# Patient Record
Sex: FEMALE | Race: WHITE | NOT HISPANIC OR LATINO | Employment: OTHER | URBAN - METROPOLITAN AREA
[De-identification: names, ages, dates, MRNs, and addresses within clinical notes are randomized per-mention and may not be internally consistent; named-entity substitution may affect disease eponyms.]

---

## 2017-01-09 DIAGNOSIS — J45.909 UNCOMPLICATED ASTHMA, UNSPECIFIED ASTHMA SEVERITY: ICD-10-CM

## 2017-01-09 NOTE — TELEPHONE ENCOUNTER
Have we ever prescribed this med? Yes.  If yes, what date? 8/8/16    Last OV: 5/2/16    Next OV: no pending    DX: Asthma    Medications: Qvar 80mcg

## 2017-01-10 NOTE — TELEPHONE ENCOUNTER
Spoke to patient advised RX sent to the pharmacy. Advised to follow up as advised annual with PFT pt was transferred to scheduling to be scheduled

## 2017-01-16 ENCOUNTER — TELEPHONE (OUTPATIENT)
Dept: PULMONOLOGY | Facility: HOSPICE | Age: 77
End: 2017-01-16

## 2017-01-16 NOTE — TELEPHONE ENCOUNTER
MEDICATION PRIOR AUTHORIZATION NEEDED:    1. Name of Medication: Qvar 80mcg-Tier Exception    2. Requested By (Name of Pharmacy): [facility]     3. Is insurance on file current? yes    4. What is the name & phone number of the 3rd party payor? [facility] [phone]

## 2017-01-18 NOTE — TELEPHONE ENCOUNTER
DOCUMENTATION OF PRIOR AUTH STATUS    1. Medication name and dose: Qvar 80mcg    2. Name and Phone # of Prescription coverage company: [employer] [phone]    3. Date Prior Auth was submitted: 1/13/2017    4. What information was given to obtain insurance decision: Clinical notes    5. Prior Auth letter Approved or Denied: Approved through 12/31/2017    6. Pharmacy notified: Yes    7. Patient notified: Yes

## 2017-01-23 ENCOUNTER — TELEPHONE (OUTPATIENT)
Dept: PULMONOLOGY | Facility: HOSPICE | Age: 77
End: 2017-01-23

## 2017-01-30 DIAGNOSIS — J45.909 UNCOMPLICATED ASTHMA, UNSPECIFIED ASTHMA SEVERITY: ICD-10-CM

## 2017-01-30 RX ORDER — FLUTICASONE PROPIONATE 110 UG/1
2 AEROSOL, METERED RESPIRATORY (INHALATION) 2 TIMES DAILY
Qty: 3 INHALER | Refills: 3 | Status: SHIPPED | OUTPATIENT
Start: 2017-01-30 | End: 2018-02-19 | Stop reason: SDUPTHER

## 2017-01-31 NOTE — TELEPHONE ENCOUNTER
1. Caller Name: pt                      Call Back Number: [phone] (home)       2. Message: Pt called in regards to the switch out from Qvar. Per last phone encounter WILLIAM Jang has suggested switching to Flovent 110mcg. Pt would like this sent as a 3 Mth supply to [facility].     3. Patient approves office to leave a detailed voicemail/MyChart message: yes

## 2017-02-24 ENCOUNTER — TELEPHONE (OUTPATIENT)
Dept: PULMONOLOGY | Facility: HOSPICE | Age: 77
End: 2017-02-24

## 2017-02-24 DIAGNOSIS — R05.9 COUGH: ICD-10-CM

## 2017-02-24 RX ORDER — AZITHROMYCIN 250 MG/1
TABLET, FILM COATED ORAL
Qty: 6 TAB | Refills: 0 | Status: SHIPPED | OUTPATIENT
Start: 2017-02-24 | End: 2017-12-15

## 2017-02-24 RX ORDER — METHYLPREDNISOLONE 4 MG/1
TABLET ORAL
Qty: 21 TAB | Refills: 0 | Status: SHIPPED | OUTPATIENT
Start: 2017-02-24 | End: 2017-12-15

## 2017-03-20 ENCOUNTER — TELEPHONE (OUTPATIENT)
Dept: PULMONOLOGY | Facility: HOSPICE | Age: 77
End: 2017-03-20

## 2017-03-20 DIAGNOSIS — R06.00 DYSPNEA, UNSPECIFIED TYPE: ICD-10-CM

## 2017-05-04 ENCOUNTER — NON-PROVIDER VISIT (OUTPATIENT)
Dept: PULMONOLOGY | Facility: HOSPICE | Age: 77
End: 2017-05-04
Payer: MEDICARE

## 2017-05-04 ENCOUNTER — OFFICE VISIT (OUTPATIENT)
Dept: PULMONOLOGY | Facility: HOSPICE | Age: 77
End: 2017-05-04
Payer: MEDICARE

## 2017-05-04 VITALS
SYSTOLIC BLOOD PRESSURE: 118 MMHG | TEMPERATURE: 97 F | WEIGHT: 178 LBS | HEART RATE: 85 BPM | BODY MASS INDEX: 28.61 KG/M2 | DIASTOLIC BLOOD PRESSURE: 76 MMHG | HEIGHT: 66 IN | RESPIRATION RATE: 16 BRPM

## 2017-05-04 DIAGNOSIS — J45.991 COUGH VARIANT ASTHMA: ICD-10-CM

## 2017-05-04 DIAGNOSIS — R06.00 DYSPNEA, UNSPECIFIED TYPE: ICD-10-CM

## 2017-05-04 PROCEDURE — 4040F PNEUMOC VAC/ADMIN/RCVD: CPT | Mod: 8P | Performed by: INTERNAL MEDICINE

## 2017-05-04 PROCEDURE — G8419 CALC BMI OUT NRM PARAM NOF/U: HCPCS | Performed by: INTERNAL MEDICINE

## 2017-05-04 PROCEDURE — 1101F PT FALLS ASSESS-DOCD LE1/YR: CPT | Mod: 8P | Performed by: INTERNAL MEDICINE

## 2017-05-04 PROCEDURE — G8432 DEP SCR NOT DOC, RNG: HCPCS | Performed by: INTERNAL MEDICINE

## 2017-05-04 PROCEDURE — 94060 EVALUATION OF WHEEZING: CPT | Performed by: INTERNAL MEDICINE

## 2017-05-04 PROCEDURE — 99214 OFFICE O/P EST MOD 30 MIN: CPT | Mod: 25 | Performed by: INTERNAL MEDICINE

## 2017-05-04 PROCEDURE — 1036F TOBACCO NON-USER: CPT | Performed by: INTERNAL MEDICINE

## 2017-05-04 PROCEDURE — 94729 DIFFUSING CAPACITY: CPT | Performed by: INTERNAL MEDICINE

## 2017-05-04 PROCEDURE — 3017F COLORECTAL CA SCREEN DOC REV: CPT | Mod: 8P | Performed by: INTERNAL MEDICINE

## 2017-05-04 PROCEDURE — 3014F SCREEN MAMMO DOC REV: CPT | Performed by: INTERNAL MEDICINE

## 2017-05-04 PROCEDURE — 94726 PLETHYSMOGRAPHY LUNG VOLUMES: CPT | Performed by: INTERNAL MEDICINE

## 2017-05-04 RX ORDER — TURMERIC ROOT EXTRACT 500 MG
500 TABLET ORAL 2 TIMES DAILY
COMMUNITY

## 2017-05-04 RX ORDER — MAGNESIUM CARB/ALUMINUM HYDROX 105-160MG
1 TABLET,CHEWABLE ORAL DAILY
COMMUNITY
End: 2023-05-09

## 2017-05-04 RX ORDER — THYROID 60 MG/1
60 TABLET ORAL DAILY
COMMUNITY
End: 2017-12-15

## 2017-05-04 ASSESSMENT — PULMONARY FUNCTION TESTS
FVC_PREDICTED: 3.08
FVC: 2.57
FEV1/FVC_PERCENT_PREDICTED: 104
FEV1/FVC: 78.21
FEV1/FVC_PERCENT_CHANGE: 100
FEV1_PERCENT_CHANGE: 2
FEV1_PERCENT_CHANGE: 2
FEV1_PREDICTED: 2.32
FVC: 2.51
FEV1: 1.95
FEV1/FVC: 78
FEV1_PERCENT_PREDICTED: 84
FVC_PERCENT_PREDICTED: 81
FEV1/FVC_PERCENT_PREDICTED: 104
FVC_PERCENT_PREDICTED: 83
FEV1_PERCENT_PREDICTED: 86
FEV1/FVC_PERCENT_PREDICTED: 75
FEV1: 2.01

## 2017-05-04 NOTE — PROCEDURES
Good patient effort & cooperation.  The results of this test meet the ATS standards for acceptability and repeatability.  The Spirometry results meet the ATS/ERS standards acceptability & repeatability.  The DLCO was uncorrected for Hgb.  A bronchodilator of Ventolin HFA- 2puffs via spacer was administered.    The FVC is 2.57 L or 83%, FEV1 is 2.01 L or 86%, FEV1/FVC 78%. TLC 93%. DLCO 146%. No bronchodilator response.    Interpretation:  Normal pulmonary function.  Normal gas transfer.  Good patient effort and cooperation.

## 2017-05-04 NOTE — PROGRESS NOTES
Chief Complaint   Patient presents with   • Results     PFT results.       HPI: This patient is a 76 y.o. Female who returns for annual follow-up of cough variant asthma. She was on Qvar 80 µg however formulary changed her to Flovent 110 µg. She denies cough, wheezing, dyspnea or chest tightness. Her pulmonary function testing show normal lung function with improved FEV1 to 2.01 L or 86%. No significant bronchodilator response was noted. She feels well overall, typically has one bout of bronchitis annually requiring antibiotics.   She feels well and has no specific complaints.      Past Medical History   Diagnosis Date   • Hyperlipidemia    • Hypothyroidism    • Lung nodules July 2009 stable 2010   • HTN (hypertension)        Social History     Social History   • Marital Status:      Spouse Name: N/A   • Number of Children: N/A   • Years of Education: N/A     Occupational History   • Not on file.     Social History Main Topics   • Smoking status: Former Smoker -- 1.00 packs/day for 15 years     Types: Cigarettes     Quit date: 01/01/1977   • Smokeless tobacco: Never Used      Comment: STARTED 1960  QUIT  1977   • Alcohol Use: 0.0 oz/week     0 Standard drinks or equivalent per week      Comment: MIXED DRINKS OCCASIONALLY   • Drug Use: No   • Sexual Activity: Not on file     Other Topics Concern   • Not on file     Social History Narrative       History reviewed. No pertinent family history.    Current Outpatient Prescriptions on File Prior to Visit   Medication Sig Dispense Refill   • fluticasone (FLOVENT HFA) 110 MCG/ACT Aerosol Inhale 2 Puffs by mouth 2 times a day. Use spacer. Rinse mouth after each use. 3 Inhaler 3   • Albuterol Sulfate (PROAIR RESPICLICK) 108 (90 BASE) MCG/ACT AEROSOL POWDER, BREATH ACTIVATED Inhale 2 Puffs by mouth as needed (for shortness of breath, wheezing.). every 4-6 hours as needed. 1 Each 0   • simvastatin (ZOCOR) 40 MG Tab Take 40 mg by mouth every day.     • lorazepam  (ATIVAN) 0.5 MG TABS Take 1 Tab by mouth 2 Times a Day. 100 Each 1   • thyroid (ARMOUR THYROID) 90 MG TABS Take 1 Tab by mouth every day. 100 Each 3   • vitamin D (CHOLECALCIFEROL) 1000 UNIT TABS Take 1,000 Units by mouth every day.     • Magnesium 250 MG TABS Take 400 Tabs by mouth every day.     • Lutein 20 MG TABS Take 25 mg by mouth every day.     • Bioflavonoid Products (LISSY-C) TABS Take 1,000 Tabs by mouth every day.     • Multiple Vitamin (MULTIVITAMIN PO) Take 1 tablet by mouth every day.     • docosahexanoic acid (FISH OIL) 1000 MG CAPS Take 1,000 mg by mouth every day.     • azithromycin (ZITHROMAX) 250 MG Tab Take 2 tablets on day 1, then take 1 tablet a day for 4 days. 6 Tab 0   • MethylPREDNISolone (MEDROL DOSEPAK) 4 MG Tablet Therapy Pack Take as directed. 21 Tab 0   • beclomethasone (QVAR) 80 MCG/ACT inhaler Inhale 2 Puffs by mouth 2 times a day. 1 Inhaler 5   • MethylPREDNISolone (MEDROL DOSEPAK) 4 MG Tablet Therapy Pack Take as directed. 21 Tab 0   • azithromycin (ZITHROMAX) 250 MG Tab Take as directed 6 Tab 0   • albuterol 108 (90 BASE) MCG/ACT Aero Soln inhalation aerosol Inhale 2 Puffs by mouth every four hours as needed for Shortness of Breath. 1 Inhaler 1   • simvastatin (ZOCOR) 40 MG Tab Take 40 mg by mouth every evening.     • NON SPECIFIED CALCIUM TABLETS ONE BY MOUTH DAILY.     • clotrimazole (LOTRIMIN) 1 % CREA Apply  to affected area(s) 2 times a day. Apply to affected area 2 times a day prn  Safe way [phone] 1 Tube 3   • fluticasone-salmeterol (ADVAIR DISKUS) 250-50 MCG/DOSE AEPB Inhale 1 Puff by mouth every 12 hours.     • Ranitidine HCl (RANITIDINE 75 PO) Take 75 mg by mouth 2 Times a Day.       No current facility-administered medications on file prior to visit.       Allergies: Atenolol    ROS:   Constitutional: Denies fevers, chills, night sweats, fatigue or weight loss  Eyes: Denies vision loss, pain, drainage, double vision  Ears, Nose, Throat: Denies earache, difficulty  "hearing, tinnitus, nasal congestion, hoarseness  Cardiovascular: Denies chest pain, tightness, palpitations, orthopnea or edema  Respiratory: As in history of present illness  Sleep: Denies daytime sleepiness, snoring, apneas, insomnia, morning headaches  GI: Denies heartburn, dysphagia, nausea, abdominal pain, diarrhea or constipation  : Denies frequent urination, hematuria, discharge or painful urination  Musculoskeletal: Denies back pain, painful joints, sore muscles  Neurological: Denies weakness or headaches  Skin: No rashes    Blood pressure 118/76, pulse 85, temperature 36.1 °C (97 °F), temperature source Temporal, resp. rate 16, height 1.676 m (5' 5.98\"), weight 80.74 kg (178 lb).  Multi-Ox Readings  Multi Ox #1     O2 sat % at rest     O2 sat % on exertion     O2 sat average on exertion     Multi Ox #2     O2 sat % at rest     O2 sat % on exertion     O2 sat average on exertion       Oxygen Use     Oxygen Frequency     Duration of need     Is the patient mobile within the home?     CPAP Use?     BIPAP Use?     Servo Titration         Physical Exam:  Appearance: Well-nourished, well-developed, in no acute distress  HEENT: Normocephalic, atraumatic, white sclera, PERRLA, oropharynx clear  Neck: No adenopathy or masses  Respiratory: no intercostal retractions or accessory muscle use  Lungs auscultation: Clear to auscultation bilaterally  Cardiovascular: Regular rate rhythm. No murmurs, rubs or gallops.  No LE edema  Abdomen: soft, nondistended  Gait: Normal  Digits: No clubbing, cyanosis  Motor: No focal deficits  Orientation: Oriented to time, person and place    Diagnosis:  1. Cough variant asthma  AMB SPIROMETRY       Plan:  The patient's cough variant asthma is under good clinical control with inhaled corticosteroids. Continue Flovent 110 µg 2 puffs twice a day, rinsing mouth after use. Use ProAir HFA when necessary.  Pneumococcal vaccine, and annual influenza vaccine, advised.  Return in about 1 year " (around 5/4/2018) for with spirometry.

## 2017-05-04 NOTE — MR AVS SNAPSHOT
Karli Berrios   2017 1:00 PM   Non-Provider Visit   MRN: [id number]    Department:  [facility]   Dept Phone:  [phone]    Description:  Female : 1940   Provider:  Eli Jaramillo M.D.           Reason for Visit     Shortness of Breath           Allergies as of 2017     Allergen Noted Reactions    Atenolol 10/15/2014       Side effect. Leg Cramp      You were diagnosed with     Dyspnea, unspecified type   [[id number]]         Vital Signs     Smoking Status                   Former Smoker           Basic Information     Date Of Birth Sex Race Ethnicity Preferred Language    1940 Female White Non- English      Your appointments     May 04, 2017  2:20 PM   Established Patient Pul with Eli Jaramillo M.D.   [facility] (--)    [address]  [facility] [address]  [facility] [id number] [phone]              Problem List              ICD-10-CM Priority Class Noted - Resolved    Hypothyroidism E03.9   Unknown - Present    Hyperlipidemia E78.5   Unknown - Present    Neck pain M54.2   2010 - Present    Cough R05   2010 - Present    Lung nodules R91.8   2010 - Present    Asthma, mild intermittent J45.20   2016 - Present      Health Maintenance        Date Due Completion Dates    IMM DTaP/Tdap/Td Vaccine (1 - Tdap) 1961 ---    PAP SMEAR 1963 ---    COLONOSCOPY 1992 ---    IMM ZOSTER VACCINE 2002 ---    IMM PNEUMOCOCCAL 65+ (ADULT) LOW/MEDIUM RISK SERIES (1 of 2 - PCV13) 2007 ---    BONE DENSITY 3/10/2010 3/10/2005    MAMMOGRAM 2017, 10/5/2015, 2014, 2013, 2012, 2011, 2010, 2010, 2009, 2009, 6/3/2008, 6/3/2008, 5/3/2006, 2005            Current Immunizations     Influenza Vaccine Quad Inj (Pf) 2016  4:45 PM      Below and/or attached are the medications your provider expects you to take. Review all of your home medications and newly ordered medications with your  provider and/or pharmacist. Follow medication instructions as directed by your provider and/or pharmacist. Please keep your medication list with you and share with your provider. Update the information when medications are discontinued, doses are changed, or new medications (including over-the-counter products) are added; and carry medication information at all times in the event of emergency situations     Allergies:  ATENOLOL - (reactions not documented)               Medications  Valid as of: May 04, 2017 -  2:15 PM    Generic Name Brand Name Tablet Size Instructions for use    Albuterol Sulfate (Aero Soln) albuterol 108 (90 BASE) MCG/ACT Inhale 2 Puffs by mouth every four hours as needed for Shortness of Breath.        Albuterol Sulfate (AEROSOL POWDER, BREATH ACTIVATED) Albuterol Sulfate 108 (90 BASE) MCG/ACT Inhale 2 Puffs by mouth as needed (for shortness of breath, wheezing.). every 4-6 hours as needed.        Azithromycin (Tab) ZITHROMAX 250 MG Take as directed        Azithromycin (Tab) ZITHROMAX 250 MG Take 2 tablets on day 1, then take 1 tablet a day for 4 days.        Beclomethasone Dipropionate (Aero Soln) QVAR 80 MCG/ACT Inhale 2 Puffs by mouth 2 times a day.        Bioflavonoid Products (Tab) LISSY-C  Take 1,000 Tabs by mouth every day.        Calcium Carbonate-Vit D-Min   Take 1 tablet by mouth every day.        Cholecalciferol (Tab) cholecalciferol 1000 UNIT Take 1,000 Units by mouth every day.        Clotrimazole (Cream) LOTRIMIN 1 % Apply  to affected area(s) 2 times a day. Apply to affected area 2 times a day prn  Safe way [phone]        Cyanocobalamin   Take 1,000 Tabs by mouth every day.        Fluticasone Propionate HFA (Aerosol) FLOVENT  MCG/ACT Inhale 2 Puffs by mouth 2 times a day. Use spacer. Rinse mouth after each use.        Fluticasone-Salmeterol (AEROSOL POWDER, BREATH ACTIVATED) ADVAIR 250-50 MCG/DOSE Inhale 1 Puff by mouth every 12 hours.        Glucosamine-Chondroitin (Tab)  Glucosamine-Chondroitin 750-600 MG Take 1 tablet by mouth every day.        LORazepam (Tab) ATIVAN 0.5 MG Take 1 Tab by mouth 2 Times a Day.        Lutein (Tab) Lutein 20 MG Take 25 mg by mouth every day.        Magnesium (Tab) Magnesium 250 MG Take 400 Tabs by mouth every day.        MethylPREDNISolone (Tablet Therapy Pack) MEDROL DOSEPAK 4 MG Take as directed.        MethylPREDNISolone (Tablet Therapy Pack) MEDROL DOSEPAK 4 MG Take as directed.        Multiple Vitamins-Minerals   Take 1 tablet by mouth every day.        NON SPECIFIED   CALCIUM TABLETS ONE BY MOUTH DAILY.        Omega-3 Fatty Acids (Cap) OMEGA 3 FA 1000 MG Take 1,000 mg by mouth every day.        RaNITidine HCl   Take 75 mg by mouth 2 Times a Day.        Simvastatin (Tab) ZOCOR 40 MG Take 40 mg by mouth every day.        Simvastatin (Tab) ZOCOR 40 MG Take 40 mg by mouth every evening.        Thyroid (Tab) ARMOUR THYROID 90 MG Take 1 Tab by mouth every day.        Thyroid (Tab) ARMOUR THYROID 60 MG Take 60 mg by mouth every day.        Turmeric (Tab) Turmeric 500 MG Take 1 tablet by mouth 2 Times a Day.        .                 Medicines prescribed today were sent to:     [facility] [address] [place] [address], [place] - [address]    [address] [facility] [place] [address]    Phone: [phone] Fax: [phone]    Open 24 Hours?: No    [facility] [place], [place] - [address]    [address] [place] [address]    Phone: [phone] Fax: [phone]    Open 24 Hours?: No      Medication refill instructions:       If your prescription bottle indicates you have medication refills left, it is not necessary to call your provider’s office. Please contact your pharmacy and they will refill your medication.    If your prescription bottle indicates you do not have any refills left, you may request refills at any time through one of the following ways: The online [employer] system (except Urgent Care), by calling your provider’s office,  or by asking your pharmacy to contact your provider’s office with a refill request. Medication refills are processed only during regular business hours and may not be available until the next business day. Your provider may request additional information or to have a follow-up visit with you prior to refilling your medication.   *Please Note: Medication refills are assigned a new Rx number when refilled electronically. Your pharmacy may indicate that no refills were authorized even though a new prescription for the same medication is available at the pharmacy. Please request the medicine by name with the pharmacy before contacting your provider for a refill.           [email or web address] Access Code: [email or web address]  Expires: 6/2/2017  3:31 PM    PicRate.Me  A secure, online tool to manage your health information     [employer]’s PicRate.Me® is a secure, online tool that connects you to your personalized health information from the privacy of your home -- day or night - making it very easy for you to manage your healthcare. Once the activation process is completed, you can even access your medical information using the PicRate.Me sean, which is available for free in the Apple Sean store or Google Play store.     PicRate.Me provides the following levels of access (as shown below):   My Chart Features   [facility] Doctor [facility]  Specialists [facility]  Urgent  Care Non-[facility]  Primary Care  Doctor   Email your healthcare team securely and privately 24/7 X X X    Manage appointments: schedule your next appointment; view details of past/upcoming appointments X      Request prescription refills. X      View recent personal medical records, including lab and immunizations X X X X   View health record, including health history, allergies, medications X X X X   Read reports about your outpatient visits, procedures, consult and ER notes X X X X   See your discharge summary, which is a recap of your hospital and/or ER visit that includes  your diagnosis, lab results, and care plan. X X       How to register for [employer]:  1. Go to  [email or web address].  2. Click on the Sign Up Now box, which takes you to the New Member Sign Up page. You will need to provide the following information:  a. Enter your [employer] Access Code exactly as it appears at the top of this page. (You will not need to use this code after you’ve completed the sign-up process. If you do not sign up before the expiration date, you must request a new code.)   b. Enter your date of birth.   c. Enter your home email address.   d. Click Submit, and follow the next screen’s instructions.  3. Create a [employer] ID. This will be your [employer] login ID and cannot be changed, so think of one that is secure and easy to remember.  4. Create a [employer] password. You can change your password at any time.  5. Enter your Password Reset Question and Answer. This can be used at a later time if you forget your password.   6. Enter your e-mail address. This allows you to receive e-mail notifications when new information is available in [employer]net.  7. Click Sign Up. You can now view your health information.    For assistance activating your [employer] account, call [phone]

## 2017-05-04 NOTE — MR AVS SNAPSHOT
"Karli Berrios   2017 2:20 PM   Office Visit   MRN: [id number]    Department:  [facility]   Dept Phone:  [phone]    Description:  Female : 1940   Provider:  Eli Jaramillo M.D.           Reason for Visit     Results PFT results.      Allergies as of 2017     Allergen Noted Reactions    Atenolol 10/15/2014       Side effect. Leg Cramp      You were diagnosed with     Cough variant asthma   [493.82.ICD-9-CM]         Vital Signs     Blood Pressure Pulse Temperature Respirations Height Weight    118/76 mmHg 85 36.1 °C (97 °F) (Temporal) 16 1.676 m (5' 5.98\") 80.74 kg (178 lb)    Body Mass Index Smoking Status                28.74 kg/m2 Former Smoker          Basic Information     Date Of Birth Sex Race Ethnicity Preferred Language    1940 Female White Non- English      Problem List              ICD-10-CM Priority Class Noted - Resolved    Hypothyroidism E03.9   Unknown - Present    Hyperlipidemia E78.5   Unknown - Present    Neck pain M54.2   2010 - Present    Cough R05   2010 - Present    Lung nodules R91.8   2010 - Present    Asthma, mild intermittent J45.20   2016 - Present      Health Maintenance        Date Due Completion Dates    IMM DTaP/Tdap/Td Vaccine (1 - Tdap) 1961 ---    PAP SMEAR 1963 ---    COLONOSCOPY 1992 ---    IMM ZOSTER VACCINE 2002 ---    IMM PNEUMOCOCCAL 65+ (ADULT) LOW/MEDIUM RISK SERIES (1 of 2 - PCV13) 2007 ---    BONE DENSITY 3/10/2010 3/10/2005    MAMMOGRAM 2017, 10/5/2015, 2014, 2013, 2012, 2011, 2010, 2010, 2009, 2009, 6/3/2008, 6/3/2008, 5/3/2006, 2005            Current Immunizations     Influenza Vaccine Quad Inj (Pf) 2016  4:45 PM      Below and/or attached are the medications your provider expects you to take. Review all of your home medications and newly ordered medications with your provider and/or pharmacist. Follow medication " instructions as directed by your provider and/or pharmacist. Please keep your medication list with you and share with your provider. Update the information when medications are discontinued, doses are changed, or new medications (including over-the-counter products) are added; and carry medication information at all times in the event of emergency situations     Allergies:  ATENOLOL - (reactions not documented)               Medications  Valid as of: May 04, 2017 -  2:33 PM    Generic Name Brand Name Tablet Size Instructions for use    Albuterol Sulfate (Aero Soln) albuterol 108 (90 BASE) MCG/ACT Inhale 2 Puffs by mouth every four hours as needed for Shortness of Breath.        Albuterol Sulfate (AEROSOL POWDER, BREATH ACTIVATED) Albuterol Sulfate 108 (90 BASE) MCG/ACT Inhale 2 Puffs by mouth as needed (for shortness of breath, wheezing.). every 4-6 hours as needed.        Azithromycin (Tab) ZITHROMAX 250 MG Take as directed        Azithromycin (Tab) ZITHROMAX 250 MG Take 2 tablets on day 1, then take 1 tablet a day for 4 days.        Beclomethasone Dipropionate (Aero Soln) QVAR 80 MCG/ACT Inhale 2 Puffs by mouth 2 times a day.        Bioflavonoid Products (Tab) LISSY-C  Take 1,000 Tabs by mouth every day.        Calcium Carbonate-Vit D-Min   Take 1 tablet by mouth every day.        Cholecalciferol (Tab) cholecalciferol 1000 UNIT Take 1,000 Units by mouth every day.        Clotrimazole (Cream) LOTRIMIN 1 % Apply  to affected area(s) 2 times a day. Apply to affected area 2 times a day prn  [address] [phone]        Cyanocobalamin   Take 1,000 Tabs by mouth every day.        Fluticasone Propionate HFA (Aerosol) FLOVENT  MCG/ACT Inhale 2 Puffs by mouth 2 times a day. Use spacer. Rinse mouth after each use.        Fluticasone-Salmeterol (AEROSOL POWDER, BREATH ACTIVATED) ADVAIR 250-50 MCG/DOSE Inhale 1 Puff by mouth every 12 hours.        Glucosamine-Chondroitin (Tab) Glucosamine-Chondroitin 750-600 MG Take 1  tablet by mouth every day.        LORazepam (Tab) ATIVAN 0.5 MG Take 1 Tab by mouth 2 Times a Day.        Lutein (Tab) Lutein 20 MG Take 25 mg by mouth every day.        Magnesium (Tab) Magnesium 250 MG Take 400 Tabs by mouth every day.        MethylPREDNISolone (Tablet Therapy Pack) MEDROL DOSEPAK 4 MG Take as directed.        MethylPREDNISolone (Tablet Therapy Pack) MEDROL DOSEPAK 4 MG Take as directed.        Multiple Vitamins-Minerals   Take 1 tablet by mouth every day.        NON SPECIFIED   CALCIUM TABLETS ONE BY MOUTH DAILY.        Omega-3 Fatty Acids (Cap) OMEGA 3 FA 1000 MG Take 1,000 mg by mouth every day.        RaNITidine HCl   Take 75 mg by mouth 2 Times a Day.        Simvastatin (Tab) ZOCOR 40 MG Take 40 mg by mouth every day.        Simvastatin (Tab) ZOCOR 40 MG Take 40 mg by mouth every evening.        Thyroid (Tab) ARMOUR THYROID 90 MG Take 1 Tab by mouth every day.        Thyroid (Tab) ARMOUR THYROID 60 MG Take 60 mg by mouth every day.        Turmeric (Tab) Turmeric 500 MG Take 1 tablet by mouth 2 Times a Day.        .                 Medicines prescribed today were sent to:     [facility] [address] [place] [address], [place] - [address] [facility]    [address] [facility] [address]    Phone: [phone] Fax: [phone]    Open 24 Hours?: No    [facility] - [place], [place] - [address]    [address] [facility] [place] [address]    Phone: [phone] Fax: [phone]    Open 24 Hours?: No      Medication refill instructions:       If your prescription bottle indicates you have medication refills left, it is not necessary to call your provider’s office. Please contact your pharmacy and they will refill your medication.    If your prescription bottle indicates you do not have any refills left, you may request refills at any time through one of the following ways: The online [employer] system (except Urgent Care), by calling your provider’s office, or by asking your pharmacy to contact your  provider’s office with a refill request. Medication refills are processed only during regular business hours and may not be available until the next business day. Your provider may request additional information or to have a follow-up visit with you prior to refilling your medication.   *Please Note: Medication refills are assigned a new Rx number when refilled electronically. Your pharmacy may indicate that no refills were authorized even though a new prescription for the same medication is available at the pharmacy. Please request the medicine by name with the pharmacy before contacting your provider for a refill.        Your To Do List     Future Labs/Procedures Complete By Expires    AMB SPIROMETRY  5/4/2018 5/4/2018         [employer] Access Code: [email or web address]  Expires: 6/2/2017  3:31 PM    [employer]  A secure, online tool to manage your health information     [employer]® is a secure, online tool that connects you to your personalized health information from the privacy of your home -- day or night - making it very easy for you to manage your healthcare. Once the activation process is completed, you can even access your medical information using the [employer] sean, which is available for free in the Apple Sean store or Google Play store.     [employer] provides the following levels of access (as shown below):   My Chart Features   [facility] Doctor [facility]  Specialists [facility]  Urgent  Care Non-[facility]  Primary Care  Doctor   Email your healthcare team securely and privately 24/7 X X X    Manage appointments: schedule your next appointment; view details of past/upcoming appointments X      Request prescription refills. X      View recent personal medical records, including lab and immunizations X X X X   View health record, including health history, allergies, medications X X X X   Read reports about your outpatient visits, procedures, consult and ER notes X X X X   See your discharge summary, which  is a recap of your hospital and/or ER visit that includes your diagnosis, lab results, and care plan. X X       How to register for [employer]:  1. Go to  [email or web address].  2. Click on the Sign Up Now box, which takes you to the New Member Sign Up page. You will need to provide the following information:  a. Enter your [employer] Access Code exactly as it appears at the top of this page. (You will not need to use this code after you’ve completed the sign-up process. If you do not sign up before the expiration date, you must request a new code.)   b. Enter your date of birth.   c. Enter your home email address.   d. Click Submit, and follow the next screen’s instructions.  3. Create a [employer] ID. This will be your [employer] login ID and cannot be changed, so think of one that is secure and easy to remember.  4. Create a [employer] password. You can change your password at any time.  5. Enter your Password Reset Question and Answer. This can be used at a later time if you forget your password.   6. Enter your e-mail address. This allows you to receive e-mail notifications when new information is available in [employer].  7. Click Sign Up. You can now view your health information.    For assistance activating your [employer] account, call [phone]

## 2017-11-10 ENCOUNTER — HOSPITAL ENCOUNTER (OUTPATIENT)
Dept: RADIOLOGY | Facility: MEDICAL CENTER | Age: 77
End: 2017-11-10
Attending: OBSTETRICS & GYNECOLOGY
Payer: MEDICARE

## 2017-11-10 DIAGNOSIS — Z12.39 BREAST SCREENING: ICD-10-CM

## 2017-11-10 PROCEDURE — G0202 SCR MAMMO BI INCL CAD: HCPCS

## 2018-02-19 DIAGNOSIS — J45.909 UNCOMPLICATED ASTHMA: ICD-10-CM

## 2018-02-20 RX ORDER — DEXAMETHASONE 4 MG/1
TABLET ORAL
Qty: 1 INHALER | Refills: 3 | Status: SHIPPED | OUTPATIENT
Start: 2018-02-20 | End: 2018-08-06 | Stop reason: SDUPTHER

## 2018-02-20 NOTE — TELEPHONE ENCOUNTER
Have we ever prescribed this med? Yes.  If yes, what date? 1/30/17    Last OV: 5/4/17- Annual    Next OV: No pending apt scheduled    DX: Uncomplicated asthma, unspecified asthma severity (J45.909)    Medications: fluticasone (FLOVENT HFA) 110 MCG/ACT Aerosol

## 2018-08-01 ENCOUNTER — TELEPHONE (OUTPATIENT)
Dept: PULMONOLOGY | Facility: HOSPICE | Age: 78
End: 2018-08-01

## 2018-08-01 DIAGNOSIS — R06.02 SOB (SHORTNESS OF BREATH): ICD-10-CM

## 2018-08-06 DIAGNOSIS — J45.20 MILD INTERMITTENT ASTHMA WITHOUT COMPLICATION: ICD-10-CM

## 2018-08-06 DIAGNOSIS — J45.909 UNCOMPLICATED ASTHMA, UNSPECIFIED ASTHMA SEVERITY, UNSPECIFIED WHETHER PERSISTENT: ICD-10-CM

## 2018-08-06 DIAGNOSIS — J40 BRONCHITIS: ICD-10-CM

## 2018-08-06 RX ORDER — FLUTICASONE PROPIONATE 110 UG/1
AEROSOL, METERED RESPIRATORY (INHALATION)
Qty: 3 INHALER | Refills: 3 | Status: SHIPPED | OUTPATIENT
Start: 2018-08-06 | End: 2019-04-18 | Stop reason: SDUPTHER

## 2018-08-06 NOTE — TELEPHONE ENCOUNTER
Caller Name: Karli Berrios                 Call Back Number: [phone] (home)         Patient approves a detailed voicemail message: yes    Have we ever prescribed this med? Yes.  If yes, what date? 12/8/16    Last OV: 5/4/17- Dr. Jaramillo    Next OV: 8/6/18    DX: Mild intermittent asthma w/o comp    Medications: Flovent HFA, Proair respiclick

## 2018-08-21 ENCOUNTER — OFFICE VISIT (OUTPATIENT)
Dept: PULMONOLOGY | Facility: HOSPICE | Age: 78
End: 2018-08-21
Payer: MEDICARE

## 2018-08-21 ENCOUNTER — NON-PROVIDER VISIT (OUTPATIENT)
Dept: PULMONOLOGY | Facility: HOSPICE | Age: 78
End: 2018-08-21
Payer: MEDICARE

## 2018-08-21 VITALS
SYSTOLIC BLOOD PRESSURE: 124 MMHG | RESPIRATION RATE: 16 BRPM | OXYGEN SATURATION: 95 % | TEMPERATURE: 97.9 F | HEART RATE: 71 BPM | DIASTOLIC BLOOD PRESSURE: 62 MMHG | BODY MASS INDEX: 28.28 KG/M2 | HEIGHT: 66 IN | WEIGHT: 176 LBS

## 2018-08-21 DIAGNOSIS — J45.20 MILD INTERMITTENT ASTHMA WITHOUT COMPLICATION: ICD-10-CM

## 2018-08-21 DIAGNOSIS — R06.02 SOB (SHORTNESS OF BREATH): ICD-10-CM

## 2018-08-21 DIAGNOSIS — Z23 NEED FOR VACCINATION: ICD-10-CM

## 2018-08-21 PROCEDURE — 94060 EVALUATION OF WHEEZING: CPT | Performed by: INTERNAL MEDICINE

## 2018-08-21 PROCEDURE — 99213 OFFICE O/P EST LOW 20 MIN: CPT | Performed by: NURSE PRACTITIONER

## 2018-08-21 RX ORDER — THYROID 60 MG
60 TABLET ORAL
COMMUNITY
Start: 2018-06-20 | End: 2022-02-04 | Stop reason: SDUPTHER

## 2018-08-21 ASSESSMENT — PULMONARY FUNCTION TESTS
FVC: 2.29
FEV1_PREDICTED: 2.27
FEV1/FVC: 78.6
FVC: 2.29
FEV1/FVC_PERCENT_CHANGE: 0
FEV1: 1.8
FEV1/FVC_PERCENT_PREDICTED: 103
FEV1_PERCENT_CHANGE: 0
FEV1_PERCENT_CHANGE: 1
FEV1/FVC: 77
FEV1: 1.77
FVC_PERCENT_PREDICTED: 75
FEV1/FVC_PREDICTED: 74.92
FEV1_PREDICTED: 77
FVC_PREDICTED: 3.03

## 2018-08-21 ASSESSMENT — ENCOUNTER SYMPTOMS
CONSTITUTIONAL NEGATIVE: 1
COUGH: 1
NEUROLOGICAL NEGATIVE: 1
HEMOPTYSIS: 0
EYE DISCHARGE: 0
PSYCHIATRIC NEGATIVE: 1
EYE PAIN: 0
SHORTNESS OF BREATH: 0
SPUTUM PRODUCTION: 0
CARDIOVASCULAR NEGATIVE: 1
MUSCULOSKELETAL NEGATIVE: 1
BRUISES/BLEEDS EASILY: 0
GASTROINTESTINAL NEGATIVE: 1

## 2018-08-21 NOTE — PROGRESS NOTES
Chief Complaint   Patient presents with   • Follow-Up     PFT results         HPI: This patient is a 77 y.o. female, who presents for annual follow-up of cough variant asthma.  She is a former smoker, brief and distant smoking history.  15 pack year history quit 1977.    Spirometry today is stable with an FEV1 of 1.77 L 77% predicted, FEV1 FVC ratio of 77, no significant bronchodilator response.  She uses Flovent 110 mcg 2 puffs twice a day.  Patient reports her breathing has been stable.  She notes some mild chest congestion over the past couple of days.  She denies purulent cough, hemoptysis, wheeze, fever, chills, night sweats.  She follows with her primary care physician routinely.  She reports that she has had both of her pneumonia vaccines at [facility] by her house.  She will get those records and call our office so our system can be updated.    Past Medical History:   Diagnosis Date   • HTN (hypertension)    • Hyperlipidemia    • Hypothyroidism    • Lung nodules July 2009    stable 2010       Social History   Substance Use Topics   • Smoking status: Former Smoker     Packs/day: 1.00     Years: 15.00     Types: Cigarettes     Quit date: 1/1/1977   • Smokeless tobacco: Never Used      Comment: STARTED 1960  QUIT  1977   • Alcohol use 0.0 oz/week      Comment: MIXED DRINKS OCCASIONALLY       History reviewed. No pertinent family history.    Immunization History   Administered Date(s) Administered   • Hepatitis A Vaccine, Adult 07/31/2001, 02/07/2002   • Hepatitis B Vaccine Recombivax (Adol/Adult) 07/31/2001, 09/12/2001, 02/07/2002   • Influenza Vaccine Adult HD 10/03/2011, 09/27/2013, 09/17/2014, 09/23/2015, 09/21/2017   • Influenza Vaccine Quad Inj (Pf) 09/19/2016       Current medications as of today   Current Outpatient Prescriptions   Medication Sig Dispense Refill   • fluticasone (FLOVENT HFA) 110 MCG/ACT Aerosol INHALE TWO PUFFS BY MOUTH TWICE DAILY USE  SPACER.  RINSE  MOUTH  AFTER  EACH  USE 3 Inhaler 3  "  • Calcium Carbonate-Vit D-Min (CALCIUM 1200 PO) Take 1 tablet by mouth every day.     • Cyanocobalamin (VITAMIN B12 PO) Take 1,000 Tabs by mouth every day.     • Glucosamine-Chondroitin 750-600 MG Tab Take 1 tablet by mouth every day.     • Turmeric 500 MG Tab Take 1 tablet by mouth 2 Times a Day.     • albuterol 108 (90 BASE) MCG/ACT Aero Soln inhalation aerosol Inhale 2 Puffs by mouth every four hours as needed for Shortness of Breath. 1 Inhaler 1   • simvastatin (ZOCOR) 40 MG Tab Take 40 mg by mouth every day.     • lorazepam (ATIVAN) 0.5 MG TABS Take 1 Tab by mouth 2 Times a Day. 100 Each 1   • thyroid (ARMOUR THYROID) 90 MG TABS Take 1 Tab by mouth every day. 100 Each 3   • vitamin D (CHOLECALCIFEROL) 1000 UNIT TABS Take 1,000 Units by mouth every day.     • Magnesium 250 MG TABS Take 400 Tabs by mouth every day.     • Lutein 20 MG TABS Take 25 mg by mouth every day.     • Bioflavonoid Products (LISSY-C) TABS Take 1,000 Tabs by mouth every day.     • Multiple Vitamin (MULTIVITAMIN PO) Take 1 tablet by mouth every day.     • docosahexanoic acid (FISH OIL) 1000 MG CAPS Take 1,000 mg by mouth every day.     • ARMOUR THYROID 60 MG Tab      • Albuterol Sulfate (PROAIR RESPICLICK) 108 (90 Base) MCG/ACT AEROSOL POWDER, BREATH ACTIVATED Inhale 2 Puffs by mouth as needed (for shortness of breath, wheezing.). every 4-6 hours as needed. 1 Each 5   • azithromycin (ZITHROMAX) 250 MG Tab Take two pills today then one a day for the next four days 6 Tab 0   • MethylPREDNISolone (MEDROL DOSEPAK) 4 MG Tablet Therapy Pack Take as directed. 21 Tab 0     No current facility-administered medications for this visit.        Allergies: Atenolol    Blood pressure 124/62, pulse 71, temperature 36.6 °C (97.9 °F), resp. rate 16, height 1.676 m (5' 6\"), weight 79.8 kg (176 lb), SpO2 95 %.      Review of Systems   Constitutional: Negative.    HENT: Negative.    Eyes: Negative for pain and discharge.   Respiratory: Positive for cough. " Negative for hemoptysis, sputum production and shortness of breath.    Cardiovascular: Negative.    Gastrointestinal: Negative.    Musculoskeletal: Negative.    Skin: Negative.    Neurological: Negative.    Endo/Heme/Allergies: Negative for environmental allergies. Does not bruise/bleed easily.   Psychiatric/Behavioral: Negative.        Physical Exam   Constitutional: She is oriented to person, place, and time and well-developed, well-nourished, and in no distress.   HENT:   Head: Normocephalic and atraumatic.   Eyes: Pupils are equal, round, and reactive to light.   Neck: Normal range of motion. Neck supple. No tracheal deviation present.   Cardiovascular: Normal rate, regular rhythm and normal heart sounds.    Pulmonary/Chest: Effort normal and breath sounds normal.   Musculoskeletal: Normal range of motion.   Neurological: She is alert and oriented to person, place, and time. Gait normal.   Skin: Skin is warm and dry.   Psychiatric: Mood, memory, affect and judgment normal.   Vitals reviewed.      Diagnoses/Plan:      1. Mild intermittent asthma without complication  Continue current bronchodilators.  Patient has albuterol but rarely requires use.  She likes to minimize medication use if at all possible.  She has Medrol zpak on hand to take if necessary.    2. Need for vaccination  She will call with her pneumococcal vaccine history    She will follow-up here annually, sooner if needed    She is encouraged to follow-up with her primary care physician for routine health

## 2018-08-21 NOTE — PROCEDURES
Good patient effort & cooperation. The results of this test meet the ATS standards for acceptability and repeatability. Test was performed on the Curiyo/D spirometry system. Predicted values were N-Leonardo. A bronchodilator of Ventolin HFA - 2 puffs with a spacer was administered to the patient.    FEV1 is 1.77 L which is 77% of predicted.  FEV1 FVC ratio is normal.  No reversibility is noted.    No definite obstructive lung disease is noted.  A restrictive process cannot be ruled out without measurement of lung volume.

## 2018-09-21 DIAGNOSIS — J20.9 ACUTE BRONCHITIS WITH BRONCHOSPASM: ICD-10-CM

## 2018-09-21 RX ORDER — METHYLPREDNISOLONE 4 MG/1
TABLET ORAL
Qty: 21 TAB | Refills: 0 | Status: SHIPPED | OUTPATIENT
Start: 2018-09-21 | End: 2018-12-03

## 2018-09-21 NOTE — TELEPHONE ENCOUNTER
The patient called and she is asking for a new Rx of the Methylprednisone to have on hand.  She has a sore throat and does not want to go the weekend without having the med on hand in case she gets sick.  She took the last Rx when the smoke was so bad as she lives in [facility].  She would like the Rx sent to [facility] in [place].  Please advise, thank you.

## 2018-11-26 ENCOUNTER — HOSPITAL ENCOUNTER (OUTPATIENT)
Dept: RADIOLOGY | Facility: MEDICAL CENTER | Age: 78
End: 2018-11-26
Attending: NURSE PRACTITIONER
Payer: MEDICARE

## 2018-11-26 DIAGNOSIS — R22.30 AXILLARY MASS, UNSPECIFIED LATERALITY: ICD-10-CM

## 2018-11-26 DIAGNOSIS — R92.30 DENSE BREASTS: ICD-10-CM

## 2018-11-26 DIAGNOSIS — R92.2 DENSE BREASTS: ICD-10-CM

## 2018-11-26 DIAGNOSIS — Z12.31 BREAST CANCER SCREENING BY MAMMOGRAM: ICD-10-CM

## 2018-11-26 PROCEDURE — 76642 ULTRASOUND BREAST LIMITED: CPT | Mod: LT

## 2018-11-26 PROCEDURE — G0279 TOMOSYNTHESIS, MAMMO: HCPCS

## 2018-12-03 ENCOUNTER — TELEPHONE (OUTPATIENT)
Dept: PULMONOLOGY | Facility: HOSPICE | Age: 78
End: 2018-12-03

## 2018-12-03 DIAGNOSIS — J45.20 MILD INTERMITTENT ASTHMA WITHOUT COMPLICATION: ICD-10-CM

## 2018-12-03 RX ORDER — METHYLPREDNISOLONE 4 MG/1
TABLET ORAL
Qty: 21 TAB | Refills: 0 | Status: SHIPPED | OUTPATIENT
Start: 2018-12-03 | End: 2019-03-08 | Stop reason: SDUPTHER

## 2018-12-03 RX ORDER — PREDNISONE 10 MG/1
TABLET ORAL
Qty: 42 TAB | Refills: 0 | Status: CANCELLED | OUTPATIENT
Start: 2018-12-03

## 2018-12-03 NOTE — TELEPHONE ENCOUNTER
Caller Name:  Karli Berrios  Call Back Number: [phone]    Patient approves a detailed voicemail message: yes    Patient's symptoms    Consititutional: chest tightness from congestion    Fever: absent    Cough: absent     Is this a new symptom: Yes    Onset of symptom: several days ago    Frequency of symptom: rare    Has the patient tried anything to resolve symptoms: Yes    Has the patient taken their nebulizer/rescue inhaler: no, uses flovent but will not use rescue unless absolutely necessary     Additional details: none    Action taken per Active Symptom guide: patient requesting prednisone

## 2019-01-03 ENCOUNTER — TELEPHONE (OUTPATIENT)
Dept: PULMONOLOGY | Facility: HOSPICE | Age: 79
End: 2019-01-03

## 2019-01-03 DIAGNOSIS — R05.9 COUGH: ICD-10-CM

## 2019-01-03 RX ORDER — AZITHROMYCIN 250 MG/1
TABLET, FILM COATED ORAL
Qty: 6 TAB | Refills: 0 | Status: SHIPPED | OUTPATIENT
Start: 2019-01-03 | End: 2019-07-20

## 2019-01-03 NOTE — TELEPHONE ENCOUNTER
Caller Name:  Karli Berrios  Call Back Number: [phone] (home)      Patient approves a detailed voicemail message: yes    Patient's symptoms    Consititutional: non produc, chills    Fever: absent    Cough: non-productive    Is this a new symptom: Yes    Onset of symptom: a week ago    Frequency of symptom: infrequent    Has the patient tried anything to resolve symptoms: Yes    Has the patient taken their nebulizer/rescue inhaler: yes    Additional details: patient finished prednisone taper about 2 weeks ago due to a flare. She states she is pretty congested in her chest right now with no productive cough. She states she has some chills but no fever. She is wondering if she should try antibiotics.   Pharmacy on file confirmed       Action taken per Active Symptom guide: Urgent Care recommended    Patient agrees to recommended action per Active Symptom Escalation Protocol.

## 2019-03-08 DIAGNOSIS — J45.20 MILD INTERMITTENT ASTHMA WITHOUT COMPLICATION: ICD-10-CM

## 2019-03-08 RX ORDER — METHYLPREDNISOLONE 4 MG/1
TABLET ORAL
Qty: 21 TAB | Refills: 0 | Status: SHIPPED | OUTPATIENT
Start: 2019-03-08 | End: 2019-04-23

## 2019-03-08 NOTE — TELEPHONE ENCOUNTER
Have we ever prescribed this med? Yes.  If yes, what date? 12/03/2018    Last OV: 08/21/2018    Next OV: 08/19/2019    DX:  Acute bronchitis with bronchospasm (J20.9)    Medications:MethylPREDNISolone (MEDROL DOSEPAK) 4 MG Tablet Therapy Pack [[id number]         Pt said that she wants to have this on hand just to make sure she has it because she feels like if the Bronchitis is coming back.

## 2019-04-18 DIAGNOSIS — J45.20 MILD INTERMITTENT ASTHMA WITHOUT COMPLICATION: ICD-10-CM

## 2019-04-18 RX ORDER — FLUTICASONE PROPIONATE 110 UG/1
AEROSOL, METERED RESPIRATORY (INHALATION)
Qty: 3 INHALER | Refills: 3 | Status: SHIPPED | OUTPATIENT
Start: 2019-04-18 | End: 2020-05-01 | Stop reason: SDUPTHER

## 2019-04-18 NOTE — TELEPHONE ENCOUNTER
Received refill request from [facility] pharmacy fax [phone]     Have we ever prescribed this med? Yes.  If yes, what date? 8/6/18    Last OV: 8/21/18 SOFY Jo     Next OV: 4/23/19 SOFY Jo     DX: Mild intermittent asthma without complication    Medications: Flovent

## 2019-04-23 ENCOUNTER — OFFICE VISIT (OUTPATIENT)
Dept: PULMONOLOGY | Facility: HOSPICE | Age: 79
End: 2019-04-23
Payer: MEDICARE

## 2019-04-23 ENCOUNTER — HOSPITAL ENCOUNTER (OUTPATIENT)
Dept: RADIOLOGY | Facility: MEDICAL CENTER | Age: 79
End: 2019-04-23
Attending: NURSE PRACTITIONER
Payer: MEDICARE

## 2019-04-23 VITALS
SYSTOLIC BLOOD PRESSURE: 128 MMHG | HEART RATE: 98 BPM | DIASTOLIC BLOOD PRESSURE: 80 MMHG | HEIGHT: 66 IN | RESPIRATION RATE: 16 BRPM | BODY MASS INDEX: 26.65 KG/M2 | WEIGHT: 165.8 LBS | OXYGEN SATURATION: 96 %

## 2019-04-23 DIAGNOSIS — J45.20 MILD INTERMITTENT ASTHMA WITHOUT COMPLICATION: ICD-10-CM

## 2019-04-23 DIAGNOSIS — R06.02 SOB (SHORTNESS OF BREATH): ICD-10-CM

## 2019-04-23 DIAGNOSIS — R07.89 CHEST TIGHTNESS: ICD-10-CM

## 2019-04-23 PROCEDURE — 99214 OFFICE O/P EST MOD 30 MIN: CPT | Performed by: NURSE PRACTITIONER

## 2019-04-23 PROCEDURE — 71046 X-RAY EXAM CHEST 2 VIEWS: CPT

## 2019-04-23 RX ORDER — PREDNISONE 10 MG/1
TABLET ORAL
Qty: 18 TAB | Refills: 0 | Status: SHIPPED | OUTPATIENT
Start: 2019-04-23 | End: 2019-04-23

## 2019-04-23 RX ORDER — ALBUTEROL SULFATE 90 UG/1
2 AEROSOL, METERED RESPIRATORY (INHALATION) EVERY 4 HOURS PRN
Qty: 1 INHALER | Refills: 0 | Status: SHIPPED | OUTPATIENT
Start: 2019-04-23 | End: 2019-07-20

## 2019-04-23 RX ORDER — PREDNISONE 10 MG/1
TABLET ORAL
Qty: 18 TAB | Refills: 0 | Status: SHIPPED | OUTPATIENT
Start: 2019-04-23 | End: 2019-07-20

## 2019-04-23 ASSESSMENT — ENCOUNTER SYMPTOMS
FEVER: 0
WHEEZING: 0
WEIGHT LOSS: 0
EYE PAIN: 0
MUSCULOSKELETAL NEGATIVE: 1
BRUISES/BLEEDS EASILY: 0
CHILLS: 0
SHORTNESS OF BREATH: 1
COUGH: 0
DIAPHORESIS: 0
HEMOPTYSIS: 0
GASTROINTESTINAL NEGATIVE: 1
WEAKNESS: 0
CARDIOVASCULAR NEGATIVE: 1
EYE DISCHARGE: 0
PSYCHIATRIC NEGATIVE: 1
NEUROLOGICAL NEGATIVE: 1
SPUTUM PRODUCTION: 0

## 2019-04-23 NOTE — PROGRESS NOTES
"Chief Complaint   Patient presents with   • Asthma     Last Seen 18         HPI: This patient is a 78 y.o. female, who presents for follow-up of cough variant asthma    She is a former smoker, brief and distant smoking history. 15 pack year history quit .     Spirometry 2018 indicated FEV1 of 1.77 L 77% predicted, FEV1 FVC ratio of 77, no significant bronchodilator response.  She uses Flovent 110 mcg 2 puffs twice a day, rare use of albuterol.  She says for the past 3 weeks she has been doing more physical labor at home.  She is moving boxes from her home in [place] up to [facility].  She is noticed an increase shortness of breath and tightness in her chest.  She denies radiating symptoms or chest pain.  She denies wheezing, purulent cough, fever, chills, night sweats or allergy symptoms.  She feels almost like she has \"inflammation\".  She denies lower extremity edema.  She is concerned about the possibility of lung cancer.  Her friend was recently diagnosed with some type of GYN cancer and her father-in-law apparently  of lung cancer many years ago.  She does have a history of tiny bilateral pulmonary nodules stable on chest imaging from  through .  I reassured her that these are benign and not responsible for her current symptoms.  She has not had an x-ray in many years.  She was sick with bronchitis 3 times this winter.  The last time she was given Medrol and azithromycin with significant improvement in symptoms.  This feels similar.    Past Medical History:   Diagnosis Date   • HTN (hypertension)    • Hyperlipidemia    • Hypothyroidism    • Lung nodules 2009    stable        Social History   Substance Use Topics   • Smoking status: Former Smoker     Packs/day: 1.00     Years: 15.00     Types: Cigarettes     Quit date: 1977   • Smokeless tobacco: Never Used      Comment: STARTED   QUIT     • Alcohol use 0.0 oz/week      Comment: MIXED DRINKS OCCASIONALLY "       History reviewed. No pertinent family history.    Immunization History   Administered Date(s) Administered   • Hepatitis A Vaccine, Adult 07/31/2001, 02/07/2002   • Hepatitis B Vaccine Recombivax (Adol/Adult) 07/31/2001, 09/12/2001, 02/07/2002   • Influenza Vaccine Adult HD 10/03/2011, 09/27/2013, 09/17/2014, 09/23/2015, 09/21/2017, 09/19/2018   • Influenza Vaccine Quad Inj (Pf) 09/19/2016   • Pneumococcal Conjugate Vaccine (Prevnar/PCV-13) 10/01/2015   • Pneumococcal polysaccharide vaccine (PPSV-23) 01/01/2008       Current medications as of today   Current Outpatient Prescriptions   Medication Sig Dispense Refill   • fluticasone (FLOVENT HFA) 110 MCG/ACT Aerosol INHALE TWO PUFFS BY MOUTH TWICE DAILY USE  SPACER.  RINSE  MOUTH  AFTER  EACH  USE 3 Inhaler 3   • promethazine-dextromethorphan (PROMETHAZINE-DM) 6.25-15 MG/5ML syrup Take 5 mL by mouth at bedtime as needed for Cough. 1 Bottle 0   • ARMOUR THYROID 60 MG Tab      • Calcium Carbonate-Vit D-Min (CALCIUM 1200 PO) Take 1 tablet by mouth every day.     • Cyanocobalamin (VITAMIN B12 PO) Take 1,000 Tabs by mouth every day.     • Glucosamine-Chondroitin 750-600 MG Tab Take 1 tablet by mouth every day.     • Turmeric 500 MG Tab Take 1 tablet by mouth 2 Times a Day.     • simvastatin (ZOCOR) 40 MG Tab Take 40 mg by mouth every day.     • thyroid (ARMOUR THYROID) 90 MG TABS Take 1 Tab by mouth every day. 100 Each 3   • vitamin D (CHOLECALCIFEROL) 1000 UNIT TABS Take 1,000 Units by mouth every day.     • Magnesium 250 MG TABS Take 400 Tabs by mouth every day.     • Lutein 20 MG TABS Take 25 mg by mouth every day.     • Bioflavonoid Products (LISSY-C) TABS Take 1,000 Tabs by mouth every day.     • Multiple Vitamin (MULTIVITAMIN PO) Take 1 tablet by mouth every day.     • docosahexanoic acid (FISH OIL) 1000 MG CAPS Take 1,000 mg by mouth every day.     • MethylPREDNISolone (MEDROL DOSEPAK) 4 MG Tablet Therapy Pack Take as directed. 21 Tab 0   • azithromycin  "(ZITHROMAX Z-DARNELL) 250 MG Tab Take 2 tablets on day 1. Then take 1 tablet a day for 4 days. (Patient not taking: Reported on 4/23/2019) 6 Tab 0   • Albuterol Sulfate (PROAIR RESPICLICK) 108 (90 Base) MCG/ACT AEROSOL POWDER, BREATH ACTIVATED Inhale 2 Puffs by mouth as needed (for shortness of breath, wheezing.). every 4-6 hours as needed. 1 Each 5   • albuterol 108 (90 BASE) MCG/ACT Aero Soln inhalation aerosol Inhale 2 Puffs by mouth every four hours as needed for Shortness of Breath. 1 Inhaler 1   • lorazepam (ATIVAN) 0.5 MG TABS Take 1 Tab by mouth 2 Times a Day. 100 Each 1     No current facility-administered medications for this visit.        Allergies: Atenolol and Bactrim [sulfamethoxazole w-trimethoprim]    /80 (BP Location: Left arm, Patient Position: Sitting, BP Cuff Size: Adult)   Pulse 98   Resp 16   Ht 1.676 m (5' 6\")   Wt 75.2 kg (165 lb 12.8 oz)   SpO2 96%       Review of Systems   Constitutional: Positive for malaise/fatigue. Negative for chills, diaphoresis, fever and weight loss.   HENT: Negative.    Eyes: Negative for pain and discharge.   Respiratory: Positive for shortness of breath. Negative for cough, hemoptysis, sputum production and wheezing.    Cardiovascular: Negative.    Gastrointestinal: Negative.    Musculoskeletal: Negative.    Skin: Negative.    Neurological: Negative.  Negative for weakness.   Endo/Heme/Allergies: Negative for environmental allergies. Does not bruise/bleed easily.   Psychiatric/Behavioral: Negative.        Physical Exam   Constitutional: She is oriented to person, place, and time and well-developed, well-nourished, and in no distress.   HENT:   Head: Normocephalic and atraumatic.   Eyes: Pupils are equal, round, and reactive to light.   Neck: Normal range of motion. Neck supple. No tracheal deviation present.   Cardiovascular: Normal rate, regular rhythm and normal heart sounds.    Pulmonary/Chest: Effort normal and breath sounds normal. No respiratory " distress. She has no wheezes. She has no rales.   Diminished bilateral bases otherwise clear   Musculoskeletal: Normal range of motion.   Neurological: She is alert and oriented to person, place, and time.   Skin: Skin is warm and dry.   Psychiatric: Mood, memory, affect and judgment normal.   Vitals reviewed.      Diagnoses/Plan:    1. Mild intermittent asthma without complication  I suspect asthma exacerbation.  I have recommended a chest x-ray today.  We will increase her Flovent to Breo daily.  She will pick a sample up from the sample clinic.  We have provided her instructions on use.  She understands to discontinue the Flovent while taking Breo.  I would like her to start prednisone taper.  She should use her albuterol every 4-6 hours as needed.  - DX-CHEST-2 VIEWS  - albuterol (VENTOLIN HFA) 108 (90 Base) MCG/ACT Aero Soln inhalation aerosol; Inhale 2 Puffs by mouth every four hours as needed for Shortness of Breath (Wheezing).  Dispense: 1 Inhaler; Refill: 0  - Fluticasone Furoate-Vilanterol (BREO ELLIPTA) 100-25 MCG/INH AEROSOL POWDER, BREATH ACTIVATED; Inhale 1 Puff by mouth every day. Rinse mouth after use.  Dispense: 2 Each; Refill: 0    2. Chest tightness  - DX-CHEST-2 VIEWS    3. SOB (shortness of breath)  - DX-CHEST-2 VIEWS      ER precautions are given for any worsening symptoms    She will follow-up in 3 weeks for reevaluation      This dictation was created using voice recognition software. The accuracy of the dictation is limited to the abilities of the software. I expect there may be some errors of grammar and possibly content.

## 2019-04-23 NOTE — PATIENT INSTRUCTIONS
1. Go get chest xray today at [facility]  2. Stop Flovent. Start Breo, 1 puff daily, rinse mouth after use.  sample from [facility]  3. Start Prednisone, prescription has been sent to [facility]  4. ER precautions for any change in symptoms  5. F/U in 3 weeks

## 2019-04-25 ENCOUNTER — TELEPHONE (OUTPATIENT)
Dept: SLEEP MEDICINE | Facility: MEDICAL CENTER | Age: 79
End: 2019-04-25

## 2019-04-25 NOTE — TELEPHONE ENCOUNTER
Mary,  In regards to xray note, if Karli is feeling better with the current treatment plan she is okay to follow-up with me in July.  If she is not feeling better she should be reevaluated in the office.  Please have her keep her appointment with Ileana for the time being and see how she feels.

## 2019-04-29 ENCOUNTER — TELEPHONE (OUTPATIENT)
Dept: PULMONOLOGY | Facility: HOSPICE | Age: 79
End: 2019-04-29

## 2019-04-29 NOTE — TELEPHONE ENCOUNTER
Patient called to inform our office that all her medications will be transferred to [facility] pharmacy unless it is something she absolutely needs then it will be sent to a local pharmacy.

## 2019-05-15 ENCOUNTER — OFFICE VISIT (OUTPATIENT)
Dept: PULMONOLOGY | Facility: HOSPICE | Age: 79
End: 2019-05-15
Payer: MEDICARE

## 2019-05-15 VITALS
OXYGEN SATURATION: 95 % | DIASTOLIC BLOOD PRESSURE: 62 MMHG | RESPIRATION RATE: 16 BRPM | HEART RATE: 94 BPM | SYSTOLIC BLOOD PRESSURE: 126 MMHG | WEIGHT: 167 LBS | BODY MASS INDEX: 26.84 KG/M2 | HEIGHT: 66 IN

## 2019-05-15 DIAGNOSIS — J45.20 MILD INTERMITTENT ASTHMA WITHOUT COMPLICATION: ICD-10-CM

## 2019-05-15 PROCEDURE — 99213 OFFICE O/P EST LOW 20 MIN: CPT | Performed by: PHYSICIAN ASSISTANT

## 2019-05-15 ASSESSMENT — ENCOUNTER SYMPTOMS
DIAPHORESIS: 0
FEVER: 0
PALPITATIONS: 0
COUGH: 0
WEIGHT LOSS: 0
HEARTBURN: 1
SHORTNESS OF BREATH: 0
CHILLS: 0
WHEEZING: 1
SORE THROAT: 0
ORTHOPNEA: 0

## 2019-05-15 NOTE — PATIENT INSTRUCTIONS
1-continue Flovent  2-ok for Flonase if increased nasal symptoms  3-indications for Albuterol use   Increased cough   Shortness of breath    Wheezing   4-follow up in August

## 2019-05-16 NOTE — PROGRESS NOTES
CC: Improved cough    HPI:  Karli Berrios is a 78 y.o. year old female here today for follow-up on cough variant asthma.  Patient last seen in clinic on 4/23/2019 by SOFY Blackmon.  At that time she was seen for possible asthma exacerbation.  Reported history of 3 episodes of bronchitis in the last 4  months.  Patient with remote smoking history of reported 17 pack years who quit in 1977.  Continued abstinence advised.    Reviewed in clinic vitals including blood pressure of 126/62, SPO2 95%, heart rate of 94 and BMI of 26.95 kg/m². Reviewed home medication regimen, she did trial Breo but the pharmacy is now out.  She is taking Flovent with stated benefit.  We did discuss Flonase if she experiences increased nasal symptoms, we also discussed indications for albuterol use including increased cough, shortness of breath, wheezing.  Patient has follow-up appointment in August already scheduled.    Reviewed most recent imaging including chest x-ray obtained 4/23/2019 which demonstrated no acute cardiopulmonary disease.  Patient did have spirometry completed 8/21/2018 which demonstrated an FEV1 of 1.77 L or 77% predicted, and FEV1/FVC ratio that was normal.  Per pulmonologist interpretation no reversibility noted, no definite obstructive lung disease noted, restrictive disease cannot be ruled out without measurement of lung volumes.  Prior PFT obtained 5/4/2017 demonstrated per pulmonologist interpretation normal pulmonary function, normal gas transfer, no bronchodilator response.      Review of Systems   Constitutional: Negative for chills, diaphoresis, fever, malaise/fatigue and weight loss.   HENT: Negative for congestion, ear pain, hearing loss, sore throat and tinnitus.         No history of seasonal allergies or sinus infections   Eyes:        Wears eyeglasses, denies new or sudden vision changes   Respiratory: Positive for wheezing. Negative for cough and shortness of breath.         Reports she  "only wheezes when sick   Cardiovascular: Positive for chest pain. Negative for palpitations, orthopnea and leg swelling.        Occasional twinge, possibly related to reflux   Gastrointestinal: Positive for heartburn.        Improved on ranitidine and with decreased weight of 15 pounds, denies difficulty swallowing   Skin: Negative.        Past Medical History:   Diagnosis Date   • HTN (hypertension)    • Hyperlipidemia    • Hypothyroidism    • Lung nodules July 2009 stable 2010       Past Surgical History:   Procedure Laterality Date   • APPENDECTOMY     • CARPAL TUNNEL RELEASE     • CATARACT EXTRACTION WITH IOL     • HEMORRHOIDECTOMY     • OTHER      D & C   • OTHER      FISTULECTOMY   • OTHER      HYSTERECTOMY   • OTHER      LEFTY BREAST LIPOMA RESECTION       History reviewed. No pertinent family history.    Social History     Social History   • Marital status:      Spouse name: N/A   • Number of children: N/A   • Years of education: N/A     Occupational History   • Not on file.     Social History Main Topics   • Smoking status: Former Smoker     Packs/day: 1.00     Years: 15.00     Types: Cigarettes     Quit date: 1/1/1977   • Smokeless tobacco: Never Used      Comment: STARTED 1960  QUIT  1977   • Alcohol use 0.0 oz/week      Comment: MIXED DRINKS OCCASIONALLY   • Drug use: No      Comment: CBD with THC Cream for hands   • Sexual activity: Not on file     Other Topics Concern   • Not on file     Social History Narrative   • No narrative on file       Allergies as of 05/15/2019 - Reviewed 05/15/2019   Allergen Reaction Noted   • Atenolol  10/15/2014   • Bactrim [sulfamethoxazole w-trimethoprim]  09/27/2018        @Vital signs for this encounter:  Vitals:    05/15/19 1425 05/15/19 1434   Height: 1.676 m (5' 6\")    Weight: 75.8 kg (167 lb)    Weight % change since last entry.: 0 %    BP: 126/62    Pulse: 94    BMI (Calculated): 26.95    Resp: 16    O2 sat % room air:  95 %       Current medications as " of today   Current Outpatient Prescriptions   Medication Sig Dispense Refill   • fluticasone (FLOVENT HFA) 110 MCG/ACT Aerosol INHALE TWO PUFFS BY MOUTH TWICE DAILY USE  SPACER.  RINSE  MOUTH  AFTER  EACH  USE 3 Inhaler 3   • promethazine-dextromethorphan (PROMETHAZINE-DM) 6.25-15 MG/5ML syrup Take 5 mL by mouth at bedtime as needed for Cough. 1 Bottle 0   • ARMOUR THYROID 60 MG Tab      • Calcium Carbonate-Vit D-Min (CALCIUM 1200 PO) Take 1 tablet by mouth every day.     • Cyanocobalamin (VITAMIN B12 PO) Take 1,000 Tabs by mouth every day.     • Glucosamine-Chondroitin 750-600 MG Tab Take 1 tablet by mouth every day.     • Turmeric 500 MG Tab Take 1 tablet by mouth 2 Times a Day.     • simvastatin (ZOCOR) 40 MG Tab Take 40 mg by mouth every day.     • lorazepam (ATIVAN) 0.5 MG TABS Take 1 Tab by mouth 2 Times a Day. 100 Each 1   • thyroid (ARMOUR THYROID) 90 MG TABS Take 1 Tab by mouth every day. 100 Each 3   • vitamin D (CHOLECALCIFEROL) 1000 UNIT TABS Take 1,000 Units by mouth every day.     • Magnesium 250 MG TABS Take 400 Tabs by mouth every day.     • Lutein 20 MG TABS Take 25 mg by mouth every day.     • Bioflavonoid Products (LISSY-C) TABS Take 1,000 Tabs by mouth every day.     • Multiple Vitamin (MULTIVITAMIN PO) Take 1 tablet by mouth every day.     • docosahexanoic acid (FISH OIL) 1000 MG CAPS Take 1,000 mg by mouth every day.     • albuterol (VENTOLIN HFA) 108 (90 Base) MCG/ACT Aero Soln inhalation aerosol Inhale 2 Puffs by mouth every four hours as needed for Shortness of Breath (Wheezing). 1 Inhaler 0   • Fluticasone Furoate-Vilanterol (BREO ELLIPTA) 100-25 MCG/INH AEROSOL POWDER, BREATH ACTIVATED Inhale 1 Puff by mouth every day. Rinse mouth after use. (Patient not taking: Reported on 5/15/2019) 2 Each 0   • predniSONE (DELTASONE) 10 MG Tab Take 30mg x 3 days, then take 20mg x 3 days, then take 10mg x 3 days, with food, then discontinue. (Patient not taking: Reported on 5/15/2019) 18 Tab 0   •  azithromycin (ZITHROMAX Z-DARNELL) 250 MG Tab Take 2 tablets on day 1. Then take 1 tablet a day for 4 days. (Patient not taking: Reported on 4/23/2019) 6 Tab 0   • Albuterol Sulfate (PROAIR RESPICLICK) 108 (90 Base) MCG/ACT AEROSOL POWDER, BREATH ACTIVATED Inhale 2 Puffs by mouth as needed (for shortness of breath, wheezing.). every 4-6 hours as needed. 1 Each 5   • albuterol 108 (90 BASE) MCG/ACT Aero Soln inhalation aerosol Inhale 2 Puffs by mouth every four hours as needed for Shortness of Breath. 1 Inhaler 1     No current facility-administered medications for this visit.          Physical Exam:   Gen:           Alert and oriented, No apparent distress. Mood and affect     appropriate, normal interaction with provider.  Eyes:          sclere white, conjunctive moist.  Hearing:     Grossly intact.  Dentition:    Good dentition.  Oropharynx:   Tongue normal, posterior pharynx without erythema or exudate.  Neck:        Supple, trachea midline, no masses.  Respiratory Effort: No intercostal retractions or use of accessory muscles.   Lung Auscultation:      Clear to auscultation bilaterally; no rales, rhonchi or wheezing.  CV:            Regular rate and rhythm. No edema. No murmurs, rubs or gallops.  Digits, Nails, Ext: No clubbing, cyanosis, petechiae, or nodes.   Skin:        No rashes, lesions or ulcers noted on back or exposed skin    surfaces.                     Assessment:  1. Mild intermittent asthma without complication   continue Flovent, reviewed albuterol use indications, Flonase if increased nasal symptoms       Immunizations:    Flu: 9/19/2018  Pneumovax 23: 1/1/2008  Prevnar 13: 10/1/2015    Plan:  1-continue Flovent  2-ok for Flonase if increased nasal symptoms  3-indications for Albuterol use   Increased cough   Shortness of breath    Wheezing   4-follow up in August as scheduled, sooner if needed    This dictation was created using voice recognition software. The accuracy of the dictation is limited to  the abilities of the software. I expect there may be some errors of grammar and possibly content.

## 2019-09-10 ENCOUNTER — OFFICE VISIT (OUTPATIENT)
Dept: PULMONOLOGY | Facility: HOSPICE | Age: 79
End: 2019-09-10
Payer: MEDICARE

## 2019-09-10 VITALS
DIASTOLIC BLOOD PRESSURE: 70 MMHG | HEART RATE: 65 BPM | RESPIRATION RATE: 16 BRPM | SYSTOLIC BLOOD PRESSURE: 102 MMHG | WEIGHT: 157 LBS | HEIGHT: 66 IN | OXYGEN SATURATION: 95 % | BODY MASS INDEX: 25.23 KG/M2

## 2019-09-10 DIAGNOSIS — J45.20 MILD INTERMITTENT ASTHMA WITHOUT COMPLICATION: ICD-10-CM

## 2019-09-10 DIAGNOSIS — Z23 NEED FOR VACCINATION: ICD-10-CM

## 2019-09-10 DIAGNOSIS — Z87.891 FORMER SMOKER: ICD-10-CM

## 2019-09-10 DIAGNOSIS — R09.82 POST-NASAL DRIP: ICD-10-CM

## 2019-09-10 PROCEDURE — 99214 OFFICE O/P EST MOD 30 MIN: CPT | Mod: 25 | Performed by: NURSE PRACTITIONER

## 2019-09-10 PROCEDURE — 90662 IIV NO PRSV INCREASED AG IM: CPT | Performed by: NURSE PRACTITIONER

## 2019-09-10 PROCEDURE — G0008 ADMIN INFLUENZA VIRUS VAC: HCPCS | Performed by: NURSE PRACTITIONER

## 2019-09-10 ASSESSMENT — ENCOUNTER SYMPTOMS
SHORTNESS OF BREATH: 0
FALLS: 0
COUGH: 1
EYE DISCHARGE: 0
NEUROLOGICAL NEGATIVE: 1
BACK PAIN: 1
WHEEZING: 0
NECK PAIN: 0
MYALGIAS: 0
SPUTUM PRODUCTION: 0
HEMOPTYSIS: 0
BRUISES/BLEEDS EASILY: 0
GASTROINTESTINAL NEGATIVE: 1
CARDIOVASCULAR NEGATIVE: 1
PSYCHIATRIC NEGATIVE: 1
CONSTITUTIONAL NEGATIVE: 1
EYE PAIN: 0

## 2019-09-10 NOTE — PROGRESS NOTES
Chief Complaint   Patient presents with   • Asthma     Last Seen 05/15/19         HPI: This patient is a 78 y.o. female, who presents for follow up mild intermittent asthma.     She is a former smoker, brief and distant smoking history. 15 pack year history quit .     Spirometry 2018 indicated FEV1 of 1.77 L 77% predicted, FEV1 FVC ratio of 77, no significant bronchodilator response.  She uses Flovent 110 mcg 2 puffs twice a day, rare use of albuterol.  she does have a history of tiny bilateral pulmonary nodules stable on chest imaging from  through .  Chest x-ray in 2019 was unremarkable. She was treated with abx and steroids on 4 occasions this past winter and spring.  She is no URI or exacerbations of her asthma since May.  She is been feeling well.  She does notice intermittent cough and postnasal drip.  She is used Flonase in the past with subjective benefit.  She says her symptoms resolved with Flonase she is just not good about taking this.  she also has a history of GERD but changed her diet and reports improvement in symptoms.  She will take Prilosec on occasion but this is rare.  She denies purulent cough, wheeze, fever, chills, night sweats.    She has a history of osteoarthritis and has joint and back pain.  She is following up with pain management for steroid injections in her back.    Past Medical History:   Diagnosis Date   • HTN (hypertension)    • Hyperlipidemia    • Hypothyroidism    • Lung nodules 2009    stable        Social History     Tobacco Use   • Smoking status: Former Smoker     Packs/day: 1.00     Years: 17.00     Pack years: 17.00     Types: Cigarettes     Last attempt to quit: 1977     Years since quittin.7   • Smokeless tobacco: Never Used   • Tobacco comment: STARTED   QUIT     Substance Use Topics   • Alcohol use: Yes     Alcohol/week: 0.0 oz     Comment: MIXED DRINKS OCCASIONALLY   • Drug use: No     Types: Marijuana     Comment:  CBD with THC Cream for hands       History reviewed. No pertinent family history.    Immunization History   Administered Date(s) Administered   • Hepatitis A Vaccine, Adult 07/31/2001, 02/07/2002   • Hepatitis B Vaccine Recombivax (Adol/Adult) 07/31/2001, 09/12/2001, 02/07/2002   • Influenza Vaccine Adult HD 10/03/2011, 09/27/2013, 09/17/2014, 09/23/2015, 09/21/2017, 09/19/2018   • Influenza Vaccine Quad Inj (Pf) 09/19/2016   • Pneumococcal Conjugate Vaccine (Prevnar/PCV-13) 10/01/2015   • Pneumococcal polysaccharide vaccine (PPSV-23) 01/01/2008       Current medications as of today   Current Outpatient Medications   Medication Sig Dispense Refill   • fluticasone (FLOVENT HFA) 110 MCG/ACT Aerosol INHALE TWO PUFFS BY MOUTH TWICE DAILY USE  SPACER.  RINSE  MOUTH  AFTER  EACH  USE 3 Inhaler 3   • ARMOUR THYROID 60 MG Tab      • Albuterol Sulfate (PROAIR RESPICLICK) 108 (90 Base) MCG/ACT AEROSOL POWDER, BREATH ACTIVATED Inhale 2 Puffs by mouth as needed (for shortness of breath, wheezing.). every 4-6 hours as needed. 1 Each 5   • Calcium Carbonate-Vit D-Min (CALCIUM 1200 PO) Take 1 tablet by mouth every day.     • Cyanocobalamin (VITAMIN B12 PO) Take 1,000 Tabs by mouth every day.     • Glucosamine-Chondroitin 750-600 MG Tab Take 1 tablet by mouth every day.     • Turmeric 500 MG Tab Take 1 tablet by mouth 2 Times a Day.     • albuterol 108 (90 BASE) MCG/ACT Aero Soln inhalation aerosol Inhale 2 Puffs by mouth every four hours as needed for Shortness of Breath. 1 Inhaler 1   • simvastatin (ZOCOR) 40 MG Tab Take 40 mg by mouth every day.     • lorazepam (ATIVAN) 0.5 MG TABS Take 1 Tab by mouth 2 Times a Day. 100 Each 1   • thyroid (ARMOUR THYROID) 90 MG TABS Take 1 Tab by mouth every day. 100 Each 3   • vitamin D (CHOLECALCIFEROL) 1000 UNIT TABS Take 1,000 Units by mouth every day.     • Magnesium 250 MG TABS Take 400 Tabs by mouth every day.     • Lutein 20 MG TABS Take 25 mg by mouth every day.     • Bioflavonoid  "Products (LISSY-C) TABS Take 1,000 Tabs by mouth every day.     • Multiple Vitamin (MULTIVITAMIN PO) Take 1 tablet by mouth every day.     • docosahexanoic acid (FISH OIL) 1000 MG CAPS Take 1,000 mg by mouth every day.     • Fluticasone Furoate-Vilanterol (BREO ELLIPTA) 100-25 MCG/INH AEROSOL POWDER, BREATH ACTIVATED Inhale 1 Puff by mouth every day. Rinse mouth after use. (Patient not taking: Reported on 5/15/2019) 2 Each 0     No current facility-administered medications for this visit.        Allergies: Atenolol and Bactrim [sulfamethoxazole w-trimethoprim]    /70 (BP Location: Left arm, Patient Position: Sitting, BP Cuff Size: Adult)   Pulse 65   Resp 16   Ht 1.676 m (5' 6\")   Wt 71.2 kg (157 lb)   SpO2 95%       Review of Systems   Constitutional: Negative.    HENT: Negative.         + PND   Eyes: Negative for pain and discharge.   Respiratory: Positive for cough. Negative for hemoptysis, sputum production, shortness of breath and wheezing.    Cardiovascular: Negative.    Gastrointestinal: Negative.    Musculoskeletal: Positive for back pain and joint pain. Negative for falls, myalgias and neck pain.   Skin: Negative.    Neurological: Negative.    Endo/Heme/Allergies: Negative for environmental allergies. Does not bruise/bleed easily.   Psychiatric/Behavioral: Negative.        Physical Exam   Constitutional: She is oriented to person, place, and time and well-developed, well-nourished, and in no distress.   HENT:   Head: Normocephalic and atraumatic.   Mouth/Throat: Oropharynx is clear and moist.   Eyes: Pupils are equal, round, and reactive to light.   Neck: Normal range of motion. Neck supple. No tracheal deviation present.   Cardiovascular: Normal rate, regular rhythm and normal heart sounds.   Pulmonary/Chest: Effort normal and breath sounds normal.   Musculoskeletal: Normal range of motion.   Neurological: She is alert and oriented to person, place, and time. Gait normal.   Skin: Skin is warm " and dry.   Psychiatric: Mood, memory, affect and judgment normal.   Vitals reviewed.      Diagnoses/Plan:    1. Mild intermittent asthma without complication  Asthma is clinically stable.  She will continue Flovent twice daily.  Albuterol when necessary.  During winter months or for recurrent exacerbations she understands to stop therapy up to Breo.  She has a back-up inhaler on hand.  She will call our office if she experiences any worsening symptoms.    2. Former smoker  Patient quit smoking in 1977.  Recent chest x-ray was unremarkable.    3. Post-nasal drip  Cough related to postnasal drip.  Results completely with the use of Flonase.  I encouraged her to use Flonase regularly.    4. Need for vaccination  - Influenza Vaccine, High Dose (65+ Only)      She will follow-up here annually, sooner if needed.      This dictation was created using voice recognition software. The accuracy of the dictation is limited to the abilities of the software. I expect there may be some errors of grammar and possibly content.

## 2019-10-16 DIAGNOSIS — J45.20 MILD INTERMITTENT ASTHMA WITHOUT COMPLICATION: ICD-10-CM

## 2019-10-16 RX ORDER — METHYLPREDNISOLONE 4 MG/1
TABLET ORAL
Qty: 21 TAB | Refills: 0 | Status: SHIPPED | OUTPATIENT
Start: 2019-10-16 | End: 2020-09-24

## 2019-10-16 NOTE — TELEPHONE ENCOUNTER
"Pt called and stated that she is having some sick symptoms. She states she is experiencing a productive cough, sore throat, chest and nasal congestion. She informed that her \"lungs are aching\". She would like a medrol dosepak as she has taken that before and it seemed to help in the past.    Last OV: 9/10/19 SIVA GOETZ   Next OV: 9/14/2020 SIVA GOETZ   "

## 2019-10-16 NOTE — TELEPHONE ENCOUNTER
MARJAN Escobar.  You 23 minutes ago (9:25 AM)      Patient should step up her inhaler therapy to Breo 1 puff daily as well. Does she have this on hand.   Rx signed.      Pt notified that rx was sent to     [facility] - [place], [place] - [address]  [address]  [place] [address]  Phone: [phone] Fax: [phone]  Pt advised she does have Breo on hand

## 2019-10-23 ENCOUNTER — TELEPHONE (OUTPATIENT)
Dept: PULMONOLOGY | Facility: HOSPICE | Age: 79
End: 2019-10-23

## 2019-10-24 NOTE — TELEPHONE ENCOUNTER
Called and spoke with pt. Pt said she is using both, Breo and Flovent.  Pt said she is not sure if she is having allergy sxs. Pt said the congestion is in her upper chest, coughing and scratching throat.

## 2019-12-19 ENCOUNTER — HOSPITAL ENCOUNTER (OUTPATIENT)
Dept: RADIOLOGY | Facility: MEDICAL CENTER | Age: 79
End: 2019-12-19
Attending: INTERNAL MEDICINE
Payer: MEDICARE

## 2019-12-19 DIAGNOSIS — Z12.31 VISIT FOR SCREENING MAMMOGRAM: ICD-10-CM

## 2019-12-19 PROCEDURE — 77063 BREAST TOMOSYNTHESIS BI: CPT

## 2020-03-18 ENCOUNTER — TELEPHONE (OUTPATIENT)
Dept: PULMONOLOGY | Facility: HOSPICE | Age: 80
End: 2020-03-18

## 2020-03-18 NOTE — TELEPHONE ENCOUNTER
Caller:Karli    Phone number:[phone] (home)       Message: Pt called and said she has chest congestion, cough w/phlegmx3-4days, no sob, no fever.  Pt's humidifier is not helping.  Pt said her rescue inhaler(ventolin) is about to . Can this help her?  Pended rx.    Please advise

## 2020-03-19 NOTE — TELEPHONE ENCOUNTER
Is patient having any sinus congestion/post nasal drip/sinus pressure/acid reflux?    Is she using Flovent twice daily with spacer?  Use albuterol 1-2puffs to help clear sputum every 4-6hrs.  If having sinus issues, recommend sinus rinse BID followed by 1 squirt of flonase to each nostril BID.

## 2020-03-19 NOTE — TELEPHONE ENCOUNTER
Called and spoke with pt. Pt said no sinus congestion, yes to the post nasal drip, no sinus pressure, and no acid reflux.  She is using her Flovent with a spacer.  She hasn't been using her rescue inhaler. She used it once 3 days ago.  Notified pt she should use her albuterol inhaler 1-2 puffs every 4-6 hours to help clear sputum.  She said she is already using the nasal spray but she will try doing the sinus rinse as recommended.

## 2020-03-20 RX ORDER — ALBUTEROL SULFATE 90 UG/1
2 AEROSOL, METERED RESPIRATORY (INHALATION) EVERY 4 HOURS PRN
Qty: 1 INHALER | Refills: 1 | Status: SHIPPED | OUTPATIENT
Start: 2020-03-20 | End: 2022-06-28

## 2020-03-20 NOTE — TELEPHONE ENCOUNTER
Called pt and l/m. Asking pt to return our call and I will be also sending a [employer] message to pt.       [employer] message sent.

## 2020-03-24 ENCOUNTER — TELEPHONE (OUTPATIENT)
Dept: PULMONOLOGY | Facility: HOSPICE | Age: 80
End: 2020-03-24

## 2020-03-24 DIAGNOSIS — J45.21 MILD INTERMITTENT ASTHMA WITH EXACERBATION: ICD-10-CM

## 2020-03-24 RX ORDER — METHYLPREDNISOLONE 4 MG/1
TABLET ORAL
Qty: 21 TAB | Refills: 1 | Status: SHIPPED | OUTPATIENT
Start: 2020-03-24 | End: 2020-09-24

## 2020-03-24 NOTE — TELEPHONE ENCOUNTER
Patient has responded well to Medrol dose pack in past.  Will send prescription.  Review indications for seeking medical care including green purulent secretions, fever, worsening difficulty breathing.

## 2020-03-24 NOTE — TELEPHONE ENCOUNTER
Patient called and said that she would like to get a refill on Prednisone with one more refill, per pervious encounters patient got a refill on her albuterol and was recommended to continue using sinus rinse and flonase but she feels its not helping and would like the Prednisone   Please advice.

## 2020-04-16 ENCOUNTER — TELEPHONE (OUTPATIENT)
Dept: PULMONOLOGY | Facility: HOSPICE | Age: 80
End: 2020-04-16

## 2020-04-16 RX ORDER — AZITHROMYCIN 250 MG/1
TABLET, FILM COATED ORAL
Qty: 6 TAB | Refills: 0 | Status: SHIPPED | OUTPATIENT
Start: 2020-04-16 | End: 2020-10-05

## 2020-04-16 NOTE — TELEPHONE ENCOUNTER
Caller: Karli    Phone Number: [phone] (home)     Message: Pt called and said she has gotten the Methylpred. Pack in March but didn't use it.  But now she started taking it yesterday.  Pt c/o painful breathing, cough w/clear phlegm, throat irriated. X3days. No fever. Pt doesn't feel any better after taking the Methylpred.  Pt wanted to know does she need a Z=Juan Luis.     Please advise.

## 2020-04-16 NOTE — TELEPHONE ENCOUNTER
Called and spoke with pt. Pt said Re: COVID. No not that she is aware of.  She has been very careful.  Notified pt of remaining message. Pt understood.

## 2020-04-16 NOTE — TELEPHONE ENCOUNTER
Has she had any possible COVID-19 exposures?  Continue methylprednisolone as directed.  She may not find significant benefit from less than 24 hours of use.  We could attempt a Z-Juan Luis but if symptoms do not improve she may need to call triage RN to be assessed and visit to urgent care.  Rx signed.

## 2020-05-01 DIAGNOSIS — J45.20 MILD INTERMITTENT ASTHMA WITHOUT COMPLICATION: ICD-10-CM

## 2020-05-01 RX ORDER — DEXAMETHASONE 4 MG/1
TABLET ORAL
Qty: 3 INHALER | Refills: 3 | Status: SHIPPED | OUTPATIENT
Start: 2020-05-01 | End: 2021-09-21 | Stop reason: SDUPTHER

## 2020-05-01 NOTE — TELEPHONE ENCOUNTER
Have we ever prescribed this med? Yes.  If yes, what date? 04/18/2019    Last OV: 09/10/2019- SIVA Tello    Next OV: 09/24/2020- SIVA Joseph     DX: Mild intermittent asthma without complication (J45.20)    Medications:   Requested Prescriptions     Pending Prescriptions Disp Refills   • fluticasone (FLOVENT HFA) 110 MCG/ACT Aerosol 3 Inhaler 3     Sig: INHALE TWO PUFFS BY MOUTH TWICE DAILY USE  SPACER.  RINSE  MOUTH  AFTER  EACH  USE

## 2020-09-24 ENCOUNTER — OFFICE VISIT (OUTPATIENT)
Dept: PULMONOLOGY | Facility: HOSPICE | Age: 80
End: 2020-09-24
Payer: MEDICARE

## 2020-09-24 VITALS
HEIGHT: 66 IN | OXYGEN SATURATION: 95 % | WEIGHT: 160 LBS | DIASTOLIC BLOOD PRESSURE: 66 MMHG | RESPIRATION RATE: 16 BRPM | BODY MASS INDEX: 25.71 KG/M2 | SYSTOLIC BLOOD PRESSURE: 124 MMHG | HEART RATE: 85 BPM

## 2020-09-24 DIAGNOSIS — R05.9 COUGH: ICD-10-CM

## 2020-09-24 DIAGNOSIS — Z87.891 FORMER SMOKER: ICD-10-CM

## 2020-09-24 DIAGNOSIS — J45.20 MILD INTERMITTENT ASTHMA WITHOUT COMPLICATION: ICD-10-CM

## 2020-09-24 PROBLEM — R06.02 SHORTNESS OF BREATH: Status: RESOLVED | Noted: 2019-04-23 | Resolved: 2020-09-24

## 2020-09-24 PROBLEM — R07.89 CHEST TIGHTNESS: Status: RESOLVED | Noted: 2019-04-23 | Resolved: 2020-09-24

## 2020-09-24 PROCEDURE — 99213 OFFICE O/P EST LOW 20 MIN: CPT | Performed by: NURSE PRACTITIONER

## 2020-09-24 RX ORDER — PREDNISONE 10 MG/1
TABLET ORAL
Qty: 18 TAB | Refills: 0 | Status: SHIPPED | OUTPATIENT
Start: 2020-09-24 | End: 2020-10-05

## 2020-09-24 RX ORDER — AZITHROMYCIN 250 MG/1
TABLET, FILM COATED ORAL
Qty: 6 TAB | Refills: 0 | Status: SHIPPED | OUTPATIENT
Start: 2020-09-24 | End: 2020-10-05

## 2020-09-24 NOTE — PROGRESS NOTES
Chief Complaint   Patient presents with   • Follow-Up     Mild persistent asthma without complication // last seen 9/10/19        HPI:  Karli Berrios is a 79 y.o. year old female here today for follow-up on asthma.  Annual f/u    Former smoker, quit 1977 with 15PYH.  Spirometry August 2018 indicated FEV1 of 1.77 L 77% predicted, FEV1 FVC ratio of 77, no significant bronchodilator response.  She uses Flovent 110 mcg 2 puffs twice a day, rare use of albuterol.  She rarely uses PRATIMA.  She does have a history of tiny bilateral pulmonary nodules stable on chest imaging from 2009 through 2011.  Chest x-ray in April 2019 was unremarkable.   She notes having to use abx/steroid 1x over winter for bronchitis symptoms that resolved.  She notes a mild cough with clear phlegm currently which she attributes to allergies/poor air quality. She notes it to occur t/o day but not at night. GERD controlled with PPI. NO wheezing or chest tightness. She would like to have abx/steroid on hand for acute symptoms. No cardiac symptoms.  She is moving from [place] to [facility] in the next month.      ROS: As per HPI and otherwise negative if not stated.    Past Medical History:   Diagnosis Date   • HTN (hypertension)    • Hyperlipidemia    • Hypothyroidism    • Lung nodules July 2009    stable 2010       Past Surgical History:   Procedure Laterality Date   • APPENDECTOMY     • CARPAL TUNNEL RELEASE     • CATARACT EXTRACTION WITH IOL     • HEMORRHOIDECTOMY     • OTHER      D & C   • OTHER      FISTULECTOMY   • OTHER      HYSTERECTOMY   • OTHER      LEFTY BREAST LIPOMA RESECTION       History reviewed. No pertinent family history.    Social History     Socioeconomic History   • Marital status:      Spouse name: Not on file   • Number of children: Not on file   • Years of education: Not on file   • Highest education level: Not on file   Occupational History   • Not on file   Social Needs   • Financial resource strain: Not  on file   • Food insecurity     Worry: Not on file     Inability: Not on file   • Transportation needs     Medical: Not on file     Non-medical: Not on file   Tobacco Use   • Smoking status: Former Smoker     Packs/day: 1.00     Years: 17.00     Pack years: 17.00     Types: Cigarettes     Quit date: 1977     Years since quittin.7   • Smokeless tobacco: Never Used   • Tobacco comment: STARTED   QUIT     Substance and Sexual Activity   • Alcohol use: Yes     Alcohol/week: 0.0 oz     Comment: MIXED DRINKS OCCASIONALLY   • Drug use: No     Types: Marijuana     Comment: CBD with THC Cream for hands   • Sexual activity: Not on file   Lifestyle   • Physical activity     Days per week: Not on file     Minutes per session: Not on file   • Stress: Not on file   Relationships   • Social connections     Talks on phone: Not on file     Gets together: Not on file     Attends Congregation service: Not on file     Active member of club or organization: Not on file     Attends meetings of clubs or organizations: Not on file     Relationship status: Not on file   • Intimate partner violence     Fear of current or ex partner: Not on file     Emotionally abused: Not on file     Physically abused: Not on file     Forced sexual activity: Not on file   Other Topics Concern   • Not on file   Social History Narrative   • Not on file       Allergies as of 2020 - Reviewed 2020   Allergen Reaction Noted   • Atenolol  10/15/2014   • Bactrim [sulfamethoxazole w-trimethoprim]  2018        Vitals:  @Vital signs for this encounter:    Current medications as of today   Current Outpatient Medications   Medication Sig Dispense Refill   • azithromycin (ZITHROMAX) 250 MG Tab Take 2 tablets on day 1, then take 1 tablet a day for 4 days. 6 Tab 0   • predniSONE (DELTASONE) 10 MG Tab Take 30mg x 3 days, then take 20mg x 3 days, then take 10mg x 3 days, with food, then discontinue. 18 Tab 0   • fluticasone (FLOVENT HFA) 110  MCG/ACT Aerosol INHALE TWO PUFFS BY MOUTH TWICE DAILY USE  SPACER.  RINSE  MOUTH  AFTER  EACH  USE 3 Inhaler 3   • azithromycin (ZITHROMAX Z-DARNELL) 250 MG Tab Take 2 tablets on day 1. Then take 1 tablet a day for 4 days. 6 Tab 0   • albuterol 108 (90 Base) MCG/ACT Aero Soln inhalation aerosol Inhale 2 Puffs by mouth every four hours as needed for Shortness of Breath. 1 Inhaler 1   • ARMOUR THYROID 60 MG Tab      • Calcium Carbonate-Vit D-Min (CALCIUM 1200 PO) Take 1 tablet by mouth every day.     • Cyanocobalamin (VITAMIN B12 PO) Take 1,000 Tabs by mouth every day.     • Glucosamine-Chondroitin 750-600 MG Tab Take 1 tablet by mouth every day.     • Turmeric 500 MG Tab Take 1 tablet by mouth 2 Times a Day.     • simvastatin (ZOCOR) 40 MG Tab Take 40 mg by mouth every day.     • lorazepam (ATIVAN) 0.5 MG TABS Take 1 Tab by mouth 2 Times a Day. 100 Each 1   • thyroid (ARMOUR THYROID) 90 MG TABS Take 1 Tab by mouth every day. 100 Each 3   • vitamin D (CHOLECALCIFEROL) 1000 UNIT TABS Take 1,000 Units by mouth every day.     • Magnesium 250 MG TABS Take 400 Tabs by mouth every day.     • Lutein 20 MG TABS Take 25 mg by mouth every day.     • Bioflavonoid Products (LISSY-C) TABS Take 1,000 Tabs by mouth every day.     • Multiple Vitamin (MULTIVITAMIN PO) Take 1 tablet by mouth every day.     • docosahexanoic acid (FISH OIL) 1000 MG CAPS Take 1,000 mg by mouth every day.       No current facility-administered medications for this visit.          Physical Exam:   Gen:           Alert and oriented, No apparent distress. Mood and affect appropriate, normal interaction with examiner.  Eyes:          PERRL, EOM intact, sclere white, conjunctive moist.  Ears:          Not examined.   Hearing:     Grossly intact.  Nose:          Normal, no lesions or deformities.  Dentition:    Good dentition.  Oropharynx:   mask  Mallampati Classification: mask  Neck:        Supple, trachea midline, no masses.  Respiratory Effort: No intercostal  retractions or use of accessory muscles.   Lung Auscultation:      Clear to auscultation bilaterally; no rales, rhonchi or wheezing.  CV:            Regular rate and rhythm. No murmurs, rubs or gallops.  Abd:           Not examined.   Lymphadenopathy: Not examined.  Gait and Station: Normal.  Digits and Nails: No clubbing, cyanosis, petechiae, or nodes.   Cranial Nerves: II-XII grossly intact.  Skin:        No rashes, lesions or ulcers noted.               Ext:           No cyanosis or edema.      Assessment:  1. Mild intermittent asthma without complication  PULMONARY FUNCTION TESTS -Test requested: Complete Pulmonary Function Test   2. Cough  PULMONARY FUNCTION TESTS -Test requested: Complete Pulmonary Function Test   3. Former smoker  PULMONARY FUNCTION TESTS -Test requested: Complete Pulmonary Function Test   4. BMI 25.0-25.9,adult         Immunizations:    Flu:recommend  Pneumovax 23:2008  Prevnar 13:2015    Plan:  1.  Asthma is clinically stable.  Continue current bronchodilator regimen.  Rx for Medrol Dosepak and Z-Juan Luis to have on hand for acute symptoms only.  2.  Discussed respiratory hygiene.  3.  Continue daily PPI.  4.  Follow-up with primary care for other health concerns.  5.  Follow-up in 1 year with PFT, sooner if needed.    Please note that this dictation was created using voice recognition software. I have made every reasonable attempt to correct obvious errors, but it is possible there are errors of grammar and possibly content that I did not discover before finalizing the note.

## 2021-01-13 DIAGNOSIS — Z23 NEED FOR VACCINATION: ICD-10-CM

## 2021-03-03 ENCOUNTER — HOSPITAL ENCOUNTER (OUTPATIENT)
Facility: MEDICAL CENTER | Age: 81
End: 2021-03-03
Attending: NURSE PRACTITIONER
Payer: MEDICARE

## 2021-03-03 ENCOUNTER — OFFICE VISIT (OUTPATIENT)
Dept: URGENT CARE | Facility: CLINIC | Age: 81
End: 2021-03-03
Payer: MEDICARE

## 2021-03-03 VITALS
HEART RATE: 85 BPM | RESPIRATION RATE: 16 BRPM | BODY MASS INDEX: 25.39 KG/M2 | HEIGHT: 66 IN | WEIGHT: 158 LBS | DIASTOLIC BLOOD PRESSURE: 88 MMHG | SYSTOLIC BLOOD PRESSURE: 132 MMHG | OXYGEN SATURATION: 97 % | TEMPERATURE: 98.2 F

## 2021-03-03 DIAGNOSIS — R53.83 OTHER FATIGUE: ICD-10-CM

## 2021-03-03 DIAGNOSIS — R68.83 CHILLS: ICD-10-CM

## 2021-03-03 LAB
BASOPHILS # BLD AUTO: 0.7 % (ref 0–1.8)
BASOPHILS # BLD: 0.05 K/UL (ref 0–0.12)
COVID ORDER STATUS COVID19: NORMAL
EOSINOPHIL # BLD AUTO: 0.28 K/UL (ref 0–0.51)
EOSINOPHIL NFR BLD: 3.8 % (ref 0–6.9)
ERYTHROCYTE [DISTWIDTH] IN BLOOD BY AUTOMATED COUNT: 42.4 FL (ref 35.9–50)
HCT VFR BLD AUTO: 45.8 % (ref 37–47)
HGB BLD-MCNC: 15.5 G/DL (ref 12–16)
IMM GRANULOCYTES # BLD AUTO: 0.03 K/UL (ref 0–0.11)
IMM GRANULOCYTES NFR BLD AUTO: 0.4 % (ref 0–0.9)
LYMPHOCYTES # BLD AUTO: 1.59 K/UL (ref 1–4.8)
LYMPHOCYTES NFR BLD: 21.6 % (ref 22–41)
MCH RBC QN AUTO: 31.8 PG (ref 27–33)
MCHC RBC AUTO-ENTMCNC: 33.8 G/DL (ref 33.6–35)
MCV RBC AUTO: 94 FL (ref 81.4–97.8)
MONOCYTES # BLD AUTO: 0.67 K/UL (ref 0–0.85)
MONOCYTES NFR BLD AUTO: 9.1 % (ref 0–13.4)
NEUTROPHILS # BLD AUTO: 4.73 K/UL (ref 2–7.15)
NEUTROPHILS NFR BLD: 64.4 % (ref 44–72)
NRBC # BLD AUTO: 0 K/UL
NRBC BLD-RTO: 0 /100 WBC
PLATELET # BLD AUTO: 297 K/UL (ref 164–446)
PMV BLD AUTO: 10.6 FL (ref 9–12.9)
RBC # BLD AUTO: 4.87 M/UL (ref 4.2–5.4)
SARS-COV-2 RNA RESP QL NAA+PROBE: NOTDETECTED
SPECIMEN SOURCE: NORMAL
WBC # BLD AUTO: 7.4 K/UL (ref 4.8–10.8)

## 2021-03-03 PROCEDURE — U0003 INFECTIOUS AGENT DETECTION BY NUCLEIC ACID (DNA OR RNA); SEVERE ACUTE RESPIRATORY SYNDROME CORONAVIRUS 2 (SARS-COV-2) (CORONAVIRUS DISEASE [COVID-19]), AMPLIFIED PROBE TECHNIQUE, MAKING USE OF HIGH THROUGHPUT TECHNOLOGIES AS DESCRIBED BY CMS-2020-01-R: HCPCS

## 2021-03-03 PROCEDURE — 85025 COMPLETE CBC W/AUTO DIFF WBC: CPT

## 2021-03-03 PROCEDURE — 99214 OFFICE O/P EST MOD 30 MIN: CPT | Performed by: NURSE PRACTITIONER

## 2021-03-03 PROCEDURE — U0005 INFEC AGEN DETEC AMPLI PROBE: HCPCS

## 2021-03-03 NOTE — PROGRESS NOTES
"Karli Berrios is a 80 y.o. female who presents for Chills (x3 days, bodyache ) and Cough      HPI This is a new problem.  C/o chills x 3 days with cough.  Cough is mild,  dry and intermittent.  Feels like 'something is wrong\". She has hx of peritonitis \" years ago\" that started with \" only chills\". \" I don't get chills unless I am sick\".  Denies sore throat, body aches, diarrhea, sob, c/p. No exposure to ill persons as she stays at home as much as possible to not get sick. Treatment tried: none. No other aggravating or alleviating factors.       ROS see HPI     Allergies   Allergen Reactions   • Atenolol      Side effect. Leg Cramp   • Bactrim [Sulfamethoxazole W-Trimethoprim]      She states this was always an antibiotic that should be in her chart    • Prolia [Denosumab]      Past Medical History:   Diagnosis Date   • HTN (hypertension)    • Hyperlipidemia    • Hypothyroidism    • Lung nodules      Past Surgical History:   Procedure Laterality Date   • APPENDECTOMY     • CARPAL TUNNEL RELEASE     • CATARACT EXTRACTION WITH IOL     • HEMORRHOIDECTOMY     • OTHER      D & C   • OTHER      FISTULECTOMY   • OTHER      HYSTERECTOMY   • OTHER      LEFTY BREAST LIPOMA RESECTION     No family history on file.  Social History     Tobacco Use   • Smoking status: Former Smoker     Packs/day: 1.00     Years: 17.00     Pack years: 17.00     Types: Cigarettes     Quit date: 1977     Years since quittin.1   • Smokeless tobacco: Never Used   • Tobacco comment: STARTED   QUIT     Substance Use Topics   • Alcohol use: Not Currently     Alcohol/week: 0.0 oz     Comment: MIXED DRINKS OCCASIONALLY     Patient Active Problem List   Diagnosis   • Hypothyroidism   • Hyperlipidemia   • Neck pain   • Cough   • Lung nodules   • Asthma, mild intermittent     Current Outpatient Medications on File Prior to Visit   Medication Sig Dispense Refill   • fluticasone (FLOVENT HFA) 110 MCG/ACT Aerosol " "INHALE TWO PUFFS BY MOUTH TWICE DAILY USE  SPACER.  RINSE  MOUTH  AFTER  EACH  USE 3 Inhaler 3   • albuterol 108 (90 Base) MCG/ACT Aero Soln inhalation aerosol Inhale 2 Puffs by mouth every four hours as needed for Shortness of Breath. 1 Inhaler 1   • ARMOUR THYROID 60 MG Tab      • Calcium Carbonate-Vit D-Min (CALCIUM 1200 PO) Take 1 tablet by mouth every day.     • Cyanocobalamin (VITAMIN B12 PO) Take 1,000 Tabs by mouth every day.     • Glucosamine-Chondroitin 750-600 MG Tab Take 1 tablet by mouth every day.     • Turmeric 500 MG Tab Take 1 tablet by mouth 2 Times a Day.     • simvastatin (ZOCOR) 40 MG Tab Take 40 mg by mouth every day.     • thyroid (ARMOUR THYROID) 90 MG TABS Take 1 Tab by mouth every day. 100 Each 3   • vitamin D (CHOLECALCIFEROL) 1000 UNIT TABS Take 1,000 Units by mouth every day.     • Magnesium 250 MG TABS Take 400 Tabs by mouth every day.     • Lutein 20 MG TABS Take 25 mg by mouth every day.     • Bioflavonoid Products (LISSY-C) TABS Take 1,000 Tabs by mouth every day.     • Multiple Vitamin (MULTIVITAMIN PO) Take 1 tablet by mouth every day.     • docosahexanoic acid (FISH OIL) 1000 MG CAPS Take 1,000 mg by mouth every day.       No current facility-administered medications on file prior to visit.          Objective:     /88   Pulse 85   Temp 36.8 °C (98.2 °F) (Temporal)   Resp 16   Ht 1.676 m (5' 6\")   Wt 71.7 kg (158 lb)   SpO2 97%   BMI 25.50 kg/m²     Physical Exam  Vitals and nursing note reviewed.   Constitutional:       General: She is not in acute distress.     Appearance: Normal appearance. She is well-developed and normal weight. She is not ill-appearing or toxic-appearing.   HENT:      Head: Normocephalic.      Right Ear: Hearing, ear canal and external ear normal. No middle ear effusion. Tympanic membrane is injected. Tympanic membrane is not erythematous.      Left Ear: Hearing, ear canal and external ear normal.  No middle ear effusion. Tympanic membrane is " injected. Tympanic membrane is not erythematous.      Nose: Nose normal. No mucosal edema or rhinorrhea.      Right Sinus: No maxillary sinus tenderness or frontal sinus tenderness.      Left Sinus: No maxillary sinus tenderness or frontal sinus tenderness.      Mouth/Throat:      Mouth: Mucous membranes are moist.      Pharynx: Uvula midline. No oropharyngeal exudate or posterior oropharyngeal erythema.      Tonsils: No tonsillar abscesses.   Eyes:      Pupils: Pupils are equal, round, and reactive to light.   Neck:      Trachea: Trachea normal.   Cardiovascular:      Rate and Rhythm: Normal rate and regular rhythm.      Chest Wall: PMI is not displaced.      Pulses: Normal pulses.      Heart sounds: Normal heart sounds.   Pulmonary:      Effort: Pulmonary effort is normal. No respiratory distress.      Breath sounds: Normal breath sounds. No decreased breath sounds, wheezing, rhonchi or rales.   Abdominal:      Palpations: Abdomen is soft.      Tenderness: There is no abdominal tenderness. There is no right CVA tenderness or left CVA tenderness.   Musculoskeletal:         General: Normal range of motion.      Cervical back: Full passive range of motion without pain, normal range of motion and neck supple.   Lymphadenopathy:      Cervical: No cervical adenopathy.      Upper Body:      Right upper body: No supraclavicular adenopathy.      Left upper body: No supraclavicular adenopathy.   Skin:     General: Skin is warm and dry.      Capillary Refill: Capillary refill takes less than 2 seconds.   Neurological:      Mental Status: She is alert and oriented to person, place, and time.      Gait: Gait normal.   Psychiatric:         Mood and Affect: Mood normal.         Speech: Speech normal.         Behavior: Behavior normal.         Thought Content: Thought content normal.         Assessment /Associated Orders:      1. Chills  CBC WITH DIFFERENTIAL    SARS-CoV-2 PCR (24 hour In-House): Collect [facility] swab in [facility]   2. Other  fatigue  CBC WITH DIFFERENTIAL    SARS-CoV-2 PCR (24 hour In-House): Collect NP swab in [facility]         Medical Decision Making:      VSS at today's acute urgent care visit.   CBC - normal today .   COVID - pending   Self -quarantine per CDC guidelines   Keep well hydrated  OTC antihistamine of choice. Follow manufactures dosing and safety guidelines.   Educated in natural progression of diseases. Watchful waiting. Self care.     Discussed management options (risks,benefits, and alternatives to treatment). Pt expresses understanding and the treatment plan was agreed upon. Questions were encouraged and answered to pt's satisfaction.   Advised to follow-up with the primary care physician for recheck, reevaluation, and consideration of further management if necessary.   Return to urgent care prn if new or worsening sx or if there is no improvement in condition prn. Red flags discussed and indications to immediately call 911 or present to the Emergency Department.     I personally reviewed prior external notes and test results pertinent to today's visit.  I have independently reviewed and interpreted all diagnostics ordered during this urgent care acute visit.   Time spent evaluating this patient was at least 30 minutes and includes preparing for visit, counseling/education, exam and evaluation, obtaining history, independent interpretation and ordering lab/test/procedures/medication management. This does not include time spent on separately billable procedures/tests.      Please note that this dictation was created using voice recognition software. I have made a reasonable attempt to correct obvious errors, but I expect that there are errors of grammar and possibly content that I did not discover before finalizing the note.    This note was electronically signed by Sarah GOETZ, Urgent Care

## 2021-03-04 ENCOUNTER — NURSE TRIAGE (OUTPATIENT)
Dept: HEALTH INFORMATION MANAGEMENT | Facility: OTHER | Age: 81
End: 2021-03-04

## 2021-03-04 NOTE — TELEPHONE ENCOUNTER
"Pt reporting \"chills\" for several days.  She has gone to the  03/03.  No fever, no night sweats.    Pt calling to get information on whether or not she should get Covid Vaccine this Saturday since she has been having chills.  Pt to follow up with pharmacist/pharmacy prior to vaccination administration        Reason for Disposition  • Information only question and nurse able to answer    Additional Information  • Negative: Nursing judgment  • Negative: Nursing judgment  • Negative: Nursing judgment  • Negative: Nursing judgment    Answer Assessment - Initial Assessment Questions  1. REASON FOR CALL or QUESTION: \"What is your reason for calling today?\" or \"How can I best help you?\" or \"What question do you have that I can help answer?\"      Pt has chills off and on.  Was seen at  yesterday with no problems. She is wondering if she should get the Covid vaccine this weekend.    Protocols used: INFORMATION ONLY CALL - NO TRIAGE-A-OH      "

## 2021-04-06 ENCOUNTER — HOSPITAL ENCOUNTER (OUTPATIENT)
Dept: RADIOLOGY | Facility: MEDICAL CENTER | Age: 81
End: 2021-04-06
Attending: INTERNAL MEDICINE
Payer: MEDICARE

## 2021-04-06 DIAGNOSIS — Z12.31 VISIT FOR SCREENING MAMMOGRAM: ICD-10-CM

## 2021-04-06 PROCEDURE — 77063 BREAST TOMOSYNTHESIS BI: CPT

## 2021-06-25 ENCOUNTER — TELEPHONE (OUTPATIENT)
Dept: SCHEDULING | Facility: IMAGING CENTER | Age: 81
End: 2021-06-25

## 2021-07-08 ENCOUNTER — HOSPITAL ENCOUNTER (OUTPATIENT)
Dept: LAB | Facility: MEDICAL CENTER | Age: 81
End: 2021-07-08
Attending: INTERNAL MEDICINE
Payer: MEDICARE

## 2021-07-08 PROCEDURE — 36415 COLL VENOUS BLD VENIPUNCTURE: CPT

## 2021-07-08 PROCEDURE — 82306 VITAMIN D 25 HYDROXY: CPT

## 2021-07-09 LAB — 25(OH)D3 SERPL-MCNC: 53 NG/ML (ref 30–100)

## 2021-08-04 ENCOUNTER — HOSPITAL ENCOUNTER (OUTPATIENT)
Dept: RADIOLOGY | Facility: MEDICAL CENTER | Age: 81
End: 2021-08-04
Attending: NURSE PRACTITIONER
Payer: MEDICARE

## 2021-08-04 ENCOUNTER — OFFICE VISIT (OUTPATIENT)
Dept: URGENT CARE | Facility: CLINIC | Age: 81
End: 2021-08-04

## 2021-08-04 ENCOUNTER — APPOINTMENT (OUTPATIENT)
Dept: RADIOLOGY | Facility: IMAGING CENTER | Age: 81
End: 2021-08-04
Attending: NURSE PRACTITIONER
Payer: MEDICARE

## 2021-08-04 VITALS
HEIGHT: 66 IN | DIASTOLIC BLOOD PRESSURE: 92 MMHG | RESPIRATION RATE: 16 BRPM | OXYGEN SATURATION: 98 % | WEIGHT: 160 LBS | BODY MASS INDEX: 25.71 KG/M2 | HEART RATE: 82 BPM | SYSTOLIC BLOOD PRESSURE: 168 MMHG | TEMPERATURE: 97.8 F

## 2021-08-04 DIAGNOSIS — R03.0 ELEVATED BLOOD PRESSURE READING: ICD-10-CM

## 2021-08-04 DIAGNOSIS — R91.8 MULTIPLE NODULES OF LUNG: ICD-10-CM

## 2021-08-04 DIAGNOSIS — Z00.00 HEALTHCARE MAINTENANCE: ICD-10-CM

## 2021-08-04 DIAGNOSIS — R07.89 OTHER CHEST PAIN: ICD-10-CM

## 2021-08-04 DIAGNOSIS — K21.9 GASTROESOPHAGEAL REFLUX DISEASE WITHOUT ESOPHAGITIS: ICD-10-CM

## 2021-08-04 DIAGNOSIS — R07.82 INTERCOSTAL PAIN: ICD-10-CM

## 2021-08-04 PROBLEM — I10 ESSENTIAL HYPERTENSION: Status: ACTIVE | Noted: 2021-04-21

## 2021-08-04 PROBLEM — M54.9 BACKACHE: Status: ACTIVE | Noted: 2021-04-21

## 2021-08-04 PROBLEM — M45.9 ANKYLOSING SPONDYLITIS (HCC): Status: ACTIVE | Noted: 2021-04-21

## 2021-08-04 PROBLEM — M19.90 OSTEOARTHROSIS: Status: ACTIVE | Noted: 2021-07-01

## 2021-08-04 PROBLEM — M51.36 DEGENERATION OF LUMBAR INTERVERTEBRAL DISC: Status: ACTIVE | Noted: 2021-07-01

## 2021-08-04 PROBLEM — M81.0 OSTEOPOROSIS: Status: ACTIVE | Noted: 2021-07-01

## 2021-08-04 PROBLEM — E78.5 HYPERLIPIDEMIA: Status: ACTIVE | Noted: 2021-04-21

## 2021-08-04 PROBLEM — G47.9 SLEEP DISTURBANCE: Status: ACTIVE | Noted: 2021-04-21

## 2021-08-04 PROBLEM — M51.369 DEGENERATION OF LUMBAR INTERVERTEBRAL DISC: Status: ACTIVE | Noted: 2021-07-01

## 2021-08-04 PROBLEM — N18.9 CHRONIC KIDNEY DISEASE: Status: ACTIVE | Noted: 2021-04-21

## 2021-08-04 PROBLEM — E03.9 HYPOTHYROIDISM: Status: ACTIVE | Noted: 2021-04-21

## 2021-08-04 PROBLEM — E55.9 VITAMIN D DEFICIENCY: Status: ACTIVE | Noted: 2021-04-21

## 2021-08-04 PROCEDURE — 99214 OFFICE O/P EST MOD 30 MIN: CPT | Performed by: NURSE PRACTITIONER

## 2021-08-04 PROCEDURE — 93000 ELECTROCARDIOGRAM COMPLETE: CPT | Performed by: NURSE PRACTITIONER

## 2021-08-04 PROCEDURE — 71046 X-RAY EXAM CHEST 2 VIEWS: CPT | Mod: TC,FY | Performed by: NURSE PRACTITIONER

## 2021-08-04 RX ORDER — LORAZEPAM 1 MG/1
0.5 TABLET ORAL PRN
COMMUNITY
End: 2022-03-07 | Stop reason: SDUPTHER

## 2021-08-04 RX ORDER — AFLIBERCEPT 40 MG/ML
INJECTION, SOLUTION INTRAVITREAL
COMMUNITY
End: 2023-05-08

## 2021-08-04 RX ORDER — OMEPRAZOLE 20 MG/1
20 CAPSULE, DELAYED RELEASE ORAL PRN
COMMUNITY
End: 2023-03-27

## 2021-08-04 RX ORDER — ACETAMINOPHEN 500 MG
1000 TABLET ORAL EVERY MORNING
COMMUNITY

## 2021-08-04 ASSESSMENT — ENCOUNTER SYMPTOMS
PSYCHIATRIC NEGATIVE: 1
RESPIRATORY NEGATIVE: 1
MUSCULOSKELETAL NEGATIVE: 1
CONSTITUTIONAL NEGATIVE: 1
EYES NEGATIVE: 1
NEUROLOGICAL NEGATIVE: 1
GASTROINTESTINAL NEGATIVE: 1

## 2021-08-04 NOTE — PROGRESS NOTES
Subjective:   Karli Berrios is a 80 y.o. female who presents for Chest Pain (ache x3 wks hx of asthma )       Chest Pain   This is a new problem. The current episode started 1 to 4 weeks ago. The onset quality is sudden. The problem occurs intermittently. The problem has been waxing and waning. The pain is present in the epigastric region. The pain is mild. The quality of the pain is described as burning. Radiates to: underneath breasts. Associated symptoms comments: none . Prior diagnostic workup includes chest x-ray.     Pt presents for evaluation of a new problem, reports intermittent chest pain over the past 3 weeks.  Patient is a longstanding history of asthma, and has a standing a prescription for Medrol Dosepak as well as Z-Juan Luis.  Patient has taken both of these without relief.  She called to get in with her pulmonologist, but is unable to get an appointment until October.  Patient states that she has noticed chest pain that will occur intermittently throughout the day, as well as with drinking water.  Patient consumes very little amounts of caffeine.  Patient states that she has a history of multiple lung nodules, has not been reevaluated in several years.  Denies possibility of GERD, takes alternating Prilosec and Pepcid.  Patient denies fever, chills, myalgias.    Review of Systems   Constitutional: Negative.    HENT: Negative.    Eyes: Negative.    Respiratory: Negative.    Cardiovascular: Positive for chest pain.   Gastrointestinal: Negative.    Genitourinary: Negative.    Musculoskeletal: Negative.    Skin: Negative.    Neurological: Negative.    Psychiatric/Behavioral: Negative.    All other systems reviewed and are negative.      MEDS:   Current Outpatient Medications:   •  omeprazole (PRILOSEC) 20 MG delayed-release capsule, Take 20 mg by mouth every day., Disp: , Rfl:   •  LORazepam (ATIVAN) 1 MG Tab, Take 0.5 mg by mouth every four hours as needed for Anxiety., Disp: , Rfl:   •  Aflibercept  (EYLEA) 2 MG/0.05ML Solution, by Intravitreal route., Disp: , Rfl:   •  fluticasone (FLOVENT HFA) 110 MCG/ACT Aerosol, INHALE TWO PUFFS BY MOUTH TWICE DAILY USE  SPACER.  RINSE  MOUTH  AFTER  EACH  USE, Disp: 3 Inhaler, Rfl: 3  •  albuterol 108 (90 Base) MCG/ACT Aero Soln inhalation aerosol, Inhale 2 Puffs by mouth every four hours as needed for Shortness of Breath., Disp: 1 Inhaler, Rfl: 1  •  ARMOUR THYROID 60 MG Tab, , Disp: , Rfl:   •  Calcium Carbonate-Vit D-Min (CALCIUM 1200 PO), Take 1 tablet by mouth every day., Disp: , Rfl:   •  Cyanocobalamin (VITAMIN B12 PO), Take 1,000 Tabs by mouth every day., Disp: , Rfl:   •  Glucosamine-Chondroitin 750-600 MG Tab, Take 1 tablet by mouth every day., Disp: , Rfl:   •  Turmeric 500 MG Tab, Take 1 tablet by mouth 2 Times a Day., Disp: , Rfl:   •  simvastatin (ZOCOR) 40 MG Tab, Take 40 mg by mouth every day., Disp: , Rfl:   •  thyroid (ARMOUR THYROID) 90 MG TABS, Take 1 Tab by mouth every day., Disp: 100 Each, Rfl: 3  •  vitamin D (CHOLECALCIFEROL) 1000 UNIT TABS, Take 1,000 Units by mouth every day., Disp: , Rfl:   •  Magnesium 250 MG TABS, Take 400 Tabs by mouth every day., Disp: , Rfl:   •  Lutein 20 MG TABS, Take 25 mg by mouth every day., Disp: , Rfl:   •  Multiple Vitamin (MULTIVITAMIN PO), Take 1 tablet by mouth every day., Disp: , Rfl:   •  docosahexanoic acid (FISH OIL) 1000 MG CAPS, Take 1,000 mg by mouth every day., Disp: , Rfl:   •  Bioflavonoid Products (LISSY-C) TABS, Take 1,000 Tabs by mouth every day., Disp: , Rfl:   ALLERGIES:   Allergies   Allergen Reactions   • Atenolol Unspecified     Side effect. Leg Cramp  leg cramps  Side effect. Leg Cramp   • Bactrim [Sulfamethoxazole W-Trimethoprim]      She states this was always an antibiotic that should be in her chart    • Denosumab Unspecified   • Sulfamethoxazole-Trimethoprim Unspecified     She states this was always an antibiotic that should be in her chart        Patient's PMH, SocHx, SurgHx, FamHx, Drug  "allergies and medications were reviewed.     Objective:   BP (!) 168/92   Pulse 82   Temp 36.6 °C (97.8 °F) (Temporal)   Resp 16   Ht 1.676 m (5' 6\")   Wt 72.6 kg (160 lb)   SpO2 98%   BMI 25.82 kg/m²     Physical Exam  Vitals and nursing note reviewed.   Constitutional:       General: She is awake.      Appearance: Normal appearance. She is well-developed and normal weight.   HENT:      Head: Normocephalic and atraumatic.      Right Ear: External ear normal.      Left Ear: External ear normal.      Nose: Nose normal.      Mouth/Throat:      Lips: Pink.      Mouth: Mucous membranes are moist.   Eyes:      Extraocular Movements: Extraocular movements intact.      Conjunctiva/sclera: Conjunctivae normal.      Pupils: Pupils are equal, round, and reactive to light.   Neck:      Thyroid: No thyromegaly.      Trachea: Trachea normal.   Cardiovascular:      Rate and Rhythm: Normal rate and regular rhythm.      Pulses: Normal pulses.      Heart sounds: Normal heart sounds, S1 normal and S2 normal.   Pulmonary:      Effort: Pulmonary effort is normal. No respiratory distress.      Breath sounds: Normal breath sounds. No wheezing, rhonchi or rales.   Abdominal:      General: Bowel sounds are normal.      Palpations: Abdomen is soft.   Musculoskeletal:         General: Normal range of motion.      Cervical back: Full passive range of motion without pain, normal range of motion and neck supple.   Lymphadenopathy:      Cervical: No cervical adenopathy.   Skin:     General: Skin is warm and dry.      Capillary Refill: Capillary refill takes less than 2 seconds.   Neurological:      General: No focal deficit present.      Mental Status: She is alert and oriented to person, place, and time.      Gait: Gait is intact.   Psychiatric:         Attention and Perception: Attention and perception normal.         Mood and Affect: Mood normal.         Speech: Speech normal.         Behavior: Behavior normal. Behavior is cooperative. "         Thought Content: Thought content normal.         Judgment: Judgment normal.     EKG- NSR w/o acute changes.  CXR- WNL, no acute cardiopulmonary process.    Assessment/Plan:   Assessment    1. Other chest pain  - DX-CHEST-2 VIEWS; Future  - EKG - Clinic Performed    2. Multiple nodules of lung    3. Gastroesophageal reflux disease without esophagitis    4. Intercostal pain  - CT-CHEST (THORAX) WITH; Future    5. Elevated blood pressure reading    6. Healthcare maintenance  - Comp Metabolic Panel; Future      Vital signs stable at today's acute urgent care visit.  Reviewed test results that were completed in the clinic, negative CXR and EKG.  Will order chest CT, patient unable to complete today, she will schedule in near future.  Discussed management options (risks, benefits, and alternatives to treatment).  Recommend increasing omeprazole to 40 mg a day.  Patient has appointment with GI in September.    Advised the patient to follow-up with the primary care provider for recheck, reevaluation, and/or consideration of further management if necessary. Return to urgent care with any worsening symptoms or if there is no improvement in their current condition. Red flags discussed and indications to immediately call 911 or present to the ED.  All questions were encouraged and answered to the patient's satisfaction and understanding, and they agree to the plan of care.     I personally reviewed prior external notes and test results pertinent to today's visit.  I have independently reviewed and interpreted all diagnostics ordered during this urgent care acute visit. Time spent evaluating this patient was a minimum of 30 minutes and includes preparing for visit, counseling/education, exam, evaluation, obtaining history, and ordering lab/test/procedures.      Please note that this dictation was created using voice recognition software. I have made a reasonable attempt to correct obvious errors, but I expect that there  are errors of grammar and possibly content that I did not discover before finalizing the note.

## 2021-08-05 ENCOUNTER — HOSPITAL ENCOUNTER (OUTPATIENT)
Dept: LAB | Facility: MEDICAL CENTER | Age: 81
End: 2021-08-05
Attending: NURSE PRACTITIONER
Payer: MEDICARE

## 2021-08-05 ENCOUNTER — HOSPITAL ENCOUNTER (OUTPATIENT)
Dept: RADIOLOGY | Facility: MEDICAL CENTER | Age: 81
End: 2021-08-05
Attending: NURSE PRACTITIONER
Payer: MEDICARE

## 2021-08-05 DIAGNOSIS — R07.82 INTERCOSTAL PAIN: ICD-10-CM

## 2021-08-05 DIAGNOSIS — Z00.00 HEALTHCARE MAINTENANCE: ICD-10-CM

## 2021-08-05 LAB
ALBUMIN SERPL BCP-MCNC: 4.1 G/DL (ref 3.2–4.9)
ALBUMIN/GLOB SERPL: 1.4 G/DL
ALP SERPL-CCNC: 95 U/L (ref 30–99)
ALT SERPL-CCNC: 20 U/L (ref 2–50)
ANION GAP SERPL CALC-SCNC: 9 MMOL/L (ref 7–16)
AST SERPL-CCNC: 13 U/L (ref 12–45)
BILIRUB SERPL-MCNC: 0.3 MG/DL (ref 0.1–1.5)
BUN SERPL-MCNC: 21 MG/DL (ref 8–22)
CALCIUM SERPL-MCNC: 9.1 MG/DL (ref 8.5–10.5)
CHLORIDE SERPL-SCNC: 97 MMOL/L (ref 96–112)
CO2 SERPL-SCNC: 30 MMOL/L (ref 20–33)
CREAT SERPL-MCNC: 0.84 MG/DL (ref 0.5–1.4)
FASTING STATUS PATIENT QL REPORTED: NORMAL
GLOBULIN SER CALC-MCNC: 2.9 G/DL (ref 1.9–3.5)
GLUCOSE SERPL-MCNC: 78 MG/DL (ref 65–99)
POTASSIUM SERPL-SCNC: 4.2 MMOL/L (ref 3.6–5.5)
PROT SERPL-MCNC: 7 G/DL (ref 6–8.2)
SODIUM SERPL-SCNC: 136 MMOL/L (ref 135–145)

## 2021-08-05 PROCEDURE — 36415 COLL VENOUS BLD VENIPUNCTURE: CPT | Mod: GY

## 2021-08-05 PROCEDURE — 700117 HCHG RX CONTRAST REV CODE 255: Performed by: INTERNAL MEDICINE

## 2021-08-05 PROCEDURE — 71260 CT THORAX DX C+: CPT | Mod: MG

## 2021-08-05 PROCEDURE — 80053 COMPREHEN METABOLIC PANEL: CPT | Mod: GY

## 2021-08-05 RX ADMIN — IOHEXOL 100 ML: 350 INJECTION, SOLUTION INTRAVENOUS at 16:30

## 2021-08-30 ENCOUNTER — OFFICE VISIT (OUTPATIENT)
Dept: MEDICAL GROUP | Facility: LAB | Age: 81
End: 2021-08-30
Payer: MEDICARE

## 2021-08-30 VITALS
OXYGEN SATURATION: 97 % | WEIGHT: 161 LBS | TEMPERATURE: 97.9 F | HEART RATE: 90 BPM | DIASTOLIC BLOOD PRESSURE: 62 MMHG | RESPIRATION RATE: 14 BRPM | BODY MASS INDEX: 25.88 KG/M2 | SYSTOLIC BLOOD PRESSURE: 132 MMHG | HEIGHT: 66 IN

## 2021-08-30 DIAGNOSIS — S46.812A STRAIN OF LEFT TRAPEZIUS MUSCLE, INITIAL ENCOUNTER: ICD-10-CM

## 2021-08-30 DIAGNOSIS — M25.512 ACUTE PAIN OF LEFT SHOULDER: ICD-10-CM

## 2021-08-30 DIAGNOSIS — E78.5 DYSLIPIDEMIA: ICD-10-CM

## 2021-08-30 DIAGNOSIS — I10 ESSENTIAL HYPERTENSION: ICD-10-CM

## 2021-08-30 DIAGNOSIS — K21.9 GASTROESOPHAGEAL REFLUX DISEASE, UNSPECIFIED WHETHER ESOPHAGITIS PRESENT: ICD-10-CM

## 2021-08-30 DIAGNOSIS — Z13.0 SCREENING FOR DEFICIENCY ANEMIA: ICD-10-CM

## 2021-08-30 DIAGNOSIS — S49.92XA INJURY OF LEFT SHOULDER, INITIAL ENCOUNTER: ICD-10-CM

## 2021-08-30 DIAGNOSIS — E55.9 VITAMIN D DEFICIENCY: ICD-10-CM

## 2021-08-30 DIAGNOSIS — M81.0 AGE RELATED OSTEOPOROSIS, UNSPECIFIED PATHOLOGICAL FRACTURE PRESENCE: ICD-10-CM

## 2021-08-30 DIAGNOSIS — F41.1 GAD (GENERALIZED ANXIETY DISORDER): ICD-10-CM

## 2021-08-30 DIAGNOSIS — E03.9 ACQUIRED HYPOTHYROIDISM: ICD-10-CM

## 2021-08-30 PROCEDURE — 99214 OFFICE O/P EST MOD 30 MIN: CPT | Performed by: FAMILY MEDICINE

## 2021-08-30 RX ORDER — GLUCOSAMINE/CHONDR SU A SOD 750-600 MG
TABLET ORAL DAILY
COMMUNITY

## 2021-08-30 RX ORDER — LIDOCAINE 50 MG/G
1 PATCH TOPICAL EVERY 24 HOURS
Qty: 10 PATCH | Refills: 3 | Status: SHIPPED | OUTPATIENT
Start: 2021-08-30 | End: 2021-10-28

## 2021-08-30 ASSESSMENT — PATIENT HEALTH QUESTIONNAIRE - PHQ9: CLINICAL INTERPRETATION OF PHQ2 SCORE: 0

## 2021-08-30 ASSESSMENT — FIBROSIS 4 INDEX: FIB4 SCORE: 0.76

## 2021-08-30 NOTE — ASSESSMENT & PLAN NOTE
Dyslipidemia: New problem to me that is currently stable.  Chronic condition. Current treatments: diet/exercise/medicines. She is taking simvastatin 40 mg as directed. Current side effects: none.

## 2021-09-07 ENCOUNTER — NON-PROVIDER VISIT (OUTPATIENT)
Dept: SLEEP MEDICINE | Facility: MEDICAL CENTER | Age: 81
End: 2021-09-07
Attending: NURSE PRACTITIONER
Payer: MEDICARE

## 2021-09-07 ENCOUNTER — SLEEP CENTER VISIT (OUTPATIENT)
Dept: SLEEP MEDICINE | Facility: MEDICAL CENTER | Age: 81
End: 2021-09-07
Payer: MEDICARE

## 2021-09-07 VITALS
OXYGEN SATURATION: 96 % | HEIGHT: 66 IN | WEIGHT: 162 LBS | BODY MASS INDEX: 26.03 KG/M2 | RESPIRATION RATE: 16 BRPM | SYSTOLIC BLOOD PRESSURE: 132 MMHG | HEART RATE: 90 BPM | DIASTOLIC BLOOD PRESSURE: 60 MMHG

## 2021-09-07 VITALS — WEIGHT: 162 LBS | HEIGHT: 66 IN | BODY MASS INDEX: 26.03 KG/M2

## 2021-09-07 DIAGNOSIS — J45.20 MILD INTERMITTENT ASTHMA WITHOUT COMPLICATION: ICD-10-CM

## 2021-09-07 DIAGNOSIS — Z87.891 FORMER SMOKER: ICD-10-CM

## 2021-09-07 DIAGNOSIS — R05.9 COUGH: ICD-10-CM

## 2021-09-07 DIAGNOSIS — R91.8 LUNG NODULES: ICD-10-CM

## 2021-09-07 PROCEDURE — 94060 EVALUATION OF WHEEZING: CPT | Performed by: INTERNAL MEDICINE

## 2021-09-07 PROCEDURE — 94726 PLETHYSMOGRAPHY LUNG VOLUMES: CPT | Performed by: INTERNAL MEDICINE

## 2021-09-07 PROCEDURE — 99214 OFFICE O/P EST MOD 30 MIN: CPT | Performed by: NURSE PRACTITIONER

## 2021-09-07 PROCEDURE — 94729 DIFFUSING CAPACITY: CPT | Performed by: INTERNAL MEDICINE

## 2021-09-07 RX ORDER — METHYLPREDNISOLONE 4 MG/1
TABLET ORAL
Qty: 21 TABLET | Refills: 0 | Status: SHIPPED | OUTPATIENT
Start: 2021-09-07 | End: 2021-12-01 | Stop reason: SDUPTHER

## 2021-09-07 RX ORDER — AZITHROMYCIN 250 MG/1
TABLET, FILM COATED ORAL
Qty: 6 TABLET | Refills: 0 | Status: SHIPPED | OUTPATIENT
Start: 2021-09-07 | End: 2022-03-07

## 2021-09-07 ASSESSMENT — PULMONARY FUNCTION TESTS
FEV1: 1.8
FEV1/FVC_PERCENT_CHANGE: 100
FVC: 2.36
FEV1_PERCENT_PREDICTED: 86
FVC_PERCENT_PREDICTED: 82
FVC_LLN: 2.33
FEV1/FVC: 78
FEV1: 1.84
FEV1/FVC_PERCENT_PREDICTED: 102
FEV1/FVC_PERCENT_LLN: 64
FEV1/FVC_PERCENT_PREDICTED: 101
FVC_PREDICTED: 2.79
FEV1/FVC: 77.97
FEV1/FVC: 78
FEV1/FVC_PERCENT_PREDICTED: 101
FEV1_LLN: 1.77
FEV1/FVC_PREDICTED: 77
FVC_PERCENT_PREDICTED: 84
FEV1_PERCENT_CHANGE: 2
FEV1/FVC_PERCENT_PREDICTED: 76
FEV1_PERCENT_CHANGE: 2
FEV1/FVC: 78
FEV1_PREDICTED: 2.12
FVC: 2.31
FEV1/FVC_PERCENT_CHANGE: 0
FEV1/FVC_PERCENT_PREDICTED: 102
FEV1_PERCENT_PREDICTED: 84

## 2021-09-07 ASSESSMENT — FIBROSIS 4 INDEX
FIB4 SCORE: 0.76
FIB4 SCORE: 0.76

## 2021-09-07 NOTE — PROGRESS NOTES
Chief Complaint   Patient presents with   • Follow-Up     Mild Internittent Asthma without complication // 9/24/2020    • Results     PFT        HPI:  Karli Berrios is a 80 y.o. year old female here today for follow-up on mild asthma.  Last office visit 9/24/2020    Today she notes her breathing to be stable in the last year.  She did use her antibiotic/steroid pack at home when she was having some chest discomfort unsure if this is bronchitis but it did not make any difference.  She was diagnosed with acid reflux and placed on higher dose of PPI daily.  She notes improvement with this.  Today she feels her breathing is stable without complaint.  She would like refill of her antibiotic/steroid to have on hand in case she has symptoms during the winter.    CT Chest 8/5/2021 indicated no rib fractures, no acute cardiopulmonary process, small tree-in-bud nodules in the anterior inferior right upper lobe and a 3 mm nodule in the inferior right upper lobe that is stable.  I reviewed findings with patient today.  No further imaging recommended.      PULM HX:  Former smoker, quit 1977 with 15PYH.  Spirometry August 2018 indicated FEV1 of 1.77 L 77% predicted, FEV1 FVC ratio of 77, no significant bronchodilator response.  She uses Flovent 110 mcg 2 puffs twice a day, rare use of albuterol.  She rarely uses PRATIMA.  She does have a history of tiny bilateral pulmonary nodules stable on chest imaging from 2009 through 2011.  Chest x-ray in April 2019 was unremarkable.     [facility] stGstrstastdstest:st st1st ROS: As per HPI and otherwise negative if not stated.    Past Medical History:   Diagnosis Date   • Anxiety    • Arthritis    • Asthma    • Cataract    • GERD (gastroesophageal reflux disease)    • HTN (hypertension)    • Hyperlipidemia    • Hypothyroidism    • Lung nodules July 2009    stable 2010   • Urinary tract infection        Past Surgical History:   Procedure Laterality Date   • ABDOMINAL EXPLORATION      peritonitis   •  ABDOMINAL HYSTERECTOMY TOTAL     • APPENDECTOMY     • CARPAL TUNNEL RELEASE     • CATARACT EXTRACTION WITH IOL     • EYE SURGERY     • HEMORRHOIDECTOMY     • OTHER      D & C   • OTHER      FISTULECTOMY   • OTHER      HYSTERECTOMY   • OTHER      LEFTY BREAST LIPOMA RESECTION       Family History   Problem Relation Age of Onset   • Arthritis Mother    • Hyperlipidemia Mother        Social History     Socioeconomic History   • Marital status:      Spouse name: Not on file   • Number of children: Not on file   • Years of education: Not on file   • Highest education level: Not on file   Occupational History   • Not on file   Tobacco Use   • Smoking status: Former Smoker     Packs/day: 1.00     Years: 20.00     Pack years: 20.00     Types: Cigarettes     Start date: 1957     Quit date: 1977     Years since quittin.7   • Smokeless tobacco: Never Used   Vaping Use   • Vaping Use: Never used   Substance and Sexual Activity   • Alcohol use: Not Currently     Alcohol/week: 0.0 oz     Comment: MIXED DRINKS OCCASIONALLY   • Drug use: No     Comment: CBD with THC Cream for hands   • Sexual activity: Not Currently     Partners: Male   Other Topics Concern   • Not on file   Social History Narrative   • Not on file     Social Determinants of Health     Financial Resource Strain:    • Difficulty of Paying Living Expenses:    Food Insecurity:    • Worried About Running Out of Food in the Last Year:    • Ran Out of Food in the Last Year:    Transportation Needs:    • Lack of Transportation (Medical):    • Lack of Transportation (Non-Medical):    Physical Activity:    • Days of Exercise per Week:    • Minutes of Exercise per Session:    Stress:    • Feeling of Stress :    Social Connections:    • Frequency of Communication with Friends and Family:    • Frequency of Social Gatherings with Friends and Family:    • Attends [employer] Services:    • Active Member of Clubs or Organizations:    • Attends Club or  "Organization Meetings:    • Marital Status:    Intimate Partner Violence:    • Fear of Current or Ex-Partner:    • Emotionally Abused:    • Physically Abused:    • Sexually Abused:        Allergies as of 09/07/2021 - Reviewed 09/07/2021   Allergen Reaction Noted   • Atenolol Unspecified 10/15/2014   • Bactrim [sulfamethoxazole w-trimethoprim]  09/27/2018   • Denosumab Unspecified 10/05/2020   • Sulfamethoxazole-trimethoprim Unspecified 09/27/2018        Vitals:  /60 (BP Location: Left arm, Patient Position: Sitting, BP Cuff Size: Adult)   Pulse 90   Resp 16   Ht 1.676 m (5' 6\")   Wt 73.5 kg (162 lb)   SpO2 96%     Current medications as of today   Current Outpatient Medications   Medication Sig Dispense Refill   • methylPREDNISolone (MEDROL DOSEPAK) 4 MG Tablet Therapy Pack Take as directed. 21 Tablet 0   • azithromycin (ZITHROMAX Z-DARNELL) 250 MG Tab Take 2 tablets on day 1. Then take 1 tablet a day for 4 days. 6 Tablet 0   • Lutein-Zeaxanthin 25-5 MG Cap Take  by mouth every day.     • lidocaine (LIDODERM) 5 % Patch Place 1 Patch on the skin every 24 hours. 10 Patch 3   • omeprazole (PRILOSEC) 20 MG delayed-release capsule Take 20 mg by mouth 2 (two) times a day.     • LORazepam (ATIVAN) 1 MG Tab Take 0.5 mg by mouth every four hours as needed for Anxiety.     • Aflibercept (EYLEA) 2 MG/0.05ML Solution by Intravitreal route.     • acetaminophen (TYLENOL) 500 MG Tab Take 1,000 mg by mouth every day.     • fluticasone (FLOVENT HFA) 110 MCG/ACT Aerosol INHALE TWO PUFFS BY MOUTH TWICE DAILY USE  SPACER.  RINSE  MOUTH  AFTER  EACH  USE 3 Inhaler 3   • albuterol 108 (90 Base) MCG/ACT Aero Soln inhalation aerosol Inhale 2 Puffs by mouth every four hours as needed for Shortness of Breath. 1 Inhaler 1   • ARMOUR THYROID 60 MG Tab      • Calcium Carbonate-Vit D-Min (CALCIUM 1200 PO) Take 1 tablet by mouth every day.     • Cyanocobalamin (VITAMIN B12 PO) Take 1,000 Tabs by mouth every day.     • " Glucosamine-Chondroitin 750-600 MG Tab Take 1 tablet by mouth every day.     • Turmeric 500 MG Tab Take 1 tablet by mouth 2 Times a Day.     • simvastatin (ZOCOR) 40 MG Tab Take 40 mg by mouth every day.     • thyroid (ARMOUR THYROID) 90 MG TABS Take 1 Tab by mouth every day. 100 Each 3   • vitamin D (CHOLECALCIFEROL) 1000 UNIT TABS Take 1,000 Units by mouth every day.     • Magnesium 250 MG TABS Take 400 Tabs by mouth every day.     • Lutein 20 MG TABS Take 25 mg by mouth every day.     • Bioflavonoid Products (LISSY-C) TABS Take 1,000 Tabs by mouth every day.     • Multiple Vitamin (MULTIVITAMIN PO) Take 1 tablet by mouth every day.       No current facility-administered medications for this visit.         Physical Exam:   Gen:           Alert and oriented, No apparent distress. Mood and affect appropriate, normal interaction with examiner.  Eyes:          PERRL, EOM intact, sclere white, conjunctive moist.  Ears:          Not examined.   Hearing:     Grossly intact.  Nose:          Normal, no lesions or deformities.  Dentition:    Mask.  Oropharynx:   Mask.  Mallampati Classification: mask  Neck:        Supple, trachea midline, no masses.  Respiratory Effort: No intercostal retractions or use of accessory muscles.   Lung Auscultation:      Clear to auscultation bilaterally; no rales, rhonchi or wheezing.  CV:            Regular rate and rhythm. No murmurs, rubs or gallops.  Abd:           Not examined.   Lymphadenopathy: Not examined.  Gait and Station: Normal.  Digits and Nails: No clubbing, cyanosis, petechiae, or nodes.   Cranial Nerves: II-XII grossly intact.  Skin:        No rashes, lesions or ulcers noted.               Ext:           No cyanosis or edema.      Assessment:  1. Mild intermittent asthma without complication     2. Lung nodules     3. BMI 26.0-26.9,adult     4. Former smoker         Immunizations:    Flu:recommend next month  Pneumovax 23:2008  Prevnar 13:2015  COVID-19: 3/28/21,  3/7/21    Plan:  1.  Patient's mild asthma is clinically stable.  Reviewed PFTs and recent CT scan imaging.  Continue current bronchodilators.  She will call for refills.  Rx for Z-Juan Luis and Medrol Dosepak to have on hand for acute symptoms; reviewed appropriate use.  2.  Discussed respiratory hygiene  3.  For primary care for the health concerns  4.  Continue PPI per GI recommendations  5.  Follow-up in 1 year to review symptoms, sooner if needed.    Please note that this dictation was created using voice recognition software. I have made every reasonable attempt to correct obvious errors, but it is possible there are errors of grammar and possibly content that I did not discover before finalizing the note.

## 2021-09-07 NOTE — PROCEDURES
Tech: Caroline Velazco, RRT, CPFT  Tech notes: Good patient effort & cooperation.  The results of this test meet the ATS/ERS standards for acceptability & reproducibility.  Test was performed on the [employer] Plethysmograph-Elite DX system.  Predicted values were GLI-2012 for spirometry, GLI- 2017 for DLCO, ITS for Lung Volumes.  The DLCO was uncorrected for Hgb.  A bronchodilator of Ventolin HFA -2puffs via spacer administered.  DLCO performed during dilation period.    1. Baseline spirometry demonstrates a low normal  FEV1 and FVC. FEV1/FVC ratio is 78%.  FEV1 is 1.80 L which is 84% of predicted.    2. After administration of an inhaled bronchodilator there is no significant improvement in FEV1.    3. Lung volumes demonstrate a total lung capacity of 92% of predicted.    4. Gas exchange as estimated by DLCO is normal at 119% of predicted.    5. Airway resistance is in the normal range.      Impression:    No definite evidence of significant obstructive or restrictive pulmonary disease is noted on the study.

## 2021-09-09 NOTE — ASSESSMENT & PLAN NOTE
Patient's last bone density on 4/21/2021 showed a lumbar spine T score of -2.3.  Left hip T score of -2.4.  10-year probability for major osteoporotic fracture was 17% and for hip fracture 5.4.  Patient is following with a rheumatologist for this.

## 2021-09-09 NOTE — ASSESSMENT & PLAN NOTE
Patient complains of pain in the left upper back area.  She got new chair. It didn't secure and on 8/20/21 she fell onto left shoulder. Has pain and bruising to the proximal left arm which is improving.  The pain which is exacerbated by movement.  The pain is from the left cervical/thoracic paraspinous area out to the posterior left shoulder and moves around in location based on what she is doing.  She does have a burning sensation that travels to the left shoulder area posteriorly, does not travel down the arm, not associated with any weakness, numbness or tingling.  She has been using topical Aspercreme and oral Tylenol.   She was seen at [facility] on 8/20/2021 and the x-ray showed no fractures but there was acromioclavicular arthropathy.  The glenohumeral joint was normal.  She reports that the pain is improving but she is hoping to get a referral to physical therapy to help with this.   Home

## 2021-09-09 NOTE — PROGRESS NOTES
Chief Complaint   Patient presents with   • Establish Care   • Shoulder Pain     L , injury          Karli Berrios is a 80 y.o. female here to establish care and for evaluation and management of:        HPI:    Acquired hypothyroidism  [place] thyroid 60 mg 5 days a week and 90 mg 2 days a week    Dyslipidemia  Dyslipidemia: New problem to me that is currently stable.  Chronic condition. Current treatments: diet/exercise/medicines. She is taking simvastatin 40 mg as directed. Current side effects: none.       Vitamin D deficiency  Patient has a known vitamin D deficiency and is currently taking at 1000 units daily.    Strain of left trapezius muscle  Patient complains of pain in the left upper back area.  She got new chair. It didn't secure and on 8/20/21 she fell onto left shoulder. Has pain and bruising to the proximal left arm which is improving.  The pain which is exacerbated by movement.  The pain is from the left cervical/thoracic paraspinous area out to the posterior left shoulder and moves around in location based on what she is doing.  She does have a burning sensation that travels to the left shoulder area posteriorly, does not travel down the arm, not associated with any weakness, numbness or tingling.  She has been using topical Aspercreme and oral Tylenol.   She was seen at [facility] urgent [facility] on 8/20/2021 and the x-ray showed no fractures but there was acromioclavicular arthropathy.  The glenohumeral joint was normal.  She reports that the pain is improving but she is hoping to get a referral to physical therapy to help with this.    Osteoporosis  Patient's last bone density on 4/21/2021 showed a lumbar spine T score of -2.3.  Left hip T score of -2.4.  10-year probability for major osteoporotic fracture was 17% and for hip fracture 5.4.  Patient is following with a rheumatologist for this.    GERD (gastroesophageal reflux disease)  Patient has been getting GI work-up.  She had some chest pain and  got a CT of the chest on 8/5/2021 it showed small tree-in-bud nodules in the anterior inferior right upper lobe and a 3 mm nodule in the inferior right upper lobe.  Follow-up CT at 12 months was recommended.  Patient is scheduled for an upper GI.    LUIS FELIPE (generalized anxiety disorder)        Essential hypertension  Stable.  Patient is managing this with diet and exercise and herbal supplements.  She is not taking baby aspirin daily.   She is monitoring BP at home.   Denies symptoms low BP: light-headed, tunnel-vision, unusual fatigue.   Denies symptoms high BP:pounding headache, visual changes, palpitations, flushed face.   Denies medicine side effects: unusual fatigue, slow heartbeat, foot/leg swelling, cough.        Allergies   Allergen Reactions   • Atenolol Unspecified     Side effect. Leg Cramp  leg cramps  Side effect. Leg Cramp   • Bactrim [Sulfamethoxazole W-Trimethoprim]      She states this was always an antibiotic that should be in her chart    • Denosumab Unspecified   • Sulfamethoxazole-Trimethoprim Unspecified     She states this was always an antibiotic that should be in her chart        Current medicines (including changes today)  Current Outpatient Medications   Medication Sig Dispense Refill   • lidocaine (LIDODERM) 5 % Patch Place 1 Patch on the skin every 24 hours. 10 Patch 3   • omeprazole (PRILOSEC) 20 MG delayed-release capsule Take 20 mg by mouth 2 (two) times a day.     • LORazepam (ATIVAN) 1 MG Tab Take 0.5 mg by mouth every four hours as needed for Anxiety.     • Aflibercept (EYLEA) 2 MG/0.05ML Solution by Intravitreal route.     • acetaminophen (TYLENOL) 500 MG Tab Take 1,000 mg by mouth every day.     • fluticasone (FLOVENT HFA) 110 MCG/ACT Aerosol INHALE TWO PUFFS BY MOUTH TWICE DAILY USE  SPACER.  RINSE  MOUTH  AFTER  EACH  USE 3 Inhaler 3   • albuterol 108 (90 Base) MCG/ACT Aero Soln inhalation aerosol Inhale 2 Puffs by mouth every four hours as needed for Shortness of Breath. 1  Inhaler 1   • ARMOUR THYROID 60 MG Tab      • Calcium Carbonate-Vit D-Min (CALCIUM 1200 PO) Take 1 tablet by mouth every day.     • Cyanocobalamin (VITAMIN B12 PO) Take 1,000 Tabs by mouth every day.     • Glucosamine-Chondroitin 750-600 MG Tab Take 1 tablet by mouth every day.     • Turmeric 500 MG Tab Take 1 tablet by mouth 2 Times a Day.     • simvastatin (ZOCOR) 40 MG Tab Take 40 mg by mouth every day.     • thyroid (ARMOUR THYROID) 90 MG TABS Take 1 Tab by mouth every day. 100 Each 3   • vitamin D (CHOLECALCIFEROL) 1000 UNIT TABS Take 1,000 Units by mouth every day.     • Magnesium 250 MG TABS Take 400 Tabs by mouth every day.     • Lutein 20 MG TABS Take 25 mg by mouth every day.     • Bioflavonoid Products (LISSY-C) TABS Take 1,000 Tabs by mouth every day.     • Multiple Vitamin (MULTIVITAMIN PO) Take 1 tablet by mouth every day.     • methylPREDNISolone (MEDROL DOSEPAK) 4 MG Tablet Therapy Pack Take as directed. 21 Tablet 0   • azithromycin (ZITHROMAX Z-DARNELL) 250 MG Tab Take 2 tablets on day 1. Then take 1 tablet a day for 4 days. 6 Tablet 0   • Lutein-Zeaxanthin 25-5 MG Cap Take  by mouth every day.       No current facility-administered medications for this visit.     She  has a past medical history of Anxiety, Arthritis, Asthma, Cataract, GERD (gastroesophageal reflux disease), HTN (hypertension), Hyperlipidemia, Hypothyroidism, Lung nodules (2009), and Urinary tract infection. She also has no past medical history of Breast cancer (HCC).  She  has a past surgical history that includes other; hemorrhoidectomy; other; other; appendectomy; other; carpal tunnel release; cataract extraction with iol; abdominal exploration; eye surgery; and abdominal hysterectomy total.  Social History     Tobacco Use   • Smoking status: Former Smoker     Packs/day: 1.00     Years: 20.00     Pack years: 20.00     Types: Cigarettes     Start date: 1957     Quit date: 1977     Years since quittin.7   • Smokeless  "tobacco: Never Used   Vaping Use   • Vaping Use: Never used   Substance Use Topics   • Alcohol use: Not Currently     Alcohol/week: 0.0 oz     Comment: MIXED DRINKS OCCASIONALLY   • Drug use: No     Comment: CBD with THC Cream for hands     Social History     Social History Narrative   • Not on file     Family History   Problem Relation Age of Onset   • Arthritis Mother    • Hyperlipidemia Mother      Family Status   Relation Name Status   • Fa          CAR ACCIDENT   • Mo Dementia          ROS  No fever or chills.  No nausea or vomiting.  No chest pain or palpitations.  No cough or SOB.  No pain with urination or hematuria.  No black or bloody stools.  All other systems reviewed and are negative     Objective:     /62 (BP Location: Right arm, Patient Position: Sitting, BP Cuff Size: Adult)   Pulse 90   Temp 36.6 °C (97.9 °F)   Resp 14   Ht 1.676 m (5' 6\")   Wt 73 kg (161 lb)   SpO2 97%  Body mass index is 25.99 kg/m².  Physical Exam:      Well developed, well nourished.  Alert, oriented in no acute distress.  Psych: Eye contact is good, speech goal directed, affect calm  Eyes: conjunctiva non-injected, sclera non-icteric.  Neck Supple.  No adenopathy or masses in the neck or supraclavicular regions. No thyromegaly.  Paraspinal muscle spasm in the left trapezius  Lungs: clear to auscultation bilaterally with good excursion. No wheezes or rhonchi  CV: regular rate and rhythm. No murmur  Shoulder/arm exam: No deformity, erythema, edema or ecchymosis. Tenderness to palpation left trapezius. ROM intact . Hawkin's test negative. Neer's test negative.  Empty can test negative. Drop arm test negative.. 5/5 strength bilaterally.             Assessment and Plan:   The following treatment plan was discussed    1. LUIS FELIPE (generalized anxiety disorder)  This is a new problem to me.  Dr. Adi Burgess in [place] has been managing this.  He is prescribing her lorazepam and will continue to do so.    2. " Dyslipidemia  This is a new problem to me.  Check lipid profile and adjust simvastatin dose as needed  - Lipid Profile; Future    3. Essential hypertension  This is a new problem to me.  Patient is managing with behavioral modifications.  Continue to monitor    4. Acquired hypothyroidism  This is a new problem to me.  Check thyroid labs and adjust [place] Thyroid dose as needed  - TSH; Future  - FREE THYROXINE; Future    5. Vitamin D deficiency  This is a new problem to me.  Check vitamin D level and adjust supplementation as needed  - VITAMIN D,25 HYDROXY; Future    6. Screening for deficiency anemia  Screening labs ordered.  Await results for counseling.    - CBC WITH DIFFERENTIAL; Future    7. Acute pain of left shoulder  This is a new problem to me.  Refer to physical therapy for further evaluation and treatment.  Refill Lidoderm patches as these seem to be helping.  - REFERRAL TO PHYSICAL THERAPY    8. Strain of left trapezius muscle, initial encounter  This is a new problem to me.  Refer to physical therapy for further evaluation and treatment.  Refill Lidoderm patches as these seem to be helping  - REFERRAL TO PHYSICAL THERAPY    9. Injury of left shoulder, initial encounter  This is a new problem to me.  Refer to physical therapy for further evaluation and treatment.  Refill Lidoderm patches as these seem to be helping  - lidocaine (LIDODERM) 5 % Patch; Place 1 Patch on the skin every 24 hours.  Dispense: 10 Patch; Refill: 3    10. Age related osteoporosis, unspecified pathological fracture presence  This is a new problem to me.  Patient with increased risk of fracture.  Follow-up with rheumatology as scheduled    11. Gastroesophageal reflux disease, unspecified whether esophagitis present  This is a new problem to me.  Continue omeprazole daily.  Continue follow-up with gastroenterology.      Records requested.    Any change or worsening of signs or symptoms, patient encouraged to follow-up or report to the  emergency room for further evaluation. Patient understands and agrees.    Followup: Return in about 3 months (around 11/30/2021).

## 2021-09-09 NOTE — ASSESSMENT & PLAN NOTE
Patient has been getting GI work-up.  She had some chest pain and got a CT of the chest on 8/5/2021 it showed small tree-in-bud nodules in the anterior inferior right upper lobe and a 3 mm nodule in the inferior right upper lobe.  Follow-up CT at 12 months was recommended.  Patient is scheduled for an upper GI.

## 2021-09-09 NOTE — ASSESSMENT & PLAN NOTE
Stable.  Patient is managing this with diet and exercise and herbal supplements.  She is not taking baby aspirin daily.   She is monitoring BP at home.   Denies symptoms low BP: light-headed, tunnel-vision, unusual fatigue.   Denies symptoms high BP:pounding headache, visual changes, palpitations, flushed face.   Denies medicine side effects: unusual fatigue, slow heartbeat, foot/leg swelling, cough.

## 2021-09-21 ENCOUNTER — PATIENT MESSAGE (OUTPATIENT)
Dept: SLEEP MEDICINE | Facility: MEDICAL CENTER | Age: 81
End: 2021-09-21

## 2021-09-21 DIAGNOSIS — J45.20 MILD INTERMITTENT ASTHMA WITHOUT COMPLICATION: ICD-10-CM

## 2021-09-21 RX ORDER — DEXAMETHASONE 4 MG/1
TABLET ORAL
Qty: 3 EACH | Refills: 3 | Status: SHIPPED | OUTPATIENT
Start: 2021-09-21 | End: 2022-09-14

## 2021-09-21 NOTE — TELEPHONE ENCOUNTER
From: Karli Berrios  To: Nurse Practitioner Alfreda Joseph  Sent: 9/21/2021 10:21 AM PDT  Subject: Flovent HFA - 110mcg    Need RX and refills sent to [facility]. Left phone message with your medical asst. also.  My mailing address is: [address] [place], [place] [address] [place] [address]

## 2021-10-19 ENCOUNTER — HOSPITAL ENCOUNTER (OUTPATIENT)
Dept: LAB | Facility: MEDICAL CENTER | Age: 81
End: 2021-10-19
Attending: FAMILY MEDICINE
Payer: MEDICARE

## 2021-10-19 DIAGNOSIS — Z13.0 SCREENING FOR DEFICIENCY ANEMIA: ICD-10-CM

## 2021-10-19 DIAGNOSIS — E78.5 DYSLIPIDEMIA: ICD-10-CM

## 2021-10-19 DIAGNOSIS — E55.9 VITAMIN D DEFICIENCY: ICD-10-CM

## 2021-10-19 DIAGNOSIS — E03.9 ACQUIRED HYPOTHYROIDISM: ICD-10-CM

## 2021-10-19 LAB
25(OH)D3 SERPL-MCNC: 73 NG/ML (ref 30–100)
BASOPHILS # BLD AUTO: 0.8 % (ref 0–1.8)
BASOPHILS # BLD: 0.06 K/UL (ref 0–0.12)
CHOLEST SERPL-MCNC: 158 MG/DL (ref 100–199)
EOSINOPHIL # BLD AUTO: 0.3 K/UL (ref 0–0.51)
EOSINOPHIL NFR BLD: 4.2 % (ref 0–6.9)
ERYTHROCYTE [DISTWIDTH] IN BLOOD BY AUTOMATED COUNT: 42.3 FL (ref 35.9–50)
FASTING STATUS PATIENT QL REPORTED: NORMAL
HCT VFR BLD AUTO: 45.2 % (ref 37–47)
HDLC SERPL-MCNC: 58 MG/DL
HGB BLD-MCNC: 14.9 G/DL (ref 12–16)
IMM GRANULOCYTES # BLD AUTO: 0.02 K/UL (ref 0–0.11)
IMM GRANULOCYTES NFR BLD AUTO: 0.3 % (ref 0–0.9)
LDLC SERPL CALC-MCNC: 83 MG/DL
LYMPHOCYTES # BLD AUTO: 1.65 K/UL (ref 1–4.8)
LYMPHOCYTES NFR BLD: 23.1 % (ref 22–41)
MCH RBC QN AUTO: 31.4 PG (ref 27–33)
MCHC RBC AUTO-ENTMCNC: 33 G/DL (ref 33.6–35)
MCV RBC AUTO: 95.4 FL (ref 81.4–97.8)
MONOCYTES # BLD AUTO: 0.76 K/UL (ref 0–0.85)
MONOCYTES NFR BLD AUTO: 10.6 % (ref 0–13.4)
NEUTROPHILS # BLD AUTO: 4.35 K/UL (ref 2–7.15)
NEUTROPHILS NFR BLD: 61 % (ref 44–72)
NRBC # BLD AUTO: 0 K/UL
NRBC BLD-RTO: 0 /100 WBC
PLATELET # BLD AUTO: 296 K/UL (ref 164–446)
PMV BLD AUTO: 11.1 FL (ref 9–12.9)
RBC # BLD AUTO: 4.74 M/UL (ref 4.2–5.4)
T4 FREE SERPL-MCNC: 0.99 NG/DL (ref 0.93–1.7)
TRIGL SERPL-MCNC: 85 MG/DL (ref 0–149)
TSH SERPL DL<=0.005 MIU/L-ACNC: 0.54 UIU/ML (ref 0.38–5.33)
WBC # BLD AUTO: 7.1 K/UL (ref 4.8–10.8)

## 2021-10-19 PROCEDURE — 82306 VITAMIN D 25 HYDROXY: CPT

## 2021-10-19 PROCEDURE — 36415 COLL VENOUS BLD VENIPUNCTURE: CPT

## 2021-10-19 PROCEDURE — 84443 ASSAY THYROID STIM HORMONE: CPT

## 2021-10-19 PROCEDURE — 85025 COMPLETE CBC W/AUTO DIFF WBC: CPT

## 2021-10-19 PROCEDURE — 80061 LIPID PANEL: CPT

## 2021-10-19 PROCEDURE — 84439 ASSAY OF FREE THYROXINE: CPT

## 2021-10-28 ENCOUNTER — OFFICE VISIT (OUTPATIENT)
Dept: CARDIOLOGY | Facility: MEDICAL CENTER | Age: 81
End: 2021-10-28
Payer: MEDICARE

## 2021-10-28 VITALS
HEART RATE: 85 BPM | HEIGHT: 66 IN | WEIGHT: 160 LBS | OXYGEN SATURATION: 96 % | BODY MASS INDEX: 25.71 KG/M2 | DIASTOLIC BLOOD PRESSURE: 62 MMHG | SYSTOLIC BLOOD PRESSURE: 130 MMHG | RESPIRATION RATE: 16 BRPM

## 2021-10-28 DIAGNOSIS — E78.5 DYSLIPIDEMIA: ICD-10-CM

## 2021-10-28 DIAGNOSIS — M25.511 RIGHT SHOULDER PAIN, UNSPECIFIED CHRONICITY: ICD-10-CM

## 2021-10-28 DIAGNOSIS — I10 ESSENTIAL HYPERTENSION: ICD-10-CM

## 2021-10-28 PROCEDURE — 99204 OFFICE O/P NEW MOD 45 MIN: CPT | Performed by: INTERNAL MEDICINE

## 2021-10-28 RX ORDER — CHLORAL HYDRATE 500 MG
1000 CAPSULE ORAL DAILY
COMMUNITY
End: 2023-07-10

## 2021-10-28 ASSESSMENT — ENCOUNTER SYMPTOMS
SHORTNESS OF BREATH: 0
COUGH: 0
MYALGIAS: 0
DIZZINESS: 0
PALPITATIONS: 0
LOSS OF CONSCIOUSNESS: 0

## 2021-10-28 ASSESSMENT — FIBROSIS 4 INDEX: FIB4 SCORE: 0.78

## 2021-10-28 NOTE — PROGRESS NOTES
Chief Complaint   Patient presents with   • Chest Pain       Subjective     Karli Berrios is a 81 y.o. female who presents today referred by her PCP Stephanie Garza for general cardiovascular evaluation.    The patient has a significant past medical history of longstanding dyslipidemia, hypertension, asthma and hypothyroidism.  She had been a longtime patient of Dr. Darrell Lindsey, endocrinologist and had seen my late partner Dr. Luan Milton for some chest pain issues in 2010 undergoing a cardiac PET scan that was normal with an EF of 60-65%.  In 2018 she was seen at [facility] cardiology and had a stress test that apparently was normal and a carotid ultrasound that showed mild disease.    More recently and 7/2021 the patient went to urgent care for some chest pain symptoms.  She tells me that she had a CXR, EKG and chest CT scan ultimately being started on increased dose of omeprazole with resolution of her chest pain.    The patient lives part-time between [facility] and in the past few months has been cleaning out her [facility] home by herself which has involved a tremendous amount of physical labor.  She has had no chest pain or shortness of breath but is developed some right scapular/shoulder pain that has had her concerned for heart problems as her  who had coronary disease and severe aortic stenosis and may have had some similar type of symptoms.    She does not smoke cigarettes or have diabetes mellitus.      Past Medical History:   Diagnosis Date   • Anxiety    • Arthritis    • Asthma    • Cataract    • GERD (gastroesophageal reflux disease)    • HTN (hypertension)    • Hyperlipidemia    • Hypothyroidism    • Lung nodules July 2009 stable 2010   • Urinary tract infection      Past Surgical History:   Procedure Laterality Date   • ABDOMINAL EXPLORATION      peritonitis   • ABDOMINAL HYSTERECTOMY TOTAL     • APPENDECTOMY     • CARPAL TUNNEL RELEASE     • CATARACT EXTRACTION WITH  IOL     • EYE SURGERY     • HEMORRHOIDECTOMY     • OTHER      D & C   • OTHER      FISTULECTOMY   • OTHER      HYSTERECTOMY   • OTHER      LEFTY BREAST LIPOMA RESECTION     Family History   Problem Relation Age of Onset   • Arthritis Mother    • Hyperlipidemia Mother      Social History     Socioeconomic History   • Marital status:      Spouse name: Not on file   • Number of children: Not on file   • Years of education: Not on file   • Highest education level: Not on file   Occupational History   • Not on file   Tobacco Use   • Smoking status: Former Smoker     Packs/day: 1.00     Years: 20.00     Pack years: 20.00     Types: Cigarettes     Start date: 1957     Quit date: 1977     Years since quittin.8   • Smokeless tobacco: Never Used   Vaping Use   • Vaping Use: Never used   Substance and Sexual Activity   • Alcohol use: Yes     Alcohol/week: 0.0 oz     Comment: MIXED DRINKS OCCASIONALLY   • Drug use: No     Comment: CBD with THC Cream for hands   • Sexual activity: Not Currently     Partners: Male   Other Topics Concern   • Not on file   Social History Narrative   • Not on file     Social Determinants of Health     Financial Resource Strain:    • Difficulty of Paying Living Expenses:    Food Insecurity:    • Worried About Running Out of Food in the Last Year:    • Ran Out of Food in the Last Year:    Transportation Needs:    • Lack of Transportation (Medical):    • Lack of Transportation (Non-Medical):    Physical Activity:    • Days of Exercise per Week:    • Minutes of Exercise per Session:    Stress:    • Feeling of Stress :    Social Connections:    • Frequency of Communication with Friends and Family:    • Frequency of Social Gatherings with Friends and Family:    • Attends Spiritism Services:    • Active Member of Clubs or Organizations:    • Attends Club or Organization Meetings:    • Marital Status:    Intimate Partner Violence:    • Fear of Current or Ex-Partner:    • Emotionally  Abused:    • Physically Abused:    • Sexually Abused:      Allergies   Allergen Reactions   • Atenolol Unspecified     Side effect. Leg Cramp  leg cramps  Side effect. Leg Cramp   • Bactrim [Sulfamethoxazole W-Trimethoprim]      She states this was always an antibiotic that should be in her chart    • Denosumab Unspecified   • Sulfamethoxazole-Trimethoprim Unspecified     She states this was always an antibiotic that should be in her chart      Outpatient Encounter Medications as of 10/28/2021   Medication Sig Dispense Refill   • Omega-3 Fatty Acids (FISH OIL) 1000 MG Cap capsule Take 1,000 mg by mouth every day.     • fluticasone (FLOVENT HFA) 110 MCG/ACT Aerosol INHALE TWO PUFFS BY MOUTH TWICE DAILY USE  SPACER.  RINSE  MOUTH  AFTER  EACH  USE 3 Each 3   • methylPREDNISolone (MEDROL DOSEPAK) 4 MG Tablet Therapy Pack Take as directed. 21 Tablet 0   • azithromycin (ZITHROMAX Z-DARNELL) 250 MG Tab Take 2 tablets on day 1. Then take 1 tablet a day for 4 days. 6 Tablet 0   • Lutein-Zeaxanthin 25-5 MG Cap Take  by mouth every day.     • omeprazole (PRILOSEC) 20 MG delayed-release capsule Take 20 mg by mouth every day.     • LORazepam (ATIVAN) 1 MG Tab Take 0.5 mg by mouth as needed for Anxiety.     • Aflibercept (EYLEA) 2 MG/0.05ML Solution by Intravitreal route Q30 DAYS.     • acetaminophen (TYLENOL) 500 MG Tab Take 1,000 mg by mouth every day.     • albuterol 108 (90 Base) MCG/ACT Aero Soln inhalation aerosol Inhale 2 Puffs by mouth every four hours as needed for Shortness of Breath. 1 Inhaler 1   • ARMOUR THYROID 60 MG Tab Take 60 mg by mouth. 5 days a week     • Calcium Carbonate-Vit D-Min (CALCIUM 1200 PO) Take 1 tablet by mouth every day.     • Cyanocobalamin (VITAMIN B12 PO) Take 1,000 mcg by mouth every day.     • Glucosamine-Chondroitin 750-600 MG Tab Take 1 tablet by mouth every day.     • Turmeric 500 MG Tab Take 1 tablet by mouth 2 Times a Day.     • simvastatin (ZOCOR) 40 MG Tab Take 40 mg by mouth every day.    "  • thyroid (ARMOUR THYROID) 90 MG TABS Take 1 Tab by mouth every day. (Patient taking differently: Take 90 mg by mouth every 72 hours. 2 days a week) 100 Each 3   • vitamin D (CHOLECALCIFEROL) 1000 UNIT TABS Take 2,000 Units by mouth 2 times a day.     • Magnesium 250 MG TABS Take 400 Tabs by mouth every day.     • Bioflavonoid Products (LISSY-C) TABS Take 1,000 Tablets by mouth 2 times a day.     • Multiple Vitamin (MULTIVITAMIN PO) Take 1 tablet by mouth every day.     • [DISCONTINUED] lidocaine (LIDODERM) 5 % Patch Place 1 Patch on the skin every 24 hours. (Patient not taking: Reported on 10/28/2021) 10 Patch 3   • [DISCONTINUED] Lutein 20 MG TABS Take 25 mg by mouth every day. (Patient not taking: Reported on 10/28/2021)       No facility-administered encounter medications on file as of 10/28/2021.     Review of Systems   Respiratory: Negative for cough and shortness of breath.    Cardiovascular: Negative for chest pain and palpitations.   Musculoskeletal: Positive for joint pain. Negative for myalgias.   Neurological: Negative for dizziness and loss of consciousness.   All other systems reviewed and are negative.             Objective     /62 (BP Location: Left arm, Patient Position: Sitting, BP Cuff Size: Adult)   Pulse 85   Resp 16   Ht 1.676 m (5' 6\")   Wt 72.6 kg (160 lb)   SpO2 96%   BMI 25.82 kg/m²     Physical Exam  Vitals reviewed.   Constitutional:       General: She is not in acute distress.     Appearance: She is well-developed.   Eyes:      Conjunctiva/sclera: Conjunctivae normal.      Pupils: Pupils are equal, round, and reactive to light.   Neck:      Vascular: No JVD.   Cardiovascular:      Rate and Rhythm: Normal rate and regular rhythm.      Pulses:           Carotid pulses are 2+ on the right side and 2+ on the left side.     Heart sounds: Normal heart sounds. No murmur heard.   No friction rub. No gallop.    Pulmonary:      Effort: Pulmonary effort is normal. No accessory muscle " usage or respiratory distress.      Breath sounds: Normal breath sounds. No wheezing or rales.   Abdominal:      General: There is no distension.      Palpations: Abdomen is soft. There is no mass.      Tenderness: There is no abdominal tenderness.   Musculoskeletal:      Cervical back: Normal range of motion and neck supple.   Skin:     General: Skin is warm and dry.      Findings: No rash.      Nails: There is no clubbing.   Neurological:      Mental Status: She is alert and oriented to person, place, and time.   Psychiatric:         Behavior: Behavior normal.            Cardiac PET scan 2010  EF 60-69%  Normal perfusion    EKG 8/4/2021 sinus rhythm, within normal limits, personally reviewed    Laboratory 10/19/2021 reviewed    Assessment & Plan     1. Right shoulder pain, unspecified chronicity  Treadmill Stress   2. Essential hypertension     3. Dyslipidemia         Medical Decision Making: Today's Assessment/Status/Plan:   Recommendation Discussion  1.  For the patient's concern regarding her right shoulder pain possibly reflecting atypical symptoms for coronary disease will arrange for a treadmill stress test and will make further recommendations based on those results  2.  Otherwise all of her coronary risk factors appear to be well addressed in regards to her blood pressure and cholesterol management.    Thank you for allowing me see this pleasant patient in consultation.

## 2021-11-16 ENCOUNTER — TELEPHONE (OUTPATIENT)
Dept: SCHEDULING | Facility: IMAGING CENTER | Age: 81
End: 2021-11-16

## 2021-11-16 DIAGNOSIS — M25.511 RIGHT SHOULDER PAIN, UNSPECIFIED CHRONICITY: ICD-10-CM

## 2021-11-16 DIAGNOSIS — E78.5 DYSLIPIDEMIA: ICD-10-CM

## 2021-11-16 DIAGNOSIS — M79.601 PAIN IN RIGHT ARM: ICD-10-CM

## 2021-11-16 NOTE — TELEPHONE ENCOUNTER
JAH Rivers,    This patient was calling to find out what is going on with her order to get a TM stress test. She was told that [facility] is still not doing them and to contact [facility] or [facility]. She is calling to ask if the referral can be faxed over to them in order for her to schedule per JAH. Please call her back at [phone].      Thank you,  TATYANA

## 2021-11-17 NOTE — TELEPHONE ENCOUNTER
Upon chart review, lab point of care treadmill ordered instead of active treadmill test only order.     Ordered correct treadmill test.     Called patient mobile number and patient identifier on [facility] LM stating order has been placed and she should here about scheduling instructions in a couple of days. Advised if she does not here from anyone by Monday to give us a call back. Answered all questions and concerns, appreciative of call.

## 2021-11-23 NOTE — TELEPHONE ENCOUNTER
Referral has been authorized.     Called patient mobile number and patient identifier on , LM for the patient with this information, gave [facility] phone number, and office number to callback if she has further questions.

## 2021-12-01 ENCOUNTER — OFFICE VISIT (OUTPATIENT)
Dept: MEDICAL GROUP | Facility: LAB | Age: 81
End: 2021-12-01
Payer: MEDICARE

## 2021-12-01 VITALS
WEIGHT: 164 LBS | HEART RATE: 86 BPM | OXYGEN SATURATION: 97 % | DIASTOLIC BLOOD PRESSURE: 66 MMHG | RESPIRATION RATE: 12 BRPM | HEIGHT: 66 IN | BODY MASS INDEX: 26.36 KG/M2 | SYSTOLIC BLOOD PRESSURE: 134 MMHG | TEMPERATURE: 98.2 F

## 2021-12-01 DIAGNOSIS — E03.9 ACQUIRED HYPOTHYROIDISM: ICD-10-CM

## 2021-12-01 DIAGNOSIS — R05.9 COUGH: ICD-10-CM

## 2021-12-01 DIAGNOSIS — K21.9 GASTROESOPHAGEAL REFLUX DISEASE, UNSPECIFIED WHETHER ESOPHAGITIS PRESENT: ICD-10-CM

## 2021-12-01 DIAGNOSIS — J45.20 MILD INTERMITTENT ASTHMA WITHOUT COMPLICATION: Chronic | ICD-10-CM

## 2021-12-01 LAB
EXTERNAL QUALITY CONTROL: NORMAL
SARS-COV+SARS-COV-2 AG RESP QL IA.RAPID: NEGATIVE

## 2021-12-01 PROCEDURE — 99214 OFFICE O/P EST MOD 30 MIN: CPT | Mod: CS | Performed by: FAMILY MEDICINE

## 2021-12-01 PROCEDURE — 87426 SARSCOV CORONAVIRUS AG IA: CPT | Performed by: FAMILY MEDICINE

## 2021-12-01 RX ORDER — METHYLPREDNISOLONE 4 MG/1
TABLET ORAL
Qty: 21 TABLET | Refills: 1 | Status: SHIPPED | OUTPATIENT
Start: 2021-12-01 | End: 2022-03-07

## 2021-12-01 ASSESSMENT — FIBROSIS 4 INDEX: FIB4 SCORE: 0.78

## 2021-12-01 ASSESSMENT — ANXIETY QUESTIONNAIRES
4. TROUBLE RELAXING: SEVERAL DAYS
2. NOT BEING ABLE TO STOP OR CONTROL WORRYING: NOT AT ALL
GAD7 TOTAL SCORE: 1
5. BEING SO RESTLESS THAT IT IS HARD TO SIT STILL: NOT AT ALL
6. BECOMING EASILY ANNOYED OR IRRITABLE: NOT AT ALL
7. FEELING AFRAID AS IF SOMETHING AWFUL MIGHT HAPPEN: NOT AT ALL
3. WORRYING TOO MUCH ABOUT DIFFERENT THINGS: NOT AT ALL
1. FEELING NERVOUS, ANXIOUS, OR ON EDGE: NOT AT ALL

## 2021-12-01 ASSESSMENT — PATIENT HEALTH QUESTIONNAIRE - PHQ9: CLINICAL INTERPRETATION OF PHQ2 SCORE: 0

## 2021-12-01 NOTE — ASSESSMENT & PLAN NOTE
[place] thyroid 60 mg 5 days a week and 90 mg 2 days a week.  Has tried Synthroid and levothyroxine and did not tolerate either.  She was seeing endocrinology in the past.

## 2021-12-15 NOTE — ASSESSMENT & PLAN NOTE
Current flare. Increased cough and clear rhinorrhea.  Currently using Flovent inhaler as prescribed.  She denies side effects.  Frequency of Albuterol use: increased recently  Reports recent or current exacerbation.  Reports current shortness of breath, chest pain, or cough.

## 2021-12-15 NOTE — PROGRESS NOTES
Subjective:     Chief Complaint   Patient presents with   • Follow-Up     3 month   • Cough     concerns for bronchitis       Karli Berrios is a 81 y.o. female here today for evaluation and management of:    Acquired hypothyroidism  [place] thyroid 60 mg 5 days a week and 90 mg 2 days a week.  Has tried Synthroid and levothyroxine and did not tolerate either.  She was seeing endocrinology in the past.      Asthma, mild intermittent  Current flare. Increased cough and clear rhinorrhea.  Currently using Flovent inhaler as prescribed.  She denies side effects.  Frequency of Albuterol use: increased recently  Reports recent or current exacerbation.  Reports current shortness of breath, chest pain, or cough.            GERD (gastroesophageal reflux disease)  Stable.Takes omeprazole 20 mg daily as directed. Denies side effects.  Denies early satiety, unintentional weight loss, choking, persistent burning pain in chest or upper abdomen.         Allergies   Allergen Reactions   • Atenolol Unspecified     Side effect. Leg Cramp  leg cramps  Side effect. Leg Cramp   • Bactrim [Sulfamethoxazole W-Trimethoprim]      She states this was always an antibiotic that should be in her chart    • Denosumab Unspecified   • Sulfamethoxazole-Trimethoprim Unspecified     She states this was always an antibiotic that should be in her chart        Current medicines (including changes today)  Current Outpatient Medications   Medication Sig Dispense Refill   • methylPREDNISolone (MEDROL DOSEPAK) 4 MG Tablet Therapy Pack Take as directed. 21 Tablet 1   • Omega-3 Fatty Acids (FISH OIL) 1000 MG Cap capsule Take 1,000 mg by mouth every day.     • fluticasone (FLOVENT HFA) 110 MCG/ACT Aerosol INHALE TWO PUFFS BY MOUTH TWICE DAILY USE  SPACER.  RINSE  MOUTH  AFTER  EACH  USE 3 Each 3   • azithromycin (ZITHROMAX Z-DARNELL) 250 MG Tab Take 2 tablets on day 1. Then take 1 tablet a day for 4 days. 6 Tablet 0   • Lutein-Zeaxanthin 25-5 MG Cap Take  by  mouth every day.     • omeprazole (PRILOSEC) 20 MG delayed-release capsule Take 20 mg by mouth every day.     • LORazepam (ATIVAN) 1 MG Tab Take 0.5 mg by mouth as needed for Anxiety.     • Aflibercept (EYLEA) 2 MG/0.05ML Solution by Intravitreal route Q30 DAYS.     • acetaminophen (TYLENOL) 500 MG Tab Take 1,000 mg by mouth every day.     • albuterol 108 (90 Base) MCG/ACT Aero Soln inhalation aerosol Inhale 2 Puffs by mouth every four hours as needed for Shortness of Breath. 1 Inhaler 1   • ARMOUR THYROID 60 MG Tab Take 60 mg by mouth. 5 days a week     • Calcium Carbonate-Vit D-Min (CALCIUM 1200 PO) Take 1 tablet by mouth every day.     • Cyanocobalamin (VITAMIN B12 PO) Take 1,000 mcg by mouth every day.     • Glucosamine-Chondroitin 750-600 MG Tab Take 1 tablet by mouth every day.     • Turmeric 500 MG Tab Take 1 tablet by mouth 2 Times a Day.     • simvastatin (ZOCOR) 40 MG Tab Take 40 mg by mouth every day.     • thyroid (ARMOUR THYROID) 90 MG TABS Take 1 Tab by mouth every day. (Patient taking differently: Take 90 mg by mouth every 72 hours. 2 days a week) 100 Each 3   • vitamin D (CHOLECALCIFEROL) 1000 UNIT TABS Take 2,000 Units by mouth 2 times a day.     • Magnesium 250 MG TABS Take 400 Tabs by mouth every day.     • Bioflavonoid Products (LISSY-C) TABS Take 1,000 Tablets by mouth 2 times a day.     • Multiple Vitamin (MULTIVITAMIN PO) Take 1 tablet by mouth every day.       No current facility-administered medications for this visit.       She  has a past medical history of Anxiety, Arthritis, Asthma, Cataract, GERD (gastroesophageal reflux disease), HTN (hypertension), Hyperlipidemia, Hypothyroidism, Lung nodules (July 2009), and Urinary tract infection. She also has no past medical history of Breast cancer (HCC).    Patient Active Problem List    Diagnosis Date Noted   • LUIS FELIPE (generalized anxiety disorder) 08/30/2021   • Acute pain of left shoulder 08/30/2021   • Strain of left trapezius muscle 08/30/2021  "  • Degeneration of lumbar intervertebral disc 07/01/2021   • GERD (gastroesophageal reflux disease) 07/01/2021   • Osteoarthrosis 07/01/2021   • Osteoporosis 07/01/2021   • Acquired hypothyroidism 04/21/2021   • Dyslipidemia 04/21/2021   • Ankylosing spondylitis (HCC) 04/21/2021   • Backache 04/21/2021   • Chronic kidney disease 04/21/2021   • Essential hypertension 04/21/2021   • Vitamin D deficiency 04/21/2021   • Sleep disturbance 04/21/2021   • Asthma, mild intermittent 05/02/2016   • Neck pain 01/26/2010   • Lung nodules 01/26/2010       ROS   No fever or chills.  No nausea or vomiting.  No chest pain or palpitations.  No cough or SOB.  No pain with urination or hematuria.  No black or bloody stools.       Objective:     /66 (BP Location: Right arm, Patient Position: Sitting, BP Cuff Size: Adult)   Pulse 86   Temp 36.8 °C (98.2 °F)   Resp 12   Ht 1.676 m (5' 6\")   Wt 74.4 kg (164 lb)   SpO2 97%  Body mass index is 26.47 kg/m².   Physical Exam:  Well developed, well nourished.  Alert, oriented in no acute distress.  Eye contact is good, speech goal directed, affect calm  Eyes: conjunctiva non-injected, sclera non-icteric.  Ears: Pinna normal. TM pearly gray.   Nose: Nares are patent.  Pale mucosa with clear discharge  Mouth: Oral mucous membranes pink and moist with no lesions.  Neck Supple.  No adenopathy or masses in the neck or supraclavicular regions. No thyromegaly  Lungs: occasional wheezes with good excursion. No crackles or rhonchi  CV: regular rate and rhythm. No murmur      Assessment and Plan:   The following treatment plan was discussed    1. Cough  Negative COVID test.  Feel this is an exacerbation of her asthma.  Medrol dose pack as directed.  Patient to call if symptoms don't improve  - POCT SARS-COV Antigen KRISTAL (Symptomatic Only)  - methylPREDNISolone (MEDROL DOSEPAK) 4 MG Tablet Therapy Pack; Take as directed.  Dispense: 21 Tablet; Refill: 1    2. Mild intermittent asthma without " complication  Negative COVID test.  Feel this is an exacerbation of her asthma.  Medrol dose pack as directed.  Patient to call if symptoms don't improve  - methylPREDNISolone (MEDROL DOSEPAK) 4 MG Tablet Therapy Pack; Take as directed.  Dispense: 21 Tablet; Refill: 1    3. Acquired hypothyroidism  Stable. [place] thyroid 60 mg 5 days a week and 90 mg 2 days a week.  Has tried Synthroid and levothyroxine and did not tolerate either.  She was seeing endocrinology in the past.    Denies palpitations, skin changes, temperature intolerance, or changes in bowel habits    4. Gastroesophageal reflux disease, unspecified whether esophagitis present  This is a chronic medical condition that is currently stable  Continue omeprazole 20 mg daily.  Dietary counseling done.    Any change or worsening of signs or symptoms, patient encouraged to follow-up or report to the emergency room for further evaluation. Patient understands and agrees.    Followup: Return in about 6 months (around 6/1/2022).

## 2021-12-15 NOTE — ASSESSMENT & PLAN NOTE
Stable.Takes omeprazole 20 mg daily as directed. Denies side effects.  Denies early satiety, unintentional weight loss, choking, persistent burning pain in chest or upper abdomen.

## 2022-02-04 RX ORDER — THYROID 60 MG
60 TABLET ORAL DAILY
Qty: 90 TABLET | Refills: 1 | Status: SHIPPED | OUTPATIENT
Start: 2022-02-04 | End: 2022-03-07 | Stop reason: SDUPTHER

## 2022-02-04 RX ORDER — THYROID 90 MG/1
90 TABLET ORAL DAILY
Qty: 100 EACH | Refills: 1 | Status: SHIPPED | OUTPATIENT
Start: 2022-02-04 | End: 2023-02-06

## 2022-02-04 NOTE — TELEPHONE ENCOUNTER
----- Message from Karli Berrios sent at 2/4/2022 11:50 AM PST -----  Regarding: RX Prior Authorization Request - [facility] CLINICAL PHARMACY REVIEW  I need a request sent to [facility] for pre-authorization for following prescriptions:  60mg.  [place] Thyroid - 5 x a week = 90 day supply  90mg.  [place] Thyroid - 2 x a week = 90 day supply    Information re this request from [facility] was scanned in your office on my last visit on 12/1/21. This request has to be processed on an annual basis. Thank you so much!

## 2022-03-06 SDOH — ECONOMIC STABILITY: HOUSING INSECURITY
IN THE LAST 12 MONTHS, WAS THERE A TIME WHEN YOU DID NOT HAVE A STEADY PLACE TO SLEEP OR SLEPT IN A SHELTER (INCLUDING NOW)?: NO

## 2022-03-06 SDOH — ECONOMIC STABILITY: HOUSING INSECURITY

## 2022-03-06 SDOH — ECONOMIC STABILITY: FOOD INSECURITY: WITHIN THE PAST 12 MONTHS, THE FOOD YOU BOUGHT JUST DIDN'T LAST AND YOU DIDN'T HAVE MONEY TO GET MORE.: NEVER TRUE

## 2022-03-06 SDOH — ECONOMIC STABILITY: INCOME INSECURITY: HOW HARD IS IT FOR YOU TO PAY FOR THE VERY BASICS LIKE FOOD, HOUSING, MEDICAL CARE, AND HEATING?: NOT HARD AT ALL

## 2022-03-06 SDOH — HEALTH STABILITY: PHYSICAL HEALTH: ON AVERAGE, HOW MANY MINUTES DO YOU ENGAGE IN EXERCISE AT THIS LEVEL?: 20 MIN

## 2022-03-06 SDOH — HEALTH STABILITY: MENTAL HEALTH
STRESS IS WHEN SOMEONE FEELS TENSE, NERVOUS, ANXIOUS, OR CAN'T SLEEP AT NIGHT BECAUSE THEIR MIND IS TROUBLED. HOW STRESSED ARE YOU?: TO SOME EXTENT

## 2022-03-06 SDOH — ECONOMIC STABILITY: TRANSPORTATION INSECURITY
IN THE PAST 12 MONTHS, HAS LACK OF RELIABLE TRANSPORTATION KEPT YOU FROM MEDICAL APPOINTMENTS, MEETINGS, WORK OR FROM GETTING THINGS NEEDED FOR DAILY LIVING?: NO

## 2022-03-06 SDOH — ECONOMIC STABILITY: FOOD INSECURITY: WITHIN THE PAST 12 MONTHS, YOU WORRIED THAT YOUR FOOD WOULD RUN OUT BEFORE YOU GOT MONEY TO BUY MORE.: NEVER TRUE

## 2022-03-06 SDOH — ECONOMIC STABILITY: TRANSPORTATION INSECURITY
IN THE PAST 12 MONTHS, HAS THE LACK OF TRANSPORTATION KEPT YOU FROM MEDICAL APPOINTMENTS OR FROM GETTING MEDICATIONS?: NO

## 2022-03-06 SDOH — HEALTH STABILITY: PHYSICAL HEALTH: ON AVERAGE, HOW MANY DAYS PER WEEK DO YOU ENGAGE IN MODERATE TO STRENUOUS EXERCISE (LIKE A BRISK WALK)?: 3 DAYS

## 2022-03-06 SDOH — ECONOMIC STABILITY: TRANSPORTATION INSECURITY
IN THE PAST 12 MONTHS, HAS LACK OF TRANSPORTATION KEPT YOU FROM MEETINGS, WORK, OR FROM GETTING THINGS NEEDED FOR DAILY LIVING?: NO

## 2022-03-06 SDOH — ECONOMIC STABILITY: INCOME INSECURITY: IN THE LAST 12 MONTHS, WAS THERE A TIME WHEN YOU WERE NOT ABLE TO PAY THE MORTGAGE OR RENT ON TIME?: NO

## 2022-03-06 ASSESSMENT — SOCIAL DETERMINANTS OF HEALTH (SDOH)
HOW HARD IS IT FOR YOU TO PAY FOR THE VERY BASICS LIKE FOOD, HOUSING, MEDICAL CARE, AND HEATING?: NOT HARD AT ALL
HOW OFTEN DO YOU ATTEND CHURCH OR RELIGIOUS SERVICES?: NEVER
DO YOU BELONG TO ANY CLUBS OR ORGANIZATIONS SUCH AS CHURCH GROUPS UNIONS, FRATERNAL OR ATHLETIC GROUPS, OR SCHOOL GROUPS?: YES
HOW OFTEN DO YOU ATTENT MEETINGS OF THE CLUB OR ORGANIZATION YOU BELONG TO?: 1 TO 4 TIMES PER YEAR
HOW MANY DRINKS CONTAINING ALCOHOL DO YOU HAVE ON A TYPICAL DAY WHEN YOU ARE DRINKING: 1 OR 2
HOW OFTEN DO YOU GET TOGETHER WITH FRIENDS OR RELATIVES?: ONCE A WEEK
HOW OFTEN DO YOU GET TOGETHER WITH FRIENDS OR RELATIVES?: ONCE A WEEK
WITHIN THE PAST 12 MONTHS, YOU WORRIED THAT YOUR FOOD WOULD RUN OUT BEFORE YOU GOT THE MONEY TO BUY MORE: NEVER TRUE
IN A TYPICAL WEEK, HOW MANY TIMES DO YOU TALK ON THE PHONE WITH FAMILY, FRIENDS, OR NEIGHBORS?: MORE THAN THREE TIMES A WEEK
HOW OFTEN DO YOU HAVE A DRINK CONTAINING ALCOHOL: MONTHLY OR LESS
HOW OFTEN DO YOU ATTENT MEETINGS OF THE CLUB OR ORGANIZATION YOU BELONG TO?: 1 TO 4 TIMES PER YEAR
HOW OFTEN DO YOU HAVE SIX OR MORE DRINKS ON ONE OCCASION: NEVER
IN A TYPICAL WEEK, HOW MANY TIMES DO YOU TALK ON THE PHONE WITH FAMILY, FRIENDS, OR NEIGHBORS?: MORE THAN THREE TIMES A WEEK
DO YOU BELONG TO ANY CLUBS OR ORGANIZATIONS SUCH AS CHURCH GROUPS UNIONS, FRATERNAL OR ATHLETIC GROUPS, OR SCHOOL GROUPS?: YES
HOW OFTEN DO YOU ATTEND CHURCH OR RELIGIOUS SERVICES?: NEVER

## 2022-03-06 ASSESSMENT — LIFESTYLE VARIABLES
HOW OFTEN DO YOU HAVE SIX OR MORE DRINKS ON ONE OCCASION: NEVER
HOW MANY STANDARD DRINKS CONTAINING ALCOHOL DO YOU HAVE ON A TYPICAL DAY: 1 OR 2
HOW OFTEN DO YOU HAVE A DRINK CONTAINING ALCOHOL: MONTHLY OR LESS

## 2022-03-07 ENCOUNTER — OFFICE VISIT (OUTPATIENT)
Dept: MEDICAL GROUP | Facility: LAB | Age: 82
End: 2022-03-07
Payer: MEDICARE

## 2022-03-07 VITALS
WEIGHT: 161 LBS | HEIGHT: 66 IN | HEART RATE: 97 BPM | DIASTOLIC BLOOD PRESSURE: 64 MMHG | OXYGEN SATURATION: 97 % | BODY MASS INDEX: 25.88 KG/M2 | RESPIRATION RATE: 12 BRPM | SYSTOLIC BLOOD PRESSURE: 126 MMHG | TEMPERATURE: 97 F

## 2022-03-07 DIAGNOSIS — E03.9 ACQUIRED HYPOTHYROIDISM: ICD-10-CM

## 2022-03-07 DIAGNOSIS — F51.01 PRIMARY INSOMNIA: ICD-10-CM

## 2022-03-07 DIAGNOSIS — E78.5 DYSLIPIDEMIA: ICD-10-CM

## 2022-03-07 PROBLEM — M45.9 ANKYLOSING SPONDYLITIS (HCC): Status: RESOLVED | Noted: 2021-04-21 | Resolved: 2022-03-07

## 2022-03-07 PROCEDURE — 99214 OFFICE O/P EST MOD 30 MIN: CPT | Performed by: FAMILY MEDICINE

## 2022-03-07 RX ORDER — LORAZEPAM 1 MG/1
0.5 TABLET ORAL PRN
Qty: 30 TABLET | Refills: 1 | Status: SHIPPED | OUTPATIENT
Start: 2022-03-07 | End: 2022-04-06

## 2022-03-07 RX ORDER — THYROID 60 MG
60 TABLET ORAL DAILY
Qty: 90 TABLET | Refills: 3 | Status: SHIPPED | OUTPATIENT
Start: 2022-03-07 | End: 2023-05-22

## 2022-03-07 ASSESSMENT — PATIENT HEALTH QUESTIONNAIRE - PHQ9: CLINICAL INTERPRETATION OF PHQ2 SCORE: 0

## 2022-03-07 ASSESSMENT — FIBROSIS 4 INDEX: FIB4 SCORE: 0.78

## 2022-03-07 NOTE — PROGRESS NOTES
Subjective:     Chief Complaint   Patient presents with   • New Patient         HPI:   Karli presents today to establish care. Previously saw Dr Garza in our office.   She does have some Advanced directive paperwork/ POLST. She would like to get this updated on file.   She does split time between [place] and [facility] in [place].     Would like to be done with colonoscopies . Last done 2019.     Next labs not due until October.     Does have some early waking at times. Will wake, then cant get back to bed. Lorazepam helps. She did have his regularly years ago for stressors and anxiety, but now this is much less. Takes here and there.     Does have a little dog at home.  passed away 4 years ago.   Tries to be active all day, and goes to PT, walks dog couple times a day. Saw Dr Arthur at [facility] fracture after a fall out of a chair. Then saw Dr Alston, seeing [facility] now. Thinking neck issue. Would like to see Dr Quick, appt on April 1st.     Does see Pulm at [facility], asthma well controlled.     Seeing ophtho for macular degeneration, injections. Doing Eylea injections. Permanent retinal issues. Using magnifying glass for some thing.       Did have Prolia injection years ago and had worsened pain. Not on anything else right now. Dexa was 4/21/21, osteopenia. Sees Dr Garcia in rheum for this.     Sees Dr Angel for cardio. Stress test ordered. Never did due to Omicron, etc.         Current Outpatient Medications Ordered in Epic   Medication Sig Dispense Refill   • LORazepam (ATIVAN) 1 MG Tab Take 0.5 Tablets by mouth as needed for Anxiety for up to 30 days. 30 Tablet 1   • ARMOUR THYROID 60 MG Tab Take 1 Tablet by mouth every day. 5 days a week 90 Tablet 3   • thyroid (ARMOUR THYROID) 90 MG Tab Take 1 Tablet by mouth every day. 2 X a week 100 Each 1   • Omega-3 Fatty Acids (FISH OIL) 1000 MG Cap capsule Take 1,000 mg by mouth every day.     • fluticasone (FLOVENT HFA) 110 MCG/ACT Aerosol INHALE TWO PUFFS BY  "MOUTH TWICE DAILY USE  SPACER.  RINSE  MOUTH  AFTER  EACH  USE 3 Each 3   • Lutein-Zeaxanthin 25-5 MG Cap Take  by mouth every day.     • omeprazole (PRILOSEC) 20 MG delayed-release capsule Take 20 mg by mouth every day.     • Aflibercept (EYLEA) 2 MG/0.05ML Solution by Intravitreal route Q30 DAYS.     • acetaminophen (TYLENOL) 500 MG Tab Take 1,000 mg by mouth every day.     • albuterol 108 (90 Base) MCG/ACT Aero Soln inhalation aerosol Inhale 2 Puffs by mouth every four hours as needed for Shortness of Breath. 1 Inhaler 1   • Calcium Carbonate-Vit D-Min (CALCIUM 1200 PO) Take 1 tablet by mouth every day.     • Cyanocobalamin (VITAMIN B12 PO) Take 1,000 mcg by mouth every day.     • Glucosamine-Chondroitin 750-600 MG Tab Take 1 tablet by mouth every day.     • Turmeric 500 MG Tab Take 1 tablet by mouth 2 Times a Day.     • simvastatin (ZOCOR) 40 MG Tab Take 40 mg by mouth every day.     • vitamin D (CHOLECALCIFEROL) 1000 UNIT TABS Take 2,000 Units by mouth 2 times a day.     • Magnesium 250 MG TABS Take 400 Tabs by mouth every day.     • Bioflavonoid Products (LISSY-C) TABS Take 1,000 Tablets by mouth 2 times a day.     • Multiple Vitamin (MULTIVITAMIN PO) Take 1 tablet by mouth every day.       No current [facility]-ordered facility-administered medications on file.         ROS:  Gen: no fevers/chills, no changes in weight  Eyes: no changes in vision  ENT: no sore throat, no hearing loss, no bloody nose  Pulm: no sob, no cough  CV: no chest pain, no palpitations  GI: no nausea/vomiting, no diarrhea  : no dysuria  MSk: no myalgias  Skin: no rash  Neuro: no headaches, no numbness/tingling  Heme/Lymph: no easy bruising      Objective:     Exam:  /64 (BP Location: Left arm, Patient Position: Sitting, BP Cuff Size: Adult)   Pulse 97   Temp 36.1 °C (97 °F)   Resp 12   Ht 1.676 m (5' 6\")   Wt 73 kg (161 lb)   SpO2 97%   BMI 25.99 kg/m²  Body mass index is 25.99 kg/m².    Gen: Alert and oriented, No apparent " distress.  Neck: Neck is supple without lymphadenopathy.  Lungs: Normal effort, CTA bilaterally, no wheezes, rhonchi, or rales  CV: Regular rate and rhythm. No murmurs, rubs, or gallops.  Ext: No clubbing, cyanosis, edema.      Assessment & Plan:     81 y.o. female with the following -     1. Primary insomnia  Discussed sporadic use of lorazepam.  Refill sent in today.  PDMP reviewed.  No concerns for misuse or abuse.  - LORazepam (ATIVAN) 1 MG Tab; Take 0.5 Tablets by mouth as needed for Anxiety for up to 30 days.  Dispense: 30 Tablet; Refill: 1    2. Acquired hypothyroidism  Discussed thyroid dosing with her [place] Thyroid.  She does alternate doses.  Has been on levothyroxine and Synthroid in the past both with ineffectiveness and side effects.  Refill sent in    3. Dyslipidemia  Reviewed last cholesterol check.  Doing very well.  Chronic and stable on simvastatin.  No refills needed at this point.    Reviewed her POLST forms and DNR wishes.  Went over new updated forms without information here.  She also has her power of  and advanced directive information at home.  She will fill this out and her can be needs.      No follow-ups on file.    Please note that this dictation was created using voice recognition software. I have made every reasonable attempt to correct obvious errors, but I expect that there are errors of grammar and possibly content that I did not discover before finalizing the note.

## 2022-03-10 ENCOUNTER — TELEPHONE (OUTPATIENT)
Dept: CARDIOLOGY | Facility: MEDICAL CENTER | Age: 82
End: 2022-03-10
Payer: MEDICARE

## 2022-03-10 NOTE — TELEPHONE ENCOUNTER
JAH Calvillo says that she feels fine and would like to know if doing the stress test that was ordered in November is necessary. Please reach out. Ph # [phone]    Thank you,  Angie DICK

## 2022-03-11 NOTE — TELEPHONE ENCOUNTER
Called patient mobile number and patient identifier on VM, left detailed message for patient and to callback if further questions.

## 2022-03-28 ENCOUNTER — TELEPHONE (OUTPATIENT)
Dept: CARDIOLOGY | Facility: MEDICAL CENTER | Age: 82
End: 2022-03-28
Payer: MEDICARE

## 2022-03-28 NOTE — TELEPHONE ENCOUNTER
SW    Patient called and states that she never received a VM on 3/11 from  nurse in regards to her questions about the stress test. Patient would like a call back, she wants to know if the stress test is necessary at this time. Please advise.    Thank you,  Eliud GAMING

## 2022-03-28 NOTE — TELEPHONE ENCOUNTER
Called patient mobile number and it went directly to , patient identifier on , LM to callback.     Called patient home number and spoke to the patient, advised I just left a message on her mobile number as well, confirmed number and advised it doesn't ring goes directly to . She stated she would figure that out, advised nuc med stress test is still recommended and will provide information based on her last assessment and symptoms. Gave her the scheduling number for [facility] to reschedule. She was scheduled before and cancelled due to Omicron and shoulder issue. Answered all questions and concerns, appreciative of call.

## 2022-04-18 ENCOUNTER — HOSPITAL ENCOUNTER (OUTPATIENT)
Dept: RADIOLOGY | Facility: MEDICAL CENTER | Age: 82
End: 2022-04-18
Attending: FAMILY MEDICINE
Payer: MEDICARE

## 2022-04-18 DIAGNOSIS — Z12.31 VISIT FOR SCREENING MAMMOGRAM: ICD-10-CM

## 2022-04-18 PROCEDURE — 77063 BREAST TOMOSYNTHESIS BI: CPT

## 2022-05-19 DIAGNOSIS — J45.20 MILD INTERMITTENT ASTHMA WITHOUT COMPLICATION: ICD-10-CM

## 2022-05-19 RX ORDER — ALBUTEROL SULFATE 90 UG/1
2 AEROSOL, METERED RESPIRATORY (INHALATION) EVERY 4 HOURS PRN
Qty: 1 EACH | Refills: 0 | Status: SHIPPED | OUTPATIENT
Start: 2022-05-19 | End: 2022-11-03

## 2022-05-19 RX ORDER — METHYLPREDNISOLONE 4 MG/1
TABLET ORAL
Qty: 21 TABLET | Refills: 0 | Status: SHIPPED | OUTPATIENT
Start: 2022-05-19 | End: 2022-05-24

## 2022-05-19 NOTE — TELEPHONE ENCOUNTER
Received request via: Patient  wheezing and coughing  Was the patient seen in the last year in this department? Yes    Does the patient have an active prescription (recently filled or refills available) for medication(s) requested? No

## 2022-05-23 ENCOUNTER — TELEPHONE (OUTPATIENT)
Dept: MEDICAL GROUP | Facility: LAB | Age: 82
End: 2022-05-23
Payer: MEDICARE

## 2022-05-23 NOTE — TELEPHONE ENCOUNTER
Pt. Requesting Medications for Cold Cough Relief   methylPREDNISolone (MEDROL DOSEPAK) 4 MG Tablet Therapy Pack     Not working

## 2022-06-02 ENCOUNTER — OFFICE VISIT (OUTPATIENT)
Dept: MEDICAL GROUP | Facility: LAB | Age: 82
End: 2022-06-02
Payer: MEDICARE

## 2022-06-02 VITALS
WEIGHT: 162 LBS | RESPIRATION RATE: 12 BRPM | DIASTOLIC BLOOD PRESSURE: 72 MMHG | HEART RATE: 111 BPM | TEMPERATURE: 97 F | BODY MASS INDEX: 26.03 KG/M2 | OXYGEN SATURATION: 93 % | SYSTOLIC BLOOD PRESSURE: 144 MMHG | HEIGHT: 66 IN

## 2022-06-02 DIAGNOSIS — J01.30 SUBACUTE SPHENOIDAL SINUSITIS: ICD-10-CM

## 2022-06-02 PROCEDURE — 99213 OFFICE O/P EST LOW 20 MIN: CPT | Performed by: FAMILY MEDICINE

## 2022-06-02 RX ORDER — CODEINE PHOSPHATE/GUAIFENESIN 10-100MG/5
10 LIQUID (ML) ORAL NIGHTLY
Qty: 70 ML | Refills: 0 | Status: SHIPPED | OUTPATIENT
Start: 2022-06-02 | End: 2022-06-09

## 2022-06-02 RX ORDER — AMOXICILLIN AND CLAVULANATE POTASSIUM 875; 125 MG/1; MG/1
1 TABLET, FILM COATED ORAL 2 TIMES DAILY
Qty: 20 TABLET | Refills: 0 | Status: SHIPPED | OUTPATIENT
Start: 2022-06-02 | End: 2022-06-12

## 2022-06-02 ASSESSMENT — FIBROSIS 4 INDEX: FIB4 SCORE: 0.78

## 2022-06-02 NOTE — PROGRESS NOTES
Subjective:     Chief Complaint   Patient presents with   • Nasal Congestion     Chest congestion tiredness coughing drainage  using Flonase helped some   X 2 weeks          HPI:   Karli presents today with the above.   Tested positive for COVID 5/24/22.   Symptoms started 5/19/22. Started with cough, fatigue, wheezing. No fevers.   Lots of sinus drainage. Flonase and saline rinse has helped.   Still with chest congestion. Has taken medrol dose pack x 2. Also used Zpack. Using albuterol at night for coughing and SOB.   Coughing worse at night.   Doing ok with SOB now.   Using flovent daily in general.   Has tried some mucinex without much help.     Current Outpatient Medications Ordered in Epic   Medication Sig Dispense Refill   • amoxicillin-clavulanate (AUGMENTIN) 875-125 MG Tab Take 1 Tablet by mouth 2 times a day for 10 days. 20 Tablet 0   • guaifenesin-codeine (TUSSI-ORGANIDIN NR) 100-10 MG/5ML syrup Take 10 mL by mouth every evening for 7 days. 70 mL 0   • albuterol (VENTOLIN HFA) 108 (90 Base) MCG/ACT Aero Soln inhalation aerosol Inhale 2 Puffs every four hours as needed for Shortness of Breath (Wheezing). 1 Each 0   • [place]OUR THYROID 60 MG Tab Take 1 Tablet by mouth every day. 5 days a week 90 Tablet 3   • thyroid (ARMOUR THYROID) 90 MG Tab Take 1 Tablet by mouth every day. 2 X a week 100 Each 1   • Omega-3 Fatty Acids (FISH OIL) 1000 MG Cap capsule Take 1,000 mg by mouth every day.     • fluticasone (FLOVENT HFA) 110 MCG/ACT Aerosol INHALE TWO PUFFS BY MOUTH TWICE DAILY USE  SPACER.  RINSE  MOUTH  AFTER  EACH  USE 3 Each 3   • Lutein-Zeaxanthin 25-5 MG Cap Take  by mouth every day.     • omeprazole (PRILOSEC) 20 MG delayed-release capsule Take 20 mg by mouth every day.     • Aflibercept (EYLEA) 2 MG/0.05ML Solution by Intravitreal route Q30 DAYS.     • acetaminophen (TYLENOL) 500 MG Tab Take 1,000 mg by mouth every day.     • albuterol 108 (90 Base) MCG/ACT Aero Soln inhalation aerosol Inhale 2 Puffs by  "mouth every four hours as needed for Shortness of Breath. 1 Inhaler 1   • Calcium Carbonate-Vit D-Min (CALCIUM 1200 PO) Take 1 tablet by mouth every day.     • Cyanocobalamin (VITAMIN B12 PO) Take 1,000 mcg by mouth every day.     • Glucosamine-Chondroitin 750-600 MG Tab Take 1 tablet by mouth every day.     • Turmeric 500 MG Tab Take 1 tablet by mouth 2 Times a Day.     • simvastatin (ZOCOR) 40 MG Tab Take 40 mg by mouth every day.     • vitamin D (CHOLECALCIFEROL) 1000 UNIT TABS Take 2,000 Units by mouth 2 times a day.     • Magnesium 250 MG TABS Take 400 Tabs by mouth every day.     • Bioflavonoid Products (LISSY-C) TABS Take 1,000 Tablets by mouth 2 times a day.     • Multiple Vitamin (MULTIVITAMIN PO) Take 1 tablet by mouth every day.       No current [facility]-ordered facility-administered medications on file.         ROS:  Gen: no fevers/chills, no changes in weight  Eyes: no changes in vision  ENT: no sore throat, no hearing loss, no bloody nose  Pulm: no sob, no cough  CV: no chest pain, no palpitations  GI: no nausea/vomiting, no diarrhea  : no dysuria  MSk: no myalgias  Skin: no rash  Neuro: no headaches, no numbness/tingling  Heme/Lymph: no easy bruising      Objective:     Exam:  BP (!) 144/72   Pulse (!) 111   Temp 36.1 °C (97 °F)   Resp 12   Ht 1.676 m (5' 6\")   Wt 73.5 kg (162 lb)   SpO2 93%   BMI 26.15 kg/m²  Body mass index is 26.15 kg/m².    Gen: Alert and oriented, No apparent distress.  HEENT: NCAT, EOMI, TMs normal bilaterally, oropharynx is mildly erythematous without any exudates.  Dentition is good.  Neck: Neck is supple without lymphadenopathy.  Lungs: Normal effort, CTA bilaterally, no wheezes, rhonchi, or rales  CV: Regular rate and rhythm. No murmurs, rubs, or gallops.  Ext: No clubbing, cyanosis, edema.      Assessment & Plan:     81 y.o. female with the following -     1. Subacute sphenoidal sinusitis  Discussed superimposed sinus infection on top of COVID.  The amount of time that " the congestion and drainage has been going on leads me to believe to more bacterial sinus infection at this point.  We will try some Augmentin and some cough medicine at bedtime as well.  This is sent in for her.  Would like to avoid continued steroid use at this time.  She will let us know if things or not improving after the weekend.  Discussed continuing to push fluids and rest and wear a mask when out and about.  Discussed that she is outside of her contagiousness window.  - amoxicillin-clavulanate (AUGMENTIN) 875-125 MG Tab; Take 1 Tablet by mouth 2 times a day for 10 days.  Dispense: 20 Tablet; Refill: 0  - guaifenesin-codeine (TUSSI-ORGANIDIN NR) 100-10 MG/5ML syrup; Take 10 mL by mouth every evening for 7 days.  Dispense: 70 mL; Refill: 0            No follow-ups on file.    Please note that this dictation was created using voice recognition software. I have made every reasonable attempt to correct obvious errors, but I expect that there are errors of grammar and possibly content that I did not discover before finalizing the note.

## 2022-07-18 ENCOUNTER — OFFICE VISIT (OUTPATIENT)
Dept: SLEEP MEDICINE | Facility: MEDICAL CENTER | Age: 82
End: 2022-07-18
Payer: MEDICARE

## 2022-07-18 VITALS
HEIGHT: 66 IN | WEIGHT: 163 LBS | SYSTOLIC BLOOD PRESSURE: 118 MMHG | HEART RATE: 88 BPM | DIASTOLIC BLOOD PRESSURE: 68 MMHG | BODY MASS INDEX: 26.2 KG/M2 | OXYGEN SATURATION: 98 % | RESPIRATION RATE: 16 BRPM

## 2022-07-18 DIAGNOSIS — R05.9 COUGH: ICD-10-CM

## 2022-07-18 DIAGNOSIS — J45.20 MILD INTERMITTENT ASTHMA WITHOUT COMPLICATION: ICD-10-CM

## 2022-07-18 DIAGNOSIS — R06.02 SHORTNESS OF BREATH: ICD-10-CM

## 2022-07-18 DIAGNOSIS — Z87.891 FORMER SMOKER: ICD-10-CM

## 2022-07-18 DIAGNOSIS — Z86.16 HISTORY OF COVID-19: ICD-10-CM

## 2022-07-18 PROCEDURE — 99214 OFFICE O/P EST MOD 30 MIN: CPT | Performed by: NURSE PRACTITIONER

## 2022-07-18 RX ORDER — LORAZEPAM 1 MG/1
0.25 TABLET ORAL PRN
COMMUNITY
Start: 2022-07-12 | End: 2022-12-30 | Stop reason: SDUPTHER

## 2022-07-18 ASSESSMENT — FIBROSIS 4 INDEX: FIB4 SCORE: 0.78

## 2022-07-18 NOTE — PROGRESS NOTES
Chief Complaint   Patient presents with   • Follow-Up     Mild Internittent Asthma without complication // last seen 9/7/2021       HPI:  Karli Berrios is a 81 y.o. year old female here today for follow-up on mild asthma.  Last OV 9/7/21  She lives in [facility] during the summer and [place] during winter     MMRC Grade: 0-1    She notes since last office visit having COVID-19 May 2022 and seen by PCP who placed her on Augmentin for ongoing chest congestion.  She has found benefit.  She did require Pratima more frequently for relief of shortness of breath.  She notes her symptoms have resolved but she does have ongoing shortness of breath and fatigue.  She was in the garden yesterday and had to go lay down due to being so tired.  She notes ongoing congestion she feels is coming from her chest that can be clear or white.  She denies any sinus congestion or dripping.  No ear plugging or sinus pressure.  She denies GERD and currently controlled with medication.  She remains on Flovent 2 puffs twice daily and rare use of PRATIMA.  She does use a spacer and denies any issues with thrush.  She has lorazepam as needed for history of anxiety that she rarely uses.  She denies any asthma exacerbations.  She denies any issues with sleep or having cough wake her during the night.    PULM HX:  Former smoker, quit 1977 with 15PYH.  Spirometry August 2018 indicated FEV1 of 1.77 L 77% predicted, FEV1 FVC ratio of 77, no significant bronchodilator response.  She uses Flovent 110 mcg 2 puffs twice a day, rare use of albuterol.  She rarely uses PRATIMA.  She does have a history of tiny bilateral pulmonary nodules stable on chest imaging from 2009 through 2011.  Chest x-ray in April 2019 was unremarkable.   CT Chest 8/5/2021 indicated no rib fractures, no acute cardiopulmonary process, small tree-in-bud nodules in the anterior inferior right upper lobe and a 3 mm nodule in the inferior right upper lobe that is stable.       ROS: As per HPI  and otherwise negative if not stated.    Past Medical History:   Diagnosis Date   • Anxiety    • Arthritis    • Asthma    • Cataract    • GERD (gastroesophageal reflux disease)    • HTN (hypertension)    • Hyperlipidemia    • Hypertension    • Hypothyroidism    • Lung nodules 2009   • Urinary tract infection        Past Surgical History:   Procedure Laterality Date   • ABDOMINAL EXPLORATION      peritonitis   • ABDOMINAL HYSTERECTOMY TOTAL     • APPENDECTOMY     • CARPAL TUNNEL RELEASE     • CATARACT EXTRACTION WITH IOL     • EYE SURGERY     • HEMORRHOIDECTOMY     • OTHER      D & C   • OTHER      FISTULECTOMY   • OTHER      HYSTERECTOMY   • OTHER      LEFTY BREAST LIPOMA RESECTION       Family History   Problem Relation Age of Onset   • Arthritis Mother    • Hyperlipidemia Mother        Social History     Socioeconomic History   • Marital status:      Spouse name: Not on file   • Number of children: Not on file   • Years of education: Not on file   • Highest education level: 12th grade   Occupational History   • Not on file   Tobacco Use   • Smoking status: Former Smoker     Packs/day: 1.00     Years: 20.00     Pack years: 20.00     Types: Cigarettes     Start date: 1957     Quit date: 1977     Years since quittin.5   • Smokeless tobacco: Never Used   Vaping Use   • Vaping Use: Never used   Substance and Sexual Activity   • Alcohol use: Yes     Alcohol/week: 0.0 oz     Comment: MIXED DRINKS OCCASIONALLY   • Drug use: No     Comment: CBD with THC Cream for hands   • Sexual activity: Not Currently     Partners: Male   Other Topics Concern   • Not on file   Social History Narrative   • Not on file     Social Determinants of Health     Financial Resource Strain: Low Risk    • Difficulty of Paying Living Expenses: Not hard at all   Food Insecurity: No Food Insecurity   • Worried About Running Out of Food in the Last Year: Never true   • Ran Out of Food in the Last Year: Never true  "  Transportation Needs: No Transportation Needs   • Lack of Transportation (Medical): No   • Lack of Transportation (Non-Medical): No   Physical Activity: Insufficiently Active   • Days of Exercise per Week: 3 days   • Minutes of Exercise per Session: 20 min   Stress: Stress Concern Present   • Feeling of Stress : To some extent   Social Connections: Moderately Isolated   • Frequency of Communication with Friends and Family: More than three times a week   • Frequency of Social Gatherings with Friends and Family: Once a week   • Attends Quaker Services: Never   • Active Member of Clubs or Organizations: Yes   • Attends Club or Organization Meetings: 1 to 4 times per year   • Marital Status:    Intimate Partner Violence: Not on file   Housing Stability: Unknown   • Unable to Pay for Housing in the Last Year: No   • Number of Places Lived in the Last Year: Not on file   • Unstable Housing in the Last Year: No       Allergies as of 07/18/2022 - Reviewed 07/18/2022   Allergen Reaction Noted   • Atenolol Unspecified 10/15/2014   • Bactrim [sulfamethoxazole w-trimethoprim]  09/27/2018   • Codeine Diarrhea 06/26/2022   • Denosumab Unspecified 10/05/2020        Vitals:  /68 (BP Location: Left arm, Patient Position: Sitting, BP Cuff Size: Adult)   Pulse 88   Resp 16   Ht 1.676 m (5' 6\")   Wt 73.9 kg (163 lb)   SpO2 98%     Current medications as of today   Current Outpatient Medications   Medication Sig Dispense Refill   • Fluticasone Furoate-Vilanterol (BREO ELLIPTA) 100-25 MCG/INH AEROSOL POWDER, BREATH ACTIVATED Inhale 1 Puff every day. Rinse mouth after use. 2 Each 0   • simvastatin (ZOCOR) 40 MG Tab Take 1 Tablet by mouth every day. 90 Tablet 3   • LORazepam (ATIVAN) 0.5 MG Tab Take 0.5 mg by mouth every four hours as needed for Anxiety.     • ARMOUR THYROID 60 MG Tab Take 1 Tablet by mouth every day. 5 days a week 90 Tablet 3   • thyroid (ARMOUR THYROID) 90 MG Tab Take 1 Tablet by mouth every day. 2 " X a week 100 Each 1   • Omega-3 Fatty Acids (FISH OIL) 1000 MG Cap capsule Take 1,000 mg by mouth every day.     • fluticasone (FLOVENT HFA) 110 MCG/ACT Aerosol INHALE TWO PUFFS BY MOUTH TWICE DAILY USE  SPACER.  RINSE  MOUTH  AFTER  EACH  USE 3 Each 3   • Lutein-Zeaxanthin 25-5 MG Cap Take  by mouth every day.     • omeprazole (PRILOSEC) 20 MG delayed-release capsule Take 20 mg by mouth every day.     • Aflibercept (EYLEA) 2 MG/0.05ML Solution by Intravitreal route Q30 DAYS.     • acetaminophen (TYLENOL) 500 MG Tab Take 1,000 mg by mouth every day.     • Calcium Carbonate-Vit D-Min (CALCIUM 1200 PO) Take 1 tablet by mouth every day.     • Cyanocobalamin (VITAMIN B12 PO) Take 1,000 mcg by mouth every day.     • Glucosamine-Chondroitin 750-600 MG Tab Take 1 tablet by mouth every day.     • Turmeric 500 MG Tab Take 1 tablet by mouth 2 Times a Day.     • vitamin D (CHOLECALCIFEROL) 1000 UNIT TABS Take 2,000 Units by mouth 2 times a day.     • Magnesium 250 MG TABS Take 400 Tabs by mouth every day.     • Bioflavonoid Products (LISSY-C) TABS Take 1,000 Tablets by mouth 2 times a day.     • Multiple Vitamin (MULTIVITAMIN PO) Take 1 tablet by mouth every day.     • LORazepam (ATIVAN) 1 MG Tab TAKE 1/2 (ONE-HALF) TABLET BY MOUTH TWICE DAILY AS NEEDED FOR ANXIETY FOR UP TO 30 DAYS     • albuterol (VENTOLIN HFA) 108 (90 Base) MCG/ACT Aero Soln inhalation aerosol Inhale 2 Puffs every four hours as needed for Shortness of Breath (Wheezing). 1 Each 0     No current facility-administered medications for this visit.         Physical Exam:   Gen:           Alert and oriented, No apparent distress. Mood and affect appropriate, normal interaction with examiner.  Eyes:          PERRL, EOM intact, sclere white, conjunctive moist.  Ears:          Not examined.   Hearing:     Grossly intact.  Nose:          NMask.  Oropharynx:   Mask.  Mallampati Classification: mask  Neck:        Supple, trachea midline, no masses.  Respiratory  Effort: No intercostal retractions or use of accessory muscles.   Lung Auscultation:      Clear to auscultation bilaterally; no rales, rhonchi or wheezing.  CV:            Regular rate and rhythm. No murmurs, rubs or gallops.  Abd:           Not examined.  Lymphadenopathy: Not examined.  Gait and Station: Normal.  Digits and Nails: No clubbing, cyanosis, petechiae, or nodes.   Cranial Nerves: II-XII grossly intact.  Skin:        No rashes, lesions or ulcers noted.               Ext:           No cyanosis or edema.      Assessment:  1. Mild intermittent asthma without complication  Spirometry   2. Cough     3. Shortness of breath     4. History of COVID-19     5. Former smoker     6. BMI 26.0-26.9,adult         Immunizations:    Flu:10/6/21  Pneumovax 23:2008  Prevnar 13:2015  COVID-19: 10/6/21, 3/28/21, 3/7/21, booster recommended    Plan:  1.  Patient's asthma is clinically stable but she does have intermittent cough with phlegm production.  Recommend stepping up inhaler therapy with Breo 100 mcg 1 puff once daily x2 weeks and then resume Flovent HFA inhaler as previously prescribed to her check if cough is ongoing.  She may also use Mucinex D as needed for congestion.  GERD appears well controlled medication.  Spirometry at next office visit  2.  Follow-up with primary care further health concerns  3.  Follow-up in 1 year with updated spirometry, sooner if needed.  Advised patient to contact me via [employer] if she has ongoing issues with cough.    Please note that this dictation was created using voice recognition software. I have made every reasonable attempt to correct obvious errors, but it is possible there are errors of grammar and possibly content that I did not discover before finalizing the note.

## 2022-07-18 NOTE — PATIENT INSTRUCTIONS
For the next 2 weeks, start Breo 100mcg 1puff once daily, rinse mouth after use. May use albuterol in the AM when you first wake then take Breo.  After 2 weeks, resume flovent hfa, stop breo.    Continue mucinex-dm for cough if needed, take for 1 week then stop to check symptoms, off of it for 1 week.    Continue medication for acid reflux    Message me for refills on inhalers

## 2022-07-18 NOTE — LETTER
LUTHER Escobar  [facility]   [address], [address] [place] [address]  Phone: [phone] - Fax:             Encounter Date: 7/18/2022  Provider: LUTHER Escobar  Location of Care: [place] [facility]  [address] [place] [address]      Patient:   Karli Berrios   MR Number: [id number]   YOB: 1940     PROGRESS NOTE:  Chief Complaint   Patient presents with   • Follow-Up     Mild Internittent Asthma without complication // last seen 9/7/2021       HPI:  Karli Berrios is a 81 y.o. year old female here today for follow-up on mild asthma.  Last OV 9/7/21  She lives in [facility] during the summer and [place] during winter     MMRC Grade: 0-1    She notes since last office visit having COVID-19 May 2022 and seen by PCP who placed her on Augmentin for ongoing chest congestion.  She has found benefit.  She did require Pratima more frequently for relief of shortness of breath.  She notes her symptoms have resolved but she does have ongoing shortness of breath and fatigue.  She was in the garden yesterday and had to go lay down due to being so tired.  She notes ongoing congestion she feels is coming from her chest that can be clear or white.  She denies any sinus congestion or dripping.  No ear plugging or sinus pressure.  She denies GERD and currently controlled with medication.  She remains on Flovent 2 puffs twice daily and rare use of PRATIMA.  She does use a spacer and denies any issues with thrush.  She has lorazepam as needed for history of anxiety that she rarely uses.  She denies any asthma exacerbations.  She denies any issues with sleep or having cough wake her during the night.    PULM HX:  Former smoker, quit 1977 with 15PYH.  Spirometry August 2018 indicated FEV1 of 1.77 L 77% predicted, FEV1 FVC ratio of 77, no significant bronchodilator response.  She uses Flovent 110 mcg 2  puffs twice a day, rare use of albuterol.  She rarely uses PRATIMA.  She does have a history of tiny bilateral pulmonary nodules stable on chest imaging from  through .  Chest x-ray in 2019 was unremarkable.   CT Chest 2021 indicated no rib fractures, no acute cardiopulmonary process, small tree-in-bud nodules in the anterior inferior right upper lobe and a 3 mm nodule in the inferior right upper lobe that is stable.       ROS: As per HPI and otherwise negative if not stated.    Past Medical History:   Diagnosis Date   • Anxiety    • Arthritis    • Asthma    • Cataract    • GERD (gastroesophageal reflux disease)    • HTN (hypertension)    • Hyperlipidemia    • Hypertension    • Hypothyroidism    • Lung nodules 2009    stable    • Urinary tract infection        Past Surgical History:   Procedure Laterality Date   • ABDOMINAL EXPLORATION      peritonitis   • ABDOMINAL HYSTERECTOMY TOTAL     • APPENDECTOMY     • CARPAL TUNNEL RELEASE     • CATARACT EXTRACTION WITH IOL     • EYE SURGERY     • HEMORRHOIDECTOMY     • OTHER      D & C   • OTHER      FISTULECTOMY   • OTHER      HYSTERECTOMY   • OTHER      LEFTY BREAST LIPOMA RESECTION       Family History   Problem Relation Age of Onset   • Arthritis Mother    • Hyperlipidemia Mother        Social History     Socioeconomic History   • Marital status:      Spouse name: Not on file   • Number of children: Not on file   • Years of education: Not on file   • Highest education level: 12th grade   Occupational History   • Not on file   Tobacco Use   • Smoking status: Former Smoker     Packs/day: 1.00     Years: 20.00     Pack years: 20.00     Types: Cigarettes     Start date: 1957     Quit date: 1977     Years since quittin.5   • Smokeless tobacco: Never Used   Vaping Use   • Vaping Use: Never used   Substance and Sexual Activity   • Alcohol use: Yes     Alcohol/week: 0.0 oz     Comment: MIXED DRINKS OCCASIONALLY   • Drug use: No      "Comment: CBD with THC Cream for hands   • Sexual activity: Not Currently     Partners: Male   Other Topics Concern   • Not on file   Social History Narrative   • Not on file     Social Determinants of Health     Financial Resource Strain: Low Risk    • Difficulty of Paying Living Expenses: Not hard at all   Food Insecurity: No Food Insecurity   • Worried About Running Out of Food in the Last Year: Never true   • Ran Out of Food in the Last Year: Never true   Transportation Needs: No Transportation Needs   • Lack of Transportation (Medical): No   • Lack of Transportation (Non-Medical): No   Physical Activity: Insufficiently Active   • Days of Exercise per Week: 3 days   • Minutes of Exercise per Session: 20 min   Stress: Stress Concern Present   • Feeling of Stress : To some extent   Social Connections: Moderately Isolated   • Frequency of Communication with Friends and Family: More than three times a week   • Frequency of Social Gatherings with Friends and Family: Once a week   • Attends Yazidi Services: Never   • Active Member of Clubs or Organizations: Yes   • Attends Club or Organization Meetings: 1 to 4 times per year   • Marital Status:    Intimate Partner Violence: Not on file   Housing Stability: Unknown   • Unable to Pay for Housing in the Last Year: No   • Number of Places Lived in the Last Year: Not on file   • Unstable Housing in the Last Year: No       Allergies as of 07/18/2022 - Reviewed 07/18/2022   Allergen Reaction Noted   • Atenolol Unspecified 10/15/2014   • Bactrim [sulfamethoxazole w-trimethoprim]  09/27/2018   • Codeine Diarrhea 06/26/2022   • Denosumab Unspecified 10/05/2020        Vitals:  /68 (BP Location: Left arm, Patient Position: Sitting, BP Cuff Size: Adult)   Pulse 88   Resp 16   Ht 1.676 m (5' 6\")   Wt 73.9 kg (163 lb)   SpO2 98%     Current medications as of today   Current Outpatient Medications   Medication Sig Dispense Refill   • Fluticasone Furoate-Vilanterol " (BREO ELLIPTA) 100-25 MCG/INH AEROSOL POWDER, BREATH ACTIVATED Inhale 1 Puff every day. Rinse mouth after use. 2 Each 0   • simvastatin (ZOCOR) 40 MG Tab Take 1 Tablet by mouth every day. 90 Tablet 3   • LORazepam (ATIVAN) 0.5 MG Tab Take 0.5 mg by mouth every four hours as needed for Anxiety.     • ARMOUR THYROID 60 MG Tab Take 1 Tablet by mouth every day. 5 days a week 90 Tablet 3   • thyroid (ARMOUR THYROID) 90 MG Tab Take 1 Tablet by mouth every day. 2 X a week 100 Each 1   • Omega-3 Fatty Acids (FISH OIL) 1000 MG Cap capsule Take 1,000 mg by mouth every day.     • fluticasone (FLOVENT HFA) 110 MCG/ACT Aerosol INHALE TWO PUFFS BY MOUTH TWICE DAILY USE  SPACER.  RINSE  MOUTH  AFTER  EACH  USE 3 Each 3   • Lutein-Zeaxanthin 25-5 MG Cap Take  by mouth every day.     • omeprazole (PRILOSEC) 20 MG delayed-release capsule Take 20 mg by mouth every day.     • Aflibercept (EYLEA) 2 MG/0.05ML Solution by Intravitreal route Q30 DAYS.     • acetaminophen (TYLENOL) 500 MG Tab Take 1,000 mg by mouth every day.     • Calcium Carbonate-Vit D-Min (CALCIUM 1200 PO) Take 1 tablet by mouth every day.     • Cyanocobalamin (VITAMIN B12 PO) Take 1,000 mcg by mouth every day.     • Glucosamine-Chondroitin 750-600 MG Tab Take 1 tablet by mouth every day.     • Turmeric 500 MG Tab Take 1 tablet by mouth 2 Times a Day.     • vitamin D (CHOLECALCIFEROL) 1000 UNIT TABS Take 2,000 Units by mouth 2 times a day.     • Magnesium 250 MG TABS Take 400 Tabs by mouth every day.     • Bioflavonoid Products (LISSY-C) TABS Take 1,000 Tablets by mouth 2 times a day.     • Multiple Vitamin (MULTIVITAMIN PO) Take 1 tablet by mouth every day.     • LORazepam (ATIVAN) 1 MG Tab TAKE 1/2 (ONE-HALF) TABLET BY MOUTH TWICE DAILY AS NEEDED FOR ANXIETY FOR UP TO 30 DAYS     • albuterol (VENTOLIN HFA) 108 (90 Base) MCG/ACT Aero Soln inhalation aerosol Inhale 2 Puffs every four hours as needed for Shortness of Breath (Wheezing). 1 Each 0     No current  facility-administered medications for this visit.         Physical Exam:   Gen:           Alert and oriented, No apparent distress. Mood and affect appropriate, normal interaction with examiner.  Eyes:          PERRL, EOM intact, sclere white, conjunctive moist.  Ears:          Not examined.   Hearing:     Grossly intact.  Nose:          NMask.  Oropharynx:   Mask.  Mallampati Classification: mask  Neck:        Supple, trachea midline, no masses.  Respiratory Effort: No intercostal retractions or use of accessory muscles.   Lung Auscultation:      Clear to auscultation bilaterally; no rales, rhonchi or wheezing.  CV:            Regular rate and rhythm. No murmurs, rubs or gallops.  Abd:           Not examined.  Lymphadenopathy: Not examined.  Gait and Station: Normal.  Digits and Nails: No clubbing, cyanosis, petechiae, or nodes.   Cranial Nerves: II-XII grossly intact.  Skin:        No rashes, lesions or ulcers noted.               Ext:           No cyanosis or edema.      Assessment:  1. Mild intermittent asthma without complication  Spirometry   2. Cough     3. Shortness of breath     4. History of COVID-19     5. Former smoker     6. BMI 26.0-26.9,adult         Immunizations:    Flu:10/6/21  Pneumovax 23:2008  Prevnar 13:2015  COVID-19: 10/6/21, 3/28/21, 3/7/21, booster recommended    Plan:  1.  Patient's asthma is clinically stable but she does have intermittent cough with phlegm production.  Recommend stepping up inhaler therapy with Breo 100 mcg 1 puff once daily x2 weeks and then resume Flovent HFA inhaler as previously prescribed to her check if cough is ongoing.  She may also use Mucinex D as needed for congestion.  GERD appears well controlled medication.  Spirometry at next office visit  2.  Follow-up with primary care further health concerns  3.  Follow-up in 1 year with updated spirometry, sooner if needed.  Advised patient to contact me via [employer] if she has ongoing issues with cough.    Please note  that this dictation was created using voice recognition software. I have made every reasonable attempt to correct obvious errors, but it is possible there are errors of grammar and possibly content that I did not discover before finalizing the note.          Electronically signed by LUTHER Escobar  on 07/18/22    MAUREEN Rodriguez  [address] [place] [address]  Via In Basket

## 2022-09-14 DIAGNOSIS — J45.20 MILD INTERMITTENT ASTHMA WITHOUT COMPLICATION: ICD-10-CM

## 2022-09-14 RX ORDER — FLUTICASONE PROPIONATE 110 UG/1
AEROSOL, METERED RESPIRATORY (INHALATION)
Qty: 36 G | Refills: 1 | Status: SHIPPED | OUTPATIENT
Start: 2022-09-14 | End: 2022-11-14

## 2022-09-14 NOTE — TELEPHONE ENCOUNTER
LVM for the pt informing her that we received a refill request for fluticasone (FLOVENT HFA) 110 MCG/ACT Aerosol and it was approve and sent to [facility] (Now [facility]) - [place], [place] - [address]

## 2022-09-14 NOTE — TELEPHONE ENCOUNTER
Have we ever prescribed this med? Yes.  If yes, what date? 9/21/2021    Last OV: 7/18/2022 - YURI GOETZ     Next OV: NONE    DX: Mild intermittent asthma without complication (J45.20)    Medications: fluticasone (FLOVENT HFA) 110 MCG/ACT Aerosol

## 2022-09-26 DIAGNOSIS — I10 ESSENTIAL HYPERTENSION: ICD-10-CM

## 2022-09-26 DIAGNOSIS — E03.9 ACQUIRED HYPOTHYROIDISM: ICD-10-CM

## 2022-09-26 DIAGNOSIS — E78.5 DYSLIPIDEMIA: ICD-10-CM

## 2022-09-26 DIAGNOSIS — E55.9 VITAMIN D DEFICIENCY: ICD-10-CM

## 2022-10-10 ENCOUNTER — OFFICE VISIT (OUTPATIENT)
Dept: MEDICAL GROUP | Facility: LAB | Age: 82
End: 2022-10-10
Payer: MEDICARE

## 2022-10-10 VITALS
BODY MASS INDEX: 25.88 KG/M2 | TEMPERATURE: 97.7 F | HEIGHT: 66 IN | OXYGEN SATURATION: 94 % | WEIGHT: 161 LBS | HEART RATE: 82 BPM | SYSTOLIC BLOOD PRESSURE: 130 MMHG | DIASTOLIC BLOOD PRESSURE: 64 MMHG | RESPIRATION RATE: 14 BRPM

## 2022-10-10 DIAGNOSIS — L28.0 LICHENOID DERMATITIS: ICD-10-CM

## 2022-10-10 PROCEDURE — 99213 OFFICE O/P EST LOW 20 MIN: CPT | Performed by: FAMILY MEDICINE

## 2022-10-10 RX ORDER — CLOBETASOL PROPIONATE 0.5 MG/G
OINTMENT TOPICAL
Qty: 45 G | Refills: 3 | Status: SHIPPED | OUTPATIENT
Start: 2022-10-10 | End: 2022-11-03

## 2022-10-10 ASSESSMENT — FIBROSIS 4 INDEX: FIB4 SCORE: 0.79

## 2022-10-10 NOTE — PROGRESS NOTES
Subjective:     Chief Complaint   Patient presents with    Vaginal Pain     Sore / discomfort          HPI:   Karli presents today with sore or discomfort in the genital area.   Appeared 2 weeks ago. Very raw, tender. Looks like a little blister. Woke her up due to pain. Painful in shower this am. Not getting better. Painful with clothes rubbing up against it. Tried hydrocortisone cream. 2 extra strength tylenol in the Ams helps a bit. Using sitz baths without much relief. Tried bacitracin . No drainage or discharge.   Tried some abreva without relief as well, made it a bit worse.       Current Outpatient Medications Ordered in Epic   Medication Sig Dispense Refill    fluticasone (FLOVENT HFA) 110 MCG/ACT Aerosol INHALE TWO PUFFS BY MOUTH TWICE DAILY - USE  SPACER.  RINSE  MOUTH  AFTER  EACH  USE 36 g 1    LORazepam (ATIVAN) 1 MG Tab TAKE 1/2 (ONE-HALF) TABLET BY MOUTH TWICE DAILY AS NEEDED FOR ANXIETY FOR UP TO 30 DAYS      Fluticasone Furoate-Vilanterol (BREO ELLIPTA) 100-25 MCG/INH AEROSOL POWDER, BREATH ACTIVATED Inhale 1 Puff every day. Rinse mouth after use. 2 Each 0    simvastatin (ZOCOR) 40 MG Tab Take 1 Tablet by mouth every day. 90 Tablet 3    LORazepam (ATIVAN) 0.5 MG Tab Take 0.5 mg by mouth every four hours as needed for Anxiety.      albuterol (VENTOLIN HFA) 108 (90 Base) MCG/ACT Aero Soln inhalation aerosol Inhale 2 Puffs every four hours as needed for Shortness of Breath (Wheezing). 1 Each 0    ARMOUR THYROID 60 MG Tab Take 1 Tablet by mouth every day. 5 days a week 90 Tablet 3    thyroid (ARMOUR THYROID) 90 MG Tab Take 1 Tablet by mouth every day. 2 X a week 100 Each 1    Omega-3 Fatty Acids (FISH OIL) 1000 MG Cap capsule Take 1,000 mg by mouth every day.      Lutein-Zeaxanthin 25-5 MG Cap Take  by mouth every day.      omeprazole (PRILOSEC) 20 MG delayed-release capsule Take 20 mg by mouth every day.      Aflibercept (EYLEA) 2 MG/0.05ML Solution by Intravitreal route Q30 DAYS.      acetaminophen  "(TYLENOL) 500 MG Tab Take 1,000 mg by mouth every day.      Calcium Carbonate-Vit D-Min (CALCIUM 1200 PO) Take 1 tablet by mouth every day.      Cyanocobalamin (VITAMIN B12 PO) Take 1,000 mcg by mouth every day.      Glucosamine-Chondroitin 750-600 MG Tab Take 1 tablet by mouth every day.      Turmeric 500 MG Tab Take 1 tablet by mouth 2 Times a Day.      vitamin D (CHOLECALCIFEROL) 1000 UNIT TABS Take 2,000 Units by mouth 2 times a day.      Magnesium 250 MG TABS Take 400 Tabs by mouth every day.      Bioflavonoid Products (LISSY-C) TABS Take 1,000 Tablets by mouth 2 times a day.      Multiple Vitamin (MULTIVITAMIN PO) Take 1 tablet by mouth every day.       No current [facility]-ordered facility-administered medications on file.         ROS:  Gen: no fevers/chills, no changes in weight  Eyes: no changes in vision  ENT: no sore throat, no hearing loss, no bloody nose  Pulm: no sob, no cough  CV: no chest pain, no palpitations  GI: no nausea/vomiting, no diarrhea  : no dysuria  MSk: no myalgias  Skin: no rash  Neuro: no headaches, no numbness/tingling  Heme/Lymph: no easy bruising      Objective:     Exam:  /64 (BP Location: Right arm, Patient Position: Sitting, BP Cuff Size: Adult)   Pulse 82   Temp 36.5 °C (97.7 °F)   Resp 14   Ht 1.676 m (5' 6\")   Wt 73 kg (161 lb)   SpO2 94%   BMI 25.99 kg/m²  Body mass index is 25.99 kg/m².    Gen: AAOx3, NAD, well appearing  HEENT: NCAT, EOMI, Nares patent, Mucosa moist  Resp: Normal chest wall rise and fall, not SOB, no tachypnea  Skin: no rash or abnormality of visible skin.   Psych: normal speech, not slurred, good insight, affect full  MSK: Moves all four limbs equally and normally, gait normal  : Externally at the labia majora approximately 5 o'clock position there is slightly smaller than dime sized area of induration, erythema, skin breakdown.  There is no vesicular or blisterlike appearance.  No drainage or discharge present.  Mildly tender to " palpation.      Assessment & Plan:     82 y.o. female with the following -     1. Lichenoid dermatitis  Discussed that this might be just the area of inflammation and irritation possibly even lichenoid.  We will go and try a little bit of a stronger steroid ointment and then consider biopsy in the future if this is not resolved.  She will let me know how things are going.  I do have a visit with her November for Medicare annual wellness as well.  May refer to Derm if we just cannot get this classified and treated.  - clobetasol (TEMOVATE) 0.05 % Ointment; Pea sized amount to affected area twice daily, til relief. 14 days max  Dispense: 45 g; Refill: 3            No follow-ups on file.    Please note that this dictation was created using voice recognition software. I have made every reasonable attempt to correct obvious errors, but I expect that there are errors of grammar and possibly content that I did not discover before finalizing the note.

## 2022-11-03 ENCOUNTER — HOSPITAL ENCOUNTER (OUTPATIENT)
Facility: MEDICAL CENTER | Age: 82
End: 2022-11-03
Attending: FAMILY MEDICINE
Payer: MEDICARE

## 2022-11-03 ENCOUNTER — OFFICE VISIT (OUTPATIENT)
Dept: URGENT CARE | Facility: CLINIC | Age: 82
End: 2022-11-03
Payer: MEDICARE

## 2022-11-03 VITALS
WEIGHT: 164.2 LBS | SYSTOLIC BLOOD PRESSURE: 134 MMHG | TEMPERATURE: 97.6 F | OXYGEN SATURATION: 96 % | RESPIRATION RATE: 16 BRPM | HEIGHT: 66 IN | HEART RATE: 78 BPM | DIASTOLIC BLOOD PRESSURE: 64 MMHG | BODY MASS INDEX: 26.39 KG/M2

## 2022-11-03 DIAGNOSIS — R10.2 PELVIC PAIN IN FEMALE: ICD-10-CM

## 2022-11-03 DIAGNOSIS — R68.83 CHILLS: ICD-10-CM

## 2022-11-03 DIAGNOSIS — R53.81 MALAISE AND FATIGUE: ICD-10-CM

## 2022-11-03 DIAGNOSIS — R53.83 MALAISE AND FATIGUE: ICD-10-CM

## 2022-11-03 LAB
APPEARANCE UR: CLEAR
BILIRUB UR STRIP-MCNC: NEGATIVE MG/DL
COLOR UR AUTO: YELLOW
GLUCOSE UR STRIP.AUTO-MCNC: NEGATIVE MG/DL
KETONES UR STRIP.AUTO-MCNC: NEGATIVE MG/DL
LEUKOCYTE ESTERASE UR QL STRIP.AUTO: NEGATIVE
NITRITE UR QL STRIP.AUTO: NEGATIVE
PH UR STRIP.AUTO: 7 [PH] (ref 5–8)
PROT UR QL STRIP: NEGATIVE MG/DL
RBC UR QL AUTO: NEGATIVE
SP GR UR STRIP.AUTO: 1.01
UROBILINOGEN UR STRIP-MCNC: 0.2 MG/DL

## 2022-11-03 PROCEDURE — 99000 SPECIMEN HANDLING OFFICE-LAB: CPT | Performed by: FAMILY MEDICINE

## 2022-11-03 PROCEDURE — 99213 OFFICE O/P EST LOW 20 MIN: CPT | Performed by: FAMILY MEDICINE

## 2022-11-03 PROCEDURE — 81002 URINALYSIS NONAUTO W/O SCOPE: CPT | Performed by: FAMILY MEDICINE

## 2022-11-03 PROCEDURE — 87086 URINE CULTURE/COLONY COUNT: CPT

## 2022-11-03 ASSESSMENT — ENCOUNTER SYMPTOMS: ABDOMINAL PAIN: 1

## 2022-11-03 ASSESSMENT — FIBROSIS 4 INDEX: FIB4 SCORE: 0.79

## 2022-11-03 NOTE — PROGRESS NOTES
"Subjective     Karli Berrios is a 82 y.o. female who presents with Pelvic Pain (\"X10 days, chills, I have intermittent feelings of menstrual cramps.\")      - This is a pleasant and nontoxic appearing 82 y.o. female who has come to the walk-in clinic today for:    #1) ~1-1.5 weeks w/ constant 5/10 lower abdomen/pelvic pain. No fevers but has chills and malaise/fatigue. No NV, no urinary or stool changes. Eating well. Hx DESIREE and ROB due to 'ruptured ovary'.       ALLERGIES:  Atenolol, Bactrim [sulfamethoxazole w-trimethoprim], Codeine, and Denosumab     PMH:  Past Medical History:   Diagnosis Date    Anxiety     Arthritis     Asthma     Cataract     GERD (gastroesophageal reflux disease)     HTN (hypertension)     Hyperlipidemia     Hypertension     Hypothyroidism     Lung nodules 07/2009    stable 2010    Urinary tract infection         PSH:  Past Surgical History:   Procedure Laterality Date    ABDOMINAL EXPLORATION      peritonitis    ABDOMINAL HYSTERECTOMY TOTAL      APPENDECTOMY      CARPAL TUNNEL RELEASE      CATARACT EXTRACTION WITH IOL      EYE SURGERY      HEMORRHOIDECTOMY      OTHER      D & C    OTHER      FISTULECTOMY    OTHER      HYSTERECTOMY    OTHER      LEFTY BREAST LIPOMA RESECTION       MEDS:    Current Outpatient Medications:     fluticasone (FLOVENT HFA) 110 MCG/ACT Aerosol, INHALE TWO PUFFS BY MOUTH TWICE DAILY - USE  SPACER.  RINSE  MOUTH  AFTER  EACH  USE, Disp: 36 g, Rfl: 1    LORazepam (ATIVAN) 1 MG Tab, TAKE 1/2 (ONE-HALF) TABLET BY MOUTH TWICE DAILY AS NEEDED FOR ANXIETY FOR UP TO 30 DAYS, Disp: , Rfl:     simvastatin (ZOCOR) 40 MG Tab, Take 1 Tablet by mouth every day., Disp: 90 Tablet, Rfl: 3    ARMOUR THYROID 60 MG Tab, Take 1 Tablet by mouth every day. 5 days a week, Disp: 90 Tablet, Rfl: 3    thyroid (ARMOUR THYROID) 90 MG Tab, Take 1 Tablet by mouth every day. 2 X a week, Disp: 100 Each, Rfl: 1    Omega-3 Fatty Acids (FISH OIL) 1000 MG Cap capsule, Take 1,000 mg by mouth every " "day., Disp: , Rfl:     Lutein-Zeaxanthin 25-5 MG Cap, Take  by mouth every day., Disp: , Rfl:     omeprazole (PRILOSEC) 20 MG delayed-release capsule, Take 20 mg by mouth every day., Disp: , Rfl:     Aflibercept (EYLEA) 2 MG/0.05ML Solution, by Intravitreal route Q30 DAYS., Disp: , Rfl:     acetaminophen (TYLENOL) 500 MG Tab, Take 1,000 mg by mouth every day., Disp: , Rfl:     Calcium Carbonate-Vit D-Min (CALCIUM 1200 PO), Take 1 tablet by mouth every day., Disp: , Rfl:     Cyanocobalamin (VITAMIN B12 PO), Take 1,000 mcg by mouth every day., Disp: , Rfl:     Glucosamine-Chondroitin 750-600 MG Tab, Take 1 tablet by mouth every day., Disp: , Rfl:     Turmeric 500 MG Tab, Take 1 tablet by mouth 2 Times a Day., Disp: , Rfl:     vitamin D (CHOLECALCIFEROL) 1000 UNIT TABS, Take 2,000 Units by mouth 2 times a day., Disp: , Rfl:     Magnesium 250 MG TABS, Take 400 Tabs by mouth every day., Disp: , Rfl:     Bioflavonoid Products (LISSY-C) TABS, Take 1,000 Tablets by mouth 2 times a day., Disp: , Rfl:     Multiple Vitamin (MULTIVITAMIN PO), Take 1 tablet by mouth every day., Disp: , Rfl:     Fluticasone Furoate-Vilanterol (BREO ELLIPTA) 100-25 MCG/INH AEROSOL POWDER, BREATH ACTIVATED, Inhale 1 Puff every day. Rinse mouth after use., Disp: 2 Each, Rfl: 0    ** I have documented what I find to be significant in regards to past medical, social, family and surgical history  in my HPI or under PMH/PSH/FH review section, otherwise it is noncontributory **         HPI    Review of Systems   Constitutional:  Positive for malaise/fatigue.   Gastrointestinal:  Positive for abdominal pain (pain across lower abdomen).   All other systems reviewed and are negative.           Objective     /64 (BP Location: Left arm, Patient Position: Sitting, BP Cuff Size: Adult)   Pulse 78   Temp 36.4 °C (97.6 °F) (Temporal)   Resp 16   Ht 1.676 m (5' 6\")   Wt 74.5 kg (164 lb 3.2 oz)   SpO2 96%   BMI 26.50 kg/m²      Physical Exam  Vitals " and nursing note reviewed.   Constitutional:       General: She is not in acute distress.     Appearance: Normal appearance. She is well-developed.   HENT:      Head: Normocephalic.   Cardiovascular:      Heart sounds: Normal heart sounds. No murmur heard.  Pulmonary:      Effort: Pulmonary effort is normal. No respiratory distress.   Abdominal:      General: There is no distension.      Palpations: Abdomen is soft.      Tenderness: There is no abdominal tenderness. There is no guarding or rebound.   Neurological:      Mental Status: She is alert.      Motor: No abnormal muscle tone.   Psychiatric:         Mood and Affect: Mood normal.         Behavior: Behavior normal.                           Assessment & Plan     1. Malaise and fatigue  POCT Urinalysis    URINE CULTURE(NEW)    CT-ABDOMEN-PELVIS WITH      2. Pelvic pain in female  POCT Urinalysis    URINE CULTURE(NEW)    CT-ABDOMEN-PELVIS WITH      3. Chills            - Dx, plan & d/c instructions discussed (asked to go to ER today for further eval. Says will go to [facility] ER)    Patient left in stable condition     POCT results reviewed/discussed

## 2022-11-06 LAB
BACTERIA UR CULT: NORMAL
SIGNIFICANT IND 70042: NORMAL
SITE SITE: NORMAL
SOURCE SOURCE: NORMAL

## 2022-11-07 ENCOUNTER — TELEPHONE (OUTPATIENT)
Dept: MEDICAL GROUP | Facility: LAB | Age: 82
End: 2022-11-07
Payer: MEDICARE

## 2022-11-07 NOTE — TELEPHONE ENCOUNTER
ANNUAL WELLNESS VISIT PRE-VISIT PLANNING    1.  Reviewed notes from the last office visit: Yes    2.  If any orders were ordered or intended to be done prior to visit (i.e. 6 mos follow-up), do we have results/consult notes or has patient scheduled?          Labs - Labs ordered, NOT completed. Patient advised to complete prior to next appointment.  Note: If patient appointment is for lab review and patient did not complete labs, check with provider if OK to reschedule patient until labs completed.         Imaging - Imaging was not ordered at last office visit.         Referrals - No referrals were ordered at last office visit.    3.  Immunizations were updated in Epic using Reconcile Outside Information activity? Yes    4.  Patient is due for the following Health Maintenance Topics:   Health Maintenance Due   Topic Date Due    Annual Wellness Visit  Never done   5.  Reviewed/Updated the following with patient:          Preferred Pharmacy? No          Preferred Lab? No          Preferred Communication? No          Allergies? No          Medications? NO    6.  Care Team Updated:          DME Company (gait device, O2, CPAP, etc.): NO          Other Specialists (eye doctor, derm, GYN, cardiology, endo, etc): NO    7.  Patient was not advised: “This is a free wellness visit. The provider will screen for medical conditions to help you stay healthy. If you have other concerns to address you may be asked to discuss these at a separate visit or there may be an additional fee.”     8.  AHA (Puls8) form printed for Provider? N/A

## 2022-11-08 ENCOUNTER — HOSPITAL ENCOUNTER (OUTPATIENT)
Dept: LAB | Facility: MEDICAL CENTER | Age: 82
End: 2022-11-08
Attending: NURSE PRACTITIONER
Payer: MEDICARE

## 2022-11-08 DIAGNOSIS — E55.9 VITAMIN D DEFICIENCY: ICD-10-CM

## 2022-11-08 DIAGNOSIS — E78.5 DYSLIPIDEMIA: ICD-10-CM

## 2022-11-08 DIAGNOSIS — E03.9 ACQUIRED HYPOTHYROIDISM: ICD-10-CM

## 2022-11-08 DIAGNOSIS — I10 ESSENTIAL HYPERTENSION: ICD-10-CM

## 2022-11-08 LAB
25(OH)D3 SERPL-MCNC: 69 NG/ML (ref 30–100)
ALBUMIN SERPL BCP-MCNC: 4.3 G/DL (ref 3.2–4.9)
ALBUMIN/GLOB SERPL: 1.4 G/DL
ALP SERPL-CCNC: 90 U/L (ref 30–99)
ALT SERPL-CCNC: 22 U/L (ref 2–50)
ANION GAP SERPL CALC-SCNC: 11 MMOL/L (ref 7–16)
AST SERPL-CCNC: 26 U/L (ref 12–45)
BASOPHILS # BLD AUTO: 1 % (ref 0–1.8)
BASOPHILS # BLD: 0.07 K/UL (ref 0–0.12)
BILIRUB SERPL-MCNC: 0.5 MG/DL (ref 0.1–1.5)
BUN SERPL-MCNC: 20 MG/DL (ref 8–22)
CALCIUM SERPL-MCNC: 9 MG/DL (ref 8.5–10.5)
CHLORIDE SERPL-SCNC: 96 MMOL/L (ref 96–112)
CHOLEST SERPL-MCNC: 158 MG/DL (ref 100–199)
CO2 SERPL-SCNC: 26 MMOL/L (ref 20–33)
CREAT SERPL-MCNC: 0.65 MG/DL (ref 0.5–1.4)
EOSINOPHIL # BLD AUTO: 0.18 K/UL (ref 0–0.51)
EOSINOPHIL NFR BLD: 2.6 % (ref 0–6.9)
ERYTHROCYTE [DISTWIDTH] IN BLOOD BY AUTOMATED COUNT: 43.2 FL (ref 35.9–50)
GFR SERPLBLD CREATININE-BSD FMLA CKD-EPI: 89 ML/MIN/1.73 M 2
GLOBULIN SER CALC-MCNC: 3.1 G/DL (ref 1.9–3.5)
GLUCOSE SERPL-MCNC: 81 MG/DL (ref 65–99)
HCT VFR BLD AUTO: 46.4 % (ref 37–47)
HDLC SERPL-MCNC: 66 MG/DL
HGB BLD-MCNC: 15.2 G/DL (ref 12–16)
IMM GRANULOCYTES # BLD AUTO: 0.01 K/UL (ref 0–0.11)
IMM GRANULOCYTES NFR BLD AUTO: 0.1 % (ref 0–0.9)
LDLC SERPL CALC-MCNC: 78 MG/DL
LYMPHOCYTES # BLD AUTO: 1.5 K/UL (ref 1–4.8)
LYMPHOCYTES NFR BLD: 22 % (ref 22–41)
MCH RBC QN AUTO: 30.6 PG (ref 27–33)
MCHC RBC AUTO-ENTMCNC: 32.8 G/DL (ref 33.6–35)
MCV RBC AUTO: 93.5 FL (ref 81.4–97.8)
MONOCYTES # BLD AUTO: 0.68 K/UL (ref 0–0.85)
MONOCYTES NFR BLD AUTO: 10 % (ref 0–13.4)
NEUTROPHILS # BLD AUTO: 4.37 K/UL (ref 2–7.15)
NEUTROPHILS NFR BLD: 64.3 % (ref 44–72)
NRBC # BLD AUTO: 0 K/UL
NRBC BLD-RTO: 0 /100 WBC
PLATELET # BLD AUTO: 296 K/UL (ref 164–446)
PMV BLD AUTO: 11.1 FL (ref 9–12.9)
POTASSIUM SERPL-SCNC: 4 MMOL/L (ref 3.6–5.5)
PROT SERPL-MCNC: 7.4 G/DL (ref 6–8.2)
RBC # BLD AUTO: 4.96 M/UL (ref 4.2–5.4)
SODIUM SERPL-SCNC: 133 MMOL/L (ref 135–145)
TRIGL SERPL-MCNC: 71 MG/DL (ref 0–149)
TSH SERPL DL<=0.005 MIU/L-ACNC: 0.39 UIU/ML (ref 0.38–5.33)
WBC # BLD AUTO: 6.8 K/UL (ref 4.8–10.8)

## 2022-11-08 PROCEDURE — 36415 COLL VENOUS BLD VENIPUNCTURE: CPT

## 2022-11-08 PROCEDURE — 84443 ASSAY THYROID STIM HORMONE: CPT

## 2022-11-08 PROCEDURE — 80061 LIPID PANEL: CPT

## 2022-11-08 PROCEDURE — 80053 COMPREHEN METABOLIC PANEL: CPT

## 2022-11-08 PROCEDURE — 82306 VITAMIN D 25 HYDROXY: CPT

## 2022-11-08 PROCEDURE — 85025 COMPLETE CBC W/AUTO DIFF WBC: CPT

## 2022-11-11 ENCOUNTER — PATIENT MESSAGE (OUTPATIENT)
Dept: HEALTH INFORMATION MANAGEMENT | Facility: OTHER | Age: 82
End: 2022-11-11

## 2022-11-14 ENCOUNTER — OFFICE VISIT (OUTPATIENT)
Dept: MEDICAL GROUP | Facility: LAB | Age: 82
End: 2022-11-14
Payer: MEDICARE

## 2022-11-14 VITALS
TEMPERATURE: 97 F | WEIGHT: 161 LBS | RESPIRATION RATE: 12 BRPM | OXYGEN SATURATION: 94 % | SYSTOLIC BLOOD PRESSURE: 122 MMHG | HEIGHT: 66 IN | DIASTOLIC BLOOD PRESSURE: 56 MMHG | HEART RATE: 82 BPM | BODY MASS INDEX: 25.88 KG/M2

## 2022-11-14 DIAGNOSIS — G89.29 CHRONIC BILATERAL LOW BACK PAIN WITHOUT SCIATICA: ICD-10-CM

## 2022-11-14 DIAGNOSIS — M25.552 LEFT HIP PAIN: ICD-10-CM

## 2022-11-14 DIAGNOSIS — Z00.00 ENCOUNTER FOR MEDICARE ANNUAL WELLNESS EXAM: ICD-10-CM

## 2022-11-14 DIAGNOSIS — M54.50 CHRONIC BILATERAL LOW BACK PAIN WITHOUT SCIATICA: ICD-10-CM

## 2022-11-14 PROBLEM — N95.2 ATROPHIC VAGINITIS: Status: ACTIVE | Noted: 2022-11-07

## 2022-11-14 PROCEDURE — 99213 OFFICE O/P EST LOW 20 MIN: CPT | Mod: 25 | Performed by: FAMILY MEDICINE

## 2022-11-14 PROCEDURE — G0439 PPPS, SUBSEQ VISIT: HCPCS | Performed by: FAMILY MEDICINE

## 2022-11-14 RX ORDER — FLUTICASONE PROPIONATE 100 UG/1
POWDER, METERED RESPIRATORY (INHALATION)
COMMUNITY
End: 2022-12-05 | Stop reason: SDUPTHER

## 2022-11-14 RX ORDER — FAMOTIDINE 40 MG/1
20 TABLET, FILM COATED ORAL 2 TIMES DAILY
COMMUNITY

## 2022-11-14 ASSESSMENT — ACTIVITIES OF DAILY LIVING (ADL): BATHING_REQUIRES_ASSISTANCE: 0

## 2022-11-14 ASSESSMENT — PATIENT HEALTH QUESTIONNAIRE - PHQ9: CLINICAL INTERPRETATION OF PHQ2 SCORE: 0

## 2022-11-14 ASSESSMENT — ENCOUNTER SYMPTOMS: GENERAL WELL-BEING: GOOD

## 2022-11-14 ASSESSMENT — FIBROSIS 4 INDEX: FIB4 SCORE: 1.5

## 2022-11-14 NOTE — PROGRESS NOTES
Subjective:     Chief Complaint   Patient presents with    Muscle Pain           HPI:   Karli presents today to discuss some muscle aches and pains.     Did see urology, pelvic pain. Aching like menstrual cramps, though obviously she is not menstruating.   H/o pelvic surgery, hysterectomy, ruptured ovarian cyst, peritonitis.   Urology thought bladder spasms. Karli is feeling better in general.   No pelvic floor therapy after. Feeling like she needs more support in the abdomen and pelvis area.       Leg pains, back, fingers. Worse at night time and mornings. Hard time crossing left leg over right.     Current Outpatient Medications Ordered in Epic   Medication Sig Dispense Refill    Fluticasone Propionate, Inhal, (FLOVENT DISKUS) 100 MCG/ACT AEROSOL POWDER, BREATH ACTIVATED Flovent Diskus   110mcg 2/day      famotidine (PEPCID) 40 MG Tab famotidine   40mg every other day      LORazepam (ATIVAN) 1 MG Tab TAKE 1/2 (ONE-HALF) TABLET BY MOUTH TWICE DAILY AS NEEDED FOR ANXIETY FOR UP TO 30 DAYS      simvastatin (ZOCOR) 40 MG Tab Take 1 Tablet by mouth every day. 90 Tablet 3    ARMOUR THYROID 60 MG Tab Take 1 Tablet by mouth every day. 5 days a week 90 Tablet 3    thyroid (ARMOUR THYROID) 90 MG Tab Take 1 Tablet by mouth every day. 2 X a week 100 Each 1    Omega-3 Fatty Acids (FISH OIL) 1000 MG Cap capsule Take 1,000 mg by mouth every day.      Lutein-Zeaxanthin 25-5 MG Cap Take  by mouth every day.      omeprazole (PRILOSEC) 20 MG delayed-release capsule Take 20 mg by mouth every day.      Aflibercept (EYLEA) 2 MG/0.05ML Solution by Intravitreal route Q30 DAYS.      acetaminophen (TYLENOL) 500 MG Tab Take 1,000 mg by mouth every day.      Calcium Carbonate-Vit D-Min (CALCIUM 1200 PO) Take 1 tablet by mouth every day.      Cyanocobalamin (VITAMIN B12 PO) Take 1,000 mcg by mouth every day.      Glucosamine-Chondroitin 750-600 MG Tab Take 1 tablet by mouth every day.      Turmeric 500 MG Tab Take 1 tablet by mouth 2 Times a  Day.      vitamin D (CHOLECALCIFEROL) 1000 UNIT TABS Take 2,000 Units by mouth 2 times a day.      Magnesium 250 MG TABS Take 400 Tabs by mouth every day.      Bioflavonoid Products (LISSY-C) TABS Take 1,000 Tablets by mouth 2 times a day.      Multiple Vitamin (MULTIVITAMIN PO) Take 1 tablet by mouth every day.       No current Epic-ordered facility-administered medications on file.     Depression Screening  Little interest or pleasure in doing things?  0 - not at all  Feeling down, depressed , or hopeless? 0 - not at all  Patient Health Questionnaire Score: 0     If depressive symptoms identified deferred to follow up visit unless specifically addressed in assessment and plan.    Interpretation of PHQ-9 Total Score   Score Severity   1-4 No Depression   5-9 Mild Depression   10-14 Moderate Depression   15-19 Moderately Severe Depression   20-27 Severe Depression    Screening for Cognitive Impairment  Three Minute Recall (daughter, isabel, teodoro) 3 /3    Alvarado clock face with all 12 numbers and set the hands to show 10 past 11.  Yes    Cognitive concerns identified deferred for follow up unless specifically addressed in assessment and plan.    Fall Risk Assessment  Has the patient had two or more falls in the last year or any fall with injury in the last year?  No    Safety Assessment  Throw rugs on floor.  No  Handrails on all stairs.  Yes  Good lighting in all hallways.  Yes  Difficulty hearing.  No  Patient counseled about all safety risks that were identified.    Functional Assessment ADLs  Are there any barriers preventing you from cooking for yourself or meeting nutritional needs?  No.    Are there any barriers preventing you from driving safely or obtaining transportation?  No.    Are there any barriers preventing you from using a telephone or calling for help?  No.    Are there any barriers preventing you from shopping?  No.    Are there any barriers preventing you from taking care of your own finances?   No.    Are there any barriers preventing you from managing your medications?  No.    Are there any barriers preventing you from showering, bathing or dressing yourself?  No.    Are you currently engaging in any exercise or physical activity?  Yes.     What is your perception of your health?  Good    Advance Care Planning  Do you have an Advance Directive, Living Will, Durable Power of , or POLST? No                 Health Maintenance Summary            Overdue - Annual Wellness Visit (Every 366 Days) Overdue - never done      No completion history exists for this topic.              BONE DENSITY (Every 5 Years) Tentatively due on 4/21/2026 04/21/2021  DS-BONE DENSITY STUDY (DEXA)    03/10/2005  DS-BONE DENSITY STUDY (DEXA)              IMM DTaP/Tdap/Td Vaccine (2 - Td or Tdap) Next due on 9/15/2030      09/15/2020  Imm Admin: Tdap Vaccine              IMM HEP B VACCINE (Series Information) Completed      02/07/2002  Imm Admin: Hepatitis B Vaccine (Adol/Adult)    09/12/2001  Imm Admin: Hepatitis B Vaccine (Adol/Adult)    07/31/2001  Imm Admin: Hepatitis B Vaccine (Adol/Adult)              IMM PNEUMOCOCCAL VACCINE: 65+ Years (Series Information) Completed      11/10/2015  Imm Admin: Pneumococcal Conjugate Vaccine (Prevnar/PCV-13)    10/01/2015  Imm Admin: Pneumococcal Conjugate Vaccine (Prevnar/PCV-13)    01/01/2008  Imm Admin: Pneumococcal polysaccharide vaccine (PPSV-23)    01/01/2008  Imm Admin: Pneumococcal Vaccine (UF) - HISTORICAL DATA              IMM ZOSTER VACCINES (Series Information) Completed      03/09/2020  Imm Admin: Zoster Vaccine Recombinant (RZV) (SHINGRIX)    12/09/2019  Imm Admin: Zoster Vaccine Recombinant (RZV) (SHINGRIX)              IMM INFLUENZA (Series Information) Completed      09/30/2022  Imm Admin: Influenza Vaccine Adult HD    10/06/2021  Imm Admin: Influenza Vaccine Adult HD    09/15/2020  Imm Admin: INFLUENZA TIV (IM)    09/10/2019  Imm Admin: Influenza Vaccine Adult HD  "   09/19/2018  Imm Admin: Influenza Vaccine Adult HD    Only the first 5 history entries have been loaded, but more history exists.              COVID-19 Vaccine (Series Information) Completed      10/10/2022  Imm Admin: PFIZER BIVALENT BOOSTER SARS-COV-2 VACCINE (12+)    10/06/2021  Imm Admin: PFIZER PURPLE CAP SARS-COV-2 VACCINATION (12+)    03/28/2021  Imm Admin: PFIZER PURPLE CAP SARS-COV-2 VACCINATION (12+)    03/07/2021  Imm Admin: PFIZER PURPLE CAP SARS-COV-2 VACCINATION (12+)              IMM MENINGOCOCCAL ACWY VACCINE (Series Information) Aged Out      No completion history exists for this topic.              Discontinued - MAMMOGRAM  Discontinued        Frequency changed to Never automatically (Topic No Longer Applies)    04/18/2022  MA-SCREENING MAMMO BILAT W/TOMOSYNTHESIS W/CAD    04/06/2021  MA-SCREENING MAMMO BILAT W/TOMOSYNTHESIS W/CAD    12/19/2019  MA-SCREENING MAMMO BILAT W/TOMOSYNTHESIS W/CAD    11/26/2018  MA-MAMMO DIAGNOSTIC BILAT W/PALLAVI W/CAD    Only the first 5 history entries have been loaded, but more history exists.              Discontinued - COLORECTAL CANCER SCREENING  Discontinued        Frequency changed to Never automatically (Topic No Longer Applies)    08/15/2019  COLONOSCOPY ([facility])                    Patient Care Team:  Deepa Joe M.D. as PCP - General (Family Medicine)      ROS:  Gen: no fevers/chills, no changes in weight  Eyes: no changes in vision  ENT: no sore throat, no hearing loss, no bloody nose  Pulm: no sob, no cough  CV: no chest pain, no palpitations  GI: no nausea/vomiting, no diarrhea  : no dysuria  MSk: no myalgias  Skin: no rash  Neuro: no headaches, no numbness/tingling  Heme/Lymph: no easy bruising      Objective:     Exam:  /56 (BP Location: Right arm, Patient Position: Sitting, BP Cuff Size: Adult)   Pulse 82   Temp 36.1 °C (97 °F)   Resp 12   Ht 1.676 m (5' 6\")   Wt 73 kg (161 lb)   SpO2 94%   BMI 25.99 kg/m²  " Body mass index is 25.99 kg/m².    Gen: Alert and oriented, No apparent distress.  Neck: Neck is supple without lymphadenopathy.  Lungs: Normal effort, CTA bilaterally, no wheezes, rhonchi, or rales  CV: Regular rate and rhythm. No murmurs, rubs, or gallops.  Ext: No clubbing, cyanosis, edema.      Assessment & Plan:     82 y.o. female with the following -     1. Encounter for Medicare annual wellness exam  Discussed routine preventative medicine as well as annual wellness visit.  Discussed her labs which are looking good.  Reviewed immunizations as well as anticipatory guidance related to age.    2. Left hip pain  Discussed left hip pain.  She does not have classic groin pain with internal rotation but does have mild groin and thigh pain with external rotation.  Would like to make sure that she does not have severe arthritis with she would only be fixed with a replacement.  She is agreeable to have x-ray done.  We will to get in with physical therapy so they can work on some core strength as well.  - DX-HIP-COMPLETE - UNILATERAL 2+ LEFT; Future  - Referral to Physical Therapy    3. Chronic bilateral low back pain without sciatica  See above.  - Referral to Physical Therapy            No follow-ups on file.    Please note that this dictation was created using voice recognition software. I have made every reasonable attempt to correct obvious errors, but I expect that there are errors of grammar and possibly content that I did not discover before finalizing the note.

## 2022-11-23 ENCOUNTER — HOSPITAL ENCOUNTER (OUTPATIENT)
Dept: RADIOLOGY | Facility: MEDICAL CENTER | Age: 82
End: 2022-11-23
Attending: FAMILY MEDICINE
Payer: MEDICARE

## 2022-11-23 DIAGNOSIS — M25.552 LEFT HIP PAIN: ICD-10-CM

## 2022-11-23 PROCEDURE — 73502 X-RAY EXAM HIP UNI 2-3 VIEWS: CPT | Mod: LT

## 2022-12-02 ENCOUNTER — PATIENT MESSAGE (OUTPATIENT)
Dept: SLEEP MEDICINE | Facility: MEDICAL CENTER | Age: 82
End: 2022-12-02
Payer: MEDICARE

## 2022-12-05 RX ORDER — FLUTICASONE PROPIONATE 100 UG/1
POWDER, METERED RESPIRATORY (INHALATION)
Qty: 3 EACH | Refills: 3 | Status: SHIPPED | OUTPATIENT
Start: 2022-12-05 | End: 2023-04-13

## 2022-12-08 ENCOUNTER — TELEPHONE (OUTPATIENT)
Dept: MEDICAL GROUP | Facility: LAB | Age: 82
End: 2022-12-08
Payer: MEDICARE

## 2022-12-08 NOTE — TELEPHONE ENCOUNTER
"Physical Therapy paperwork received from [facility] requiring provider signature.     All appropriate fields completed by Medical Assistant: N/A CMA printed and distributed to MA    Paperwork placed in \"MA to Provider\" folder/basket. Awaiting provider completion/signature.    "

## 2022-12-09 ENCOUNTER — HOSPITAL ENCOUNTER (OUTPATIENT)
Facility: MEDICAL CENTER | Age: 82
End: 2022-12-09
Attending: OPHTHALMOLOGY | Admitting: OPHTHALMOLOGY
Payer: MEDICARE

## 2022-12-09 ENCOUNTER — PRE-ADMISSION TESTING (OUTPATIENT)
Dept: ADMISSIONS | Facility: MEDICAL CENTER | Age: 82
End: 2022-12-09
Attending: OPHTHALMOLOGY
Payer: MEDICARE

## 2022-12-13 RX ORDER — MOXIFLOXACIN 5 MG/ML
1 SOLUTION/ DROPS OPHTHALMIC
Status: CANCELLED | OUTPATIENT
Start: 2022-12-13 | End: 2022-12-14

## 2022-12-13 RX ORDER — SODIUM CHLORIDE, SODIUM LACTATE, POTASSIUM CHLORIDE, CALCIUM CHLORIDE 600; 310; 30; 20 MG/100ML; MG/100ML; MG/100ML; MG/100ML
INJECTION, SOLUTION INTRAVENOUS CONTINUOUS
Status: CANCELLED | OUTPATIENT
Start: 2022-12-13 | End: 2022-12-13

## 2022-12-13 RX ORDER — FLURBIPROFEN SODIUM 0.3 MG/ML
1 SOLUTION/ DROPS OPHTHALMIC
Status: CANCELLED | OUTPATIENT
Start: 2022-12-13 | End: 2022-12-14

## 2022-12-13 RX ORDER — CYCLOPENTOLATE HYDROCHLORIDE 10 MG/ML
1 SOLUTION/ DROPS OPHTHALMIC
Status: CANCELLED | OUTPATIENT
Start: 2022-12-13 | End: 2022-12-14

## 2022-12-13 RX ORDER — PHENYLEPHRINE HYDROCHLORIDE 25 MG/ML
1 SOLUTION/ DROPS OPHTHALMIC
Status: CANCELLED | OUTPATIENT
Start: 2022-12-13 | End: 2022-12-14

## 2022-12-13 RX ORDER — TROPICAMIDE 10 MG/ML
1 SOLUTION/ DROPS OPHTHALMIC
Status: CANCELLED | OUTPATIENT
Start: 2022-12-13 | End: 2022-12-14

## 2022-12-14 ENCOUNTER — PATIENT MESSAGE (OUTPATIENT)
Dept: SLEEP MEDICINE | Facility: MEDICAL CENTER | Age: 82
End: 2022-12-14
Payer: MEDICARE

## 2022-12-15 RX ORDER — FLUTICASONE PROPIONATE 110 UG/1
2 AEROSOL, METERED RESPIRATORY (INHALATION) 2 TIMES DAILY
Qty: 12 G | Refills: 5 | Status: SHIPPED | OUTPATIENT
Start: 2022-12-15 | End: 2023-06-20

## 2022-12-30 ENCOUNTER — OFFICE VISIT (OUTPATIENT)
Dept: MEDICAL GROUP | Facility: LAB | Age: 82
End: 2022-12-30
Payer: MEDICARE

## 2022-12-30 VITALS
DIASTOLIC BLOOD PRESSURE: 64 MMHG | BODY MASS INDEX: 26.2 KG/M2 | OXYGEN SATURATION: 95 % | SYSTOLIC BLOOD PRESSURE: 128 MMHG | TEMPERATURE: 97.2 F | WEIGHT: 163 LBS | HEIGHT: 66 IN | HEART RATE: 96 BPM | RESPIRATION RATE: 12 BRPM

## 2022-12-30 DIAGNOSIS — R42 VERTIGO: ICD-10-CM

## 2022-12-30 PROCEDURE — 99213 OFFICE O/P EST LOW 20 MIN: CPT | Performed by: FAMILY MEDICINE

## 2022-12-30 RX ORDER — LORAZEPAM 0.5 MG/1
0.5 TABLET ORAL EVERY 8 HOURS PRN
Qty: 30 TABLET | Refills: 1 | Status: SHIPPED | OUTPATIENT
Start: 2022-12-30 | End: 2023-01-09

## 2022-12-30 ASSESSMENT — FIBROSIS 4 INDEX: FIB4 SCORE: 1.5

## 2022-12-30 NOTE — PROGRESS NOTES
Subjective:     Chief Complaint   Patient presents with    Follow-Up         HPI:   Karli presents today with follow up from detached retina, and mac degeneration injection.   Had laser procedure in the office. No oral meds prior to this. She describes this as a horrible experience.   Then a week later had a different injection for her macular degen. Supposedly stronger and longer lasting.     Since then she has had more anxiety, head feeling strange, lips feeling tingly    H/o vitrectomy remotely. 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 Hasn't changed.    Years ago had an episode of peritonitis and then ended up with a vestibular issue after that due to all the stress.     She thinks this may be going on again. Did PT for this before. Has home exercises for this.     Does feel better after using he lorazepam at night and when she wakes up she feels fine. Then it comes on worse throughout the day.               Current Outpatient Medications Ordered in Epic   Medication Sig Dispense Refill    fluticasone (FLOVENT HFA) 110 MCG/ACT Aerosol Inhale 2 Puffs 2 times a day. Use spacer. Rinse mouth after each use. 12 g 5    Fluticasone Propionate, Inhal, (FLOVENT DISKUS) 100 MCG/ACT AEROSOL POWDER, BREATH ACTIVATED Flovent Diskus  110mcg 2/day Strength: 100 MCG/ACT (Patient taking differently: 2 Puffs 2 (two) times a day. Flovent Diskus  110mcg 2/day Strength: 100 MCG/ACT) 3 Each 3    famotidine (PEPCID) 40 MG Tab Take 20 mg by mouth 2 times a day.      LORazepam (ATIVAN) 1 MG Tab 0.25 mg as needed.      simvastatin (ZOCOR) 40 MG Tab Take 1 Tablet by mouth every day. (Patient taking differently: Take 40 mg by mouth every evening.) 90 Tablet 3    ARMOUR THYROID 60 MG Tab Take 1 Tablet by mouth every day. 5 days a week 90 Tablet 3    thyroid (ARMOUR THYROID) 90 MG Tab Take 1 Tablet by mouth every day. 2 X a week 100 Each 1    Omega-3 Fatty Acids (FISH OIL) 1000 MG Cap capsule Take 1,000 mg by mouth every day.    "   Lutein-Zeaxanthin 25-5 MG Cap Take  by mouth every day.      omeprazole (PRILOSEC) 20 MG delayed-release capsule Take 20 mg by mouth as needed.      Aflibercept (EYLEA) 2 MG/0.05ML Solution by Intravitreal route Q30 DAYS. For Macular Degeneration Every 5-6 Weeks      acetaminophen (TYLENOL) 500 MG Tab Take 1,000 mg by mouth every morning.      Calcium Carbonate-Vit D-Min (CALCIUM 1200 PO) Take 1 tablet by mouth every day.      Cyanocobalamin (VITAMIN B12 PO) Take 1,000 mcg by mouth every day.      Glucosamine-Chondroitin 750-600 MG Tab Take 1 tablet by mouth every day.      Turmeric 500 MG Tab Take 1 tablet by mouth 2 Times a Day.      vitamin D (CHOLECALCIFEROL) 1000 UNIT TABS Take 2,000 Units by mouth 2 times a day.      Magnesium 250 MG TABS Take 400 Tabs by mouth every day.      Bioflavonoid Products (LISSY-C) TABS Take 1,000 Tablets by mouth 2 times a day.      Multiple Vitamin (MULTIVITAMIN PO) Take 1 tablet by mouth every day.       No current [facility]-ordered facility-administered medications on file.         ROS:  Gen: no fevers/chills, no changes in weight  Eyes: no changes in vision  ENT: no sore throat, no hearing loss, no bloody nose  Pulm: no sob, no cough  CV: no chest pain, no palpitations  GI: no nausea/vomiting, no diarrhea  : no dysuria  MSk: no myalgias  Skin: no rash  Neuro: no headaches, no numbness/tingling  Heme/Lymph: no easy bruising      Objective:     Exam:  /64 (BP Location: Left arm, Patient Position: Sitting, BP Cuff Size: Adult)   Pulse 96   Temp 36.2 °C (97.2 °F)   Resp 12   Ht 1.676 m (5' 6\")   Wt 73.9 kg (163 lb)   SpO2 95%   BMI 26.31 kg/m²  Body mass index is 26.31 kg/m².    Gen: Alert and oriented, No apparent distress.  Neck: Neck is supple without lymphadenopathy.  Lungs: Normal effort, CTA bilaterally, no wheezes, rhonchi, or rales  CV: Regular rate and rhythm. No murmurs, rubs, or gallops.  Ext: No clubbing, cyanosis, edema.      Assessment & Plan:     82 y.o. " female with the following -     1. Vertigo  Discussed possible vertigo with all of the issues that she is had lately.  I am okay with trialing a little bit more use of her lorazepam she does find that this is helpful.  We did discuss continue to get good rest and sleep in general.  She will let me know if things or not improving.  Also get her referred for some balance or vestibular testing if needed in the future.  - LORazepam (ATIVAN) 0.5 MG Tab; Take 1 Tablet by mouth every 8 hours as needed for Anxiety for up to 10 days.  Dispense: 30 Tablet; Refill: 1  - Referral to ENT            No follow-ups on file.    Please note that this dictation was created using voice recognition software. I have made every reasonable attempt to correct obvious errors, but I expect that there are errors of grammar and possibly content that I did not discover before finalizing the note.

## 2023-02-06 RX ORDER — THYROID 90 MG/1
TABLET ORAL
Qty: 26 TABLET | Refills: 3 | Status: SHIPPED | OUTPATIENT
Start: 2023-02-06 | End: 2023-02-07 | Stop reason: SDUPTHER

## 2023-02-06 RX ORDER — THYROID,PORK 90 MG
TABLET ORAL
Qty: 26 TABLET | Refills: 3 | Status: SHIPPED | OUTPATIENT
Start: 2023-02-06 | End: 2023-02-06 | Stop reason: SDUPTHER

## 2023-02-07 RX ORDER — THYROID 90 MG/1
TABLET ORAL
Qty: 90 TABLET | Refills: 0 | Status: SHIPPED | OUTPATIENT
Start: 2023-02-07 | End: 2024-02-28

## 2023-02-07 RX ORDER — THYROID 30 MG/1
30 TABLET ORAL
Qty: 90 TABLET | Refills: 0 | Status: SHIPPED | OUTPATIENT
Start: 2023-02-07 | End: 2023-03-27

## 2023-02-07 NOTE — TELEPHONE ENCOUNTER
Received request via: Patient    Was the patient seen in the last year in this department? Yes    Does the patient have an active prescription (recently filled or refills available) for medication(s) requested?   Does the patient have care home Plus and need 100 day supply (blood pressure, diabetes and cholesterol meds only)?

## 2023-03-27 ENCOUNTER — OFFICE VISIT (OUTPATIENT)
Dept: MEDICAL GROUP | Facility: LAB | Age: 83
End: 2023-03-27
Payer: MEDICARE

## 2023-03-27 VITALS
WEIGHT: 161 LBS | HEIGHT: 66 IN | RESPIRATION RATE: 12 BRPM | DIASTOLIC BLOOD PRESSURE: 60 MMHG | BODY MASS INDEX: 25.88 KG/M2 | HEART RATE: 87 BPM | SYSTOLIC BLOOD PRESSURE: 124 MMHG | TEMPERATURE: 97 F | OXYGEN SATURATION: 97 %

## 2023-03-27 DIAGNOSIS — R07.89 OTHER CHEST PAIN: ICD-10-CM

## 2023-03-27 DIAGNOSIS — E03.9 ACQUIRED HYPOTHYROIDISM: ICD-10-CM

## 2023-03-27 PROCEDURE — 99214 OFFICE O/P EST MOD 30 MIN: CPT | Performed by: FAMILY MEDICINE

## 2023-03-27 RX ORDER — LORAZEPAM 0.5 MG/1
TABLET ORAL
COMMUNITY
Start: 2023-03-22 | End: 2023-04-13 | Stop reason: SDUPTHER

## 2023-03-27 ASSESSMENT — FIBROSIS 4 INDEX: FIB4 SCORE: 1.5

## 2023-03-27 ASSESSMENT — PATIENT HEALTH QUESTIONNAIRE - PHQ9: CLINICAL INTERPRETATION OF PHQ2 SCORE: 0

## 2023-03-27 NOTE — PROGRESS NOTES
Subjective:     Chief Complaint   Patient presents with    Referral Needed         HPI:   Karli presents today with concerns for cardio issues.   Mid February wa son the phone, stressed out, had some sharp pains and tightness in her chest. This then went away, but since then some tightness, and feeling like she is more out of breath since her retinal detachment and she couldn't do a lot of exercise.     Was seeing Dr Mcfarlane and he ordered a stress test but then life got in the way, and she never did this.     She is more anxious than anything else.  She only uses her lorazepam here and there as needed mostly at nighttime.  She also continues to have pain with arthritis.  She does have stiffness in the morning but then when she gets going she feels much better.  She does see rheumatology regularly in [place].  It sounds like they have simply been treating her for regular arthritis and not anything inflammatory or rheumatoid.    Current Outpatient Medications Ordered in Epic   Medication Sig Dispense Refill    thyroid (ARMOUR THYROID) 90 MG Tab TAKE 1 TABLET EVERY DAY 2 DAYS PER WEEK Strength: 90 mg 90 Tablet 0    thyroid (ARMOUR THYROID) 30 MG Tab Take 1 Tablet by mouth every 48 hours. 90 Tablet 0    fluticasone (FLOVENT HFA) 110 MCG/ACT Aerosol Inhale 2 Puffs 2 times a day. Use spacer. Rinse mouth after each use. 12 g 5    Fluticasone Propionate, Inhal, (FLOVENT DISKUS) 100 MCG/ACT AEROSOL POWDER, BREATH ACTIVATED Flovent Diskus  110mcg 2/day Strength: 100 MCG/ACT (Patient taking differently: 2 Puffs 2 (two) times a day. Flovent Diskus  110mcg 2/day Strength: 100 MCG/ACT) 3 Each 3    famotidine (PEPCID) 40 MG Tab Take 20 mg by mouth 2 times a day.      simvastatin (ZOCOR) 40 MG Tab Take 1 Tablet by mouth every day. (Patient taking differently: Take 40 mg by mouth every evening.) 90 Tablet 3    ARMOUR THYROID 60 MG Tab Take 1 Tablet by mouth every day. 5 days a week 90 Tablet 3    Omega-3 Fatty Acids (FISH  "OIL) 1000 MG Cap capsule Take 1,000 mg by mouth every day.      Lutein-Zeaxanthin 25-5 MG Cap Take  by mouth every day.      omeprazole (PRILOSEC) 20 MG delayed-release capsule Take 20 mg by mouth as needed.      Aflibercept (EYLEA) 2 MG/0.05ML Solution by Intravitreal route Q30 DAYS. For Macular Degeneration Every 5-6 Weeks      acetaminophen (TYLENOL) 500 MG Tab Take 1,000 mg by mouth every morning.      Calcium Carbonate-Vit D-Min (CALCIUM 1200 PO) Take 1 tablet by mouth every day.      Cyanocobalamin (VITAMIN B12 PO) Take 1,000 mcg by mouth every day.      Glucosamine-Chondroitin 750-600 MG Tab Take 1 tablet by mouth every day.      Turmeric 500 MG Tab Take 1 tablet by mouth 2 Times a Day.      vitamin D (CHOLECALCIFEROL) 1000 UNIT TABS Take 2,000 Units by mouth 2 times a day.      Magnesium 250 MG TABS Take 400 Tabs by mouth every day.      Bioflavonoid Products (LISSY-C) TABS Take 1,000 Tablets by mouth 2 times a day.      Multiple Vitamin (MULTIVITAMIN PO) Take 1 tablet by mouth every day.       No current [facility]-ordered facility-administered medications on file.         ROS:  Gen: no fevers/chills, no changes in weight  Eyes: no changes in vision  ENT: no sore throat, no hearing loss, no bloody nose  Pulm: no sob, no cough  CV: no chest pain, no palpitations  GI: no nausea/vomiting, no diarrhea  : no dysuria  MSk: no myalgias  Skin: no rash  Neuro: no headaches, no numbness/tingling  Heme/Lymph: no easy bruising      Objective:     Exam:  /60 (BP Location: Right arm, Patient Position: Sitting, BP Cuff Size: Adult)   Pulse 87   Temp 36.1 °C (97 °F)   Resp 12   Ht 1.676 m (5' 6\")   Wt 73 kg (161 lb)   SpO2 97%   BMI 25.99 kg/m²  Body mass index is 25.99 kg/m².    Gen: Alert and oriented, No apparent distress.  Neck: Neck is supple without lymphadenopathy.  Lungs: Normal effort, CTA bilaterally, no wheezes, rhonchi, or rales  CV: Regular rate and rhythm, extra beta now and then. No murmurs, rubs, " or gallops.  Ext: No clubbing, cyanosis, edema.      Assessment & Plan:     82 y.o. female with the following -     1. Other chest pain  We discussed the possibility that her chest pain is still more anxiety or stress related but since she has not had any cardiac rule out in the past I think we should go ahead and perform this.  We will see what her stress test is showing before getting her over to cardiology as she might need different cardiologists based on results.  We did discuss with regard to arthritis that she should continue to be as active as possible with gentle exercise.  I do not think there is anything else from a pain medication standpoint that I would add today.  She is okay with this.  - NM-CARDIAC STRESS TEST; Future      2.  acQuired hypothyroidism  We discussed that she is on [place] Thyroid 2 different doses alternating through the week.  She reports that she was never on levothyroxine and does not think she had any side effects with this.  She thinks she read an article about [place] and that she should try this and so that is how she got started on it.  It does cause a problem with insurance and try to get prior authorization so she is wondering if she should switch to levothyroxine.  We discussed waiting till we find out what her chest pain issues are and get her stress test back before changing something else so that we know what side effects are caused by what.  She is agreeable to this.    No follow-ups on file.    Please note that this dictation was created using voice recognition software. I have made every reasonable attempt to correct obvious errors, but I expect that there are errors of grammar and possibly content that I did not discover before finalizing the note.

## 2023-03-28 ENCOUNTER — PATIENT MESSAGE (OUTPATIENT)
Dept: MEDICAL GROUP | Facility: LAB | Age: 83
End: 2023-03-28
Payer: MEDICARE

## 2023-03-28 DIAGNOSIS — R07.89 OTHER CHEST PAIN: ICD-10-CM

## 2023-04-10 ENCOUNTER — HOSPITAL ENCOUNTER (OUTPATIENT)
Dept: RADIOLOGY | Facility: MEDICAL CENTER | Age: 83
End: 2023-04-10
Attending: FAMILY MEDICINE
Payer: MEDICARE

## 2023-04-10 DIAGNOSIS — R07.89 OTHER CHEST PAIN: ICD-10-CM

## 2023-04-10 PROCEDURE — 93018 CV STRESS TEST I&R ONLY: CPT | Performed by: INTERNAL MEDICINE

## 2023-04-10 PROCEDURE — 78452 HT MUSCLE IMAGE SPECT MULT: CPT | Mod: 26 | Performed by: INTERNAL MEDICINE

## 2023-04-10 PROCEDURE — 78452 HT MUSCLE IMAGE SPECT MULT: CPT

## 2023-04-13 ENCOUNTER — OFFICE VISIT (OUTPATIENT)
Dept: MEDICAL GROUP | Facility: LAB | Age: 83
End: 2023-04-13
Payer: MEDICARE

## 2023-04-13 VITALS
HEIGHT: 66 IN | SYSTOLIC BLOOD PRESSURE: 140 MMHG | BODY MASS INDEX: 26.68 KG/M2 | RESPIRATION RATE: 12 BRPM | WEIGHT: 166 LBS | HEART RATE: 96 BPM | DIASTOLIC BLOOD PRESSURE: 58 MMHG | OXYGEN SATURATION: 95 % | TEMPERATURE: 97 F

## 2023-04-13 DIAGNOSIS — F51.01 PRIMARY INSOMNIA: ICD-10-CM

## 2023-04-13 DIAGNOSIS — Z86.69 H/O DETACHED RETINA REPAIR: ICD-10-CM

## 2023-04-13 DIAGNOSIS — R07.89 OTHER CHEST PAIN: ICD-10-CM

## 2023-04-13 DIAGNOSIS — Z98.890 H/O DETACHED RETINA REPAIR: ICD-10-CM

## 2023-04-13 PROCEDURE — 99214 OFFICE O/P EST MOD 30 MIN: CPT | Performed by: FAMILY MEDICINE

## 2023-04-13 RX ORDER — LORAZEPAM 0.5 MG/1
TABLET ORAL
Qty: 30 TABLET | Refills: 0 | Status: SHIPPED | OUTPATIENT
Start: 2023-04-13 | End: 2023-05-09

## 2023-04-13 ASSESSMENT — FIBROSIS 4 INDEX: FIB4 SCORE: 1.5

## 2023-04-13 NOTE — PROGRESS NOTES
Subjective:     Chief Complaint   Patient presents with    Follow-Up     Stress Test results          HPI:   Karli presents today with stress test follow-up.  Testing showed decreased perfusion of moderate severity from apical to mid septum anterior and anterior septal wall segments with mild reversible ischemia.  Negative stress portion for ischemia.  EF 71%.    She has previously seen Dr. Mcfarlane in cardiology in 2021, was supposed to have a stress test at that time and then things did not get scheduled and life got in the way.    Still reports some SOB with exertion. Had a very hard time with the treadmill portion.   Not doing any baby aspirin due to eye injections. Has h/o detached retina and doing injections for this.       Current Outpatient Medications Ordered in Epic   Medication Sig Dispense Refill    LORazepam (ATIVAN) 0.5 MG Tab TAKE 1 TABLET BY MOUTH EVERY 8 HOURS AS NEEDED FOR ANXIETY FOR 10 DAYS      thyroid (ARMOUR THYROID) 90 MG Tab TAKE 1 TABLET EVERY DAY 2 DAYS PER WEEK Strength: 90 mg 90 Tablet 0    fluticasone (FLOVENT HFA) 110 MCG/ACT Aerosol Inhale 2 Puffs 2 times a day. Use spacer. Rinse mouth after each use. 12 g 5    Fluticasone Propionate, Inhal, (FLOVENT DISKUS) 100 MCG/ACT AEROSOL POWDER, BREATH ACTIVATED Flovent Diskus  110mcg 2/day Strength: 100 MCG/ACT (Patient taking differently: 2 Puffs 2 (two) times a day. Flovent Diskus  110mcg 2/day Strength: 100 MCG/ACT) 3 Each 3    famotidine (PEPCID) 40 MG Tab Take 20 mg by mouth 2 times a day.      simvastatin (ZOCOR) 40 MG Tab Take 1 Tablet by mouth every day. (Patient taking differently: Take 40 mg by mouth every evening.) 90 Tablet 3    ARMOUR THYROID 60 MG Tab Take 1 Tablet by mouth every day. 5 days a week 90 Tablet 3    Omega-3 Fatty Acids (FISH OIL) 1000 MG Cap capsule Take 1,000 mg by mouth every day.      Lutein-Zeaxanthin 25-5 MG Cap Take  by mouth every day.      Aflibercept (EYLEA) 2 MG/0.05ML Solution by Intravitreal route Q30  "DAYS. For Macular Degeneration Every 5-6 Weeks      acetaminophen (TYLENOL) 500 MG Tab Take 1,000 mg by mouth every morning.      Calcium Carbonate-Vit D-Min (CALCIUM 1200 PO) Take 1 tablet by mouth every day.      Cyanocobalamin (VITAMIN B12 PO) Take 1,000 mcg by mouth every day.      Glucosamine-Chondroitin 750-600 MG Tab Take 1 tablet by mouth every day.      Turmeric 500 MG Tab Take 1 tablet by mouth 2 Times a Day.      vitamin D (CHOLECALCIFEROL) 1000 UNIT TABS Take 2,000 Units by mouth 2 times a day.      Magnesium 250 MG TABS Take 400 Tabs by mouth every day.      Bioflavonoid Products (LISSY-C) TABS Take 1,000 Tablets by mouth 2 times a day.      Multiple Vitamin (MULTIVITAMIN PO) Take 1 tablet by mouth every day.       No current [facility]-ordered facility-administered medications on file.         ROS:  Gen: no fevers/chills, no changes in weight  Eyes: no changes in vision  ENT: no sore throat, no hearing loss, no bloody nose  Pulm: no sob, no cough  CV: no chest pain, no palpitations  GI: no nausea/vomiting, no diarrhea  : no dysuria  MSk: no myalgias  Skin: no rash  Neuro: no headaches, no numbness/tingling  Heme/Lymph: no easy bruising      Objective:     Exam:  BP (!) 140/58 (BP Location: Left arm, Patient Position: Sitting, BP Cuff Size: Adult)   Pulse 96   Temp 36.1 °C (97 °F)   Resp 12   Ht 1.676 m (5' 6\")   Wt 75.3 kg (166 lb)   SpO2 95%   BMI 26.79 kg/m²  Body mass index is 26.79 kg/m².    Gen: Alert and oriented, No apparent distress.  Neck: Neck is supple without lymphadenopathy.  Lungs: Normal effort, CTA bilaterally, no wheezes, rhonchi, or rales  CV: Regular rate and rhythm. No murmurs, rubs, or gallops.  Ext: No clubbing, cyanosis, edema.      Assessment & Plan:     82 y.o. female with the following -     1. Other chest pain  Discussed results of stress testing.  I would really like her to be on a baby dose of aspirin but she is not supposed to be taking these things because of her eye " injections.  Asked her to discuss this with the eye providers tomorrow.  We did discuss risk and benefits of baby aspirin for cardiac prevention versus bleeding risks.  We will try to get her referred to cardiology as well.  She may need a cath if they think that this reversible defect is something more than artifact.  Again there was no evidence of ischemia on the initial treadmill stress.  - REFERRAL TO CARDIOLOGY    2. Primary insomnia  She uses this infrequently, possibly a couple times per week.  Refill sent in.  - LORazepam (ATIVAN) 0.5 MG Tab; TAKE 1 TABLET BY MOUTH EVERY 8 HOURS AS NEEDED FOR ANXIETY FOR 10 DAYS  Dispense: 30 Tablet; Refill: 0    3. H/O detached retina repair  Has eye injection tomorrow.  She will ask them about possible use of a baby aspirin for heart prevention.        No follow-ups on file.    Please note that this dictation was created using voice recognition software. I have made every reasonable attempt to correct obvious errors, but I expect that there are errors of grammar and possibly content that I did not discover before finalizing the note.

## 2023-04-19 ENCOUNTER — HOSPITAL ENCOUNTER (OUTPATIENT)
Dept: RADIOLOGY | Facility: MEDICAL CENTER | Age: 83
End: 2023-04-19
Attending: FAMILY MEDICINE
Payer: MEDICARE

## 2023-04-19 DIAGNOSIS — Z12.31 VISIT FOR SCREENING MAMMOGRAM: ICD-10-CM

## 2023-04-19 PROCEDURE — 77063 BREAST TOMOSYNTHESIS BI: CPT

## 2023-05-05 ENCOUNTER — APPOINTMENT (RX ONLY)
Dept: URBAN - METROPOLITAN AREA CLINIC 15 | Facility: CLINIC | Age: 83
Setting detail: DERMATOLOGY
End: 2023-05-05

## 2023-05-05 DIAGNOSIS — Z71.89 OTHER SPECIFIED COUNSELING: ICD-10-CM

## 2023-05-05 DIAGNOSIS — L82.1 OTHER SEBORRHEIC KERATOSIS: ICD-10-CM

## 2023-05-05 DIAGNOSIS — L81.4 OTHER MELANIN HYPERPIGMENTATION: ICD-10-CM

## 2023-05-05 DIAGNOSIS — D22 MELANOCYTIC NEVI: ICD-10-CM

## 2023-05-05 DIAGNOSIS — L73.8 OTHER SPECIFIED FOLLICULAR DISORDERS: ICD-10-CM

## 2023-05-05 DIAGNOSIS — Z85.828 PERSONAL HISTORY OF OTHER MALIGNANT NEOPLASM OF SKIN: ICD-10-CM | Status: STABLE

## 2023-05-05 DIAGNOSIS — D485 NEOPLASM OF UNCERTAIN BEHAVIOR OF SKIN: ICD-10-CM

## 2023-05-05 PROBLEM — D48.5 NEOPLASM OF UNCERTAIN BEHAVIOR OF SKIN: Status: ACTIVE | Noted: 2023-05-05

## 2023-05-05 PROBLEM — D22.5 MELANOCYTIC NEVI OF TRUNK: Status: ACTIVE | Noted: 2023-05-05

## 2023-05-05 PROCEDURE — ? BIOPSY BY SHAVE METHOD

## 2023-05-05 PROCEDURE — 11102 TANGNTL BX SKIN SINGLE LES: CPT

## 2023-05-05 PROCEDURE — ? PHOTO-DOCUMENTATION

## 2023-05-05 PROCEDURE — ? COUNSELING

## 2023-05-05 PROCEDURE — 99203 OFFICE O/P NEW LOW 30 MIN: CPT | Mod: 25

## 2023-05-05 ASSESSMENT — LOCATION DETAILED DESCRIPTION DERM
LOCATION DETAILED: LEFT INFERIOR FOREHEAD
LOCATION DETAILED: LEFT RADIAL DORSAL HAND
LOCATION DETAILED: RIGHT LATERAL EYEBROW
LOCATION DETAILED: STERNAL NOTCH
LOCATION DETAILED: LEFT ULNAR DORSAL HAND
LOCATION DETAILED: LEFT INFERIOR MEDIAL FOREHEAD
LOCATION DETAILED: SUPERIOR THORACIC SPINE
LOCATION DETAILED: RIGHT MEDIAL UPPER BACK
LOCATION DETAILED: RIGHT RADIAL DORSAL HAND
LOCATION DETAILED: LEFT LATERAL PROXIMAL CALF
LOCATION DETAILED: LEFT VENTRAL PROXIMAL FOREARM
LOCATION DETAILED: LEFT INFERIOR LATERAL NECK
LOCATION DETAILED: LEFT POSTERIOR ANKLE
LOCATION DETAILED: LEFT POSTERIOR LATERAL DISTAL THIGH
LOCATION DETAILED: RIGHT MEDIAL MALAR CHEEK
LOCATION DETAILED: RIGHT VENTRAL PROXIMAL FOREARM

## 2023-05-05 ASSESSMENT — LOCATION SIMPLE DESCRIPTION DERM
LOCATION SIMPLE: RIGHT HAND
LOCATION SIMPLE: LEFT FOREHEAD
LOCATION SIMPLE: UPPER BACK
LOCATION SIMPLE: RIGHT CHEEK
LOCATION SIMPLE: NECK
LOCATION SIMPLE: LEFT FOREARM
LOCATION SIMPLE: CHEST
LOCATION SIMPLE: RIGHT UPPER BACK
LOCATION SIMPLE: LEFT HAND
LOCATION SIMPLE: LEFT LOWER LEG
LOCATION SIMPLE: RIGHT EYEBROW
LOCATION SIMPLE: LEFT ANKLE
LOCATION SIMPLE: LEFT THIGH
LOCATION SIMPLE: RIGHT FOREARM

## 2023-05-05 ASSESSMENT — LOCATION ZONE DERM
LOCATION ZONE: HAND
LOCATION ZONE: FACE
LOCATION ZONE: NECK
LOCATION ZONE: ARM
LOCATION ZONE: LEG
LOCATION ZONE: TRUNK

## 2023-05-05 NOTE — PROCEDURE: BIOPSY BY SHAVE METHOD
Detail Level: Detailed
Depth Of Biopsy: dermis
Was A Bandage Applied: Yes
Size Of Lesion In Cm: 2
X Size Of Lesion In Cm: 0
Biopsy Type: H and E
Biopsy Method: Personna blade
Anesthesia Type: 0.05% lidocaine without epinephrine
Anesthesia Volume In Cc: 1
Hemostasis: Aluminum Chloride
Wound Care: Petrolatum
Dressing: pressure dressing with telfa
Destruction After The Procedure: No
Type Of Destruction Used: Curettage
Curettage Text: The wound bed was treated with curettage after the biopsy was performed.
Cryotherapy Text: The wound bed was treated with cryotherapy after the biopsy was performed.
Electrodesiccation Text: The wound bed was treated with electrodesiccation after the biopsy was performed.
Electrodesiccation And Curettage Text: The wound bed was treated with electrodesiccation and curettage after the biopsy was performed.
Silver Nitrate Text: The wound bed was treated with silver nitrate after the biopsy was performed.
Lab: [id number]
Lab Facility: 
Consent: Written consent was obtained and risks were reviewed including but not limited to scarring, infection, bleeding, scabbing, incomplete removal, nerve damage and allergy to anesthesia.
Post-Care Instructions: I reviewed with the patient in detail post-care instructions. Patient is to keep the biopsy site dry overnight. Gentle cleansing daily.  Apply petroleum ointment daily until healed. Patient may apply hydrogen peroxide soaks to remove any crusting.
Notification Instructions: Patient will be notified of biopsy results. However, patient instructed to call the office if not contacted within 2 weeks.
Billing Type: Third-Party Bill
Information: Selecting Yes will display possible errors in your note based on the variables you have selected. This validation is only offered as a suggestion for you. PLEASE NOTE THAT THE VALIDATION TEXT WILL BE REMOVED WHEN YOU FINALIZE YOUR NOTE. IF YOU WANT TO FAX A PRELIMINARY NOTE YOU WILL NEED TO TOGGLE THIS TO 'NO' IF YOU DO NOT WANT IT IN YOUR FAXED NOTE.

## 2023-05-05 NOTE — PROCEDURE: MIPS QUALITY
Quality 111:Pneumonia Vaccination Status For Older Adults: Patient received any pneumococcal conjugate or polysaccharide vaccine on or after their 60th birthday and before the end of the measurement period
Detail Level: Detailed
Quality 226: Preventive Care And Screening: Tobacco Use: Screening And Cessation Intervention: Patient screened for tobacco use and is an ex/non-smoker
Quality 130: Documentation Of Current Medications In The Medical Record: Current Medications Documented

## 2023-05-08 ENCOUNTER — OFFICE VISIT (OUTPATIENT)
Dept: CARDIOLOGY | Facility: MEDICAL CENTER | Age: 83
End: 2023-05-08
Payer: MEDICARE

## 2023-05-08 ENCOUNTER — TELEPHONE (OUTPATIENT)
Dept: CARDIOLOGY | Facility: MEDICAL CENTER | Age: 83
End: 2023-05-08

## 2023-05-08 VITALS
BODY MASS INDEX: 27.32 KG/M2 | OXYGEN SATURATION: 96 % | SYSTOLIC BLOOD PRESSURE: 128 MMHG | HEIGHT: 66 IN | DIASTOLIC BLOOD PRESSURE: 76 MMHG | HEART RATE: 85 BPM | RESPIRATION RATE: 16 BRPM | WEIGHT: 170 LBS

## 2023-05-08 DIAGNOSIS — R94.39 ABNORMAL STRESS TEST: ICD-10-CM

## 2023-05-08 DIAGNOSIS — E78.5 DYSLIPIDEMIA: ICD-10-CM

## 2023-05-08 DIAGNOSIS — R07.89 OTHER CHEST PAIN: ICD-10-CM

## 2023-05-08 DIAGNOSIS — Z87.891 FORMER SMOKER: ICD-10-CM

## 2023-05-08 PROCEDURE — 93000 ELECTROCARDIOGRAM COMPLETE: CPT | Performed by: INTERNAL MEDICINE

## 2023-05-08 PROCEDURE — 99214 OFFICE O/P EST MOD 30 MIN: CPT | Performed by: NURSE PRACTITIONER

## 2023-05-08 RX ORDER — NITROGLYCERIN 0.3 MG/1
TABLET SUBLINGUAL
COMMUNITY
Start: 2023-05-01 | End: 2023-05-09

## 2023-05-08 RX ORDER — LANOLIN ALCOHOL/MO/W.PET/CERES
CREAM (GRAM) TOPICAL
COMMUNITY
End: 2023-05-09

## 2023-05-08 ASSESSMENT — ENCOUNTER SYMPTOMS
PND: 0
COUGH: 0
DIZZINESS: 1
SHORTNESS OF BREATH: 0
MYALGIAS: 0
PALPITATIONS: 0
ORTHOPNEA: 0
CLAUDICATION: 0
ABDOMINAL PAIN: 0
NERVOUS/ANXIOUS: 1
FEVER: 0

## 2023-05-08 ASSESSMENT — PAIN SCALES - GENERAL: PAINLEVEL: 1=MINIMAL PAIN

## 2023-05-08 ASSESSMENT — FIBROSIS 4 INDEX: FIB4 SCORE: 1.5

## 2023-05-08 NOTE — PROGRESS NOTES
"Karli Berrios is a 82 y.o. female here for a visit with MEAGHAN today. Patient had a stress test on 4/10/2023. She had significant pain during the stress test, but they had her complete the stress test. Patient stated upon check in today that she is having chest pain and had some pain radiating to her arm. Patient brought back to room by this RN and EKG was obtained by LALIT Burgos. Patient does indicate that she has had increased stress. Patient states that pain comes and goes. Currently not having any arm pain, but is having \"waves of chest pain\". Reviewed EKG with MEAGHAN. Patient states that there is improvement of chest pain at this time. Worst pain today was a 5/10. Patient states currently pain is a 1/10. Exertion worsens her pain. Patient describes the pain as burning. Patient usually improves with rest. Vital Signs  "

## 2023-05-08 NOTE — TELEPHONE ENCOUNTER
Patient is scheduled on 5-24-23 for a C w/poss with Dr. Rowell. No meds to stop and patient to check in at 6:30 for an 8:30 procedure. H&P was done on 5-8-23 by Gill Hernandez. Pre admit to call patient.

## 2023-05-08 NOTE — PROGRESS NOTES
Chief Complaint   Patient presents with    Chest Pain       Subjective     Karli Berrios is a 82 y.o. female who presents today for follow-up on chest pain.    Previous patient of Dr. Milton and previously seen by Dr. Angel in 2021.    She comes in the office today reporting about 1 month ago she has developed chest pain with exertion.  She states she also is feeling tired, and had a chest pain episode at night last night.  She states she sat up and then the pain resolved.  She did have a stress test with her PCP and it was abnormal.  She was referred to our office for consultation.    She also is reporting dizziness and feels anxious over this.    She denies palpitations, orthopnea, PND, edema, shortness of breath.    Patient currently lives by herself.    Additionally, patient has the following medical problems:     -Hypothyroidism    -Arthritis    -Osteoporosis    -Macular degeneration with multiple surgeries    -Dyslipidemia    -Former smoker    Past Medical History:   Diagnosis Date    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), wet (HCC) 12/09/2022    OS    Anxiety     Arthritis     Asthma     Bronchitis     H/O Multiple Times    Cataract     IOL OU    Detached retina 12/09/2022    OS    Dry age-related macular degeneration of right eye 12/09/2022    GERD (gastroesophageal reflux disease)     High cholesterol     HTN (hypertension)     Hyperlipidemia     Hypothyroidism     Lung nodules 07/2009    stable 2010    Macular pucker of both eyes     Osteoporosis     Urinary tract infection     H/O     Past Surgical History:   Procedure Laterality Date    CATARACT EXTRACTION WITH IOL Bilateral 2007    CARPAL TUNNEL RELEASE  2006    ABDOMINAL EXPLORATION      peritonitis    ABDOMINAL HYSTERECTOMY TOTAL      APPENDECTOMY      EYE SURGERY Bilateral     Vitrectomy-OD 2008 & OS 2011    HEMORRHOIDECTOMY      OTHER      D & C    OTHER      FISTULECTOMY    OTHER      HYSTERECTOMY    OTHER      LEFTY BREAST LIPOMA  RESECTION    OTHER      H/O Oral Surgery-Dental Extractions & Implants.     Family History   Problem Relation Age of Onset    Arthritis Mother     Hyperlipidemia Mother     Glaucoma Maternal Grandfather      Social History     Socioeconomic History    Marital status:      Spouse name: Not on file    Number of children: Not on file    Years of education: Not on file    Highest education level: 12th grade   Occupational History    Not on file   Tobacco Use    Smoking status: Former     Packs/day: 1.00     Years: 20.00     Pack years: 20.00     Types: Cigarettes     Start date: 1957     Quit date: 1977     Years since quittin.9     Passive exposure: Past    Smokeless tobacco: Never   Vaping Use    Vaping Use: Never used   Substance and Sexual Activity    Alcohol use: Not Currently    Drug use: No    Sexual activity: Not Currently     Partners: Male   Other Topics Concern    Not on file   Social History Narrative    Not on file     Social Determinants of Health     Financial Resource Strain: Not on file   Food Insecurity: Not on file   Transportation Needs: Not on file   Physical Activity: Not on file   Stress: Not on file   Social Connections: Not on file   Intimate Partner Violence: Not on file   Housing Stability: Not on file     Allergies   Allergen Reactions    Atenolol Unspecified     Side effect. Leg Cramp  leg cramps  Side effect. Leg Cramp    Bactrim Ds Unspecified    Bactrim [Sulfamethoxazole W-Trimethoprim]      She states this was always an antibiotic that should be in her chart     Codeine Diarrhea     Pt stated on 2022    Denosumab Unspecified     Outpatient Encounter Medications as of 2023   Medication Sig Dispense Refill    Faricimab-svoa 6 MG/0.05ML Solution 6 mg by Intravitreal route.      vitamin E 200 UNIT capsule Take 200 Units by mouth every day.      aspirin EC (ECOTRIN) 81 MG Tablet Delayed Response Take 81 mg by mouth every day.      LORazepam (ATIVAN) 0.5 MG Tab  TAKE 1 TABLET BY MOUTH EVERY 8 HOURS AS NEEDED FOR ANXIETY FOR 10 DAYS (Patient taking differently: TAKE 0.5 TAB BY MOUTH EVERY 8 HOURS AS NEEDED FOR ANXIETY FOR 10 DAYS) 30 Tablet 0    thyroid (ARMOUR THYROID) 90 MG Tab TAKE 1 TABLET EVERY DAY 2 DAYS PER WEEK Strength: 90 mg 90 Tablet 0    fluticasone (FLOVENT HFA) 110 MCG/ACT Aerosol Inhale 2 Puffs 2 times a day. Use spacer. Rinse mouth after each use. 12 g 5    famotidine (PEPCID) 40 MG Tab Take 20 mg by mouth 2 times a day.      simvastatin (ZOCOR) 40 MG Tab Take 1 Tablet by mouth every day. (Patient taking differently: Take 40 mg by mouth every evening.) 90 Tablet 3    ARMOUR THYROID 60 MG Tab Take 1 Tablet by mouth every day. 5 days a week 90 Tablet 3    Omega-3 Fatty Acids (FISH OIL) 1000 MG Cap capsule Take 1,000 mg by mouth every day.      Lutein-Zeaxanthin 25-5 MG Cap Take  by mouth every day.      acetaminophen (TYLENOL) 500 MG Tab Take 1,000 mg by mouth every morning.      Calcium Carbonate-Vit D-Min (CALCIUM 1200 PO) Take 1 tablet by mouth every day.      Cyanocobalamin (VITAMIN B12 PO) Take 1,000 mcg by mouth every day.      Turmeric 500 MG Tab Take 1 tablet by mouth 2 Times a Day.      vitamin D (CHOLECALCIFEROL) 1000 UNIT TABS Take 2,000 Units by mouth 2 times a day.      Magnesium 250 MG TABS Take 400 Tabs by mouth every day.      Bioflavonoid Products (LISSY-C) TABS Take 1,000 Tablets by mouth 2 times a day.      Multiple Vitamin (MULTIVITAMIN PO) Take 1 tablet by mouth every day.      nitroGLYCERIN (NITROSTAT) 0.3 MG SL tablet PLACE 1 TABLET UNDER THE TONGUE ONCE DAILY AS NEEDED (Patient not taking: Reported on 5/8/2023)      glucosamine-chondroitin 500-400 MG tablet Take  by mouth.      [DISCONTINUED] Aflibercept (EYLEA) 2 MG/0.05ML Solution by Intravitreal route Q30 DAYS. For Macular Degeneration Every 5-6 Weeks (Patient not taking: Reported on 5/8/2023)      Glucosamine-Chondroitin 750-600 MG Tab Take 1 tablet by mouth every day.       No  "facility-administered encounter medications on file as of 5/8/2023.     Review of Systems   Constitutional:  Positive for malaise/fatigue. Negative for fever.   Respiratory:  Negative for cough and shortness of breath.    Cardiovascular:  Positive for chest pain. Negative for palpitations, orthopnea, claudication, leg swelling and PND.   Gastrointestinal:  Negative for abdominal pain.   Musculoskeletal:  Negative for myalgias.   Neurological:  Positive for dizziness.   Psychiatric/Behavioral:  The patient is nervous/anxious.    All other systems reviewed and are negative.           Objective     /76 (BP Location: Left arm, Patient Position: Sitting, BP Cuff Size: Adult)   Pulse 85   Resp 16   Ht 1.676 m (5' 6\")   Wt 77.1 kg (170 lb)   SpO2 96%   BMI 27.44 kg/m²     Physical Exam  Vitals reviewed.   Constitutional:       Appearance: She is well-developed.   HENT:      Head: Normocephalic and atraumatic.   Eyes:      Pupils: Pupils are equal, round, and reactive to light.   Neck:      Vascular: No JVD.   Cardiovascular:      Rate and Rhythm: Normal rate and regular rhythm.      Heart sounds: Normal heart sounds.   Pulmonary:      Effort: Pulmonary effort is normal. No respiratory distress.      Breath sounds: Normal breath sounds. No wheezing or rales.   Abdominal:      General: Bowel sounds are normal.      Palpations: Abdomen is soft.   Musculoskeletal:         General: Normal range of motion.      Cervical back: Normal range of motion and neck supple.      Right lower leg: No edema.      Left lower leg: No edema.   Skin:     General: Skin is warm and dry.   Neurological:      General: No focal deficit present.      Mental Status: She is alert and oriented to person, place, and time.   Psychiatric:         Behavior: Behavior normal.     Lab Results   Component Value Date/Time    CHOLSTRLTOT 158 11/08/2022 10:42 AM    LDL 78 11/08/2022 10:42 AM    HDL 66 11/08/2022 10:42 AM    TRIGLYCERIDE 71 11/08/2022 " 10:42 AM       Lab Results   Component Value Date/Time    SODIUM 133 (L) 11/08/2022 10:42 AM    POTASSIUM 4.0 11/08/2022 10:42 AM    CHLORIDE 96 11/08/2022 10:42 AM    CO2 26 11/08/2022 10:42 AM    GLUCOSE 81 11/08/2022 10:42 AM    BUN 20 11/08/2022 10:42 AM    CREATININE 0.65 11/08/2022 10:42 AM    BUNCREATRAT 16 10/14/2020 08:05 AM     Lab Results   Component Value Date/Time    ALKPHOSPHAT 90 11/08/2022 10:42 AM    ASTSGOT 26 11/08/2022 10:42 AM    ALTSGPT 22 11/08/2022 10:42 AM    TBILIRUBIN 0.5 11/08/2022 10:42 AM        Myocardial Perfusion Report 4/10/2023   NUCLEAR IMAGING INTERPRETATION   There is decreased perfusion of moderate severity from apical to mid septum, anterior and anteroseptal wall segments with mild reversible ischemia.  SDS    2.   Breast attenuation artifact noted.  True ischemia versus artifact.   Normal wall motion. LV ejection fraction = 71%.   ECG INTERPRETATION   Negative stress ECG for ischemia.   Impaired exercise tolerance of 6.0 METS.         Assessment & Plan     1. Other chest pain  EKG    CL-LEFT HEART CATHETERIZATION WITH POSSIBLE INTERVENTION      2. Abnormal stress test  CL-LEFT HEART CATHETERIZATION WITH POSSIBLE INTERVENTION      3. Dyslipidemia  CL-LEFT HEART CATHETERIZATION WITH POSSIBLE INTERVENTION      4. Former smoker  CL-LEFT HEART CATHETERIZATION WITH POSSIBLE INTERVENTION          Medical Decision Making: Today's Assessment/Status/Plan:        Chest pain with exertion:  -Sounds like angina  -EKG today shows sinus rhythm 83  -Discussed ER precautions  -Reviewed findings of stress test, would recommend coronary angiogram, risks and benefits discussed along with starting dual antiplatelet therapy.  -She is agreeable to proceed  -Continue aspirin 81 mg daily    FU in clinic in 1 month as scheduled with Dr. Westbrook to establish. Sooner if needed.    Patient verbalizes understanding and agrees with the plan of care.     PLEASE NOTE: This Note was created using voice  recognition Software. I have made every reasonable attempt to correct obvious errors, but I expect that there are errors of grammar and possibly content that I did not discover before finalizing the note

## 2023-05-09 ENCOUNTER — HOSPITAL ENCOUNTER (OUTPATIENT)
Facility: MEDICAL CENTER | Age: 83
End: 2023-05-11
Attending: EMERGENCY MEDICINE | Admitting: INTERNAL MEDICINE
Payer: MEDICARE

## 2023-05-09 ENCOUNTER — APPOINTMENT (OUTPATIENT)
Dept: RADIOLOGY | Facility: MEDICAL CENTER | Age: 83
End: 2023-05-09
Attending: EMERGENCY MEDICINE
Payer: MEDICARE

## 2023-05-09 DIAGNOSIS — I16.0 HYPERTENSIVE URGENCY: ICD-10-CM

## 2023-05-09 DIAGNOSIS — I20.0 UNSTABLE ANGINA (HCC): ICD-10-CM

## 2023-05-09 PROBLEM — R07.9 PAIN IN THE CHEST: Status: ACTIVE | Noted: 2023-05-09

## 2023-05-09 PROBLEM — R94.39 ABNORMAL CARDIOVASCULAR STRESS TEST: Status: ACTIVE | Noted: 2023-05-09

## 2023-05-09 LAB
ALBUMIN SERPL BCP-MCNC: 4.4 G/DL (ref 3.2–4.9)
ALBUMIN/GLOB SERPL: 1.5 G/DL
ALP SERPL-CCNC: 102 U/L (ref 30–99)
ALT SERPL-CCNC: 21 U/L (ref 2–50)
ANION GAP SERPL CALC-SCNC: 11 MMOL/L (ref 7–16)
AST SERPL-CCNC: 22 U/L (ref 12–45)
BASOPHILS # BLD AUTO: 0.8 % (ref 0–1.8)
BASOPHILS # BLD: 0.05 K/UL (ref 0–0.12)
BILIRUB SERPL-MCNC: 0.4 MG/DL (ref 0.1–1.5)
BUN SERPL-MCNC: 21 MG/DL (ref 8–22)
CALCIUM ALBUM COR SERPL-MCNC: 8.9 MG/DL (ref 8.5–10.5)
CALCIUM SERPL-MCNC: 9.2 MG/DL (ref 8.5–10.5)
CHLORIDE SERPL-SCNC: 99 MMOL/L (ref 96–112)
CHOLEST SERPL-MCNC: 157 MG/DL (ref 100–199)
CO2 SERPL-SCNC: 25 MMOL/L (ref 20–33)
CREAT SERPL-MCNC: 0.69 MG/DL (ref 0.5–1.4)
EKG IMPRESSION: NORMAL
EOSINOPHIL # BLD AUTO: 0.3 K/UL (ref 0–0.51)
EOSINOPHIL NFR BLD: 4.6 % (ref 0–6.9)
ERYTHROCYTE [DISTWIDTH] IN BLOOD BY AUTOMATED COUNT: 42.3 FL (ref 35.9–50)
EST. AVERAGE GLUCOSE BLD GHB EST-MCNC: 111 MG/DL
GFR SERPLBLD CREATININE-BSD FMLA CKD-EPI: 87 ML/MIN/1.73 M 2
GLOBULIN SER CALC-MCNC: 3 G/DL (ref 1.9–3.5)
GLUCOSE SERPL-MCNC: 94 MG/DL (ref 65–99)
HBA1C MFR BLD: 5.5 % (ref 4–5.6)
HCT VFR BLD AUTO: 47.1 % (ref 37–47)
HDLC SERPL-MCNC: 62 MG/DL
HGB BLD-MCNC: 15.5 G/DL (ref 12–16)
IMM GRANULOCYTES # BLD AUTO: 0.02 K/UL (ref 0–0.11)
IMM GRANULOCYTES NFR BLD AUTO: 0.3 % (ref 0–0.9)
LDLC SERPL CALC-MCNC: 78 MG/DL
LYMPHOCYTES # BLD AUTO: 1.7 K/UL (ref 1–4.8)
LYMPHOCYTES NFR BLD: 26.1 % (ref 22–41)
MAGNESIUM SERPL-MCNC: 2.1 MG/DL (ref 1.5–2.5)
MCH RBC QN AUTO: 30.6 PG (ref 27–33)
MCHC RBC AUTO-ENTMCNC: 32.9 G/DL (ref 33.6–35)
MCV RBC AUTO: 92.9 FL (ref 81.4–97.8)
MONOCYTES # BLD AUTO: 0.64 K/UL (ref 0–0.85)
MONOCYTES NFR BLD AUTO: 9.8 % (ref 0–13.4)
NEUTROPHILS # BLD AUTO: 3.8 K/UL (ref 2–7.15)
NEUTROPHILS NFR BLD: 58.4 % (ref 44–72)
NRBC # BLD AUTO: 0 K/UL
NRBC BLD-RTO: 0 /100 WBC
PLATELET # BLD AUTO: 264 K/UL (ref 164–446)
PMV BLD AUTO: 9.9 FL (ref 9–12.9)
POTASSIUM SERPL-SCNC: 4 MMOL/L (ref 3.6–5.5)
PROT SERPL-MCNC: 7.4 G/DL (ref 6–8.2)
RBC # BLD AUTO: 5.07 M/UL (ref 4.2–5.4)
SODIUM SERPL-SCNC: 135 MMOL/L (ref 135–145)
TRIGL SERPL-MCNC: 87 MG/DL (ref 0–149)
TROPONIN T SERPL-MCNC: 23 NG/L (ref 6–19)
TROPONIN T SERPL-MCNC: 25 NG/L (ref 6–19)
TROPONIN T SERPL-MCNC: 26 NG/L (ref 6–19)
WBC # BLD AUTO: 6.5 K/UL (ref 4.8–10.8)

## 2023-05-09 PROCEDURE — 93005 ELECTROCARDIOGRAM TRACING: CPT

## 2023-05-09 PROCEDURE — 99285 EMERGENCY DEPT VISIT HI MDM: CPT

## 2023-05-09 PROCEDURE — G0378 HOSPITAL OBSERVATION PER HR: HCPCS

## 2023-05-09 PROCEDURE — 80061 LIPID PANEL: CPT

## 2023-05-09 PROCEDURE — 83735 ASSAY OF MAGNESIUM: CPT

## 2023-05-09 PROCEDURE — 85025 COMPLETE CBC W/AUTO DIFF WBC: CPT

## 2023-05-09 PROCEDURE — 99223 1ST HOSP IP/OBS HIGH 75: CPT | Performed by: INTERNAL MEDICINE

## 2023-05-09 PROCEDURE — 700102 HCHG RX REV CODE 250 W/ 637 OVERRIDE(OP): Performed by: INTERNAL MEDICINE

## 2023-05-09 PROCEDURE — 71045 X-RAY EXAM CHEST 1 VIEW: CPT

## 2023-05-09 PROCEDURE — 93005 ELECTROCARDIOGRAM TRACING: CPT | Performed by: INTERNAL MEDICINE

## 2023-05-09 PROCEDURE — 36415 COLL VENOUS BLD VENIPUNCTURE: CPT

## 2023-05-09 PROCEDURE — 93010 ELECTROCARDIOGRAM REPORT: CPT | Performed by: INTERNAL MEDICINE

## 2023-05-09 PROCEDURE — A9270 NON-COVERED ITEM OR SERVICE: HCPCS | Performed by: INTERNAL MEDICINE

## 2023-05-09 PROCEDURE — 99222 1ST HOSP IP/OBS MODERATE 55: CPT | Performed by: INTERNAL MEDICINE

## 2023-05-09 PROCEDURE — 83036 HEMOGLOBIN GLYCOSYLATED A1C: CPT

## 2023-05-09 PROCEDURE — 93005 ELECTROCARDIOGRAM TRACING: CPT | Performed by: EMERGENCY MEDICINE

## 2023-05-09 PROCEDURE — 84484 ASSAY OF TROPONIN QUANT: CPT

## 2023-05-09 PROCEDURE — 80053 COMPREHEN METABOLIC PANEL: CPT

## 2023-05-09 RX ORDER — AMLODIPINE BESYLATE 5 MG/1
5 TABLET ORAL
Status: DISCONTINUED | OUTPATIENT
Start: 2023-05-09 | End: 2023-05-09

## 2023-05-09 RX ORDER — HYDRALAZINE HYDROCHLORIDE 20 MG/ML
10 INJECTION INTRAMUSCULAR; INTRAVENOUS EVERY 4 HOURS PRN
Status: DISCONTINUED | OUTPATIENT
Start: 2023-05-09 | End: 2023-05-11 | Stop reason: HOSPADM

## 2023-05-09 RX ORDER — THYROID 30 MG/1
60 TABLET ORAL DAILY
Status: DISCONTINUED | OUTPATIENT
Start: 2023-05-09 | End: 2023-05-11 | Stop reason: HOSPADM

## 2023-05-09 RX ORDER — ALUMINA, MAGNESIA, AND SIMETHICONE 2400; 2400; 240 MG/30ML; MG/30ML; MG/30ML
30 SUSPENSION ORAL EVERY 4 HOURS PRN
Status: DISCONTINUED | OUTPATIENT
Start: 2023-05-09 | End: 2023-05-11 | Stop reason: HOSPADM

## 2023-05-09 RX ORDER — FAMOTIDINE 20 MG/1
20 TABLET, FILM COATED ORAL 2 TIMES DAILY
Status: DISCONTINUED | OUTPATIENT
Start: 2023-05-09 | End: 2023-05-11 | Stop reason: HOSPADM

## 2023-05-09 RX ORDER — LORAZEPAM 0.5 MG/1
.25-.5 TABLET ORAL NIGHTLY PRN
COMMUNITY
End: 2023-07-14 | Stop reason: SDUPTHER

## 2023-05-09 RX ORDER — BISACODYL 10 MG
10 SUPPOSITORY, RECTAL RECTAL
Status: DISCONTINUED | OUTPATIENT
Start: 2023-05-09 | End: 2023-05-11 | Stop reason: HOSPADM

## 2023-05-09 RX ORDER — ONDANSETRON 2 MG/ML
4 INJECTION INTRAMUSCULAR; INTRAVENOUS EVERY 4 HOURS PRN
Status: DISCONTINUED | OUTPATIENT
Start: 2023-05-09 | End: 2023-05-11 | Stop reason: HOSPADM

## 2023-05-09 RX ORDER — FARICIMAB 6 MG/.05ML
6 INJECTION, SOLUTION INTRAVITREAL SEE ADMIN INSTRUCTIONS
COMMUNITY

## 2023-05-09 RX ORDER — LOSARTAN POTASSIUM 50 MG/1
100 TABLET ORAL
Status: DISCONTINUED | OUTPATIENT
Start: 2023-05-10 | End: 2023-05-11 | Stop reason: HOSPADM

## 2023-05-09 RX ORDER — OMEPRAZOLE 20 MG/1
20 CAPSULE, DELAYED RELEASE ORAL
COMMUNITY
End: 2023-10-10

## 2023-05-09 RX ORDER — ALBUTEROL SULFATE 90 UG/1
2 AEROSOL, METERED RESPIRATORY (INHALATION)
Status: DISCONTINUED | OUTPATIENT
Start: 2023-05-09 | End: 2023-05-11 | Stop reason: HOSPADM

## 2023-05-09 RX ORDER — AMLODIPINE BESYLATE 10 MG/1
10 TABLET ORAL
Status: DISCONTINUED | OUTPATIENT
Start: 2023-05-10 | End: 2023-05-11 | Stop reason: HOSPADM

## 2023-05-09 RX ORDER — POLYETHYLENE GLYCOL 3350 17 G/17G
1 POWDER, FOR SOLUTION ORAL
Status: DISCONTINUED | OUTPATIENT
Start: 2023-05-09 | End: 2023-05-11 | Stop reason: HOSPADM

## 2023-05-09 RX ORDER — ENOXAPARIN SODIUM 100 MG/ML
40 INJECTION SUBCUTANEOUS DAILY
Status: DISCONTINUED | OUTPATIENT
Start: 2023-05-09 | End: 2023-05-10

## 2023-05-09 RX ORDER — ACETAMINOPHEN 325 MG/1
650 TABLET ORAL EVERY 6 HOURS PRN
Status: DISCONTINUED | OUTPATIENT
Start: 2023-05-09 | End: 2023-05-11 | Stop reason: HOSPADM

## 2023-05-09 RX ORDER — NITROGLYCERIN 0.4 MG/1
0.4 TABLET SUBLINGUAL
Status: DISCONTINUED | OUTPATIENT
Start: 2023-05-09 | End: 2023-05-11 | Stop reason: HOSPADM

## 2023-05-09 RX ORDER — LISINOPRIL 10 MG/1
10 TABLET ORAL
Status: DISCONTINUED | OUTPATIENT
Start: 2023-05-09 | End: 2023-05-09

## 2023-05-09 RX ORDER — LORAZEPAM 1 MG/1
0.5 TABLET ORAL NIGHTLY PRN
Status: DISCONTINUED | OUTPATIENT
Start: 2023-05-09 | End: 2023-05-11 | Stop reason: HOSPADM

## 2023-05-09 RX ORDER — ONDANSETRON 4 MG/1
4 TABLET, ORALLY DISINTEGRATING ORAL EVERY 4 HOURS PRN
Status: DISCONTINUED | OUTPATIENT
Start: 2023-05-09 | End: 2023-05-11 | Stop reason: HOSPADM

## 2023-05-09 RX ORDER — AMOXICILLIN 250 MG
2 CAPSULE ORAL 2 TIMES DAILY
Status: DISCONTINUED | OUTPATIENT
Start: 2023-05-09 | End: 2023-05-11 | Stop reason: HOSPADM

## 2023-05-09 RX ORDER — METOPROLOL TARTRATE 1 MG/ML
5 INJECTION, SOLUTION INTRAVENOUS ONCE
Status: ACTIVE | OUTPATIENT
Start: 2023-05-09 | End: 2023-05-10

## 2023-05-09 RX ORDER — MORPHINE SULFATE 4 MG/ML
1-2 INJECTION INTRAVENOUS
Status: DISCONTINUED | OUTPATIENT
Start: 2023-05-09 | End: 2023-05-11 | Stop reason: HOSPADM

## 2023-05-09 RX ORDER — ATORVASTATIN CALCIUM 80 MG/1
80 TABLET, FILM COATED ORAL
Status: DISCONTINUED | OUTPATIENT
Start: 2023-05-09 | End: 2023-05-11 | Stop reason: HOSPADM

## 2023-05-09 RX ORDER — FLUTICASONE PROPIONATE 110 UG/1
2 AEROSOL, METERED RESPIRATORY (INHALATION) 2 TIMES DAILY
Status: DISCONTINUED | OUTPATIENT
Start: 2023-05-09 | End: 2023-05-11 | Stop reason: HOSPADM

## 2023-05-09 RX ADMIN — METOPROLOL TARTRATE 50 MG: 25 TABLET, FILM COATED ORAL at 14:51

## 2023-05-09 RX ADMIN — LORAZEPAM 0.5 MG: 1 TABLET ORAL at 20:26

## 2023-05-09 RX ADMIN — AMLODIPINE BESYLATE 5 MG: 5 TABLET ORAL at 12:49

## 2023-05-09 RX ADMIN — FLUTICASONE PROPIONATE 220 MCG: 110 AEROSOL, METERED RESPIRATORY (INHALATION) at 17:18

## 2023-05-09 RX ADMIN — ASPIRIN 81 MG: 81 TABLET, COATED ORAL at 12:49

## 2023-05-09 RX ADMIN — ATORVASTATIN CALCIUM 80 MG: 80 TABLET, FILM COATED ORAL at 20:23

## 2023-05-09 RX ADMIN — FAMOTIDINE 20 MG: 20 TABLET, FILM COATED ORAL at 17:18

## 2023-05-09 RX ADMIN — THYROID, PORCINE 60 MG: 30 TABLET ORAL at 16:07

## 2023-05-09 RX ADMIN — LISINOPRIL 10 MG: 10 TABLET ORAL at 12:51

## 2023-05-09 ASSESSMENT — PATIENT HEALTH QUESTIONNAIRE - PHQ9
1. LITTLE INTEREST OR PLEASURE IN DOING THINGS: NOT AT ALL
1. LITTLE INTEREST OR PLEASURE IN DOING THINGS: NOT AT ALL
2. FEELING DOWN, DEPRESSED, IRRITABLE, OR HOPELESS: NOT AT ALL
2. FEELING DOWN, DEPRESSED, IRRITABLE, OR HOPELESS: NOT AT ALL
SUM OF ALL RESPONSES TO PHQ9 QUESTIONS 1 AND 2: 0
SUM OF ALL RESPONSES TO PHQ9 QUESTIONS 1 AND 2: 0

## 2023-05-09 ASSESSMENT — LIFESTYLE VARIABLES
EVER FELT BAD OR GUILTY ABOUT YOUR DRINKING: NO
HAVE PEOPLE ANNOYED YOU BY CRITICIZING YOUR DRINKING: NO
DOES PATIENT WANT TO STOP DRINKING: NO
TOTAL SCORE: 0
HAVE YOU EVER FELT YOU SHOULD CUT DOWN ON YOUR DRINKING: NO
HOW MANY TIMES IN THE PAST YEAR HAVE YOU HAD 5 OR MORE DRINKS IN A DAY: 0
CONSUMPTION TOTAL: NEGATIVE
ALCOHOL_USE: YES
AVERAGE NUMBER OF DAYS PER WEEK YOU HAVE A DRINK CONTAINING ALCOHOL: 1
TOTAL SCORE: 0
TOTAL SCORE: 0
EVER HAD A DRINK FIRST THING IN THE MORNING TO STEADY YOUR NERVES TO GET RID OF A HANGOVER: NO
ON A TYPICAL DAY WHEN YOU DRINK ALCOHOL HOW MANY DRINKS DO YOU HAVE: 1

## 2023-05-09 ASSESSMENT — COPD QUESTIONNAIRES
DO YOU EVER COUGH UP ANY MUCUS OR PHLEGM?: NO/ONLY WITH OCCASIONAL COLDS OR INFECTIONS
HAVE YOU SMOKED AT LEAST 100 CIGARETTES IN YOUR ENTIRE LIFE: YES

## 2023-05-09 ASSESSMENT — NEW YORK HEART ASSOCIATION (NYHA) CLASSIFICATION: NYHA FUNCTIONAL CLASS III: 1

## 2023-05-09 ASSESSMENT — FIBROSIS 4 INDEX
FIB4 SCORE: 1.454785934906615875
FIB4 SCORE: 1.5

## 2023-05-09 NOTE — CARE PLAN
The patient is Stable - Low risk of patient condition declining or worsening    Shift Goals  Clinical Goals: echocardiogram, monitor tele and blood pressure  Patient Goals: rest  Family Goals: n/a    Progress made toward(s) clinical / shift goals:    Problem: Knowledge Deficit - Standard  Goal: Patient and family/care givers will demonstrate understanding of plan of care, disease process/condition, diagnostic tests and medications  Outcome: Progressing     Problem: Pain - Standard  Goal: Alleviation of pain or a reduction in pain to the patient’s comfort goal  Outcome: Progressing

## 2023-05-09 NOTE — PROGRESS NOTES
Pt arrived to unit via wheelchair. Pt ambulated from wheelchair to bed with steady gait. Oriented pt to call light and room.

## 2023-05-09 NOTE — CONSULTS
Cardiology Consult Note:    Javad Abreu M.D.  Date & Time note created:    5/9/2023   11:24 AM     Referring MD:  Dr. Middleton    Patient ID:   Name:             Karli Berrios   YOB: 1940  Age:                 82 y.o.  female   MRN:               [id number]                                                             Chief Complaint / Reason for consult:  Chest pain.      History of Present Illness:    This is an 82 years old woman without prior cardiac problems presented to the ER because of progressively worsening chest pain with exertion.  Patient has been having chest pain for several weeks.  She did have a recent nuclear stress test which showed minor coronary ischemia with SDS of 2 at the anterior and anterior septal wall of the left ventricle.  She came in today because she had a severe chest pain episode last night while she was walking up an incline.  No symptom at rest otherwise.    I personally interpreted all her blood test result which showed negative troponin T, LDL of 78, triglyceride of 87, GFR of 87, potassium of 4.0.    I have personally interpreted EKG today with patient, there is no evidence of acute coronary syndrome, no evidence of prior infarct, normal IN and QT interval, no significant conduction disease. Sinus rhythm.    Review of Systems:      Constitutional: Denies fevers, Denies weight changes  Eyes: Denies changes in vision, no eye pain  Ears/Nose/Throat/Mouth: Denies nasal congestion or sore throat   Cardiovascular: no chest pain, no palpitations   Respiratory: no shortness of breath , Denies cough  Gastrointestinal/Hepatic: Denies abdominal pain, nausea, vomiting, diarrhea, constipation or GI bleeding   Genitourinary: Denies dysuria or frequency  Musculoskeletal/Rheum: Denies  joint pain and swelling   Skin: Denies rash  Neurological: Denies headache, confusion, memory loss or focal weakness/parasthesias  Psychiatric: denies mood disorder   Endocrine: Margy  thyroid problems  Heme/Oncology/Lymph Nodes: Denies enlarged lymph nodes, denies brusing or known bleeding disorder  All other systems were reviewed and are negative (AMA/CMS criteria)                Past Medical History:   Past Medical History:   Diagnosis Date    Abnormal cardiovascular stress test 5/9/2023    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), wet (HCC) 12/09/2022    OS    Anxiety     Arthritis     Asthma     Bronchitis     H/O Multiple Times    Cataract     IOL OU    Chronic kidney disease     Detached retina 12/09/2022    OS    Dry age-related macular degeneration of right eye 12/09/2022    GERD (gastroesophageal reflux disease)     High cholesterol     HTN (hypertension)     Hyperlipidemia     Hypertensive urgency, 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    Hypothyroidism     Lung nodules 07/2009    stable 2010    Macular pucker of both eyes     Osteoporosis     Pain in the chest 5/9/2023    Strain of left trapezius muscle     Urinary tract infection     H/O     Active Hospital Problems    Diagnosis     Pain in the chest [R07.9]     Hypertensive urgency, malignant [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       Hospital Medications:  No current facility-administered medications for this encounter.    Current Outpatient Medications:     Faricimab-svoa 6 MG/0.05ML Solution, 6 mg by Intravitreal route., Disp: , Rfl:     nitroGLYCERIN (NITROSTAT) 0.3 MG SL tablet, PLACE 1 TABLET UNDER THE  TONGUE ONCE DAILY AS NEEDED (Patient not taking: Reported on 5/8/2023), Disp: , Rfl:     vitamin E 200 UNIT capsule, Take 200 Units by mouth every day., Disp: , Rfl:     glucosamine-chondroitin 500-400 MG tablet, Take  by mouth., Disp: , Rfl:     aspirin EC (ECOTRIN) 81 MG Tablet Delayed Response, Take 81 mg by mouth every day., Disp: , Rfl:     LORazepam (ATIVAN) 0.5 MG Tab, TAKE 1 TABLET BY MOUTH EVERY 8 HOURS AS NEEDED FOR ANXIETY FOR 10 DAYS (Patient taking differently: TAKE 0.5 TAB BY MOUTH EVERY 8 HOURS AS NEEDED FOR ANXIETY FOR 10 DAYS), Disp: 30 Tablet, Rfl: 0    thyroid (ARMOUR THYROID) 90 MG Tab, TAKE 1 TABLET EVERY DAY 2 DAYS PER WEEK Strength: 90 mg, Disp: 90 Tablet, Rfl: 0    fluticasone (FLOVENT HFA) 110 MCG/ACT Aerosol, Inhale 2 Puffs 2 times a day. Use spacer. Rinse mouth after each use., Disp: 12 g, Rfl: 5    famotidine (PEPCID) 40 MG Tab, Take 20 mg by mouth 2 times a day., Disp: , Rfl:     simvastatin (ZOCOR) 40 MG Tab, Take 1 Tablet by mouth every day. (Patient taking differently: Take 40 mg by mouth every evening.), Disp: 90 Tablet, Rfl: 3    ARMOUR THYROID 60 MG Tab, Take 1 Tablet by mouth every day. 5 days a week, Disp: 90 Tablet, Rfl: 3    Omega-3 Fatty Acids (FISH OIL) 1000 MG Cap capsule, Take 1,000 mg by mouth every day., Disp: , Rfl:     Lutein-Zeaxanthin 25-5 MG Cap, Take  by mouth every day., Disp: , Rfl:     acetaminophen (TYLENOL) 500 MG Tab, Take 1,000 mg by mouth every morning., Disp: , Rfl:     Calcium Carbonate-Vit D-Min (CALCIUM 1200 PO), Take 1 tablet by mouth every day., Disp: , Rfl:     Cyanocobalamin (VITAMIN B12 PO), Take 1,000 mcg by mouth every day., Disp: , Rfl:     Glucosamine-Chondroitin 750-600 MG Tab, Take 1 tablet by mouth every day., Disp: , Rfl:     Turmeric 500 MG Tab, Take 1 tablet by mouth 2 Times a Day., Disp: , Rfl:     vitamin D (CHOLECALCIFEROL) 1000 UNIT TABS, Take 2,000 Units by mouth 2 times a day., Disp: , Rfl:     Magnesium 250 MG TABS, Take 400 Tabs by  mouth every day., Disp: , Rfl:     Bioflavonoid Products (LISSY-C) TABS, Take 1,000 Tablets by mouth 2 times a day., Disp: , Rfl:     Multiple Vitamin (MULTIVITAMIN PO), Take 1 tablet by mouth every day., Disp: , Rfl:     Current Outpatient Medications:  (Not in a hospital admission)      Medication Allergy:  Allergies   Allergen Reactions    Atenolol Unspecified     Side effect. Leg Cramp  leg cramps  Side effect. Leg Cramp    Bactrim Ds Unspecified    Bactrim [Sulfamethoxazole W-Trimethoprim]      She states this was always an antibiotic that should be in her chart     Codeine Diarrhea     Pt stated on 2022    Denosumab Unspecified       Family History:  Family History   Problem Relation Age of Onset    Arthritis Mother     Hyperlipidemia Mother     Glaucoma Maternal Grandfather        Social History:  Social History     Socioeconomic History    Marital status:      Spouse name: Not on file    Number of children: Not on file    Years of education: Not on file    Highest education level: 12th grade   Occupational History    Not on file   Tobacco Use    Smoking status: Former     Packs/day: 1.00     Years: 20.00     Pack years: 20.00     Types: Cigarettes     Start date: 1957     Quit date: 1977     Years since quittin.9     Passive exposure: Past    Smokeless tobacco: Never   Vaping Use    Vaping Use: Never used   Substance and Sexual Activity    Alcohol use: Not Currently    Drug use: No    Sexual activity: Not Currently     Partners: Male   Other Topics Concern    Not on file   Social History Narrative    Not on file     Social Determinants of Health     Financial Resource Strain: Not on file   Food Insecurity: Not on file   Transportation Needs: Not on file   Physical Activity: Not on file   Stress: Not on file   Social Connections: Not on file   Intimate Partner Violence: Not on file   Housing Stability: Not on file         Physical Exam:  Vitals/ General Appearance:   Weight/BMI:  "Body mass index is 26.58 kg/m².  BP (!) 172/93   Pulse 85   Temp 36.3 °C (97.4 °F) (Temporal)   Resp 16   Ht 1.676 m (5' 6\")   Wt 74.7 kg (164 lb 11.2 oz)   SpO2 97%   Vitals:    23 0837 23 0857   BP: (!) 172/93    Pulse: 85    Resp: 16    Temp: 36.3 °C (97.4 °F)    TempSrc: Temporal    SpO2: 97%    Weight:  74.7 kg (164 lb 11.2 oz)   Height:  1.676 m (5' 6\")     Oxygen Therapy:  Pulse Oximetry: 97 %, O2 (LPM): 0    Constitutional:   No acute distress  HENMT:  Normocephalic, Atraumatic.  Eyes:  EOMI, No discharge.  Neck:  no JVD.  Cardiovascular:  Normal heart rate, Normal rhythm.  Extremitites with intact distal pulses, no cyanosis, or edema.  Lungs:  No respiratory distress.  Abdomen: Soft, No tenderness, No guarding, No rebound.  Skin: No significant rash.  Neurologic: Alert & oriented x 3, No focal deficits noted, cranial nerves II through X are intact.  Psychiatric: Affect normal, Judgment normal, Mood normal.      MDM (Data Review):     Records reviewed and summarized in current documentation    Lab Data Review:  Recent Results (from the past 24 hour(s))   EKG    Collection Time: 23 12:50 PM   Result Value Ref Range    Report       [facility]    Test Date:  2023  Pt Name:    NANCY MARINA                Department: [facility]  MRN:        [id number]                      Room:  Gender:     Female                       Technician: MS  :        1940                   Requested By:JENNIFER YANG  Order #:    [id number]                    Reading MD:    Measurements  Intervals                                Axis  Rate:       83                           P:          67  MD:         186                          QRS:        -1  QRSD:       87                           T:          70  QT:         352  QTc:        415    Interpretive Statements  Sinus rhythm  Low voltage, precordial leads  No previous ECG available for comparison     EKG    Collection Time: 23  8:40 AM "   Result Value Ref Range    Report       [facility]    Test Date:  2023  Pt Name:    NANCY MARINA                Department: ER  MRN:        [id number]                      Room:  Gender:     Female                       Technician: [id number]  :        [id number]                   Requested By:ER TRIAGE PROTOCOL  Order #:    [id number]                    Reading MD: ALBINO RAMIREZ MD    Measurements  Intervals                                Axis  Rate:       79                           P:          52  MT:         203                          QRS:        -2  QRSD:       94                           T:          46  QT:         369  QTc:        424    Interpretive Statements  Sinus rhythm  Low voltage, precordial leads  Compared to ECG 2023 12:50:21  No significant changes  Electronically Signed On 2023 9:43:34 PDT by ALBINO RAMIREZ MD     CBC with Differential    Collection Time: 23  9:11 AM   Result Value Ref Range    WBC 6.5 4.8 - 10.8 K/uL    RBC 5.07 4.20 - 5.40 M/uL    Hemoglobin 15.5 12.0 - 16.0 g/dL    Hematocrit 47.1 (H) 37.0 - 47.0 %    MCV 92.9 81.4 - 97.8 fL    MCH 30.6 27.0 - 33.0 pg    MCHC 32.9 (L) 33.6 - 35.0 g/dL    RDW 42.3 35.9 - 50.0 fL    Platelet Count 264 164 - 446 K/uL    MPV 9.9 9.0 - 12.9 fL    Neutrophils-Polys 58.40 44.00 - 72.00 %    Lymphocytes 26.10 22.00 - 41.00 %    Monocytes 9.80 0.00 - 13.40 %    Eosinophils 4.60 0.00 - 6.90 %    Basophils 0.80 0.00 - 1.80 %    Immature Granulocytes 0.30 0.00 - 0.90 %    Nucleated RBC 0.00 /100 WBC    Neutrophils (Absolute) 3.80 2.00 - 7.15 K/uL    Lymphs (Absolute) 1.70 1.00 - 4.80 K/uL    Monos (Absolute) 0.64 0.00 - 0.85 K/uL    Eos (Absolute) 0.30 0.00 - 0.51 K/uL    Baso (Absolute) 0.05 0.00 - 0.12 K/uL    Immature Granulocytes (abs) 0.02 0.00 - 0.11 K/uL    NRBC (Absolute) 0.00 K/uL   Complete Metabolic Panel (CMP)    Collection Time: 23  9:11 AM   Result Value Ref Range    Sodium 135 135 -  145 mmol/L    Potassium 4.0 3.6 - 5.5 mmol/L    Chloride 99 96 - 112 mmol/L    Co2 25 20 - 33 mmol/L    Anion Gap 11.0 7.0 - 16.0    Glucose 94 65 - 99 mg/dL    Bun 21 8 - 22 mg/dL    Creatinine 0.69 0.50 - 1.40 mg/dL    Calcium 9.2 8.5 - 10.5 mg/dL    AST(SGOT) 22 12 - 45 U/L    ALT(SGPT) 21 2 - 50 U/L    Alkaline Phosphatase 102 (H) 30 - 99 U/L    Total Bilirubin 0.4 0.1 - 1.5 mg/dL    Albumin 4.4 3.2 - 4.9 g/dL    Total Protein 7.4 6.0 - 8.2 g/dL    Globulin 3.0 1.9 - 3.5 g/dL    A-G Ratio 1.5 g/dL   Troponins NOW    Collection Time: 05/09/23  9:11 AM   Result Value Ref Range    Troponin T 26 (H) 6 - 19 ng/L   CORRECTED CALCIUM    Collection Time: 05/09/23  9:11 AM   Result Value Ref Range    Correct Calcium 8.9 8.5 - 10.5 mg/dL   ESTIMATED GFR    Collection Time: 05/09/23  9:11 AM   Result Value Ref Range    GFR (CKD-EPI) 87 >60 mL/min/1.73 m 2       Imaging/Procedures Review:    Chest Xray:  Reviewed    EKG:   As in HPI.     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  We will not pursue urgent or emergent coronary angiogram.  We will need to control her blood pressure better.  We will start metoprolol 50 mg p.o. twice a day.  We will get a transthoracic echocardiogram to assess for cardiac function and valvular function.  Once blood pressure is well controlled, we will consider further invasive cardiac work-up.  No indication for heparin drip at this time.      Thank you for referring this patient to our cardiology service.  We will follow patient with you.      Javad Abreu MD.   Cardiology Inpatient Service.  [facility].  [phone].  [place].

## 2023-05-09 NOTE — ASSESSMENT & PLAN NOTE
- Better controlled.  -Continue Norvasc and Lipitor.  Pending echocardiogram.  -Monitor blood pressure trend closely.  Optimize blood pressure control.

## 2023-05-09 NOTE — ASSESSMENT & PLAN NOTE
The ASCVD Risk score (Patsy GUILLEN, et al., 2019) failed to calculate for the following reasons:    The 2019 ASCVD risk score is only valid for ages 40 to 79    -With symptoms of exertional chest pain, which has become more frequent, and now occurring at rest.  Outpatient cardiac stress test showed reversible ischemia.  Troponins remain flat.  -Plan for [facility] this afternoon.  Continue baby aspirin, and statin.  -Continue to optimize blood pressure control.  -Continue as needed sublingual nitroglycerin, and morphine for pain recurrence.   -Monitor on telemetry.

## 2023-05-09 NOTE — ED PROVIDER NOTES
ED Provider Note    CHIEF COMPLAINT  Chief Complaint   Patient presents with    Chest Pain     For weeks, seen at cardiology yesterday. They want her to get an angiogram due to a blockage seen on stress test one month ago. She has been having intermittent chest pain, midsternal radiating to shoulders.        EXTERNAL RECORDS REVIEWED  Outpatient Notes Seen at [facility] [place] yesterday    HPI/ROS  LIMITATION TO HISTORY   Select: : None  OUTSIDE HISTORIAN(S):  Family      Karli Berrios is a 82 y.o. female who presents with chest pain radiating to bilateral shoulders.  No associated diaphoresis or vomiting.  Woke her up from sleep last night at around midnight.  Lasted about 15 minutes and subsided without any interventions.  She was just seen by cardiology office yesterday by nurse practitioner.  Was scheduled for an outpatient angiogram to be done later this month.  She had an abnormal recent stress test ordered by her primary care provider for recurrent exertional chest pain symptoms that have become more frequent and more severe.  She states that recently, while walking her dog for about 15 minutes, she had severe burning in her chest that resolved with rest.  She notes that she has had reproducible chest pain with exertion since around February 2022.  No known cardiovascular disease prior to this.  She notes that she lives alone and is concerned with regards to this recurrent chest pain and a possible impending heart attack.    N.p.o. since last night.  Does not take nitro.    NM stress test 4/10/2023:  Myocardial Perfusion   Report   NUCLEAR IMAGING INTERPRETATION   There is decreased perfusion of moderate severity from apical to mid septum, anterior and anteroseptal wall segments with mild reversible ischemia.  SDS  2.   Breast attenuation artifact noted.  True ischemia versus artifact.   Normal wall motion. LV ejection fraction = 71%.   ECG INTERPRETATION   Negative stress ECG for ischemia.   Impaired  exercise tolerance of 6.0 METS.       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    SURGICAL HISTORY   has a past surgical history that includes other; hemorrhoidectomy; other; other; appendectomy; other; carpal tunnel release (); cataract extraction with iol (Bilateral, ); abdominal exploration; eye surgery (Bilateral); abdominal hysterectomy total; and other.    FAMILY HISTORY  Family History   Problem Relation Age of Onset    Arthritis Mother     Hyperlipidemia Mother     Glaucoma Maternal Grandfather        SOCIAL HISTORY  Social History     Tobacco Use    Smoking status: Former     Packs/day: 1.00     Years: 20.00     Pack years: 20.00     Types: Cigarettes     Start date: 1957     Quit date: 1977     Years since quittin.9     Passive exposure: Past    Smokeless tobacco: Never   Vaping Use    Vaping Use: Never used   Substance and Sexual Activity    Alcohol use: Not Currently    Drug use: No    Sexual activity: Not Currently     Partners: Male       CURRENT MEDICATIONS  Home Medications    **Home medications have not yet been reviewed for this encounter**         ALLERGIES  Allergies   Allergen Reactions    Atenolol Unspecified     Side effect. Leg Cramp  leg cramps  Side effect. Leg Cramp    Bactrim Ds Unspecified    Bactrim [Sulfamethoxazole W-Trimethoprim]      She states this was always an antibiotic that should be in her chart     Codeine Diarrhea     Pt stated on 2022    Denosumab Unspecified       PHYSICAL EXAM  VITAL SIGNS: BP (!) 172/93   Pulse 85   Temp  "36.3 °C (97.4 °F) (Temporal)   Resp 16   Ht 1.676 m (5' 6\")   Wt 74.7 kg (164 lb 11.2 oz)   SpO2 97%   BMI 26.58 kg/m²    Constitutional: Alert in no apparent distress.  HENT: No signs of trauma, Bilateral external ears normal, Nose normal.   Eyes: Conjunctiva normal, Non-icteric.   Neck: Normal range of motion, Supple, No stridor.  Cardiovascular: Regular rate and rhythm.   Thorax & Lungs: Normal breath sounds, No respiratory distress, No wheezing  Abdomen: Soft, No tenderness, No peritoneal signs, No masses.   Skin: Warm, Dry, No erythema, No rash.   Back: No bony tenderness, No CVA tenderness.   Extremities: Intact distal pulses, No edema, No cyanosis  Musculoskeletal: Good range of motion in all major joints. No major deformities noted.   Neurologic: Alert, No focal deficits noted.       DIAGNOSTIC STUDIES / PROCEDURES  EKG  I have independently interpreted this EKG  Results for orders placed or performed during the hospital encounter of 23   EKG   Result Value Ref Range    Report       [facility] Emergency Dept.    Test Date:  2023  Pt Name:    NANCY MARINA                Department: ER  MRN:        [id number]                      Room:  Gender:     Female                       Technician: [id number]  :        [id number]                   Requested By:ER TRIAGE PROTOCOL  Order #:    [id number]                    Reading MD: ALBINO RAMIREZ MD    Measurements  Intervals                                Axis  Rate:       79                           P:          52  WI:         203                          QRS:        -2  QRSD:       94                           T:          46  QT:         369  QTc:        424    Interpretive Statements  Sinus rhythm  Low voltage, precordial leads  Compared to ECG 2023 12:50:21  No significant changes  Electronically Signed On 2023 9:43:34 PDT by ALBINO RAMIREZ MD         LABS  Labs Reviewed   CBC WITH DIFFERENTIAL - Abnormal; Notable for the " following components:       Result Value    Hematocrit 47.1 (*)     MCHC 32.9 (*)     All other components within normal limits    Narrative:     Biotin intake of greater than 5 mg per day may interfere with  troponin levels, causing false low values.   COMP METABOLIC PANEL - Abnormal; Notable for the following components:    Alkaline Phosphatase 102 (*)     All other components within normal limits    Narrative:     Biotin intake of greater than 5 mg per day may interfere with  troponin levels, causing false low values.   TROPONIN - Abnormal; Notable for the following components:    Troponin T 26 (*)     All other components within normal limits    Narrative:     Biotin intake of greater than 5 mg per day may interfere with  troponin levels, causing false low values.   TROPONIN - Abnormal; Notable for the following components:    Troponin T 23 (*)     All other components within normal limits    Narrative:     Biotin intake of greater than 5 mg per day may interfere with  troponin levels, causing false low values.   TROPONIN - Abnormal; Notable for the following components:    Troponin T 25 (*)     All other components within normal limits   CORRECTED CALCIUM    Narrative:     Biotin intake of greater than 5 mg per day may interfere with  troponin levels, causing false low values.   ESTIMATED GFR    Narrative:     Biotin intake of greater than 5 mg per day may interfere with  troponin levels, causing false low values.   HEMOGLOBIN A1C   LIPID PROFILE    Narrative:     Biotin intake of greater than 5 mg per day may interfere with  troponin levels, causing false low values.   MAGNESIUM    Narrative:     Biotin intake of greater than 5 mg per day may interfere with  troponin levels, causing false low values.         RADIOLOGY  I have independently interpreted the diagnostic imaging associated with this visit and am waiting the final reading from the radiologist.   My preliminary interpretation is as follows: No  pulmonary edema  Radiologist interpretation:   DX-CHEST-PORTABLE (1 VIEW)   Final Result      No acute cardiopulmonary abnormality.         EC-ECHOCARDIOGRAM COMPLETE W/O CONT    (Results Pending)   CL-LEFT HEART CATHETERIZATION WITH POSSIBLE INTERVENTION    (Results Pending)       COURSE & MEDICAL DECISION MAKING    ED Observation Status? No; Patient does not meet criteria for ED Observation.     INITIAL ASSESSMENT, COURSE AND PLAN  Care Narrative: 82 y.o. female with recent abnormal stress test presenting with chest pain.  She is scheduled for an outpatient cardiac cath later this month.  She was given return precautions and cardiology provider and told to report to the emergency room should she have any acute chest discomfort in the intervening time.  Last night, she woke with chest pain around midnight.  Currently chest pain-free.  She does note decreased exercise tolerance and chest burning sensation with walking several minutes with her dog.  No known prior cardiovascular disease history.  No prior history of cardiac instrumentation.    Initial troponin is only minimally elevated at 26.  EKG does not show signs of acute ischemia    I did speak with Dr. Abreu from cardiology.  Recommend hospitalization and beta-blocker to manage blood pressure hypertensive urgency.  Will likely arrange catheterization of the patient in the morning.  Spoke with the hospital service was able to the hospitalization.        ADDITIONAL PROBLEM LIST    DISPOSITION AND DISCUSSIONS  I have discussed management of the patient with the following physicians and JAYA's:  Dr Abreu, cardiology; Dr Frazier, hospitalist    Discussion of management with other \[facility] [place] [facility]\"" or appropriate source(s): None     Escalation of care considered, and ultimately not performed:    Barriers to care at this time, including but not limited to:  Patient does not have an established outpatient cardiologist .     Decision tools and prescription drugs considered including, but not  limited to: HEART Score 4 .    FINAL DIAGNOSIS  1. Unstable angina (HCC)    2. Hypertensive urgency           Electronically signed by: Kishore Middleton M.D., 5/9/2023 9:42 AM

## 2023-05-09 NOTE — PROGRESS NOTES
4 Eyes Skin Assessment Completed by BONITA Abernathy and BONITA Rivers.    Head WDL  Ears WDL  Nose WDL  Mouth WDL  Neck WDL  Breast/Chest WDL  Shoulder Blades WDL  Spine WDL  (R) Arm/Elbow/Hand WDL  (L) Arm/Elbow/Hand WDL  Abdomen WDL  Groin WDL  Scrotum/Coccyx/Buttocks WDL  (R) Leg WDL  (L) Leg WDL  (R) Heel/Foot/Toe WDL  (L) Heel/Foot/Toe WDL          Devices In Places Tele Box and Pulse Ox      Interventions In Place Pillows    Possible Skin Injury No    Pictures Uploaded Into Epic N/A  Wound Consult Placed N/A  RN Wound Prevention Protocol Ordered No

## 2023-05-09 NOTE — H&P
Hospital Medicine History & Physical Note    Date of Service  5/9/2023    Primary Care Physician  Deepa Joe M.D.    Consultants  cardiology    Specialist Names: Dr. Abreu    Code Status  Full Code    Chief Complaint  Chief Complaint   Patient presents with    Chest Pain     For weeks, seen at cardiology yesterday. They want her to get an angiogram due to a blockage seen on stress test one month ago. She has been having intermittent chest pain, midsternal radiating to shoulders.        History of Presenting Illness  Karli Berrios is a 82 y.o. female with hypertension, hyperlipidemia, mild intermittent asthma, hypothyroidism, history of macular degeneration and detached retina, who has been having intermittent exertional mid chest pain radiating to the shoulders in the past several months since February 2022, which occurs every time she exerts herself such as walking, and significantly improves with rest.  She does feel anxious with the episodes, but denies accompanying shortness of breath, nausea, diaphoresis, or palpitations.  Her PCP obtained an outpatient cardiac stress test which was done on 4/10/2023 which subsequently showed decreased perfusion of moderate severity from apical to mid septum, anterior and anteroseptal wall segments with mild reversible ischemia with SDS of 2.  She is to follow-up with outpatient cardiology, with plan for outpatient C.  However yesterday, she had frequent episodes of the chest pain (3-4 times), and had resting chest pain last night while sleeping which woke her up from sleep.  She then decided to go to the ED today.    ED course:  On evaluation, she had significant elevated blood pressure to as high as 190/84.  She is not hypoxic.  Initial blood work-up showed unremarkable CBC and normal BMP.  Troponin was 26.  Chest x-ray (my read) did not show infiltrates or consolidation.  EKG (my read) showed normal sinus rhythm, with low voltage in the precordial  leads, with no dynamic ischemic changes.  Cardiology was consulted, and is planning for LHC in the hospital once blood pressure is better controlled.  She was subsequently admitted to the hospitalist service.    I discussed the plan of care with patient, family, bedside RN, , and ER physician . I personally reviewed all lab results mentioned above. Prior medical records from this institution and outside facilities were independently reviewed as noted. I also personally reviewed all ER physician and consultant recommendations and plans as documented above. History was independently obtained by myself.       Review of Systems  ROS    Pertinent positives/negatives as mentioned above.     A complete review of systems was personally done by me. All other systems were negative.       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    Surgical History   has a past surgical history that includes other; hemorrhoidectomy; other; other; appendectomy; other; carpal tunnel release (2006); cataract extraction with iol (Bilateral, 2007); abdominal exploration; eye surgery (Bilateral); abdominal hysterectomy total; and other.     Family History  family history includes Arthritis in her mother; Glaucoma in her maternal grandfather; Hyperlipidemia in her mother.       Social History   reports that she quit smoking about 45 years ago.  Her smoking use included cigarettes. She started smoking about 65 years ago. She has a 20.00 pack-year smoking history. She has been exposed to tobacco smoke. She has never used smokeless tobacco. She reports that she does not currently use alcohol. She reports that she does not use drugs.    Allergies  Allergies   Allergen Reactions    Bactrim Ds Unspecified    Bactrim [Sulfamethoxazole W-Trimethoprim]      She states this was always an antibiotic that should be in her chart     Codeine Diarrhea     Pt stated on 06/26/2022    Denosumab Unspecified    Atenolol Unspecified     Leg cramps        Medications  Prior to Admission Medications   Prescriptions Last Dose Informant Patient Reported? Taking?   ARMOUR THYROID 60 MG Tab 5/7/2023 at AM Patient No No   Sig: Take 1 Tablet by mouth every day. 5 days a week   Patient taking differently: Take 60 mg by mouth see administration instructions. Takes on Tuesday, Wednesday, Friday, Saturday and Sunday   Bioflavonoid Products (LISSY-C) TABS 5/8/2023 at AM Patient Yes No   Sig: Take 1,000 Tablets by mouth 2 times a day.   Calcium Carbonate-Vit D-Min (CALCIUM 1200 PO) 5/8/2023 at AM Patient Yes No   Sig: Take 1 tablet  by mouth 2 times a day.   Cyanocobalamin (VITAMIN B12 PO) 5/7/2023 at PM Patient Yes No   Sig: Take 1,000 mcg by mouth every day.   Faricimab-svoa (VABYSMO) 6 MG/0.05ML Solution UNK at UNK Patient Yes Yes   Sig: Administer 6 mg into the left eye see administration instructions. Every 5 to 6 weeks  Due May 26th 2023   Glucosamine-Chondroitin (MOVE FREE PO) 5/8/2023 at AM Patient Yes Yes   Sig: Take 1 Tablet by mouth every day.   LORazepam (ATIVAN) 0.5 MG Tab 5/8/2023 at PM Patient Yes Yes   Sig: Take 0.25-0.5 mg by mouth at bedtime as needed (Sleep).   Lutein-Zeaxanthin 25-5 MG Cap 5/7/2023 at PM Patient Yes No   Sig: Take  by mouth every day.   MAGNESIUM PO 5/7/2023 at PM Patient Yes No   Sig: Take 1 Tablet by mouth every day.   Multiple Vitamin (MULTIVITAMIN  PO) 5/8/2023 at AM Patient Yes No   Sig: Take 1 tablet by mouth every day.   Omega-3 Fatty Acids (FISH OIL) 1000 MG Cap capsule 5/8/2023 at AM Patient Yes No   Sig: Take 1,000 mg by mouth every day.   Turmeric 500 MG Tab 5/8/2023 at AM Patient Yes No   Sig: Take 500 mg by mouth 2 times a day.   VITAMIN E PO 5/8/2023 at AM Patient Yes Yes   Sig: Take 1 Tablet by mouth every day.   acetaminophen (TYLENOL) 500 MG Tab 5/8/2023 at AM Patient Yes No   Sig: Take 1,000 mg by mouth every morning.   aspirin EC (ECOTRIN) 81 MG Tablet Delayed Response 5/8/2023 at AM Patient Yes No   Sig: Take 81 mg by mouth every day.   famotidine (PEPCID) 40 MG Tab 5/8/2023 at AM Patient Yes No   Sig: Take 20 mg by mouth 2 times a day.   fluticasone (FLOVENT HFA) 110 MCG/ACT Aerosol 5/8/2023 at PM Patient No No   Sig: Inhale 2 Puffs 2 times a day. Use spacer. Rinse mouth after each use.   omeprazole (PRILOSEC) 20 MG delayed-release capsule 5/8/2023 at PM Patient Yes Yes   Sig: Take 20 mg by mouth 1 time a day as needed. Indications: Heartburn   simvastatin (ZOCOR) 40 MG Tab 5/7/2023 at PM Patient No No   Sig: Take 1 Tablet by mouth every day.   thyroid (ARMOUR THYROID) 90 MG Tab 5/8/2023 at AM Patient No No   Sig: TAKE 1 TABLET EVERY DAY 2 DAYS PER WEEK Strength: 90 mg   Patient taking differently: Take 90 mg by mouth two times a week. Takes on Monday and Thursday   vitamin D (CHOLECALCIFEROL) 1000 UNIT TABS 5/8/2023 at AM Patient Yes No   Sig: Take 1,000 Units by mouth 2 times a day.      Facility-Administered Medications: None       Physical Exam  Temp:  [36.3 °C (97.4 °F)] 36.3 °C (97.4 °F)  Pulse:  [85] 85  Resp:  [16] 16  BP: (172)/(93) 172/93  SpO2:  [97 %] 97 %  Blood Pressure : (!) 172/93   Temperature: 36.3 °C (97.4 °F)   Pulse: 85   Respiration: 16   Pulse Oximetry: 97 %       Physical Exam  Vitals reviewed.   Constitutional:       General: She is not in acute distress.     Appearance: Normal appearance. She is normal weight. She is  not ill-appearing or diaphoretic.   HENT:      Head: Normocephalic and atraumatic.      Right Ear: External ear normal.      Left Ear: External ear normal.      Mouth/Throat:      Mouth: Mucous membranes are moist.      Pharynx: No oropharyngeal exudate or posterior oropharyngeal erythema.   Eyes:      General: No scleral icterus.     Extraocular Movements: Extraocular movements intact.      Conjunctiva/sclera: Conjunctivae normal.      Pupils: Pupils are equal, round, and reactive to light.   Cardiovascular:      Rate and Rhythm: Normal rate and regular rhythm.      Heart sounds: Normal heart sounds. No murmur heard.  Pulmonary:      Effort: Pulmonary effort is normal. No respiratory distress.      Breath sounds: Normal breath sounds. No stridor. No wheezing, rhonchi or rales.   Chest:      Chest wall: No tenderness.   Abdominal:      General: Bowel sounds are normal. There is no distension.      Palpations: Abdomen is soft. There is no mass.      Tenderness: There is no abdominal tenderness. There is no guarding or rebound.   Musculoskeletal:         General: No swelling. Normal range of motion.      Cervical back: Normal range of motion and neck supple. No rigidity. No muscular tenderness.      Right lower leg: No edema.      Left lower leg: No edema.   Lymphadenopathy:      Cervical: No cervical adenopathy.   Skin:     General: Skin is warm and dry.      Coloration: Skin is not jaundiced.      Findings: No rash.   Neurological:      General: No focal deficit present.      Mental Status: She is alert and oriented to person, place, and time. Mental status is at baseline.      Cranial Nerves: No cranial nerve deficit.   Psychiatric:         Mood and Affect: Mood normal.         Behavior: Behavior normal.         Thought Content: Thought content normal.         Judgment: Judgment normal.       Laboratory:  Recent Labs     05/09/23  0911   WBC 6.5   RBC 5.07   HEMOGLOBIN 15.5   HEMATOCRIT 47.1*   MCV 92.9   MCH 30.6    MCHC 32.9*   RDW 42.3   PLATELETCT 264   MPV 9.9     Recent Labs     05/09/23  0911   SODIUM 135   POTASSIUM 4.0   CHLORIDE 99   CO2 25   GLUCOSE 94   BUN 21   CREATININE 0.69   CALCIUM 9.2     Recent Labs     05/09/23  0911   ALTSGPT 21   ASTSGOT 22   ALKPHOSPHAT 102*   TBILIRUBIN 0.4   GLUCOSE 94         No results for input(s): NTPROBNP in the last 72 hours.      Recent Labs     05/09/23  0911 05/09/23  1130   TROPONINT 26* 23*       Imaging:  DX-CHEST-PORTABLE (1 VIEW)   Final Result      No acute cardiopulmonary abnormality.         EC-ECHOCARDIOGRAM COMPLETE W/O CONT    (Results Pending)         Imaging studies and EKG results were independently reviewed as above.      Assessment/Plan:  Justification for Admission Status  I anticipate this patient is appropriate for observation status at this time because of unstable angina and hypertensive urgency, requiring further cardiac management with LHC and antihypertensive optimization in the hospital.    Patient will need a Telemetry bed on MEDICAL service .  The need is secondary to angina.    * Unstable angina (HCC)- (present on admission)  Assessment & Plan  The ASCVD Risk score (Patsy DK, et al., 2019) failed to calculate for the following reasons:    The 2019 ASCVD risk score is only valid for ages 40 to 79    -With symptoms of exertional chest pain, which has become more frequent, and now occurring at rest.  Outpatient cardiac stress test showed reversible ischemia.  -Admit to telemetry.  -Reviewed cardiology recommendations and plan to do LHC.  Start baby aspirin, and changed to high intensity statin.  -Trend troponins.   - Check lipid profile and hemoglobin A1c for further risk stratification. We will do further cardiac monitoring to rule out arrhythmias.  -Will need better blood pressure control.  Management of hypertensive urgency as below.  -Start as needed sublingual nitroglycerin, and morphine for pain recurrence.       Abnormal cardiovascular stress  test- (present on admission)  Assessment & Plan  - As above    Hypertensive urgency- (present on admission)  Assessment & Plan  - Blood pressure runs high (190/84).  Having symptoms of cardiac ischemia with chest pain, along with abnormal outpatient stress test.  -Needs further optimization of blood pressure control.  Start Norvasc 5 mg daily, and lisinopril 10 mg daily.  I will put her on as needed IV hydralazine for significant hypertension.  -Plan for [facility] per cardiology.    LUIS FELIPE (generalized anxiety disorder)- (present on admission)  Assessment & Plan  - Resume home [facility].    Hypertension- (present on admission)  Assessment & Plan  - Poorly controlled.  -Management of hypertensive urgency as above.  -Monitor blood pressure trend closely.  Optimize blood pressure control.  -Check echocardiogram for baseline measurements.    Asthma, mild intermittent- (present on admission)  Assessment & Plan  - Not in acute exacerbation.  Resume home Flovent.  RT protocol while in the hospital.    Dyslipidemia- (present on admission)  Assessment & Plan  - I will change her statin to high intensity statin with Lipitor.  Check lipid profile.    Acquired hypothyroidism- (present on admission)  Assessment & Plan  - Resume home armour thyroid.        VTE prophylaxis: enoxaparin ppx

## 2023-05-09 NOTE — ED TRIAGE NOTES
"Chief Complaint   Patient presents with    Chest Pain     For weeks, seen at cardiology yesterday. They want her to get an angiogram due to a blockage seen on stress test one month ago. She has been having intermittent chest pain, midsternal radiating to shoulders.        Patient to triage via wheelchair, AAOx4, Appropriate precautions in place.     Explained wait time and triage process. Placed back in lobby. Told to notify ED tech or RN of any changes, verbalized understanding.    BP (!) 172/93   Pulse 85   Temp 36.3 °C (97.4 °F) (Temporal)   Resp 16   Ht 1.676 m (5' 6\")   Wt 74.7 kg (164 lb 11.2 oz)   SpO2 97%   BMI 26.58 kg/m²     "

## 2023-05-09 NOTE — ASSESSMENT & PLAN NOTE
- Blood pressure control is better.  Having symptoms of cardiac ischemia with chest pain, along with abnormal outpatient stress test.  -Continue Norvasc 5 mg daily, and lisinopril 10 mg daily.  Continue to optimize blood pressure control.  Continue as needed IV hydralazine for significant hypertension.  -Plan for [facility] per cardiology.

## 2023-05-09 NOTE — PROGRESS NOTES
Admit profile and assessment completed.  Pt A&Ox4.  Respirations even, unlabored on room air. Pt denies any pain at this time.  Pt denies any CP, SOB, nausea, palpitations at this time.  Sinus rhythm noted on telemetry monitor. Pt updated on POC: serial troponins, echocardiogram, heart catheterization. Communication board updated.  Bed in locked, lowest position.  Call light and belongings within reach.  Needs met.

## 2023-05-09 NOTE — ASSESSMENT & PLAN NOTE
The ASCVD Risk score (Ptasy DK, et al., 2019) failed to calculate for the following reasons:    The 2019 ASCVD risk score is only valid for ages 40 to 79    -With symptoms of exertional chest pain, which has become more frequent, and now occurring at rest.  Outpatient cardiac stress test showed reversible ischemia.  -Admit to telemetry.  -Reviewed cardiology recommendations and plan to do LHC.  Start baby aspirin, and changed to high intensity statin. Check lipid profile and hemoglobin A1c for further risk stratification. We will do further cardiac monitoring to rule out arrhythmias.  -Will need better blood pressure control.  Management of hypertensive urgency as below.  -Start as needed sublingual nitroglycerin, and morphine for pain recurrence.

## 2023-05-09 NOTE — HEART FAILURE PROGRAM
Acute Care Clinician Note template for HFrEF (heart failure with reduced ejection fraction 40% or less)    Class, Stage, and Left Ventricular Function  Stage: C  Class: III  Etiology: Unknown  Ischemic Testing: Pending Inpatient  Precipitant to heart failure admission:   EF: No results found for: LVEF   Dry Weight: unknown    Trajectory Check & Diuresis Goal for next 24 hours  Intake/Output Summary: No intake or output data in the 24 hours ending 05/09/23 1621  Last 3 Weights & Sources         5/8/2023  1247 5/9/2023  0857 5/9/2023  1309       Weight: 77.1 kg (170 lb) 74.7 kg (164 lb 11.2 oz) 76 kg (167 lb 8.8 oz)     Weight Source: -- Stand Up Scale Stand Up Scale           Status (Improving, stalled, or worsening): Improving  Diuresis Goal: unknown L  B Natriuretic Peptide: No results found for: NTPROBNP   Hemoglobin A1C:   Lab Results   Component Value Date/Time    HBA1C 5.5 05/09/2023 0911       GDMT (guideline directed medical therapy) Prescribed at Discharge/Or Reason Not Prescribed  Evidence Based Beta Blocker for EF of 40% or less: carvedilol  SIRENA - I, for EF of 40% or less ARNI is preferred : sacubitril/valsartan  Aldosterone antagonist, for EF of 40% or less : spironolactone  SGLT2 inhibitor with proven ASCVD, HF, or DKD benefit Jardiance/empagliflozin): Initiate as Inpatient - dapagliflozin (Farxiga)  Anticoagulation for atrial arrhythmia if applicable: Not applicable  Glycemic control for DM + HF If applicable: Further optimization is deferred to hospitalist service  Lipid lowering medication for DM + HF if applicable: Not applicable  For patients self-identified as  with NYHA class III-IV HFrEF who are receiving optimal medical therapy, the combination of hydralazine and isosorbide dinitrate is recommended to improve symptoms and reduce morbidity and mortality: Not applicable    Consider placing a social work consult to assess patient’s ability to afford copays or out of pocket costs of  medications: Not applicable     GDMT (guideline directed medical therapy) Plan for Optimization  Plan to optimize GDMT: To be addressed in the outpatient setting    Prevention/Interventions  Upcoming Cardiology & Med Group Appts       Visit Type Date Time Department    CL CATH LAB 60 5/24/2023  8:30 AM CATH LAB [facility]    PREVIOUS PATIENT 6/7/2023  2:00 PM HEART INST [facility]          Follow up appointment within 7 calendar days of discharge: Discharge date TBD   Smoking cessation counseling documented:Has not used nicotine in the past year  Referral to Cardiac Rehab (stable, chronic heart failure with left ejection fraction of 35% or less and New York Heart Association (NYHA) Class II to IV symptoms on optimal heart failure therapy for at least six weeks): Referral placed  Assessed for advanced circulatory support / transplant need; discussed with patient: Not applicable  Goals of Care:  Advanced Directive: Palliative Care Consult  POLST Form: Palliative Care Consult  Goals of Care Conversation: Continuing with aggressive care    Consideration of cardiac resynchronization therapy (BiV Pacer) and/or implantable cardiac defibrillator (ICD)    Does the patient have a defibrilator (ICD)?: NO - Criteria for Cardiac Resynchronization Therapy (must meet all 3):  EF <35% Yes   NYHA Class III or ambulatory Class IV Yes   QRS Duration >120 ms No     Patient does not meet criteria and has the following contraindications: Has not been on GDMT for 3 months

## 2023-05-10 ENCOUNTER — APPOINTMENT (OUTPATIENT)
Dept: CARDIOLOGY | Facility: MEDICAL CENTER | Age: 83
End: 2023-05-10
Attending: INTERNAL MEDICINE
Payer: MEDICARE

## 2023-05-10 ENCOUNTER — APPOINTMENT (OUTPATIENT)
Dept: RADIOLOGY | Facility: MEDICAL CENTER | Age: 83
End: 2023-05-10
Attending: INTERNAL MEDICINE
Payer: MEDICARE

## 2023-05-10 LAB
ANION GAP SERPL CALC-SCNC: 14 MMOL/L (ref 7–16)
BASOPHILS # BLD AUTO: 0.6 % (ref 0–1.8)
BASOPHILS # BLD: 0.05 K/UL (ref 0–0.12)
BUN SERPL-MCNC: 19 MG/DL (ref 8–22)
CALCIUM SERPL-MCNC: 8.9 MG/DL (ref 8.5–10.5)
CHLORIDE SERPL-SCNC: 105 MMOL/L (ref 96–112)
CO2 SERPL-SCNC: 22 MMOL/L (ref 20–33)
CREAT SERPL-MCNC: 0.88 MG/DL (ref 0.5–1.4)
EOSINOPHIL # BLD AUTO: 0.45 K/UL (ref 0–0.51)
EOSINOPHIL NFR BLD: 5.7 % (ref 0–6.9)
ERYTHROCYTE [DISTWIDTH] IN BLOOD BY AUTOMATED COUNT: 43.3 FL (ref 35.9–50)
GFR SERPLBLD CREATININE-BSD FMLA CKD-EPI: 66 ML/MIN/1.73 M 2
GLUCOSE SERPL-MCNC: 94 MG/DL (ref 65–99)
HCT VFR BLD AUTO: 43.2 % (ref 37–47)
HGB BLD-MCNC: 14.3 G/DL (ref 12–16)
IMM GRANULOCYTES # BLD AUTO: 0.03 K/UL (ref 0–0.11)
IMM GRANULOCYTES NFR BLD AUTO: 0.4 % (ref 0–0.9)
INR PPP: 0.98 (ref 0.87–1.13)
LV EJECT FRACT  99904: 70
LV EJECT FRACT MOD 4C 99902: 76.17
LYMPHOCYTES # BLD AUTO: 2.24 K/UL (ref 1–4.8)
LYMPHOCYTES NFR BLD: 28.6 % (ref 22–41)
MCH RBC QN AUTO: 31.2 PG (ref 27–33)
MCHC RBC AUTO-ENTMCNC: 33.1 G/DL (ref 33.6–35)
MCV RBC AUTO: 94.3 FL (ref 81.4–97.8)
MONOCYTES # BLD AUTO: 0.91 K/UL (ref 0–0.85)
MONOCYTES NFR BLD AUTO: 11.6 % (ref 0–13.4)
NEUTROPHILS # BLD AUTO: 4.16 K/UL (ref 2–7.15)
NEUTROPHILS NFR BLD: 53.1 % (ref 44–72)
NRBC # BLD AUTO: 0 K/UL
NRBC BLD-RTO: 0 /100 WBC
PLATELET # BLD AUTO: 264 K/UL (ref 164–446)
PMV BLD AUTO: 9.9 FL (ref 9–12.9)
POTASSIUM SERPL-SCNC: 4.1 MMOL/L (ref 3.6–5.5)
PROTHROMBIN TIME: 12.9 SEC (ref 12–14.6)
RBC # BLD AUTO: 4.58 M/UL (ref 4.2–5.4)
SODIUM SERPL-SCNC: 141 MMOL/L (ref 135–145)
WBC # BLD AUTO: 7.8 K/UL (ref 4.8–10.8)

## 2023-05-10 PROCEDURE — 99152 MOD SED SAME PHYS/QHP 5/>YRS: CPT | Performed by: INTERNAL MEDICINE

## 2023-05-10 PROCEDURE — 80048 BASIC METABOLIC PNL TOTAL CA: CPT

## 2023-05-10 PROCEDURE — 700102 HCHG RX REV CODE 250 W/ 637 OVERRIDE(OP): Performed by: INTERNAL MEDICINE

## 2023-05-10 PROCEDURE — 700105 HCHG RX REV CODE 258: Performed by: INTERNAL MEDICINE

## 2023-05-10 PROCEDURE — 85610 PROTHROMBIN TIME: CPT

## 2023-05-10 PROCEDURE — 99232 SBSQ HOSP IP/OBS MODERATE 35: CPT | Mod: 25 | Performed by: INTERNAL MEDICINE

## 2023-05-10 PROCEDURE — 93306 TTE W/DOPPLER COMPLETE: CPT | Mod: 26 | Performed by: INTERNAL MEDICINE

## 2023-05-10 PROCEDURE — 92928 PRQ TCAT PLMT NTRAC ST 1 LES: CPT | Mod: LD | Performed by: INTERNAL MEDICINE

## 2023-05-10 PROCEDURE — A9270 NON-COVERED ITEM OR SERVICE: HCPCS | Performed by: INTERNAL MEDICINE

## 2023-05-10 PROCEDURE — 700117 HCHG RX CONTRAST REV CODE 255: Performed by: INTERNAL MEDICINE

## 2023-05-10 PROCEDURE — 93306 TTE W/DOPPLER COMPLETE: CPT

## 2023-05-10 PROCEDURE — 85025 COMPLETE CBC W/AUTO DIFF WBC: CPT

## 2023-05-10 PROCEDURE — A9270 NON-COVERED ITEM OR SERVICE: HCPCS

## 2023-05-10 PROCEDURE — G0378 HOSPITAL OBSERVATION PER HR: HCPCS

## 2023-05-10 PROCEDURE — 99233 SBSQ HOSP IP/OBS HIGH 50: CPT | Performed by: INTERNAL MEDICINE

## 2023-05-10 PROCEDURE — 700111 HCHG RX REV CODE 636 W/ 250 OVERRIDE (IP)

## 2023-05-10 PROCEDURE — 700102 HCHG RX REV CODE 250 W/ 637 OVERRIDE(OP)

## 2023-05-10 PROCEDURE — 93458 L HRT ARTERY/VENTRICLE ANGIO: CPT | Mod: 26,59 | Performed by: INTERNAL MEDICINE

## 2023-05-10 PROCEDURE — 700101 HCHG RX REV CODE 250

## 2023-05-10 PROCEDURE — C1887 CATHETER, GUIDING: HCPCS

## 2023-05-10 PROCEDURE — 92978 ENDOLUMINL IVUS OCT C 1ST: CPT | Mod: 26,LD | Performed by: INTERNAL MEDICINE

## 2023-05-10 PROCEDURE — 93005 ELECTROCARDIOGRAM TRACING: CPT | Performed by: INTERNAL MEDICINE

## 2023-05-10 RX ORDER — MIDAZOLAM HYDROCHLORIDE 1 MG/ML
INJECTION INTRAMUSCULAR; INTRAVENOUS
Status: COMPLETED
Start: 2023-05-10 | End: 2023-05-10

## 2023-05-10 RX ORDER — HEPARIN SODIUM 200 [USP'U]/100ML
INJECTION, SOLUTION INTRAVENOUS
Status: COMPLETED
Start: 2023-05-10 | End: 2023-05-10

## 2023-05-10 RX ORDER — LIDOCAINE HYDROCHLORIDE 20 MG/ML
INJECTION, SOLUTION INFILTRATION; PERINEURAL
Status: COMPLETED
Start: 2023-05-10 | End: 2023-05-10

## 2023-05-10 RX ORDER — NITROGLYCERIN 0.4 MG/1
TABLET SUBLINGUAL
Status: COMPLETED
Start: 2023-05-10 | End: 2023-05-10

## 2023-05-10 RX ORDER — HEPARIN SODIUM 1000 [USP'U]/ML
INJECTION, SOLUTION INTRAVENOUS; SUBCUTANEOUS
Status: COMPLETED
Start: 2023-05-10 | End: 2023-05-10

## 2023-05-10 RX ORDER — VERAPAMIL HYDROCHLORIDE 2.5 MG/ML
INJECTION, SOLUTION INTRAVENOUS
Status: COMPLETED
Start: 2023-05-10 | End: 2023-05-10

## 2023-05-10 RX ORDER — BIVALIRUDIN 250 MG/5ML
INJECTION, POWDER, LYOPHILIZED, FOR SOLUTION INTRAVENOUS
Status: COMPLETED
Start: 2023-05-10 | End: 2023-05-10

## 2023-05-10 RX ORDER — SODIUM CHLORIDE 9 MG/ML
INJECTION, SOLUTION INTRAVENOUS CONTINUOUS
Status: ACTIVE | OUTPATIENT
Start: 2023-05-10 | End: 2023-05-10

## 2023-05-10 RX ORDER — CLOPIDOGREL 300 MG/1
TABLET, FILM COATED ORAL
Status: COMPLETED
Start: 2023-05-10 | End: 2023-05-10

## 2023-05-10 RX ORDER — HYDRALAZINE HYDROCHLORIDE 20 MG/ML
INJECTION INTRAMUSCULAR; INTRAVENOUS
Status: COMPLETED
Start: 2023-05-10 | End: 2023-05-10

## 2023-05-10 RX ORDER — CLOPIDOGREL 300 MG/1
600 TABLET, FILM COATED ORAL ONCE
Status: DISCONTINUED | OUTPATIENT
Start: 2023-05-10 | End: 2023-05-10

## 2023-05-10 RX ORDER — CLOPIDOGREL BISULFATE 75 MG/1
75 TABLET ORAL DAILY
Status: DISCONTINUED | OUTPATIENT
Start: 2023-05-11 | End: 2023-05-11 | Stop reason: HOSPADM

## 2023-05-10 RX ADMIN — FENTANYL CITRATE 100 MCG: 50 INJECTION, SOLUTION INTRAMUSCULAR; INTRAVENOUS at 14:51

## 2023-05-10 RX ADMIN — LIDOCAINE HYDROCHLORIDE: 20 INJECTION, SOLUTION INFILTRATION; PERINEURAL at 14:51

## 2023-05-10 RX ADMIN — IOHEXOL 55 ML: 350 INJECTION, SOLUTION INTRAVENOUS at 15:14

## 2023-05-10 RX ADMIN — HUMAN ALBUMIN MICROSPHERES AND PERFLUTREN 3 ML: 10; .22 INJECTION, SOLUTION INTRAVENOUS at 10:30

## 2023-05-10 RX ADMIN — AMLODIPINE BESYLATE 10 MG: 10 TABLET ORAL at 05:11

## 2023-05-10 RX ADMIN — SODIUM CHLORIDE: 9 INJECTION, SOLUTION INTRAVENOUS at 15:56

## 2023-05-10 RX ADMIN — ATORVASTATIN CALCIUM 80 MG: 80 TABLET, FILM COATED ORAL at 19:32

## 2023-05-10 RX ADMIN — METOPROLOL TARTRATE 50 MG: 25 TABLET, FILM COATED ORAL at 16:40

## 2023-05-10 RX ADMIN — MIDAZOLAM HYDROCHLORIDE 1 MG: 1 INJECTION, SOLUTION INTRAMUSCULAR; INTRAVENOUS at 15:14

## 2023-05-10 RX ADMIN — VERAPAMIL HYDROCHLORIDE 2.5 MG: 2.5 INJECTION, SOLUTION INTRAVENOUS at 14:51

## 2023-05-10 RX ADMIN — FLUTICASONE PROPIONATE 220 MCG: 110 AEROSOL, METERED RESPIRATORY (INHALATION) at 05:14

## 2023-05-10 RX ADMIN — METOPROLOL TARTRATE 50 MG: 25 TABLET, FILM COATED ORAL at 05:11

## 2023-05-10 RX ADMIN — FENTANYL CITRATE 100 MCG: 50 INJECTION, SOLUTION INTRAMUSCULAR; INTRAVENOUS at 15:16

## 2023-05-10 RX ADMIN — CLOPIDOGREL BISULFATE 600 MG: 300 TABLET, FILM COATED ORAL at 15:15

## 2023-05-10 RX ADMIN — HEPARIN SODIUM: 1000 INJECTION, SOLUTION INTRAVENOUS; SUBCUTANEOUS at 14:52

## 2023-05-10 RX ADMIN — NITROGLYCERIN 10 ML: 20 INJECTION INTRAVENOUS at 14:51

## 2023-05-10 RX ADMIN — THYROID, PORCINE 60 MG: 30 TABLET ORAL at 05:14

## 2023-05-10 RX ADMIN — MIDAZOLAM HYDROCHLORIDE 2 MG: 1 INJECTION, SOLUTION INTRAMUSCULAR; INTRAVENOUS at 15:02

## 2023-05-10 RX ADMIN — LOSARTAN POTASSIUM 100 MG: 50 TABLET, FILM COATED ORAL at 05:11

## 2023-05-10 RX ADMIN — FLUTICASONE PROPIONATE 220 MCG: 110 AEROSOL, METERED RESPIRATORY (INHALATION) at 16:40

## 2023-05-10 RX ADMIN — LORAZEPAM 0.5 MG: 1 TABLET ORAL at 19:31

## 2023-05-10 RX ADMIN — NITROGLYCERIN 0.4 MG: 0.4 TABLET, ORALLY DISINTEGRATING SUBLINGUAL at 15:30

## 2023-05-10 RX ADMIN — ACETAMINOPHEN 650 MG: 325 TABLET, FILM COATED ORAL at 16:36

## 2023-05-10 RX ADMIN — FENTANYL CITRATE 100 MCG: 50 INJECTION, SOLUTION INTRAMUSCULAR; INTRAVENOUS at 15:02

## 2023-05-10 RX ADMIN — FAMOTIDINE 20 MG: 20 TABLET, FILM COATED ORAL at 05:10

## 2023-05-10 RX ADMIN — BIVALIRUDIN 1.75 MG/KG/HR: 250 INJECTION, POWDER, LYOPHILIZED, FOR SOLUTION INTRAVENOUS at 14:59

## 2023-05-10 RX ADMIN — HYDRALAZINE HYDROCHLORIDE 10 MG: 20 INJECTION, SOLUTION INTRAMUSCULAR; INTRAVENOUS at 15:45

## 2023-05-10 RX ADMIN — FAMOTIDINE 20 MG: 20 TABLET, FILM COATED ORAL at 16:40

## 2023-05-10 RX ADMIN — HEPARIN SODIUM 2000 UNITS: 200 INJECTION, SOLUTION INTRAVENOUS at 14:51

## 2023-05-10 RX ADMIN — ASPIRIN 81 MG: 81 TABLET, COATED ORAL at 05:10

## 2023-05-10 ASSESSMENT — PAIN DESCRIPTION - PAIN TYPE: TYPE: ACUTE PAIN

## 2023-05-10 NOTE — PROGRESS NOTES
Hospital Medicine Daily Progress Note    Date of Service  5/10/2023    Chief Complaint  Chest pain    Hospital Course  Karli Berrios is a 82 y.o. female with hypertension, hyperlipidemia, mild intermittent asthma, hypothyroidism, history of macular degeneration and detached retina, who has been having intermittent exertional mid chest pain radiating to the shoulders in the past several months since February 2022, which occurs every time she exerts herself such as walking, and significantly improves with rest.  She does feel anxious with the episodes, but denies accompanying shortness of breath, nausea, diaphoresis, or palpitations.  Her PCP obtained an outpatient cardiac stress test which was done on 4/10/2023 which subsequently showed decreased perfusion of moderate severity from apical to mid septum, anterior and anteroseptal wall segments with mild reversible ischemia with SDS of 2.  She is to follow-up with outpatient cardiology, with plan for outpatient LHC.  However yesterday, she had frequent episodes of the chest pain (3-4 times), and had resting chest pain last night while sleeping which woke her up from sleep.  She then decided to go to the ED.     On evaluation, she had significant elevated blood pressure to as high as 190/84.  She is not hypoxic.  Initial blood work-up showed unremarkable CBC and normal BMP.  Troponin was 26.  Chest x-ray did not show infiltrates or consolidation.  EKG showed normal sinus rhythm, with low voltage in the precordial leads, with no dynamic ischemic changes.  Cardiology was consulted, and is planning for LHC in the hospital once blood pressure is better controlled.  She is started on lisinopril and Norvasc.    Interval Problem Update  5/10/2023 - I reviewed the patient's chart today. Uneventful night.  Blood pressure has improved to 120/56. Afebrile. Saturating well on RA.  CBC unremarkable.  WBC count is normal.  Electrolytes and renal function are normal.  Troponins  remain flat (26-23-25).  Plan for [facility] today.  Echocardiogram is pending.  She maintained sinus rhythm on telemetry.  LDL 78, HDL 62.    > I have personally seen and examined the patient today.  She feels better, less anxious.  Still has intermittent chest pain with exertion, but not too much today.  No shortness of breath.  No nausea or vomiting.  No abdominal pain.  No headaches, dizziness, lightheadedness.      I have discussed this patient's plan of care and discharge plan at IDT rounds today with Case Management, Nursing, Nursing leadership, and other members of the IDT team.    Consultants/Specialty  cardiology    Code Status  Full Code    Disposition  The patient is not medically cleared for discharge to home or a post-acute facility.  Anticipate discharge to: home with close outpatient follow-up    I have placed the appropriate orders for post-discharge needs.    Review of Systems  ROS     Pertinent positives/negatives as mentioned above.     A complete review of systems was personally done by me. All other systems were negative.       Physical Exam  Temp:  [36.1 °C (96.9 °F)-36.7 °C (98 °F)] 36.2 °C (97.2 °F)  Pulse:  [61-91] 61  Resp:  [16-18] 18  BP: (100-190)/(55-92) 120/56  SpO2:  [95 %-96 %] 96 %    Physical Exam  Vitals reviewed.   Constitutional:       General: She is not in acute distress.     Appearance: Normal appearance. She is normal weight. She is not ill-appearing or diaphoretic.   HENT:      Head: Normocephalic and atraumatic.      Right Ear: External ear normal.      Left Ear: External ear normal.      Mouth/Throat:      Mouth: Mucous membranes are moist.      Pharynx: No oropharyngeal exudate or posterior oropharyngeal erythema.   Eyes:      General: No scleral icterus.     Extraocular Movements: Extraocular movements intact.      Conjunctiva/sclera: Conjunctivae normal.      Pupils: Pupils are equal, round, and reactive to light.   Cardiovascular:      Rate and Rhythm: Normal rate and  regular rhythm.      Heart sounds: Normal heart sounds. No murmur heard.  Pulmonary:      Effort: Pulmonary effort is normal. No respiratory distress.      Breath sounds: Normal breath sounds. No stridor. No wheezing, rhonchi or rales.   Chest:      Chest wall: No tenderness.   Abdominal:      General: Bowel sounds are normal. There is no distension.      Palpations: Abdomen is soft. There is no mass.      Tenderness: There is no abdominal tenderness. There is no guarding or rebound.   Musculoskeletal:         General: No swelling. Normal range of motion.      Cervical back: Normal range of motion and neck supple. No rigidity. No muscular tenderness.      Right lower leg: No edema.      Left lower leg: No edema.   Lymphadenopathy:      Cervical: No cervical adenopathy.   Skin:     General: Skin is warm and dry.      Coloration: Skin is not jaundiced.      Findings: No rash.   Neurological:      General: No focal deficit present.      Mental Status: She is alert and oriented to person, place, and time. Mental status is at baseline.      Cranial Nerves: No cranial nerve deficit.   Psychiatric:         Mood and Affect: Mood normal.         Behavior: Behavior normal.         Thought Content: Thought content normal.         Judgment: Judgment normal.         Fluids  No intake or output data in the 24 hours ending 05/10/23 1021    Laboratory  Recent Labs     05/09/23  0911 05/10/23  0603   WBC 6.5 7.8   RBC 5.07 4.58   HEMOGLOBIN 15.5 14.3   HEMATOCRIT 47.1* 43.2   MCV 92.9 94.3   MCH 30.6 31.2   MCHC 32.9* 33.1*   RDW 42.3 43.3   PLATELETCT 264 264   MPV 9.9 9.9     Recent Labs     05/09/23  0911 05/10/23  0603   SODIUM 135 141   POTASSIUM 4.0 4.1   CHLORIDE 99 105   CO2 25 22   GLUCOSE 94 94   BUN 21 19   CREATININE 0.69 0.88   CALCIUM 9.2 8.9     Recent Labs     05/10/23  0927   INR 0.98         Recent Labs     05/09/23  0911   TRIGLYCERIDE 87   HDL 62   LDL 78       Imaging  EC-ECHOCARDIOGRAM COMPLETE W/ CONT          DX-CHEST-PORTABLE (1 VIEW)   Final Result      No acute cardiopulmonary abnormality.         CL-LEFT HEART CATHETERIZATION WITH POSSIBLE INTERVENTION    (Results Pending)   IR-US GUIDED PIV    (Results Pending)        Assessment/Plan  * Unstable angina (HCC)- (present on admission)  Assessment & Plan  The ASCVD Risk score (Patsy GUILLEN, et al., 2019) failed to calculate for the following reasons:    The 2019 ASCVD risk score is only valid for ages 40 to 79    -With symptoms of exertional chest pain, which has become more frequent, and now occurring at rest.  Outpatient cardiac stress test showed reversible ischemia.  Troponins remain flat.  -Plan for LHC this afternoon.  Continue baby aspirin, and statin.  -Continue to optimize blood pressure control.  -Continue as needed sublingual nitroglycerin, and morphine for pain recurrence.   -Monitor on telemetry.      Abnormal cardiovascular stress test- (present on admission)  Assessment & Plan  - As above    Hypertensive urgency- (present on admission)  Assessment & Plan  - Blood pressure control is better.  Having symptoms of cardiac ischemia with chest pain, along with abnormal outpatient stress test.  -Continue Norvasc 5 mg daily, and lisinopril 10 mg daily.  Continue to optimize blood pressure control.  Continue as needed IV hydralazine for significant hypertension.  -Plan for LHC per cardiology.    LUIS FELIPE (generalized anxiety disorder)- (present on admission)  Assessment & Plan  - Continue home Ativan.    Hypertension- (present on admission)  Assessment & Plan  - Better controlled.  -Continue Norvasc and Lipitor.  Pending echocardiogram.  -Monitor blood pressure trend closely.  Optimize blood pressure control.    Asthma, mild intermittent- (present on admission)  Assessment & Plan  - Not in acute exacerbation.  Continue home Flovent.  RT protocol while in the hospital.    Dyslipidemia- (present on admission)  Assessment & Plan  -Continue high intensity statin with  Lipitor.      Acquired hypothyroidism- (present on admission)  Assessment & Plan  - Continue home armour thyroid.         VTE prophylaxis: enoxaparin ppx

## 2023-05-10 NOTE — CARE PLAN
The patient is Stable - Low risk of patient condition declining or worsening    Shift Goals  Clinical Goals: (P) Echo, stress test, monitor BP and tele  Patient Goals: (P) comfort  Family Goals: (P) not at bedside    Progress made toward(s) clinical / shift goals:      Problem: Knowledge Deficit - Standard  Goal: Patient and family/care givers will demonstrate understanding of plan of care, disease process/condition, diagnostic tests and medications  Outcome: Progressing     Problem: Pain - Standard  Goal: Alleviation of pain or a reduction in pain to the patient’s comfort goal  Outcome: Progressing       Patient is not progressing towards the following goals:

## 2023-05-10 NOTE — PROGRESS NOTES
Monitor summary: SR 62-74, MD .23, QRS .07, QT .44, no ectopy per strip from monitor room.

## 2023-05-10 NOTE — PROGRESS NOTES
Cardiology Progress Note:    Javad Abreu M.D.  Date & Time note created:    5/10/2023   12:11 PM     Referring MD:  Dr. Frazier    Patient ID:   Name:             Karli Berrios   YOB: 1940  Age:                 82 y.o.  female   MRN:               [id number]                                                             Chief Complaint / Reason for consult:  Chest pain.    History of Present Illness:    This is an 82 years old woman without prior cardiac problems presented to the ER because of progressively worsening chest pain with exertion.  Patient has been having chest pain for several weeks.  She did have a recent nuclear stress test which showed minor coronary ischemia with SDS of 2 at the anterior and anterior septal wall of the left ventricle.  She came in today because she had a severe chest pain episode last night while she was walking up an incline.  No symptom at rest otherwise.    Overnight events:  No acute events overnight.  No telemetry events.      Review of Systems:      Constitutional: Denies fevers, Denies weight changes  Eyes: Denies changes in vision, no eye pain  Ears/Nose/Throat/Mouth: Denies nasal congestion or sore throat   Cardiovascular: no chest pain, no palpitations   Respiratory: no shortness of breath , Denies cough  Gastrointestinal/Hepatic: Denies abdominal pain, nausea, vomiting, diarrhea, constipation or GI bleeding   Genitourinary: Denies dysuria or frequency  Musculoskeletal/Rheum: Denies  joint pain and swelling   Skin: Denies rash  Neurological: Denies headache, confusion, memory loss or focal weakness/parasthesias  Psychiatric: denies mood disorder   Endocrine: Margy thyroid problems  Heme/Oncology/Lymph Nodes: Denies enlarged lymph nodes, denies brusing or known bleeding disorder  All other systems were reviewed and are negative (AMA/CMS criteria)                Past Medical History:   Past Medical History:   Diagnosis Date    Abnormal cardiovascular  stress test 5/9/2023    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), wet (HCC) 12/09/2022    OS    Anxiety     Arthritis     Asthma     Bronchitis     H/O Multiple Times    Cataract     IOL OU    Chronic kidney disease     Detached retina 12/09/2022    OS    Dry age-related macular degeneration of right eye 12/09/2022    GERD (gastroesophageal reflux disease)     High cholesterol     HTN (hypertension)     Hyperlipidemia     Hypertensive urgency, 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    Hypothyroidism     Lung nodules 07/2009    stable 2010    Macular pucker of both eyes     Osteoporosis     Pain in the chest 5/9/2023    Strain of left trapezius muscle     Urinary tract infection     H/O     Active Hospital Problems    Diagnosis     Hypertensive urgency [I16.0]     Abnormal cardiovascular stress test [R94.39]     Unstable angina (HCC) [I20.0]     LUIS FELIPE (generalized anxiety disorder) [F41.1]     Hypertension [I10]     Acquired hypothyroidism [E03.9]     Dyslipidemia [E78.5]     Asthma, mild intermittent [J45.20]        Past Surgical History:  Past Surgical History:   Procedure Laterality Date    CATARACT EXTRACTION WITH IOL Bilateral 2007    CARPAL TUNNEL RELEASE  2006    ABDOMINAL EXPLORATION      peritonitis    ABDOMINAL HYSTERECTOMY TOTAL      APPENDECTOMY      EYE SURGERY Bilateral     Vitrectomy-OD 2008 & OS 2011    HEMORRHOIDECTOMY      OTHER      D & C    OTHER      FISTULECTOMY    OTHER      HYSTERECTOMY    OTHER      LEFTY BREAST LIPOMA RESECTION    OTHER      H/O Oral Surgery-Dental Extractions & Implants.       Hospital Medications:    Current Facility-Administered Medications:     nitroglycerin (NITROSTAT) tablet 0.4 mg, 0.4 mg, Sublingual, Q5 MIN PRN, Rasheed Frazier M.D.    morphine 4 MG/ML injection 1-2 mg, 1-2 mg, Intravenous, Q10 MIN PRN, Rasheed Frazier M.D.    thyroid (ARMOUR THYROID) tablet 60 mg, 60 mg, Oral, DAILY, Rasheed Frazier M.D., 60 mg at 05/10/23 0514    aspirin EC (ECOTRIN) tablet 81 mg, 81 mg, Oral, DAILY,  Rasheed Frazier M.D., 81 mg at 05/10/23 0510    famotidine (PEPCID) tablet 20 mg, 20 mg, Oral, BID, Rasheed Frazier M.D., 20 mg at 05/10/23 0510    fluticasone (FLOVENT HFA) 110 MCG/ACT inhaler 220 mcg, 2 Puff, Inhalation, BID, Rasheed Frazier M.D., 220 mcg at 05/10/23 0514    LORazepam (ATIVAN) tablet 0.5 mg, 0.5 mg, Oral, HS PRN, Rasheed Frazier M.D., 0.5 mg at 05/09/23 2026    atorvastatin (LIPITOR) tablet 80 mg, 80 mg, Oral, QHS, Rasheed Frazier M.D., 80 mg at 05/09/23 2023    Respiratory Therapy Consult, , Nebulization, Continuous RT, Rasheed Frazier M.D.    acetaminophen (Tylenol) tablet 650 mg, 650 mg, Oral, Q6HRS PRN, Rasheed Frazier M.D.    ondansetron (ZOFRAN) syringe/vial injection 4 mg, 4 mg, Intravenous, Q4HRS PRN, Rasheed Frazier M.D.    ondansetron (ZOFRAN ODT) dispertab 4 mg, 4 mg, Oral, Q4HRS PRN, Rasheed Frazier M.D.    senna-docusate (PERICOLACE or SENOKOT S) 8.6-50 MG per tablet 2 Tablet, 2 Tablet, Oral, BID **AND** polyethylene glycol/lytes (MIRALAX) PACKET 1 Packet, 1 Packet, Oral, QDAY PRN **AND** magnesium hydroxide (MILK OF MAGNESIA) suspension 30 mL, 30 mL, Oral, QDAY PRN **AND** bisacodyl (DULCOLAX) suppository 10 mg, 10 mg, Rectal, QDAY PRN, Rasheed Frazier M.D.    GI Cocktail (hyoscyamine-lidocaine-Maalox) oral susp cup 30 mL, 30 mL, Oral, Q4HRS PRN, Rasheed Frazier M.D.    mag hydrox-al hydrox-simeth (MAALOX PLUS ES or MYLANTA DS) suspension 30 mL, 30 mL, Oral, Q4HRS PRN, Rasheed Frazier M.D.    enoxaparin (Lovenox) inj 40 mg, 40 mg, Subcutaneous, DAILY AT 1800, Rasheed Frazier M.D.    hydrALAZINE (APRESOLINE) injection 10 mg, 10 mg, Intravenous, Q4HRS PRN, Rasheed Frazier M.D.    metoprolol tartrate (LOPRESSOR) tablet 50 mg, 50 mg, Oral, TWICE DAILY, Javad Abreu M.D., 50 mg at 05/10/23 0511    losartan (COZAAR) tablet 100 mg, 100 mg, Oral, Q DAY, Javad Abreu M.D., 100 mg at 05/10/23 0511    amLODIPine (NORVASC) tablet 10 mg, 10 mg, Oral, Q DAY, Javad Abreu M.D., 10  mg at 05/10/23 0511    albuterol inhaler 2 Puff, 2 Puff, Inhalation, Q4H PRN (RT), Rasheed Frazier M.D.    Current Outpatient Medications:  Medications Prior to Admission   Medication Sig Dispense Refill Last Dose    LORazepam (ATIVAN) 0.5 MG Tab Take 0.25-0.5 mg by mouth at bedtime as needed (Sleep).   5/8/2023 at PM    VITAMIN E PO Take 1 Tablet by mouth every day.   5/8/2023 at AM    Glucosamine-Chondroitin (MOVE FREE PO) Take 1 Tablet by mouth every day.   5/8/2023 at AM    omeprazole (PRILOSEC) 20 MG delayed-release capsule Take 20 mg by mouth 1 time a day as needed. Indications: Heartburn   5/8/2023 at PM    Faricimab-svoa (VABYSMO) 6 MG/0.05ML Solution Administer 6 mg into the left eye see administration instructions. Every 5 to 6 weeks  Due May 26th 2023   UNK at UNK    aspirin EC (ECOTRIN) 81 MG Tablet Delayed Response Take 81 mg by mouth every day.   5/8/2023 at AM    thyroid (ARMOUR THYROID) 90 MG Tab TAKE 1 TABLET EVERY DAY 2 DAYS PER WEEK Strength: 90 mg (Patient taking differently: Take 90 mg by mouth two times a week. Takes on Monday and Thursday) 90 Tablet 0 5/8/2023 at AM    fluticasone (FLOVENT HFA) 110 MCG/ACT Aerosol Inhale 2 Puffs 2 times a day. Use spacer. Rinse mouth after each use. 12 g 5 5/8/2023 at PM    famotidine (PEPCID) 40 MG Tab Take 20 mg by mouth 2 times a day.   5/8/2023 at AM    simvastatin (ZOCOR) 40 MG Tab Take 1 Tablet by mouth every day. 90 Tablet 3 5/7/2023 at PM    ARMOUR THYROID 60 MG Tab Take 1 Tablet by mouth every day. 5 days a week (Patient taking differently: Take 60 mg by mouth see administration instructions. Takes on Tuesday, Wednesday, Friday, Saturday and Sunday) 90 Tablet 3 5/7/2023 at AM    Omega-3 Fatty Acids (FISH OIL) 1000 MG Cap capsule Take 1,000 mg by mouth every day.   5/8/2023 at AM    Lutein-Zeaxanthin 25-5 MG Cap Take  by mouth every day.   5/7/2023 at PM    acetaminophen (TYLENOL) 500 MG Tab Take 1,000 mg by mouth every morning.   5/8/2023 at AM     Calcium Carbonate-Vit D-Min (CALCIUM 1200 PO) Take 1 tablet  by mouth 2 times a day.   2023 at AM    Cyanocobalamin (VITAMIN B12 PO) Take 1,000 mcg by mouth every day.   2023 at PM    Turmeric 500 MG Tab Take 500 mg by mouth 2 times a day.   2023 at AM    vitamin D (CHOLECALCIFEROL) 1000 UNIT TABS Take 1,000 Units by mouth 2 times a day.   2023 at AM    MAGNESIUM PO Take 1 Tablet by mouth every day.   2023 at PM    Bioflavonoid Products (LISSY-C) TABS Take 1,000 Tablets by mouth 2 times a day.   2023 at AM    Multiple Vitamin (MULTIVITAMIN PO) Take 1 tablet by mouth every day.   2023 at AM       Medication Allergy:  Allergies   Allergen Reactions    Bactrim Ds Unspecified    Bactrim [Sulfamethoxazole W-Trimethoprim]      She states this was always an antibiotic that should be in her chart     Codeine Diarrhea     Pt stated on 2022    Denosumab Unspecified    Atenolol Unspecified     Leg cramps        Family History:  Family History   Problem Relation Age of Onset    Arthritis Mother     Hyperlipidemia Mother     Glaucoma Maternal Grandfather        Social History:  Social History     Socioeconomic History    Marital status:      Spouse name: Not on file    Number of children: Not on file    Years of education: Not on file    Highest education level: 12th grade   Occupational History    Not on file   Tobacco Use    Smoking status: Former     Packs/day: 1.00     Years: 20.00     Pack years: 20.00     Types: Cigarettes     Start date: 1957     Quit date: 1977     Years since quittin.9     Passive exposure: Past    Smokeless tobacco: Never   Vaping Use    Vaping Use: Never used   Substance and Sexual Activity    Alcohol use: Not Currently    Drug use: No    Sexual activity: Not Currently     Partners: Male   Other Topics Concern    Not on file   Social History Narrative    Not on file     Social Determinants of Health     Financial Resource Strain: Not on file  "  Food Insecurity: Not on file   Transportation Needs: Not on file   Physical Activity: Insufficiently Active (3/6/2022)    Exercise Vital Sign     Days of Exercise per Week: 3 days     Minutes of Exercise per Session: 20 min   Stress: Stress Concern Present (3/6/2022)    [employer] [place] [employer] - Occupational Stress Questionnaire     Feeling of Stress : To some extent   Social Connections: Moderately Isolated (3/6/2022)    Social Connection and Isolation Panel [NHANES]     Frequency of Communication with Friends and Family: More than three times a week     Frequency of Social Gatherings with Friends and Family: Once a week     Attends Congregation Services: Never     Active Member of Clubs or Organizations: Yes     Attends Club or Organization Meetings: 1 to 4 times per year     Marital Status:    Intimate Partner Violence: Not on file   Housing Stability: Not on file         Physical Exam:  Vitals/ General Appearance:   Weight/BMI: Body mass index is 27.04 kg/m².  /57   Pulse 67   Temp 36.4 °C (97.5 °F) (Temporal)   Resp 18   Ht 1.676 m (5' 6\")   Wt 76 kg (167 lb 8.8 oz)   SpO2 96%   Vitals:    05/10/23 0430 05/10/23 0507 05/10/23 0715 05/10/23 1101   BP: 118/55 114/56 120/56 118/57   Pulse: 62 80 61 67   Resp: 16  18 18   Temp: 36.1 °C (96.9 °F)  36.2 °C (97.2 °F) 36.4 °C (97.5 °F)   TempSrc: Temporal  Temporal Temporal   SpO2: 95%  96% 96%   Weight:       Height:         Oxygen Therapy:  Pulse Oximetry: 96 %, O2 (LPM): 0, O2 Delivery Device: None - Room Air    Constitutional:   Well developed, Well nourished, No acute distress  HENMT:  Normocephalic, Atraumatic, Oropharynx moist mucous membranes, No oral exudates, Nose normal.  No thyromegaly.  Eyes:  EOMI, Conjunctiva normal, No discharge.  Neck:  Normal range of motion, No cervical tenderness,  no JVD.  Cardiovascular:  Normal heart rate, Normal rhythm, No murmurs, No rubs, No gallops.   Extremitites with intact distal pulses, " no cyanosis, or edema.  Lungs:  Normal breath sounds, breath sounds clear to auscultation bilaterally,  no rales, no rhonchi, no wheezing.   Abdomen: Bowel sounds normal, Soft, No tenderness, No guarding, No rebound, No masses, No hepatosplenomegaly.  Skin: Warm, Dry, No erythema, No rash, no induration.  Neurologic: Alert & oriented x 3, No focal deficits noted, cranial nerves II through X are intact.  Psychiatric: Affect normal, Judgment normal, Mood normal.      MDM (Data Review):     Records reviewed and summarized in current documentation    Lab Data Review:  Recent Results (from the past 24 hour(s))   Troponin in four (4) hours    Collection Time: 23  3:59 PM   Result Value Ref Range    Troponin T 25 (H) 6 - 19 ng/L   EKG    Collection Time: 23  4:10 PM   Result Value Ref Range    Report       [facility]    Test Date:  2023  Pt Name:    NANCY Gloster                Department: ER  MRN:        [id number]                      Room:       [facility]  Gender:     Female                       Technician: CRISSY  :        1940                   Requested By:ER TRIAGE PROTOCOL  Order #:    [id number]                    Reading MD: Good Cooper MD    Measurements  Intervals                                Axis  Rate:       65                           P:          54  MN:         216                          QRS:        -2  QRSD:       96                           T:          43  QT:         394  QTc:        410    Interpretive Statements  Sinus rhythm with artifact  Prolonged MN interval  Borderline early R/S transition  Electronically Signed On 2023 16:19:41 PDT by Good Cooper MD     EKG in four (4) hours    Collection Time: 23  9:17 PM   Result Value Ref Range    Report       [facility]    Test Date:  2023  Pt Name:    NANCY MARINA                Department: ER  MRN:        [id number]                      Room:       [facility]  Gender:     Female                        Technician: CECI  :        1940                   Requested By:GENE FRASER  Order #:    [id number]                    Reading MD:    Measurements  Intervals                                Axis  Rate:       60                           P:          70  RI:         213                          QRS:        -7  QRSD:       95                           T:          45  QT:         412  QTc:        412    Interpretive Statements  Sinus rhythm  Borderline prolonged RI interval  Compared to ECG 2023 16:10:18  No significant changes     CBC with Differential    Collection Time: 05/10/23  6:03 AM   Result Value Ref Range    WBC 7.8 4.8 - 10.8 K/uL    RBC 4.58 4.20 - 5.40 M/uL    Hemoglobin 14.3 12.0 - 16.0 g/dL    Hematocrit 43.2 37.0 - 47.0 %    MCV 94.3 81.4 - 97.8 fL    MCH 31.2 27.0 - 33.0 pg    MCHC 33.1 (L) 33.6 - 35.0 g/dL    RDW 43.3 35.9 - 50.0 fL    Platelet Count 264 164 - 446 K/uL    MPV 9.9 9.0 - 12.9 fL    Neutrophils-Polys 53.10 44.00 - 72.00 %    Lymphocytes 28.60 22.00 - 41.00 %    Monocytes 11.60 0.00 - 13.40 %    Eosinophils 5.70 0.00 - 6.90 %    Basophils 0.60 0.00 - 1.80 %    Immature Granulocytes 0.40 0.00 - 0.90 %    Nucleated RBC 0.00 /100 WBC    Neutrophils (Absolute) 4.16 2.00 - 7.15 K/uL    Lymphs (Absolute) 2.24 1.00 - 4.80 K/uL    Monos (Absolute) 0.91 (H) 0.00 - 0.85 K/uL    Eos (Absolute) 0.45 0.00 - 0.51 K/uL    Baso (Absolute) 0.05 0.00 - 0.12 K/uL    Immature Granulocytes (abs) 0.03 0.00 - 0.11 K/uL    NRBC (Absolute) 0.00 K/uL   Basic Metabolic Panel (BMP)    Collection Time: 05/10/23  6:03 AM   Result Value Ref Range    Sodium 141 135 - 145 mmol/L    Potassium 4.1 3.6 - 5.5 mmol/L    Chloride 105 96 - 112 mmol/L    Co2 22 20 - 33 mmol/L    Glucose 94 65 - 99 mg/dL    Bun 19 8 - 22 mg/dL    Creatinine 0.88 0.50 - 1.40 mg/dL    Calcium 8.9 8.5 - 10.5 mg/dL    Anion Gap 14.0 7.0 - 16.0   ESTIMATED GFR    Collection Time: 05/10/23  6:03 AM   Result Value Ref Range    GFR (CKD-EPI)  66 >60 mL/min/1.73 m 2   PT/INR    Collection Time: 05/10/23  9:27 AM   Result Value Ref Range    PT 12.9 12.0 - 14.6 sec    INR 0.98 0.87 - 1.13       Imaging/Procedures Review:    Chest Xray:  Reviewed    EKG:   As in HPI.     MDM (Assessment and Plan):     Active Hospital Problems    Diagnosis     Hypertensive urgency [I16.0]     Abnormal cardiovascular stress test [R94.39]     Unstable angina (HCC) [I20.0]     LUIS FELIPE (generalized anxiety disorder) [F41.1]     Hypertension [I10]     Acquired hypothyroidism [E03.9]     Dyslipidemia [E78.5]     Asthma, mild intermittent [J45.20]        Blood pressure is better now. Losartan 100 mg daily, amlodipine 10 mg daily, Metoprolol 50 mg BID.  Continue Atorvastatin 80 mg daily.  Await coronary angiogram.        Thank you for referring this patient to our cardiology service.  We will follow patient with you.      Javad Abreu MD.   Cardiology Inpatient Service.  [facility].  [phone].  [place], [place].

## 2023-05-10 NOTE — PROGRESS NOTES
Assessment completed.  Pt A&Ox4. Pt denies any CP, SOB, nausea, palpitations at this time.  Sinus rhythm on telemetry monitor. POC discussed: NPO for cath, echo. Communication board updated. Needs met

## 2023-05-10 NOTE — PROGRESS NOTES
Received bedside report from BONITA Abernathy. Assumed care of patient. Pt A&O x4 on RA. Pt states she is feeling anxious about her stress test in the morning. Education given and all questions answered. Pt sitting upright in bed with call light within reach. Patient has no further questions at this time.

## 2023-05-10 NOTE — PROCEDURES
Cardiac Catheterization report    5/10/2023  3:13 PM    REFERRING MD: Dr. Abreu    INDICATION/PREOPERATIVE DIAGNOSIS:  Unstable angina  Hypertension    POSTOPERATIVE DIAGNOSIS:  99% proximal LAD stenosis, single-vessel disease  LVEDP 0 mmHg  Successful IVUS guided PCI of proximal LAD (3.5 x 18 mm Luther BRITT), excellent result    RECOMMENDATIONS:  Guideline directed medical therapy   Cardiovascular Risk factor modification  Dual antiplatelet therapy for minimum 1 year      PROCEDURES PERFORMED:  Coronary arteriograms  Left heart catheterization and Left ventriculogram   Supervision moderate sedation  Percutaneous coronary intervention  Intravascular ultrasound LAD      FINDINGS:  I.  HEMODYNAMICS   Ao: 108/46 mmHg   LEDP: 0 mmHg   Gradient on LV pullback: No    II. CORONARY ANGIOGRAPHY:  Left main coronary artery: Large caliber bifurcating no CAD  Left anterior descending artery: Large caliber thrombotic 99% proximal stenosis with TRA II flow.  No branch disease.  Post PCI 0% residual stenosis and TRA-3 flow.  Left circumflex coronary artery: Moderate caliber no significant CAD.  Right coronary artery: Large-caliber trivial CAD.        Procedure details:  The risks and benefits of cardiac catheterization and possible intervention were explained to the patient including death, heart attack, stroke, and emergency surgery.  The patient was brought to the cardiac catheterization lab where the right wrist was prepped and draped in the usual manner for cardiac catheterization.  The area was anesthetized with lidocaine and a 5/6 FR sheath was inserted into the right radial artery without difficulty. A Osmar catheter was advanced to the ostia of the Left coronary artery and arteriograms were recorded.   A Osmar catheter was advanced to the ostia of the right coronary artery and arteriograms were recorded. Aortic valve was crossed using Osmar catheter for left heart catheterization was performed.     Description of PCI:  The  "decision was made to intervene on the culprit coronary artery.  Anticoagulation was initiated as below.  The diagnostic catheter was exchanged over a wire for an 6 [place] EBU 3.5 guide seated appropriately. A 0.014\" mm  BMW was advanced into the artery and use to cross the lesion. A 3.0 mm balloon was used to predilate the lesion.  The glide intravascular sound catheter was delivered distal to this lesion and manual pullback recorded.  A 3.5 x 18 mm [place] BRITT was then positioned and deployed at nominal pressure. Following this a 3.5 mm noncompliant balloon to 18 yenny was used to post dilate the stent.  IVUS was repeated showing excellent stent expansion and apposition.  There was an excellent angiographic result with TRA-3 flow and no residual stenosis in the stented segment. All catheters and guidewires were removed and the patient left the cath lab in stable condition.    At the completion of the case the sheath was removed and hemostasis achieved utilizing a radial compression band patient with Cath Lab in stable condition and there were no apparent complications.    Anticoagulant: Angiomax  Antiplatelet: Plavix (clopidogrel)  EBL <25 cc  Complications: none  Specimens: none  Contrast: 55 cc    Complications:  None apparent    Sedation time:  Moderate sedation directly monitored by me during the case while supervising the administration of the sedation medication by an independent trained RN to assist in the monitoring of the patient's level of consciousness and physiological status. I, the supervising physician was present the entire time from beginning of medication administration until the end of the procedure from 1444 until 1510. For detailed administration records please see the moderate sedation documentation in the media tab.    Following the procedure I discussed the results with the patient and the patients designated contact/family member  .    Wes Rowell MD, FACC, [facility]

## 2023-05-10 NOTE — CARE PLAN
The patient is Stable - Low risk of patient condition declining or worsening    Shift Goals  Clinical Goals: echo, cath lab  Patient Goals: rest  Family Goals: n/a    Progress made toward(s) clinical / shift goals:    Problem: Knowledge Deficit - Standard  Goal: Patient and family/care givers will demonstrate understanding of plan of care, disease process/condition, diagnostic tests and medications  Outcome: Progressing     Problem: Pain - Standard  Goal: Alleviation of pain or a reduction in pain to the patient’s comfort goal  Outcome: Progressing

## 2023-05-11 VITALS
SYSTOLIC BLOOD PRESSURE: 116 MMHG | DIASTOLIC BLOOD PRESSURE: 55 MMHG | HEART RATE: 70 BPM | HEIGHT: 66 IN | TEMPERATURE: 98.1 F | RESPIRATION RATE: 16 BRPM | BODY MASS INDEX: 26.93 KG/M2 | OXYGEN SATURATION: 95 % | WEIGHT: 167.55 LBS

## 2023-05-11 LAB
ANION GAP SERPL CALC-SCNC: 12 MMOL/L (ref 7–16)
BUN SERPL-MCNC: 17 MG/DL (ref 8–22)
CALCIUM SERPL-MCNC: 8.5 MG/DL (ref 8.5–10.5)
CHLORIDE SERPL-SCNC: 108 MMOL/L (ref 96–112)
CO2 SERPL-SCNC: 22 MMOL/L (ref 20–33)
CREAT SERPL-MCNC: 0.87 MG/DL (ref 0.5–1.4)
EKG IMPRESSION: NORMAL
EKG IMPRESSION: NORMAL
ERYTHROCYTE [DISTWIDTH] IN BLOOD BY AUTOMATED COUNT: 41.6 FL (ref 35.9–50)
GFR SERPLBLD CREATININE-BSD FMLA CKD-EPI: 67 ML/MIN/1.73 M 2
GLUCOSE SERPL-MCNC: 123 MG/DL (ref 65–99)
HCT VFR BLD AUTO: 43.6 % (ref 37–47)
HGB BLD-MCNC: 14.3 G/DL (ref 12–16)
MCH RBC QN AUTO: 30.4 PG (ref 27–33)
MCHC RBC AUTO-ENTMCNC: 32.8 G/DL (ref 33.6–35)
MCV RBC AUTO: 92.6 FL (ref 81.4–97.8)
PLATELET # BLD AUTO: 265 K/UL (ref 164–446)
PMV BLD AUTO: 10.1 FL (ref 9–12.9)
POTASSIUM SERPL-SCNC: 3.6 MMOL/L (ref 3.6–5.5)
RBC # BLD AUTO: 4.71 M/UL (ref 4.2–5.4)
SODIUM SERPL-SCNC: 142 MMOL/L (ref 135–145)
WBC # BLD AUTO: 8.7 K/UL (ref 4.8–10.8)

## 2023-05-11 PROCEDURE — 99232 SBSQ HOSP IP/OBS MODERATE 35: CPT | Performed by: INTERNAL MEDICINE

## 2023-05-11 PROCEDURE — 700102 HCHG RX REV CODE 250 W/ 637 OVERRIDE(OP): Performed by: INTERNAL MEDICINE

## 2023-05-11 PROCEDURE — 85027 COMPLETE CBC AUTOMATED: CPT

## 2023-05-11 PROCEDURE — 93010 ELECTROCARDIOGRAM REPORT: CPT | Mod: 76 | Performed by: INTERNAL MEDICINE

## 2023-05-11 PROCEDURE — 93010 ELECTROCARDIOGRAM REPORT: CPT | Performed by: INTERNAL MEDICINE

## 2023-05-11 PROCEDURE — 99239 HOSP IP/OBS DSCHRG MGMT >30: CPT | Performed by: INTERNAL MEDICINE

## 2023-05-11 PROCEDURE — A9270 NON-COVERED ITEM OR SERVICE: HCPCS | Performed by: INTERNAL MEDICINE

## 2023-05-11 PROCEDURE — G0378 HOSPITAL OBSERVATION PER HR: HCPCS

## 2023-05-11 PROCEDURE — 97162 PT EVAL MOD COMPLEX 30 MIN: CPT

## 2023-05-11 PROCEDURE — 80048 BASIC METABOLIC PNL TOTAL CA: CPT

## 2023-05-11 RX ORDER — METOPROLOL TARTRATE 50 MG/1
50 TABLET, FILM COATED ORAL 2 TIMES DAILY
Qty: 180 TABLET | Refills: 0 | Status: SHIPPED | OUTPATIENT
Start: 2023-05-11 | End: 2023-05-11 | Stop reason: SDUPTHER

## 2023-05-11 RX ORDER — ATORVASTATIN CALCIUM 40 MG/1
80 TABLET, FILM COATED ORAL
Qty: 90 TABLET | Refills: 0 | Status: SHIPPED | OUTPATIENT
Start: 2023-05-11 | End: 2023-05-11 | Stop reason: SDUPTHER

## 2023-05-11 RX ORDER — CLOPIDOGREL BISULFATE 75 MG/1
75 TABLET ORAL DAILY
Qty: 90 TABLET | Refills: 0 | Status: SHIPPED | OUTPATIENT
Start: 2023-05-12 | End: 2023-05-11 | Stop reason: SDUPTHER

## 2023-05-11 RX ORDER — METOPROLOL TARTRATE 50 MG/1
50 TABLET, FILM COATED ORAL 2 TIMES DAILY
Qty: 180 TABLET | Refills: 0 | Status: SHIPPED | OUTPATIENT
Start: 2023-05-11 | End: 2023-07-10 | Stop reason: SDUPTHER

## 2023-05-11 RX ORDER — LOSARTAN POTASSIUM 100 MG/1
100 TABLET ORAL DAILY
Qty: 90 TABLET | Refills: 0 | Status: SHIPPED | OUTPATIENT
Start: 2023-05-12 | End: 2023-07-10 | Stop reason: SDUPTHER

## 2023-05-11 RX ORDER — CLOPIDOGREL BISULFATE 75 MG/1
75 TABLET ORAL DAILY
Qty: 90 TABLET | Refills: 0 | Status: SHIPPED | OUTPATIENT
Start: 2023-05-12 | End: 2023-07-10 | Stop reason: SDUPTHER

## 2023-05-11 RX ORDER — AMLODIPINE BESYLATE 10 MG/1
10 TABLET ORAL DAILY
Qty: 90 TABLET | Refills: 0 | Status: SHIPPED | OUTPATIENT
Start: 2023-05-12 | End: 2023-07-10 | Stop reason: SDUPTHER

## 2023-05-11 RX ORDER — ATORVASTATIN CALCIUM 40 MG/1
80 TABLET, FILM COATED ORAL
Qty: 90 TABLET | Refills: 0 | Status: SHIPPED | OUTPATIENT
Start: 2023-05-11 | End: 2023-07-10 | Stop reason: SDUPTHER

## 2023-05-11 RX ORDER — LOSARTAN POTASSIUM 100 MG/1
100 TABLET ORAL DAILY
Qty: 90 TABLET | Refills: 0 | Status: SHIPPED | OUTPATIENT
Start: 2023-05-12 | End: 2023-05-11 | Stop reason: SDUPTHER

## 2023-05-11 RX ORDER — AMLODIPINE BESYLATE 10 MG/1
10 TABLET ORAL DAILY
Qty: 90 TABLET | Refills: 0 | Status: SHIPPED | OUTPATIENT
Start: 2023-05-12 | End: 2023-05-11 | Stop reason: SDUPTHER

## 2023-05-11 RX ADMIN — CLOPIDOGREL BISULFATE 75 MG: 75 TABLET ORAL at 05:14

## 2023-05-11 RX ADMIN — THYROID, PORCINE 60 MG: 30 TABLET ORAL at 05:13

## 2023-05-11 RX ADMIN — ASPIRIN 81 MG: 81 TABLET, COATED ORAL at 05:13

## 2023-05-11 RX ADMIN — AMLODIPINE BESYLATE 10 MG: 10 TABLET ORAL at 05:13

## 2023-05-11 RX ADMIN — LOSARTAN POTASSIUM 100 MG: 50 TABLET, FILM COATED ORAL at 05:13

## 2023-05-11 RX ADMIN — FAMOTIDINE 20 MG: 20 TABLET, FILM COATED ORAL at 05:13

## 2023-05-11 RX ADMIN — ACETAMINOPHEN 650 MG: 325 TABLET, FILM COATED ORAL at 04:22

## 2023-05-11 RX ADMIN — METOPROLOL TARTRATE 50 MG: 25 TABLET, FILM COATED ORAL at 05:13

## 2023-05-11 RX ADMIN — FLUTICASONE PROPIONATE 220 MCG: 110 AEROSOL, METERED RESPIRATORY (INHALATION) at 05:12

## 2023-05-11 ASSESSMENT — COGNITIVE AND FUNCTIONAL STATUS - GENERAL
MOBILITY SCORE: 24
SUGGESTED CMS G CODE MODIFIER MOBILITY: CH

## 2023-05-11 ASSESSMENT — LIFESTYLE VARIABLES: REASON UNABLE TO ASSESS: N

## 2023-05-11 ASSESSMENT — GAIT ASSESSMENTS
DISTANCE (FEET): 50
GAIT LEVEL OF ASSIST: SUPERVISED
DEVIATION: BRADYKINETIC

## 2023-05-11 ASSESSMENT — PAIN DESCRIPTION - PAIN TYPE: TYPE: ACUTE PAIN

## 2023-05-11 NOTE — DISCHARGE SUMMARY
Discharge Summary    CHIEF COMPLAINT ON ADMISSION  Chief Complaint   Patient presents with    Chest Pain     For weeks, seen at cardiology yesterday. They want her to get an angiogram due to a blockage seen on stress test one month ago. She has been having intermittent chest pain, midsternal radiating to shoulders.        Reason for Admission  chest pain     Admission Date  5/9/2023    CODE STATUS  Full Code    HPI & HOSPITAL COURSE  Karli Berrios is a 82 y.o. female with hypertension, hyperlipidemia, mild intermittent asthma, hypothyroidism, history of macular degeneration and detached retina, who has been having intermittent exertional mid chest pain radiating to the shoulders in the past several months since February 2022, which occurs every time she exerts herself such as walking, and significantly improves with rest.  She does feel anxious with the episodes, but denies accompanying shortness of breath, nausea, diaphoresis, or palpitations.  Her PCP obtained an outpatient cardiac stress test which was done on 4/10/2023 which subsequently showed decreased perfusion of moderate severity from apical to mid septum, anterior and anteroseptal wall segments with mild reversible ischemia with SDS of 2.  She is to follow-up with outpatient cardiology, with plan for outpatient C.  However on the day prior to admission, she had frequent episodes of the chest pain (3-4 times), and had resting chest pain last night while sleeping which woke her up from sleep.  She then decided to go to the ED.     On evaluation, she had significant elevated blood pressure to as high as 190/84.  She was not hypoxic.  Initial blood work-up showed unremarkable CBC and normal BMP.  Troponin was 26.  Chest x-ray did not show infiltrates or consolidation. EKG showed normal sinus rhythm, with low voltage in the precordial leads, with no dynamic ischemic changes.  Her blood pressure control is optimized with oral antihypertensives, with  improved blood pressure control.  Cardiology was consulted, and recommended C which she subsequently had which showed single-vessel disease with 99% proximal LAD stenosis and she had PCI with BRITT with excellent result.  She was monitored overnight, and she remained hemodynamically stable and afebrile, and maintain excellent blood pressure control.  She maintained sinus rhythm on telemetry monitoring.    I have personally seen and examined the patient on the day of discharge. With her clinical improvement, she was deemed ready to discharge from the hospital as she did not have any further hospitalization needs. Patient felt comfortable going home. The discharge plan was discussed with the patient, with which she was agreeable to.     Therefore, she is discharged in good and stable condition to home with close outpatient follow-up.      Discharge Date  5/11/2023      FOLLOW UP ITEMS POST DISCHARGE  -Continue aspirin and Plavix for at least 1 year.  Continue statin.  Follow-up with cardiology.  -She will continue on Norvasc, metoprolol, and losartan.  Monitor blood pressure trend as outpatient.  -Follow-up with PCP.  - counseled to seek immediate medical attention, or return to the ED for recurrent or worsening symptoms.      DISCHARGE DIAGNOSES  Principal Problem:    Unstable angina (HCC) (POA: Yes)  Active Problems:    Hypertensive urgency (POA: Yes)    Abnormal cardiovascular stress test (POA: Yes)    Acquired hypothyroidism (POA: Yes)    Dyslipidemia (POA: Yes)    Asthma, mild intermittent (Chronic) (POA: Yes)    Hypertension (POA: Yes)    LUIS FELIPE (generalized anxiety disorder) (POA: Yes)  Resolved Problems:    * No resolved hospital problems. *      FOLLOW UP  Future Appointments   Date Time Provider Department Center   5/24/2023  8:30 AM [facility] CATH LAB 4 CLOT None   6/7/2023  2:00 PM Maicol Westbrook M.D. [facility] None   7/13/2023 11:00 AM SPIROMETRY/6MW [facility] None   7/13/2023 12:40 PM LUTHER Escobar [facility] None      No follow-up provider specified.    MEDICATIONS ON DISCHARGE     Medication List        START taking these medications        Instructions   amLODIPine 10 MG Tabs  Start taking on: May 12, 2023  Commonly known as: NORVASC   Take 1 Tablet by mouth every day.  Dose: 10 mg     atorvastatin 40 MG Tabs  Commonly known as: LIPITOR   Take 2 Tablets by mouth at bedtime.  Dose: 80 mg     clopidogrel 75 MG Tabs  Start taking on: May 12, 2023  Commonly known as: PLAVIX   Take 1 Tablet by mouth every day.  Dose: 75 mg     losartan 100 MG Tabs  Start taking on: May 12, 2023  Commonly known as: COZAAR   Take 1 Tablet by mouth every day.  Dose: 100 mg     metoprolol tartrate 50 MG Tabs  Commonly known as: LOPRESSOR   Take 1 Tablet by mouth 2 times a day.  Dose: 50 mg            CHANGE how you take these medications        Instructions   * [place] Thyroid 60 MG Tabs  What changed: Another medication with the same name was changed. Make sure you understand how and when to take each.  Generic drug: thyroid   Take 1 Tablet by mouth every day. 5 days a week  Dose: 60 mg     * thyroid 90 MG Tabs  What changed:   how much to take  how to take this  when to take this  additional instructions  Commonly known as: [place] Thyroid   TAKE 1 TABLET EVERY DAY 2 DAYS PER WEEK Strength: 90 mg           * This list has 2 medication(s) that are the same as other medications prescribed for you. Read the directions carefully, and ask your doctor or other care provider to review them with you.                CONTINUE taking these medications        Instructions   acetaminophen 500 MG Tabs  Commonly known as: TYLENOL   Take 1,000 mg by mouth every morning.  Dose: 1,000 mg     aspirin EC 81 MG Tbec  Commonly known as: ECOTRIN   Take 1 Tablet by mouth every day.  Dose: 81 mg     CALCIUM 1200 PO   Take 1 tablet  by mouth 2 times a day.  Dose: 1 tablet      Jennifer-C Tabs   Take 1,000 Tablets by mouth 2 times a day.  Dose: 1,000 Tablet     famotidine 40 MG  Tabs  Commonly known as: PEPCID   Take 20 mg by mouth 2 times a day.  Dose: 20 mg     fish oil 1000 MG Caps capsule   Take 1,000 mg by mouth every day.  Dose: 1,000 mg     fluticasone 110 MCG/ACT Aero  Commonly known as: Flovent HFA   Inhale 2 Puffs 2 times a day. Use spacer. Rinse mouth after each use.  Dose: 2 Puff     LORazepam 0.5 MG Tabs  Commonly known as: ATIVAN   Take 0.25-0.5 mg by mouth at bedtime as needed (Sleep).  Dose: 0.25-0.5 mg     Lutein-Zeaxanthin 25-5 MG Caps   Take  by mouth every day.     MAGNESIUM PO   Take 1 Tablet by mouth every day.  Dose: 1 Tablet     MOVE FREE PO   Take 1 Tablet by mouth every day.  Dose: 1 Tablet     MULTIVITAMIN PO   Take 1 tablet by mouth every day.  Dose: 1 tablet      omeprazole 20 MG delayed-release capsule  Commonly known as: PRILOSEC   Take 20 mg by mouth 1 time a day as needed. Indications: Heartburn  Dose: 20 mg     Turmeric 500 MG Tabs   Take 500 mg by mouth 2 times a day.  Dose: 500 mg     Vabysmo 6 MG/0.05ML Soln  Generic drug: Faricimab-svoa   Administer 6 mg into the left eye see administration instructions. Every 5 to 6 weeks  Due May 26th 2023  Dose: 6 mg     VITAMIN B12 PO   Take 1,000 mcg by mouth every day.  Dose: 1,000 mcg     vitamin D 1000 Unit (25 mcg) Tabs  Commonly known as: cholecalciferol   Take 1,000 Units by mouth 2 times a day.  Dose: 1,000 Units     VITAMIN E PO   Take 1 Tablet by mouth every day.  Dose: 1 Tablet            STOP taking these medications      simvastatin 40 MG Tabs  Commonly known as: ZOCOR              Allergies  Allergies   Allergen Reactions    Bactrim Ds Unspecified    Bactrim [Sulfamethoxazole W-Trimethoprim]      She states this was always an antibiotic that should be in her chart     Codeine Diarrhea     Pt stated on 06/26/2022    Denosumab Unspecified    Atenolol Unspecified     Leg cramps        DIET  Orders Placed This Encounter   Procedures    Diet Order Diet: Cardiac     Standing Status:   Standing     Number of  Occurrences:   1     Order Specific Question:   Diet:     Answer:   Cardiac [6]       ACTIVITY  As tolerated.  Weight bearing as tolerated    CONSULTATIONS  Cardiology    PROCEDURES  As above    LABORATORY  Lab Results   Component Value Date    SODIUM 142 05/11/2023    POTASSIUM 3.6 05/11/2023    CHLORIDE 108 05/11/2023    CO2 22 05/11/2023    GLUCOSE 123 (H) 05/11/2023    BUN 17 05/11/2023    CREATININE 0.87 05/11/2023        Lab Results   Component Value Date    WBC 8.7 05/11/2023    HEMOGLOBIN 14.3 05/11/2023    HEMATOCRIT 43.6 05/11/2023    PLATELETCT 265 05/11/2023        Total time of the discharge process = 35 minutes.

## 2023-05-11 NOTE — PROGRESS NOTES
Pt in bed awake,alert,pleasant.no c/o chest pain,tele with SR,pt had 6 beats v-tach,pt asymptomatic,bp 118/58,informed MD,no c/o palpitation,for discharge,awaiting for cardiologist.

## 2023-05-11 NOTE — DISCHARGE INSTRUCTIONS
FOLLOW UP ITEMS POST DISCHARGE  -Continue aspirin and Plavix for at least 1 year.  Continue statin.  Follow-up with cardiology.  -She will continue on Norvasc, metoprolol, and losartan.  Monitor blood pressure trend as outpatient.  -Follow-up with PCP.  - counseled to seek immediate medical attention, or return to the ED for recurrent or worsening symptoms.    Chest Pain Observation  It is often hard to give a specific diagnosis for the cause of chest pain. Among other possibilities your symptoms might be caused by inadequate oxygen delivery to your heart (angina). Angina that is not treated or evaluated can lead to a heart attack (myocardial infarction) or death.  Blood tests, electrocardiograms, and X-rays may have been done to help determine a possible cause of your chest pain. After evaluation and observation, your health care provider has determined that it is unlikely your pain was caused by an unstable condition that requires hospitalization. However, a full evaluation of your pain may need to be completed, with additional diagnostic testing as directed. It is very important to keep your follow-up appointments. Not keeping your follow-up appointments could result in permanent heart damage, disability, or death. If there is any problem keeping your follow-up appointments, you must call your health care provider.  HOME CARE INSTRUCTIONS   Due to the slight chance that your pain could be angina, it is important to follow your health care provider's treatment plan and also maintain a healthy lifestyle:  Maintain or work toward achieving a healthy weight.  Stay physically active and exercise regularly.  Decrease your salt intake.  Eat a balanced, healthy diet. Talk to a dietitian to learn about heart-healthy foods.  Increase your fiber intake by including whole grains, vegetables, fruits, and nuts in your diet.  Avoid situations that cause stress, anger, or depression.  Take medicines as advised by your  health care provider. Report any side effects to your health care provider. Do not stop medicines or adjust the dosages on your own.  Quit smoking. Do not use nicotine patches or gum until you check with your health care provider.  Keep your blood pressure, blood sugar, and cholesterol levels within normal limits.  Limit alcohol intake to no more than 1 drink per day for women who are not pregnant and 2 drinks per day for men.  Do not abuse drugs.  SEEK IMMEDIATE MEDICAL CARE IF:  You have severe chest pain or pressure which may include symptoms such as:  You feel pain or pressure in your arms, neck, jaw, or back.  You have severe back or abdominal pain, feel sick to your stomach (nauseous), or throw up (vomit).  You are sweating profusely.  You are having a fast or irregular heartbeat.  You feel short of breath while at rest.  You notice increasing shortness of breath during rest, sleep, or with activity.  You have chest pain that does not get better after rest or after taking your usual medicine.  You wake from sleep with chest pain.  You are unable to sleep because you cannot breathe.  You develop a frequent cough or you are coughing up blood.  You feel dizzy, faint, or experience extreme fatigue.  You develop severe weakness, dizziness, fainting, or chills.  Any of these symptoms may represent a serious problem that is an emergency. Do not wait to see if the symptoms will go away. Call your local emergency services (911 in the [place] Do not drive yourself to the hospital.  MAKE SURE YOU:  Understand these instructions.  Will watch your condition.  Will get help right away if you are not doing well or get worse.     This information is not intended to replace advice given to you by your health care provider. Make sure you discuss any questions you have with your health care provider.     Document Released: 01/20/2012 Document Revised: 12/23/2014 Document Reviewed: 06/19/2014  [employer] Interactive Patient  Education ©2016 [employer]    Coronary Angiogram With Stent, Care After  This sheet gives you information about how to care for yourself after your procedure. Your health care provider may also give you more specific instructions. If you have problems or questions, contact your health care provider.  What can I expect after the procedure?  After your procedure, it is common to have:  Bruising in the area where a small, thin tube (catheter) was inserted. This usually fades within 1-2 weeks.  Blood collecting in the tissue (hematoma) that may be painful to the touch. It should usually decrease in size and tenderness within 1-2 weeks.  Follow these instructions at home:  Insertion area care  Do not take baths, swim, or use a hot tub until your health care provider approves.  You may shower 24-48 hours after the procedure or as directed by your health care provider.  Follow instructions from your health care provider about how to take care of your incision. Make sure you:  Wash your hands with soap and water before you change your bandage (dressing). If soap and water are not available, use hand .  Change your dressing as told by your health care provider.  Leave stitches (sutures), skin glue, or adhesive strips in place. These skin closures may need to stay in place for 2 weeks or longer. If adhesive strip edges start to loosen and curl up, you may trim the loose edges. Do not remove adhesive strips completely unless your health care provider tells you to do that.  Remove the bandage (dressing) and gently wash the catheter insertion site with plain soap and water.  Pat the area dry with a clean towel. Do not rub the area, because that may cause bleeding.  Do not apply powder or lotion to the incision area.  Check your incision area every day for signs of infection. Check for:  More redness, swelling, or pain.  More fluid or blood.  Warmth.  Pus or a bad smell.  Activity  Do not drive for 24 hours if you  were given a medicine to help you relax (sedative).  Do not lift anything that is heavier than 10 lb (4.5 kg) for 5 days after your procedure or as directed by your health care provider.  Ask your health care provider when it is okay for you:  To return to work or school.  To resume usual physical activities or sports.  To resume sexual activity.  Eating and drinking    Eat a heart-healthy diet. This should include plenty of fresh fruits and vegetables.  Avoid the following types of food:  Food that is high in salt.  Canned or highly processed food.  Food that is high in saturated fat or sugar.  Fried food.  Limit alcohol intake to no more than 1 drink a day for non-pregnant women and 2 drinks a day for men. One drink equals 12 oz of beer, 5 oz of wine, or 1½ oz of hard liquor.  Lifestyle    Do not use any products that contain nicotine or tobacco, such as cigarettes and e-cigarettes. If you need help quitting, ask your health care provider.  Take steps to manage and control your weight.  Get regular exercise.  Manage your blood pressure.  Manage other health problems, such as diabetes.  General instructions  Take over-the-counter and prescription medicines only as told by your health care provider. Blood thinners may be prescribed after your procedure to improve blood flow through the stent.  If you need an MRI after your heart stent has been placed, be sure to tell the health care provider who orders the MRI that you have a heart stent.  Keep all follow-up visits as directed by your health care provider. This is important.  Contact a health care provider if:  You have a fever.  You have chills.  You have increased bleeding from the catheter insertion area. Hold pressure on the area.  Get help right away if:  You develop chest pain or shortness of breath.  You feel faint or you pass out.  You have unusual pain at the catheter insertion area.  You have redness, warmth, or swelling at the catheter insertion  area.  You have drainage (other than a small amount of blood on the dressing) from the catheter insertion area.  The catheter insertion area is bleeding, and the bleeding does not stop after 30 minutes of holding steady pressure on the area.  You develop bleeding from any other place, such as from your rectum. There may be bright red blood in your urine or stool, or it may appear as black, tarry stool.  This information is not intended to replace advice given to you by your health care provider. Make sure you discuss any questions you have with your health care provider.  Document Released: 07/07/2006 Document Revised: 11/30/2018 Document Reviewed: 09/14/2017  Elsevier Patient Education © 2020 [employer]

## 2023-05-11 NOTE — THERAPY
"Physical Therapy   Initial Evaluation     Patient Name: Karli Berrios  Age:  82 y.o., Sex:  female  Medical Record #: [id number]  Today's Date: 5/11/2023     Precautions  Precautions: Cardiac Precautions (See Comments)  Comments: Exercise  +/- 15 bpm, Activity HR (85%) 91+/-15 bpm    Assessment  Patient is 82 y.o. female admitted for unstable angina with PMH of hypertension, hyperlipidemia, mild intermittent asthma, hypothyroidism, history of macular degeneration and detached retina. Pt underwent cardiac cath with PCI to LAD, EF 70% on BB. Pt presents to physical therapy at OF baseline in regards to functional mobility; however, demonstrates impaired activity tolerance due to ongoing \"chest pressure\" with light activity. Pt tolerated ambulation x50 ft with very slow and guarded walking speed (maintaining R UE elevated to reduce potential for bleeding). Pt noted \"chest pressure\" unlike the pain experienced pre admission, but noted it to be abnormal for herself with the activity. Pt able to complete the 50 ft walk back to room with resolution of chest pressure once seated. Educated on phase I cardiac rehab goals, \"talk test\", RPE scale and return to desired activity ([facility] and ability to go up/down inclined hills without s/sx of failure). Pt will benefit from acute PT interventions to improve overall activity tolerance prior to DC home.     Plan    Physical Therapy Initial Treatment Plan   Treatment Plan : Stair Training, Self Care / Home Evaluation, Gait Training, Therapeutic Activities  Treatment Frequency: 4 Times per Week  Duration: Until Therapy Goals Met    DC Equipment Recommendations: None  Discharge Recommendations:  (outpatient cardiac rehab)        05/11/23 0952   Precautions   Precautions Cardiac Precautions (See Comments)   Comments Exercise  +/- 15 bpm, Activity HR (85%) 91+/-15 bpm   Vitals   O2 Delivery Device None - Room Air   Pain 0 - 10 Group " "  Therapist Pain Assessment During Activity;Nurse Notified  (chest pressure noted during ambulation, not rated)   Prior Living Situation   Prior Services Home-Independent   Housing / Facility 1 Story House   Steps Into Home 1   Steps In Home 0   Equipment Owned   (per pt \"I have everything\")   Lives with - Patient's Self Care Capacity Alone and Able to Care For Self   Comments Pt reports she has a home in [facility] [place] but then will be transitioning to her home in [place] in the coming weeks. Home in [facility] is split level with flight of stairs  from garage, with a stair lift chair. 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) and negotiate couple steps to enter [facility]   Prior Level of Functional Mobility   Bed Mobility Independent   Transfer Status Independent   Ambulation Independent   Ambulation Distance community, reports she tries to walk daily but chest pain has been limiting tolerance   Assistive Devices Used None   Stairs Independent   Active ROM Lower Body    Active ROM Lower Body  WDL   Strength Lower Body   Lower Body Strength  WDL   Balance Assessment   Sitting Balance (Static) Good   Sitting Balance (Dynamic) Good   Standing Balance (Static) Good   Standing Balance (Dynamic) Fair +   Weight Shift Sitting Good   Weight Shift Standing Good   Comments w/o AD   Bed Mobility    Comments seated EOB upon PT arrival and up to chair at end of session   Gait Analysis   Gait Level Of Assist Supervised   Assistive Device None   Distance (Feet) 50   # of Times Distance was Traveled 2  (required 1 min standing rest break between ambulation efforts due to \"chest pressure\")   Deviation Bradykinetic   # of Stairs Climbed 0   Weight Bearing Status no restictions   Comments more guarded gait with R UE elevated during ambulation due to R wrist cath site bruising and swelling   Functional Mobility   Sit to Stand Supervised   Bed, Chair, Wheelchair Transfer Supervised   How much difficulty does the " patient currently have...   Turning over in bed (including adjusting bedclothes, sheets and blankets)? 4   Sitting down on and standing up from a chair with arms (e.g., wheelchair, bedside commode, etc.) 4   Moving from lying on back to sitting on the side of the bed? 4   How much help from another person does the patient currently need...   Moving to and from a bed to a chair (including a wheelchair)? 4   Need to walk in a hospital room? 4   Climbing 3-5 steps with a railing? 4   6 clicks Mobility Score 24   Patient / Family Goals    Patient / Family Goal #1 to return home   Short Term Goals    Short Term Goal # 1 pt will ambulate > 200 ft at self selected walking speed without s/sx of failure and SPV in 6 visits   Short Term Goal # 2 pt will negotiate 1 step to 1 flight of stairs to access [place] home without s/sx of failure s/p cardiac cath with SPV in 6 visits   Short Term Goal # 3 pt will independently verbalize and demonstrate understanding of cardiac rehab components (talk test, s/sx of failure, activity modifications) in 6 visits   Education Group   Education Provided Role of Physical Therapist   Role of Physical Therapist Patient Response Patient;Acceptance;Explanation;Verbal Demonstration   Physical Therapy Initial Treatment Plan    Treatment Plan  Stair Training;Self Care / Home Evaluation;Gait Training;Therapeutic Activities   Treatment Frequency 4 Times per Week   Duration Until Therapy Goals Met   Problem List    Problems Decreased Activity Tolerance   Anticipated Discharge Equipment and Recommendations   DC Equipment Recommendations None   Discharge Recommendations   (outpatient cardiac rehab)   Interdisciplinary Plan of Care Collaboration   IDT Collaboration with  Nursing   Patient Position at End of Therapy Seated;Call Light within Reach;Tray Table within Reach;Phone within Reach   Collaboration Comments aware of PT eval and recs   Session Information   Date / Session Number  5/11-1 (1/4,  5/17)   Priority 4  (cardiac rehab activity tolerance follow up)

## 2023-05-11 NOTE — PROGRESS NOTES
Patient seen Aox4 and resting comfortably in bed.  Plan of care discussed. Will continue telemetry monitoring and monitor right wrist site.   Patient verbalizes understanding.  No complaints of pain or numbness/tingling at this time.  Patient HANCOCK and able to ambulate up to bathroom with 1 person assist.   TR band removed. Gauze and tegaderm placed. Right wrist bruising noted and marked by amador RN. Area has not increased in size. Will continue to monitor site.   Educated patient regarding limited wrist movements for 48 hours. Assured her more education regarding limitations will be provided prior to discharge.  All needs addressed at this time.  Call light and personal belongings within reach, bed locked and in lowest position and hourly rounding in place.

## 2023-05-11 NOTE — CARE PLAN
The patient is Stable - Low risk of patient condition declining or worsening    Shift Goals  Clinical Goals: Tele monitoring; Monitor right wrist site;  Patient Goals: Sleep;  Family Goals: VASU;    Problem: Knowledge Deficit - Standard  Goal: Patient and family/care givers will demonstrate understanding of plan of care, disease process/condition, diagnostic tests and medications  Description: Target End Date:  1-3 days or as soon as patient condition allows    1.  Patient oriented to unit, equipment, visitation policy and means for communicating concern  2.  Complete/review Learning Assessment  3.  Assess knowledge level of disease process/condition, treatment plan, diagnostic tests and medications  4.  Explain disease process/condition, treatment plan, diagnostic tests and medications  Outcome: Progressing     Problem: Pain - Standard  Goal: Alleviation of pain or a reduction in pain to the patient’s comfort goal  Description: Target End Date:  Prior to discharge or change in level of care    1.  Document pain using the appropriate pain scale per order or unit policy  2.  Educate and implement non-pharmacologic comfort measures (i.e. relaxation, distraction, massage, cold/heat therapy, etc.)  3.  Pain management medications as ordered  4.  Reassess pain after pain med administration per policy  5.  If opiods administered assess patient's response to pain medication is appropriate per POSS sedation scale  6.  Follow pain management plan developed in collaboration with patient and interdisciplinary team (including palliative care or pain specialists if applicable)  Outcome: Progressing

## 2023-05-11 NOTE — PROGRESS NOTES
Monitor summary: SR 62-74, NC 0.22, QRS 0.07, QT 0.35, with rare PVCs and couplet per strip from monitor room.

## 2023-05-11 NOTE — PROGRESS NOTES
Cardiology Progress Note:    Javad Abreu M.D.  Date & Time note created:    5/11/2023   11:16 AM     Referring MD:  Dr. Frazier    Patient ID:   Name:             Karli Berrios   YOB: 1940  Age:                 82 y.o.  female   MRN:               [id number]                                                             Chief Complaint / Reason for consult:  Chest pain.    History of Present Illness:    This is an 82 years old woman without prior cardiac problems presented to the ER because of progressively worsening chest pain with exertion.  Patient has been having chest pain for several weeks.  She did have a recent nuclear stress test which showed minor coronary ischemia with SDS of 2 at the anterior and anterior septal wall of the left ventricle.  She came in today because she had a severe chest pain episode last night while she was walking up an incline.  No symptom at rest otherwise.    I have independently interpreted and reviewed echocardiogram's actual images with patient which showed normal left ventricular systolic function. No wall motion abnormality. No evidence of pulmonary hypertension. No significant valvular disease.    S/P LAD stent.    Overnight events:  No acute events overnight.  No telemetry events.    Review of Systems:      Constitutional: Denies fevers, Denies weight changes  Eyes: Denies changes in vision, no eye pain  Ears/Nose/Throat/Mouth: Denies nasal congestion or sore throat   Cardiovascular: no chest pain, no palpitations   Respiratory: no shortness of breath , Denies cough  Gastrointestinal/Hepatic: Denies abdominal pain, nausea, vomiting, diarrhea, constipation or GI bleeding   Genitourinary: Denies dysuria or frequency  Musculoskeletal/Rheum: Denies  joint pain and swelling   Skin: Denies rash  Neurological: Denies headache, confusion, memory loss or focal weakness/parasthesias  Psychiatric: denies mood disorder   Endocrine: Margy thyroid  problems  Heme/Oncology/Lymph Nodes: Denies enlarged lymph nodes, denies brusing or known bleeding disorder  All other systems were reviewed and are negative (AMA/CMS criteria)                Past Medical History:   Past Medical History:   Diagnosis Date    Abnormal cardiovascular stress test 5/9/2023    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), wet (HCC) 12/09/2022    OS    Anxiety     Arthritis     Asthma     Bronchitis     H/O Multiple Times    Cataract     IOL OU    Chronic kidney disease     Detached retina 12/09/2022    OS    Dry age-related macular degeneration of right eye 12/09/2022    GERD (gastroesophageal reflux disease)     High cholesterol     HTN (hypertension)     Hyperlipidemia     Hypertensive urgency, 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    Hypothyroidism     Lung nodules 07/2009    stable 2010    Macular pucker of both eyes     Osteoporosis     Pain in the chest 5/9/2023    Strain of left trapezius muscle     Urinary tract infection     H/O     Active Hospital Problems    Diagnosis     Hypertensive urgency [I16.0]     Abnormal cardiovascular stress test [R94.39]     Unstable angina (HCC) [I20.0]     LUIS FELIPE (generalized anxiety disorder) [F41.1]     Hypertension [I10]     Acquired hypothyroidism [E03.9]     Dyslipidemia [E78.5]     Asthma, mild intermittent [J45.20]        Past Surgical History:  Past Surgical History:   Procedure Laterality Date    CATARACT EXTRACTION WITH IOL Bilateral 2007    CARPAL TUNNEL RELEASE  2006    ABDOMINAL EXPLORATION      peritonitis    ABDOMINAL HYSTERECTOMY TOTAL      APPENDECTOMY      EYE SURGERY Bilateral     Vitrectomy-OD 2008 & OS 2011    HEMORRHOIDECTOMY      OTHER      D & C    OTHER      FISTULECTOMY    OTHER      HYSTERECTOMY    OTHER      LEFTY BREAST LIPOMA RESECTION    OTHER      H/O Oral Surgery-Dental Extractions & Implants.       Hospital Medications:    Current Facility-Administered Medications:     aspirin EC (ECOTRIN) tablet 81 mg, 81 mg, Oral, DAILY, Wes Rowell  M.D., 81 mg at 05/11/23 0513    clopidogrel (PLAVIX) tablet 75 mg, 75 mg, Oral, DAILY, Wes Rowell M.D., 75 mg at 05/11/23 0514    nitroglycerin (NITROSTAT) tablet 0.4 mg, 0.4 mg, Sublingual, Q5 MIN PRN, Rasheed Frazier M.D.    morphine 4 MG/ML injection 1-2 mg, 1-2 mg, Intravenous, Q10 MIN PRN, Rasheed Frazier M.D.    thyroid (ARMOUR THYROID) tablet 60 mg, 60 mg, Oral, DAILY, Rasheed Frazier M.D., 60 mg at 05/11/23 0513    famotidine (PEPCID) tablet 20 mg, 20 mg, Oral, BID, Rasheed Frazier M.D., 20 mg at 05/11/23 0513    fluticasone (FLOVENT HFA) 110 MCG/ACT inhaler 220 mcg, 2 Puff, Inhalation, BID, Rasheed Frazier M.D., 220 mcg at 05/11/23 0512    LORazepam (ATIVAN) tablet 0.5 mg, 0.5 mg, Oral, HS PRN, Rasheed Frazier M.D., 0.5 mg at 05/10/23 1931    atorvastatin (LIPITOR) tablet 80 mg, 80 mg, Oral, QHS, Rasheed Frazier M.D., 80 mg at 05/10/23 1932    Respiratory Therapy Consult, , Nebulization, Continuous RT, Rasheed Frazier M.D.    acetaminophen (Tylenol) tablet 650 mg, 650 mg, Oral, Q6HRS PRN, Rasheed Frazier M.D., 650 mg at 05/11/23 0422    ondansetron (ZOFRAN) syringe/vial injection 4 mg, 4 mg, Intravenous, Q4HRS PRN, Rasheed Frazier M.D.    ondansetron (ZOFRAN ODT) dispertab 4 mg, 4 mg, Oral, Q4HRS PRN, Rasheed Frazier M.D.    senna-docusate (PERICOLACE or SENOKOT S) 8.6-50 MG per tablet 2 Tablet, 2 Tablet, Oral, BID **AND** polyethylene glycol/lytes (MIRALAX) PACKET 1 Packet, 1 Packet, Oral, QDAY PRN **AND** magnesium hydroxide (MILK OF MAGNESIA) suspension 30 mL, 30 mL, Oral, QDAY PRN **AND** bisacodyl (DULCOLAX) suppository 10 mg, 10 mg, Rectal, QDAY PRN, Rasheed Frazier M.D.    GI Cocktail (hyoscyamine-lidocaine-Maalox) oral susp cup 30 mL, 30 mL, Oral, Q4HRS PRN, Rasheed Frazier M.D.    mag hydrox-al hydrox-simeth (MAALOX PLUS ES or MYLANTA DS) suspension 30 mL, 30 mL, Oral, Q4HRS PRN, Rasheed Frazier M.D.    hydrALAZINE (APRESOLINE) injection 10 mg, 10 mg, Intravenous, Q4HRS PRN, Rasheed Frazier  M.D.    metoprolol tartrate (LOPRESSOR) tablet 50 mg, 50 mg, Oral, TWICE DAILY, Javad Abreu M.D., 50 mg at 05/11/23 0513    losartan (COZAAR) tablet 100 mg, 100 mg, Oral, Q DAY, RENETTA PantojaDDara, 100 mg at 05/11/23 0513    amLODIPine (NORVASC) tablet 10 mg, 10 mg, Oral, Q DAY, RENETTA PantojaDDara, 10 mg at 05/11/23 0513    albuterol inhaler 2 Puff, 2 Puff, Inhalation, Q4H PRN (RT), Rasheed Frazier M.D.    Current Outpatient Medications:  Medications Prior to Admission   Medication Sig Dispense Refill Last Dose    LORazepam (ATIVAN) 0.5 MG Tab Take 0.25-0.5 mg by mouth at bedtime as needed (Sleep).   5/8/2023 at PM    VITAMIN E PO Take 1 Tablet by mouth every day.   5/8/2023 at AM    Glucosamine-Chondroitin (MOVE FREE PO) Take 1 Tablet by mouth every day.   5/8/2023 at AM    omeprazole (PRILOSEC) 20 MG delayed-release capsule Take 20 mg by mouth 1 time a day as needed. Indications: Heartburn   5/8/2023 at PM    Faricimab-svoa (VABYSMO) 6 MG/0.05ML Solution Administer 6 mg into the left eye see administration instructions. Every 5 to 6 weeks  Due May 26th 2023   UNK at UNK    [DISCONTINUED] aspirin EC (ECOTRIN) 81 MG Tablet Delayed Response Take 81 mg by mouth every day.   5/8/2023 at AM    thyroid (ARMOUR THYROID) 90 MG Tab TAKE 1 TABLET EVERY DAY 2 DAYS PER WEEK Strength: 90 mg (Patient taking differently: Take 90 mg by mouth two times a week. Takes on Monday and Thursday) 90 Tablet 0 5/8/2023 at AM    fluticasone (FLOVENT HFA) 110 MCG/ACT Aerosol Inhale 2 Puffs 2 times a day. Use spacer. Rinse mouth after each use. 12 g 5 5/8/2023 at PM    famotidine (PEPCID) 40 MG Tab Take 20 mg by mouth 2 times a day.   5/8/2023 at AM    [DISCONTINUED] simvastatin (ZOCOR) 40 MG Tab Take 1 Tablet by mouth every day. 90 Tablet 3 5/7/2023 at PM    ARMOUR THYROID 60 MG Tab Take 1 Tablet by mouth every day. 5 days a week (Patient taking differently: Take 60 mg by mouth see administration instructions. Takes on  Tuesday, Wednesday, Friday, Saturday and ) 90 Tablet 3 2023 at AM    Omega-3 Fatty Acids (FISH OIL) 1000 MG Cap capsule Take 1,000 mg by mouth every day.   2023 at AM    Lutein-Zeaxanthin 25-5 MG Cap Take  by mouth every day.   2023 at PM    acetaminophen (TYLENOL) 500 MG Tab Take 1,000 mg by mouth every morning.   2023 at AM    Calcium Carbonate-Vit D-Min (CALCIUM 1200 PO) Take 1 tablet  by mouth 2 times a day.   2023 at AM    Cyanocobalamin (VITAMIN B12 PO) Take 1,000 mcg by mouth every day.   2023 at PM    Turmeric 500 MG Tab Take 500 mg by mouth 2 times a day.   2023 at AM    vitamin D (CHOLECALCIFEROL) 1000 UNIT TABS Take 1,000 Units by mouth 2 times a day.   2023 at AM    MAGNESIUM PO Take 1 Tablet by mouth every day.   2023 at PM    Bioflavonoid Products (LISSY-C) TABS Take 1,000 Tablets by mouth 2 times a day.   2023 at AM    Multiple Vitamin (MULTIVITAMIN PO) Take 1 tablet by mouth every day.   2023 at AM       Medication Allergy:  Allergies   Allergen Reactions    Bactrim Ds Unspecified    Bactrim [Sulfamethoxazole W-Trimethoprim]      She states this was always an antibiotic that should be in her chart     Codeine Diarrhea     Pt stated on 2022    Denosumab Unspecified    Atenolol Unspecified     Leg cramps        Family History:  Family History   Problem Relation Age of Onset    Arthritis Mother     Hyperlipidemia Mother     Glaucoma Maternal Grandfather        Social History:  Social History     Socioeconomic History    Marital status:      Spouse name: Not on file    Number of children: Not on file    Years of education: Not on file    Highest education level: 12th grade   Occupational History    Not on file   Tobacco Use    Smoking status: Former     Packs/day: 1.00     Years: 20.00     Pack years: 20.00     Types: Cigarettes     Start date: 1957     Quit date: 1977     Years since quittin.9     Passive exposure: Past     Smokeless tobacco: Never   Vaping Use    Vaping Use: Never used   Substance and Sexual Activity    Alcohol use: Not Currently    Drug use: No    Sexual activity: Not Currently     Partners: Male   Other Topics Concern    Not on file   Social History Narrative    Not on file     Social Determinants of Health     Financial Resource Strain: Low Risk  (3/6/2022)    Overall Financial Resource Strain (CARDIA)     Difficulty of Paying Living Expenses: Not hard at all   Food Insecurity: No Food Insecurity (3/6/2022)    Hunger Vital Sign     Worried About Running Out of Food in the Last Year: Never true     Ran Out of Food in the Last Year: Never true   Transportation Needs: No Transportation Needs (3/6/2022)    PRAPARE - Transportation     Lack of Transportation (Medical): No     Lack of Transportation (Non-Medical): No   Physical Activity: Insufficiently Active (3/6/2022)    Exercise Vital Sign     Days of Exercise per Week: 3 days     Minutes of Exercise per Session: 20 min   Stress: Stress Concern Present (3/6/2022)    [employer] [place] [employer] - Occupational Stress Questionnaire     Feeling of Stress : To some extent   Social Connections: Moderately Isolated (3/6/2022)    Social Connection and Isolation Panel [NHANES]     Frequency of Communication with Friends and Family: More than three times a week     Frequency of Social Gatherings with Friends and Family: Once a week     Attends Sikh Services: Never     Active Member of Clubs or Organizations: Yes     Attends Club or Organization Meetings: 1 to 4 times per year     Marital Status:    Intimate Partner Violence: Not on file   Housing Stability: Unknown (3/6/2022)    Housing Stability Vital Sign     Unable to Pay for Housing in the Last Year: No     Number of Places Lived in the Last Year: Not on file     Unstable Housing in the Last Year: No         Physical Exam:  Vitals/ General Appearance:   Weight/BMI: Body mass index is 27.04  "kg/m².  /65   Pulse 61   Temp 36.4 °C (97.6 °F) (Temporal)   Resp 16   Ht 1.676 m (5' 6\")   Wt 76 kg (167 lb 8.8 oz)   SpO2 94%   Vitals:    23 0000 23 0424 23 0510 23 0703   BP: 138/63 (!) 156/70 129/59 136/65   Pulse: 81 73 74 61   Resp: 16 16  16   Temp: 36.4 °C (97.6 °F) 36.1 °C (97 °F)  36.4 °C (97.6 °F)   TempSrc: Temporal Temporal  Temporal   SpO2: 93% 95%  94%   Weight:       Height:         Oxygen Therapy:  Pulse Oximetry: 94 %, O2 (LPM): 0, O2 Delivery Device: None - Room Air    Constitutional:   Well developed, Well nourished, No acute distress  HENMT:  Normocephalic, Atraumatic, Oropharynx moist mucous membranes, No oral exudates, Nose normal.  No thyromegaly.  Eyes:  EOMI, Conjunctiva normal, No discharge.  Neck:  Normal range of motion, No cervical tenderness,  no JVD.  Cardiovascular:  Normal heart rate, Normal rhythm, No murmurs, No rubs, No gallops.   Extremitites with intact distal pulses, no cyanosis, or edema.  Lungs:  Normal breath sounds, breath sounds clear to auscultation bilaterally,  no rales, no rhonchi, no wheezing.   Abdomen: Bowel sounds normal, Soft, No tenderness, No guarding, No rebound, No masses, No hepatosplenomegaly.  Skin: Warm, Dry, No erythema, No rash, no induration.  Neurologic: Alert & oriented x 3, No focal deficits noted, cranial nerves II through X are intact.  Psychiatric: Affect normal, Judgment normal, Mood normal.      MDM (Data Review):     Records reviewed and summarized in current documentation    Lab Data Review:  Recent Results (from the past 24 hour(s))   EKG STAT    Collection Time: 05/10/23  4:00 PM   Result Value Ref Range    Report       [facility]    Test Date:  2023-05-10  Pt Name:    NANCY MARINA                Department: [facility]  MRN:        [id number]                      Room:       T207  Gender:     Female                       Technician: VINAY  :        1940                   Requested By:LISHA SCHAFFER" HARMANALEXANDER  Order #:    [id number]                    Reading MD: Maicol Westbrook MD    Measurements  Intervals                                Axis  Rate:       66                           P:          66  MI:         216                          QRS:        -10  QRSD:       143                          T:          14  QT:         423  QTc:        444    Interpretive Statements  Sinus rhythm  Borderline prolonged MI interval  Consider left atrial enlargement  Right bundle branch block  Compared to ECG 05/09/2023 21:17:20  Right bundle-branch block now present  Electronically Signed On 5- 6:57:00 PDT by Maicol Westbrook MD     Basic Metabolic Panel (BMP)    Collection Time: 05/11/23 12:45 AM   Result Value Ref Range    Sodium 142 135 - 145 mmol/L    Potassium 3.6 3.6 - 5.5 mmol/L    Chloride 108 96 - 112 mmol/L    Co2 22 20 - 33 mmol/L    Glucose 123 (H) 65 - 99 mg/dL    Bun 17 8 - 22 mg/dL    Creatinine 0.87 0.50 - 1.40 mg/dL    Calcium 8.5 8.5 - 10.5 mg/dL    Anion Gap 12.0 7.0 - 16.0   CBC without differential    Collection Time: 05/11/23 12:45 AM   Result Value Ref Range    WBC 8.7 4.8 - 10.8 K/uL    RBC 4.71 4.20 - 5.40 M/uL    Hemoglobin 14.3 12.0 - 16.0 g/dL    Hematocrit 43.6 37.0 - 47.0 %    MCV 92.6 81.4 - 97.8 fL    MCH 30.4 27.0 - 33.0 pg    MCHC 32.8 (L) 33.6 - 35.0 g/dL    RDW 41.6 35.9 - 50.0 fL    Platelet Count 265 164 - 446 K/uL    MPV 10.1 9.0 - 12.9 fL   ESTIMATED GFR    Collection Time: 05/11/23 12:45 AM   Result Value Ref Range    GFR (CKD-EPI) 67 >60 mL/min/1.73 m 2       Imaging/Procedures Review:    Chest Xray:  Reviewed    EKG:   As in HPI.     MDM (Assessment and Plan):     Active Hospital Problems    Diagnosis     Hypertensive urgency [I16.0]     Abnormal cardiovascular stress test [R94.39]     Unstable angina (HCC) [I20.0]     LUIS FELIPE (generalized anxiety disorder) [F41.1]     Hypertension [I10]     Acquired hypothyroidism [E03.9]     Dyslipidemia [E78.5]     Asthma, mild intermittent [J45.20]       Coronary arterial disease s/p LAD stent proximally in 05/2023:  [employer] as Metoprolol will be increased to 100 mg BID.  ASA 81 mg po daily and Clopidogrel 75mg 1x daily  Statin as Atorvastatin 80 mg daily.  ARB as Losartan 100 mg daily.     Hypertension:  Optimize control with BB, ACE-I or ARB as permitted above in conjunction with CAD treatment.  Amlodipine 10 mg daily.     Hyperlipidemia:  Optimize statin as within guidelines of CAD treatment as above.    Overall, based on prior history of obstructive coronary arterial disease, patient is at at high risk for recurrent events and will require consistent ongoing guidelines directed medical therapy to reduce his cardiovascular mortality and associated complications.    Will sign off at this time, please call us with further questions.  Patient will be followed in the outpatient cardiology clinic for further cardiac care.    Thank you for referring this patient to our cardiology service.    Javad Abreu MD.   [facility].

## 2023-05-12 ENCOUNTER — PATIENT OUTREACH (OUTPATIENT)
Dept: MEDICAL GROUP | Facility: LAB | Age: 83
End: 2023-05-12
Payer: MEDICARE

## 2023-05-12 ENCOUNTER — PATIENT OUTREACH (OUTPATIENT)
Dept: HEALTH INFORMATION MANAGEMENT | Facility: OTHER | Age: 83
End: 2023-05-12
Payer: MEDICARE

## 2023-05-12 NOTE — PROGRESS NOTES
[facility]W Days[facility] contacted pt to offer Community Care Management services. CHW was unable to reach pt. CCM contact information was provided via . CHW will try again at a later date.

## 2023-05-15 ENCOUNTER — TELEPHONE (OUTPATIENT)
Dept: CARDIOLOGY | Facility: MEDICAL CENTER | Age: 83
End: 2023-05-15

## 2023-05-15 NOTE — TELEPHONE ENCOUNTER
Phone Number Called: [phone]    Call outcome: Did not leave a detailed message. Requested patient to call back.    Message: Called to follow up with patient regarding symptoms after her stent placement.

## 2023-05-15 NOTE — TELEPHONE ENCOUNTER
MEAGHAN    Caller: Karli Berrios    Topic/issue: Above PT wanting a call back to discuss symptoms she is having after her stent placement.     Callback Number: [phone]    Thank You  Caitlyn MARSH

## 2023-05-16 ENCOUNTER — PATIENT MESSAGE (OUTPATIENT)
Dept: CARDIOLOGY | Facility: MEDICAL CENTER | Age: 83
End: 2023-05-16
Payer: MEDICARE

## 2023-05-16 NOTE — PROGRESS NOTES
Transitional Care Management    This patient qualifies for a Transitional Care Management visit as they are being seen within the required time and two attempts were made to contact this patient within two business days to review discharge per CMS guidelines.      Subjective:     Karli Berrios is a 82 y.o. female who presents for Hospital Follow-up.    HPI:   Recently hospitalized for stent follow up. Woke up with Chest pain. Went through ER and got in.   Has follow up with Dr Westbrook Early June.     Still with aching in her chest. Coughing as well. Only doing 40 mg of lipitor right now as hospitalist told her. Sleep is just alright. Still very fatigued.     Cough is ok right now, but living on cough drops. Has asthma variant.     Recent melanoma diagnosis. Repeat appt upcoming to follow up. On asa and plavix now.   Supposed to do cardiac rehab. Trying to get this set up.     Current medicines (including reconciliation performed today)  Current Outpatient Medications   Medication Sig Dispense Refill    methylPREDNISolone (MEDROL DOSEPAK) 4 MG Tablet Therapy Pack As directed on the packaging label. 21 Tablet 0    aspirin EC (ECOTRIN) 81 MG Tablet Delayed Response Take 1 Tablet by mouth every day. 100 Tablet 0    amLODIPine (NORVASC) 10 MG Tab Take 1 Tablet by mouth every day. 90 Tablet 0    atorvastatin (LIPITOR) 40 MG Tab Take 2 Tablets by mouth at bedtime. 90 Tablet 0    clopidogrel (PLAVIX) 75 MG Tab Take 1 Tablet by mouth every day. 90 Tablet 0    losartan (COZAAR) 100 MG Tab Take 1 Tablet by mouth every day. 90 Tablet 0    metoprolol tartrate (LOPRESSOR) 50 MG Tab Take 1 Tablet by mouth 2 times a day. 180 Tablet 0    LORazepam (ATIVAN) 0.5 MG Tab Take 0.25-0.5 mg by mouth at bedtime as needed (Sleep).      VITAMIN E PO Take 1 Tablet by mouth every day.      Glucosamine-Chondroitin (MOVE FREE PO) Take 1 Tablet by mouth every day.      omeprazole (PRILOSEC) 20 MG delayed-release capsule Take 20 mg by mouth 1  time a day as needed. Indications: Heartburn      Faricimab-svoa (VABYSMO) 6 MG/0.05ML Solution Administer 6 mg into the left eye see administration instructions. Every 5 to 6 weeks  Due May 26th 2023      thyroid (ARMOUR THYROID) 90 MG Tab TAKE 1 TABLET EVERY DAY 2 DAYS PER WEEK Strength: 90 mg (Patient taking differently: Take 90 mg by mouth two times a week. Takes on Monday and Thursday) 90 Tablet 0    fluticasone (FLOVENT HFA) 110 MCG/ACT Aerosol Inhale 2 Puffs 2 times a day. Use spacer. Rinse mouth after each use. 12 g 5    famotidine (PEPCID) 40 MG Tab Take 20 mg by mouth 2 times a day.      ARMOUR THYROID 60 MG Tab Take 1 Tablet by mouth every day. 5 days a week (Patient taking differently: Take 60 mg by mouth see administration instructions. Takes on Tuesday, Wednesday, Friday, Saturday and Sunday) 90 Tablet 3    Omega-3 Fatty Acids (FISH OIL) 1000 MG Cap capsule Take 1,000 mg by mouth every day.      Lutein-Zeaxanthin 25-5 MG Cap Take  by mouth every day.      acetaminophen (TYLENOL) 500 MG Tab Take 1,000 mg by mouth every morning.      Calcium Carbonate-Vit D-Min (CALCIUM 1200 PO) Take 1 tablet  by mouth 2 times a day.      Cyanocobalamin (VITAMIN B12 PO) Take 1,000 mcg by mouth every day.      Turmeric 500 MG Tab Take 500 mg by mouth 2 times a day.      vitamin D (CHOLECALCIFEROL) 1000 UNIT TABS Take 1,000 Units by mouth 2 times a day.      MAGNESIUM PO Take 1 Tablet by mouth every day.      Bioflavonoid Products (LISSY-C) TABS Take 1,000 Tablets by mouth 2 times a day.      Multiple Vitamin (MULTIVITAMIN PO) Take 1 tablet by mouth every day.       No current facility-administered medications for this visit.       Allergies:   Bactrim ds, Bactrim [sulfamethoxazole w-trimethoprim], Codeine, Denosumab, and Atenolol    Social History     Tobacco Use    Smoking status: Former     Packs/day: 1.00     Years: 20.00     Pack years: 20.00     Types: Cigarettes     Start date: 8/30/1957     Quit date: 6/1/1977      "Years since quittin.9     Passive exposure: Past    Smokeless tobacco: Never   Vaping Use    Vaping Use: Never used   Substance Use Topics    Alcohol use: Not Currently    Drug use: No       ROS:  See above.     Objective:     Vitals:    23 0928   BP: 110/46   BP Location: Right arm   Patient Position: Sitting   BP Cuff Size: Adult   Pulse: 65   Resp: 12   Temp: 36.3 °C (97.4 °F)   SpO2: 95%   Weight: 73.9 kg (163 lb)   Height: 1.676 m (5' 6\")     Body mass index is 26.31 kg/m².    Physical Exam:  Gen: AAOx3, no acute distress.  Well-nourished.  Chest: Regular rate and rhythm, clear to auscultation bilaterally.  Extre: No edema noted.  Psych: a bit anxious but otherwise normal. Good speech, insight.     Assessment and Plan:   1. Coronary artery disease due to lipid rich plaque    2. History of placement of stent in LAD coronary artery    3. Mild intermittent asthma without complication  - methylPREDNISolone (MEDROL DOSEPAK) 4 MG Tablet Therapy Pack; As directed on the packaging label.  Dispense: 21 Tablet; Refill: 0    Discussed her hospital stay and course.  She overall seems to be doing fairly well.  She is very tired which could be related to the procedure itself but also her medication with the metoprolol.  She has a follow-up with cardiology in early  and they may elect to cut down her metoprolol at that point.  She is compliant with her aspirin and Plavix.  We did discuss slowly try to get back to more normal activities.  She should definitely get cardiac rehab scheduled as well.  She is able to go to the grocery store when she needs to and do some light housework in small chunks.  She will let me know if anything else pops up in the interim.  - Chart and discharge summary were reviewed.   - Hospitalization and results reviewed with patient.   - Medications reviewed including instructions regarding high risk medications, dosing and side effects.  - Recommended Services: No services needed at " this time  - Advance directive/POLST on file?  Yes. Would like to update    Follow-up:No follow-ups on file.    Face-to-face transitional care management services with MODERATE (today's visit is within 14 days post discharge & LACE+ score of 28-58) medical decision complexity were provided.     LACE+ Historical Score Over Time (0-28: Low, 29-58: Medium, 59+: High): 73

## 2023-05-18 ENCOUNTER — OFFICE VISIT (OUTPATIENT)
Dept: MEDICAL GROUP | Facility: LAB | Age: 83
End: 2023-05-18
Payer: MEDICARE

## 2023-05-18 VITALS
HEIGHT: 66 IN | RESPIRATION RATE: 12 BRPM | HEART RATE: 65 BPM | DIASTOLIC BLOOD PRESSURE: 46 MMHG | BODY MASS INDEX: 26.2 KG/M2 | OXYGEN SATURATION: 95 % | WEIGHT: 163 LBS | SYSTOLIC BLOOD PRESSURE: 110 MMHG | TEMPERATURE: 97.4 F

## 2023-05-18 DIAGNOSIS — I25.10 CORONARY ARTERY DISEASE DUE TO LIPID RICH PLAQUE: ICD-10-CM

## 2023-05-18 DIAGNOSIS — Z95.5 HISTORY OF PLACEMENT OF STENT IN LAD CORONARY ARTERY: ICD-10-CM

## 2023-05-18 DIAGNOSIS — I25.83 CORONARY ARTERY DISEASE DUE TO LIPID RICH PLAQUE: ICD-10-CM

## 2023-05-18 DIAGNOSIS — J45.20 MILD INTERMITTENT ASTHMA WITHOUT COMPLICATION: ICD-10-CM

## 2023-05-18 PROCEDURE — 99495 TRANSJ CARE MGMT MOD F2F 14D: CPT | Performed by: FAMILY MEDICINE

## 2023-05-18 PROCEDURE — 3074F SYST BP LT 130 MM HG: CPT | Performed by: FAMILY MEDICINE

## 2023-05-18 PROCEDURE — 3078F DIAST BP <80 MM HG: CPT | Performed by: FAMILY MEDICINE

## 2023-05-18 RX ORDER — METHYLPREDNISOLONE 4 MG/1
TABLET ORAL
Qty: 21 TABLET | Refills: 0 | Status: SHIPPED | OUTPATIENT
Start: 2023-05-18 | End: 2023-06-20

## 2023-05-18 RX ORDER — SIMVASTATIN 80 MG
80 TABLET ORAL
COMMUNITY
End: 2023-05-18

## 2023-05-18 ASSESSMENT — FIBROSIS 4 INDEX: FIB4 SCORE: 1.45

## 2023-05-22 RX ORDER — THYROID 60 MG
TABLET ORAL
Qty: 65 TABLET | Refills: 3 | Status: SHIPPED | OUTPATIENT
Start: 2023-05-22

## 2023-05-22 NOTE — TELEPHONE ENCOUNTER
Received request via: Pharmacy    Was the patient seen in the last year in this department? Yes  5/18/2023  Does the patient have an active prescription (recently filled or refills available) for medication(s) requested? No    Does the patient have intermediate Plus and need 100 day supply (blood pressure, diabetes and cholesterol meds only)? Patient does not have SCP

## 2023-06-01 NOTE — PATIENT COMMUNICATION
To MEAGHAN, please advise. Ok to reduce Atorvastatin back to 40 mg daily d/t muscle pain? Or any alternative recommendations?

## 2023-06-02 ENCOUNTER — TELEPHONE (OUTPATIENT)
Dept: MEDICAL GROUP | Facility: LAB | Age: 83
End: 2023-06-02
Payer: MEDICARE

## 2023-06-02 NOTE — PROGRESS NOTES
CHW attempted to contact pt to offer Community Care Management services. CHW was unable to reach pt. CHW provided CCM contact information via  and encouraged pt to call if anything was needed. CHW Daysi will not continue to follow at this time.

## 2023-06-02 NOTE — TELEPHONE ENCOUNTER
Pt requesting to go down to 40 mg having back pain and issues  atorvastatin (LIPITOR) 40 MG Tab

## 2023-06-07 ENCOUNTER — OFFICE VISIT (OUTPATIENT)
Dept: CARDIOLOGY | Facility: MEDICAL CENTER | Age: 83
End: 2023-06-07
Attending: FAMILY MEDICINE
Payer: MEDICARE

## 2023-06-07 ENCOUNTER — TELEPHONE (OUTPATIENT)
Dept: CARDIOLOGY | Facility: MEDICAL CENTER | Age: 83
End: 2023-06-07

## 2023-06-07 VITALS
SYSTOLIC BLOOD PRESSURE: 118 MMHG | OXYGEN SATURATION: 96 % | HEART RATE: 61 BPM | DIASTOLIC BLOOD PRESSURE: 60 MMHG | RESPIRATION RATE: 14 BRPM | WEIGHT: 162 LBS | HEIGHT: 66 IN | BODY MASS INDEX: 26.03 KG/M2

## 2023-06-07 DIAGNOSIS — E78.5 DYSLIPIDEMIA: ICD-10-CM

## 2023-06-07 DIAGNOSIS — I10 ESSENTIAL HYPERTENSION: ICD-10-CM

## 2023-06-07 DIAGNOSIS — I25.10 CORONARY ARTERY DISEASE INVOLVING NATIVE CORONARY ARTERY OF NATIVE HEART WITHOUT ANGINA PECTORIS: ICD-10-CM

## 2023-06-07 PROCEDURE — 99214 OFFICE O/P EST MOD 30 MIN: CPT | Performed by: INTERNAL MEDICINE

## 2023-06-07 PROCEDURE — 3078F DIAST BP <80 MM HG: CPT | Performed by: INTERNAL MEDICINE

## 2023-06-07 PROCEDURE — 99212 OFFICE O/P EST SF 10 MIN: CPT | Performed by: INTERNAL MEDICINE

## 2023-06-07 PROCEDURE — 99213 OFFICE O/P EST LOW 20 MIN: CPT | Performed by: INTERNAL MEDICINE

## 2023-06-07 PROCEDURE — 3074F SYST BP LT 130 MM HG: CPT | Performed by: INTERNAL MEDICINE

## 2023-06-07 ASSESSMENT — FIBROSIS 4 INDEX: FIB4 SCORE: 1.45

## 2023-06-07 NOTE — PROGRESS NOTES
Cardiology Follow-up Consultation Note    Date of note:    6/7/2023    Primary Care Provider: Deepa Joe M.D.    Name:             Karli Berrios   YOB: 1940  MRN:               [id number]    Chief Complaint   Patient presents with    Chest Pain    Dyslipidemia       HISTORY OF PRESENT ILLNESS  Ms. Karli Berrios is a 82 y.o. female who returns to see us for follow-up of CAD and post hospital visit    Last clinic visit: 5/8/2023 with SOFY Barth.  Is new to me.    Interim History:  Patient was referred to cardiology for abnormal stress test and exertional chest pain symptoms.  During her last visit, coronary angiogram was ordered.  However, patient presented to [facility] ER with unstable angina.  Underwent coronary angiogram on 5/10/2023 s/p LAD stent placement.      Past Medical History:   Diagnosis Date    Abnormal cardiovascular stress test 5/9/2023    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), wet (HCC) 12/09/2022    OS    Anxiety     Arthritis     Asthma     Bronchitis     H/O Multiple Times    Cataract     IOL OU    Chronic kidney disease     Detached retina 12/09/2022    OS    Dry age-related macular degeneration of right eye 12/09/2022    GERD (gastroesophageal reflux disease)     High cholesterol     HTN (hypertension)     Hyperlipidemia     Hypertensive urgency, 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    Hypothyroidism     Lung nodules 07/2009    stable 2010    Macular pucker of both eyes     Osteoporosis     Pain in the chest 5/9/2023    Strain of left trapezius muscle     Urinary tract infection     H/O         Past Surgical History:   Procedure Laterality Date    CATARACT EXTRACTION WITH IOL Bilateral 2007    CARPAL TUNNEL RELEASE  2006    ABDOMINAL EXPLORATION      peritonitis    ABDOMINAL HYSTERECTOMY TOTAL      APPENDECTOMY      EYE SURGERY Bilateral     Vitrectomy-OD 2008 & OS 2011    HEMORRHOIDECTOMY      OTHER      D & C    OTHER      FISTULECTOMY    OTHER      HYSTERECTOMY     OTHER      LEFTY BREAST LIPOMA RESECTION    OTHER      H/O Oral Surgery-Dental Extractions & Implants.         Current Outpatient Medications   Medication Sig Dispense Refill    ARMOUR THYROID 60 MG Tab TAKE 1 TABLET BY MOUTH EVERY DAY ON 5 DAYS A WEEK 65 Tablet 3    methylPREDNISolone (MEDROL DOSEPAK) 4 MG Tablet Therapy Pack As directed on the packaging label. 21 Tablet 0    aspirin EC (ECOTRIN) 81 MG Tablet Delayed Response Take 1 Tablet by mouth every day. 100 Tablet 0    amLODIPine (NORVASC) 10 MG Tab Take 1 Tablet by mouth every day. 90 Tablet 0    atorvastatin (LIPITOR) 40 MG Tab Take 2 Tablets by mouth at bedtime. (Patient taking differently: Take 40 mg by mouth at bedtime.) 90 Tablet 0    clopidogrel (PLAVIX) 75 MG Tab Take 1 Tablet by mouth every day. 90 Tablet 0    losartan (COZAAR) 100 MG Tab Take 1 Tablet by mouth every day. 90 Tablet 0    metoprolol tartrate (LOPRESSOR) 50 MG Tab Take 1 Tablet by mouth 2 times a day. 180 Tablet 0    LORazepam (ATIVAN) 0.5 MG Tab Take 0.25-0.5 mg by mouth at bedtime as needed (Sleep).      VITAMIN E PO Take 1 Tablet by mouth every day.      Glucosamine-Chondroitin (MOVE FREE PO) Take 1 Tablet by mouth every day.      omeprazole (PRILOSEC) 20 MG delayed-release capsule Take 20 mg by mouth 1 time a day as needed. Indications: Heartburn      Faricimab-svoa (VABYSMO) 6 MG/0.05ML Solution Administer 6 mg into the left eye see administration instructions. Every 5 to 6 weeks  Due May 26th 2023      thyroid (ARMOUR THYROID) 90 MG Tab TAKE 1 TABLET EVERY DAY 2 DAYS PER WEEK Strength: 90 mg (Patient taking differently: Take 90 mg by mouth two times a week. Takes on Monday and Thursday) 90 Tablet 0    fluticasone (FLOVENT HFA) 110 MCG/ACT Aerosol Inhale 2 Puffs 2 times a day. Use spacer. Rinse mouth after each use. 12 g 5    famotidine (PEPCID) 40 MG Tab Take 20 mg by mouth 2 times a day.      Omega-3 Fatty Acids (FISH OIL) 1000 MG Cap capsule Take 1,000 mg by mouth every day.       Lutein-Zeaxanthin 25-5 MG Cap Take  by mouth every day.      acetaminophen (TYLENOL) 500 MG Tab Take 1,000 mg by mouth every morning.      Calcium Carbonate-Vit D-Min (CALCIUM 1200 PO) Take 1 tablet  by mouth 2 times a day.      Cyanocobalamin (VITAMIN B12 PO) Take 1,000 mcg by mouth every day.      Turmeric 500 MG Tab Take 500 mg by mouth 2 times a day.      vitamin D (CHOLECALCIFEROL) 1000 UNIT TABS Take 1,000 Units by mouth 2 times a day.      MAGNESIUM PO Take 1 Tablet by mouth every day.      Bioflavonoid Products (LISSY-C) TABS Take 1,000 Tablets by mouth 2 times a day.      Multiple Vitamin (MULTIVITAMIN PO) Take 1 tablet by mouth every day.       No current facility-administered medications for this visit.         Allergies   Allergen Reactions    Bactrim Ds Unspecified    Bactrim [Sulfamethoxazole W-Trimethoprim]      She states this was always an antibiotic that should be in her chart     Codeine Diarrhea     Pt stated on 2022    Denosumab Unspecified    Atenolol Unspecified     Leg cramps          Family History   Problem Relation Age of Onset    Arthritis Mother     Hyperlipidemia Mother     Glaucoma Maternal Grandfather          Social History     Socioeconomic History    Marital status:      Spouse name: Not on file    Number of children: Not on file    Years of education: Not on file    Highest education level: 12th grade   Occupational History    Not on file   Tobacco Use    Smoking status: Former     Packs/day: 1.00     Years: 20.00     Pack years: 20.00     Types: Cigarettes     Start date: 1957     Quit date: 1977     Years since quittin.0     Passive exposure: Past    Smokeless tobacco: Never   Vaping Use    Vaping Use: Never used   Substance and Sexual Activity    Alcohol use: Not Currently    Drug use: No    Sexual activity: Not Currently     Partners: Male   Other Topics Concern    Not on file   Social History Narrative    Not on file     Social Determinants of  "Health     Financial Resource Strain: Low Risk  (3/6/2022)    Overall Financial Resource Strain (CARDIA)     Difficulty of Paying Living Expenses: Not hard at all   Food Insecurity: No Food Insecurity (3/6/2022)    Hunger Vital Sign     Worried About Running Out of Food in the Last Year: Never true     Ran Out of Food in the Last Year: Never true   Transportation Needs: No Transportation Needs (3/6/2022)    PRAPARE - Transportation     Lack of Transportation (Medical): No     Lack of Transportation (Non-Medical): No   Physical Activity: Insufficiently Active (3/6/2022)    Exercise Vital Sign     Days of Exercise per Week: 3 days     Minutes of Exercise per Session: 20 min   Stress: Stress Concern Present (3/6/2022)    [employer] - Occupational Stress Questionnaire     Feeling of Stress : To some extent   Social Connections: Moderately Isolated (3/6/2022)    Social Connection and Isolation Panel [NHANES]     Frequency of Communication with Friends and Family: More than three times a week     Frequency of Social Gatherings with Friends and Family: Once a week     Attends Rastafari Services: Never     Active Member of Clubs or Organizations: Yes     Attends Club or Organization Meetings: 1 to 4 times per year     Marital Status:    Intimate Partner Violence: Not on file   Housing Stability: Unknown (3/6/2022)    Housing Stability Vital Sign     Unable to Pay for Housing in the Last Year: No     Number of Places Lived in the Last Year: Not on file     Unstable Housing in the Last Year: No         Physical Exam:  Ambulatory Vitals  /60 (BP Location: Left arm, Patient Position: Sitting, BP Cuff Size: Adult)   Pulse 61   Resp 14   Ht 1.676 m (5' 6\")   Wt 73.5 kg (162 lb)   SpO2 96%    Oxygen Therapy:  Pulse Oximetry: 96 %  BP Readings from Last 4 Encounters:   06/07/23 118/60   05/18/23 110/46   05/11/23 116/55   05/08/23 128/76       Weight/BMI: Body mass index is 26.15 " kg/m².  Wt Readings from Last 4 Encounters:   06/07/23 73.5 kg (162 lb)   05/18/23 73.9 kg (163 lb)   05/09/23 76 kg (167 lb 8.8 oz)   05/08/23 77.1 kg (170 lb)       GEN: Well developed, well nourished and in no acute distress.  HEART: no significant JVD, regular rate and rhythm, normal S1 and S2, no murmurs, no third heart sounds, normal cardiac palpation  LUNG: clear to auscultation bilaterally, no wheezing, no crackles, normal respiratory effort on room air  ABDOMEN: soft, non-tender, non-distended, normal bowel sounds throughout  EXTREMITIES: no peripheral edema noted  VASCULAR: no significantly elevated jugular venous pressure, no carotid bruits noted, radial pulses 2+ and equal      Lab Data Review:  Lab Results   Component Value Date/Time    CHOLSTRLTOT 157 05/09/2023 09:11 AM    LDL 78 05/09/2023 09:11 AM    HDL 62 05/09/2023 09:11 AM    TRIGLYCERIDE 87 05/09/2023 09:11 AM       Lab Results   Component Value Date/Time    SODIUM 142 05/11/2023 12:45 AM    POTASSIUM 3.6 05/11/2023 12:45 AM    CHLORIDE 108 05/11/2023 12:45 AM    CO2 22 05/11/2023 12:45 AM    GLUCOSE 123 (H) 05/11/2023 12:45 AM    BUN 17 05/11/2023 12:45 AM    CREATININE 0.87 05/11/2023 12:45 AM    BUNCREATRAT 16 10/14/2020 08:05 AM     Lab Results   Component Value Date/Time    ALKPHOSPHAT 102 (H) 05/09/2023 09:11 AM    ASTSGOT 22 05/09/2023 09:11 AM    ALTSGPT 21 05/09/2023 09:11 AM    TBILIRUBIN 0.4 05/09/2023 09:11 AM      Lab Results   Component Value Date/Time    WBC 8.7 05/11/2023 12:45 AM     Lab Results   Component Value Date/Time    HBA1C 5.5 05/09/2023 09:11 AM       Cardiac Imaging and Procedures Review:    EKG dated 5/10/2023: My personal interpretation is sinus rhythm    Echo dated 5/10/2023, personally interpreted:   Normal LV size and function  No WMA  No valve disease    Nuclear Perfusion Imaging (4/10/2023):    There is decreased perfusion of moderate severity from apical to mid septum,    anterior and anteroseptal wall  segments with mild reversible ischemia.  SDS    2.   Breast attenuation artifact noted.  True ischemia versus artifact.   Normal wall motion. LV ejection fraction = 71%.   ECG INTERPRETATION   Negative stress ECG for ischemia.   Impaired exercise tolerance of 6.0 METS.    [facility] (5/10/2023):   POSTOPERATIVE DIAGNOSIS:  99% proximal LAD stenosis, single-vessel disease  LVEDP 0 mmHg  Successful IVUS guided PCI of proximal LAD (3.5 x 18 mm Hurley BRITT), excellent result  Left main coronary artery: Large caliber bifurcating no CAD  Left anterior descending artery: Large caliber thrombotic 99% proximal stenosis with TRA II flow.  No branch disease.  Post PCI 0% residual stenosis and TRA-3 flow.  Left circumflex coronary artery: Moderate caliber no significant CAD.  Right coronary artery: Large-caliber trivial CAD.      Radiology test Review:  CXR: No acute cardiopulmonary abnormality noted.      Assessment & Plan     1. Coronary artery disease involving native coronary artery of native heart without angina pectoris  Lipid Profile      2. Dyslipidemia  Lipid Profile      3. Essential hypertension            Doing well since the stent placement.  Does feel that she is deconditioned but no anginal symptoms.  Continue DAPT with aspirin and Plavix for minimum of 6 months, ideally 1 year if no bleeding concerns which we discussed.    Blood pressure at goal.  Continue current antihypertensive therapy.    Obtain lipid panel to monitor LDL level.  Was previously on simvastatin 40 mg which was changed to Lipitor 80 mg upon discharge.  Resulted in myalgias and joint pain.  She is currently taking Lipitor 40 mg without side effects.  We discussed trying over-the-counter co-Q10 supplement.    I have independently interpreted test results and discussed results and management with the patient.    All of patient's excellent questions were answered to the best of my knowledge and to her satisfaction.  It was a pleasure seeing Ms. Calvillo  Agustina in my clinic today. Return in about 6 months (around 12/7/2023). Patient is aware to call the cardiology clinic with any questions or concerns.      Maicol Westbrook MD  [facility]  [facility], [facility]  [address], [place] [address]  [place] [address]  Phone: [phone]  Fax: [phone]    Please note that this dictation was created using voice recognition software. I have made every reasonable attempt to correct obvious errors, but it is possible there are errors of grammar and possibly content that I did not discover before finalizing the note.

## 2023-06-08 ENCOUNTER — PATIENT MESSAGE (OUTPATIENT)
Dept: CARDIOLOGY | Facility: MEDICAL CENTER | Age: 83
End: 2023-06-08
Payer: MEDICARE

## 2023-06-09 ENCOUNTER — TELEPHONE (OUTPATIENT)
Dept: CARDIOLOGY | Facility: MEDICAL CENTER | Age: 83
End: 2023-06-09
Payer: MEDICARE

## 2023-06-09 ENCOUNTER — PATIENT MESSAGE (OUTPATIENT)
Dept: CARDIOLOGY | Facility: MEDICAL CENTER | Age: 83
End: 2023-06-09

## 2023-06-09 DIAGNOSIS — I20.0 UNSTABLE ANGINA (HCC): ICD-10-CM

## 2023-06-09 DIAGNOSIS — I16.0 HYPERTENSIVE URGENCY: ICD-10-CM

## 2023-06-09 NOTE — TELEPHONE ENCOUNTER
DAFNE          Caller: Karli Berrios      Topic/issue: Patient was calling and following up on her [employer] message that she sent earlier regarding not only her refills but also a new medication that she had talked about but she couldn't recall the name of the medication and was wanting it filled today if possible    Callback Number: [phone]      Thank you    -Johann RAMIREZ

## 2023-06-09 NOTE — TELEPHONE ENCOUNTER
DAFNE          Caller: Karli Berrios      Topic/issue: Patient was returning our call and was asking for a call back    Callback Number: [phone]        Thank you      -Johann RAMIREZ

## 2023-06-09 NOTE — TELEPHONE ENCOUNTER
Pt returned call.  Reviewed all medications. Reassured pt that refills can be placed at 1 week before pt runs out, as it is too soon to refill now. Advised pt to send [employer] message when ready for refills. All questions answered. Pt verbalized understanding and is appreciative of call back.

## 2023-06-12 ENCOUNTER — APPOINTMENT (RX ONLY)
Dept: URBAN - METROPOLITAN AREA CLINIC 36 | Facility: CLINIC | Age: 83
Setting detail: DERMATOLOGY
End: 2023-06-12

## 2023-06-12 PROBLEM — D03.72 MELANOMA IN SITU OF LEFT LOWER LIMB, INCLUDING HIP: Status: ACTIVE | Noted: 2023-06-12

## 2023-06-12 PROCEDURE — 15271 SKIN SUB GRAFT TRNK/ARM/LEG: CPT

## 2023-06-12 PROCEDURE — ? REPAIR NOTE

## 2023-06-12 PROCEDURE — ? EXCISION

## 2023-06-12 PROCEDURE — 11602 EXC TR-EXT MAL+MARG 1.1-2 CM: CPT

## 2023-06-12 NOTE — PROCEDURE: EXCISION
Referring Physician (Optional): mannering
Surgeon (Optional): Adele
Biopsy Photograph Reviewed: Yes
Previous Accession (Optional): [id number]
Size Of Lesion In Cm: 0.8
Size Of Margin In Cm: 0.5
Anesthesia Volume In Cc: 8
Was An Eye Clamp Used?: No
Eye Clamp Note Details: An eye clamp was used during the procedure.
Excision Method: Round
Saucerization Depth: dermis and superficial adipose tissue
Repair Type: Graft
Suturegard Retention Suture: 2-0 Nylon
Retention Suture Bite Size: 3 mm
Length To Time In Minutes Device Was In Place: 10
Number Of Hemigard Strips Per Side: 1
Intermediate / Complex Repair - Final Wound Length In Cm: 0
Undermining Type: Entire Wound
Debridement Text: The wound edges were debrided prior to proceeding with the closure to facilitate wound healing.
Helical Rim Text: The closure involved the helical rim.
Vermilion Border Text: The closure involved the vermilion border.
Nostril Rim Text: The closure involved the nostril rim.
Retention Suture Text: Retention sutures were placed to support the closure and prevent dehiscence.
Graft Type: Skin Substitute
Primary Defect Length (In Cm): 1.8
Skin Substitute: Trev FRENCH
Skin Substitute Units (Will Override Primary Defect Units If Greater Than 0): 15
Lab: [id number]
Lab Facility: 
Graft Donor Site Bandage (Optional-Leave Blank If You Don't Want In Note): Steri-strips and a pressure bandage were applied to the donor site.
Epidermal Closure Graft Donor Site (Optional): simple interrupted
Billing Type: Third-Party Bill
Excision Depth: fascia
Scalpel Size: 15 blade
Anesthesia Type: 1% lidocaine with epinephrine and a 1:10 solution of 8.4% sodium bicarbonate
Additional Anesthesia Volume In Cc: 6
Hemostasis: Electrocautery
Estimated Blood Loss (Cc): minimal
Detail Level: Detailed
Anesthesia Volume In Cc: 3
Deep Sutures: 4-0 PDO
Epidermal Closure: running subcuticular
Wound Care: No ointment
Dressing: steri-strips
Wound Check: 21 days
Suturegard Intro: Intraoperative tissue expansion was performed, utilizing the SUTUREGARD device, in order to reduce wound tension.
Suturegard Body: The suture ends were repeatedly re-tightened and re-clamped to achieve the desired tissue expansion.
Hemigard Intro: Due to skin fragility and wound tension, it was decided to use HEMIGARD adhesive retention suture devices to permit a linear closure. The skin was cleaned and dried for a 6cm distance away from the wound. Excessive hair, if present, was removed to allow for adhesion.
Hemigard Postcare Instructions: The HEMIGARD strips are to remain completely dry for at least 5-7 days.
Positioning (Leave Blank If You Do Not Want): The patient was placed in a comfortable position exposing the surgical site.
Pre-Excision Curettage Text (Leave Blank If You Do Not Want): Prior to drawing the surgical margin the visible lesion was removed with electrodesiccation and curettage to clearly define the lesion size.
Complex Repair Preamble Text (Leave Blank If You Do Not Want): Extensive wide undermining was performed.
Intermediate Repair Preamble Text (Leave Blank If You Do Not Want): Undermining was performed with blunt dissection.
Curvilinear Excision Additional Text (Leave Blank If You Do Not Want): The margin was drawn around the clinically apparent lesion.  A curvilinear shape was then drawn on the skin incorporating the lesion and margins.  Incisions were then made along these lines to the appropriate tissue plane and the lesion was extirpated.
Fusiform Excision Additional Text (Leave Blank If You Do Not Want): The margin was drawn around the clinically apparent lesion.  A fusiform shape was then drawn on the skin incorporating the lesion and margins.  Incisions were then made along these lines to the appropriate tissue plane and the lesion was extirpated.
Elliptical Excision Additional Text (Leave Blank If You Do Not Want): The margin was drawn around the clinically apparent lesion.  An elliptical shape was then drawn on the skin incorporating the lesion and margins.  Incisions were then made along these lines to the appropriate tissue plane and the lesion was extirpated.
Saucerization Excision Additional Text (Leave Blank If You Do Not Want): The margin was drawn around the clinically apparent lesion.  Incisions were then made along these lines, in a tangential fashion, to the appropriate tissue plane and the lesion was extirpated.
Slit Excision Additional Text (Leave Blank If You Do Not Want): A linear line was drawn on the skin overlying the lesion. An incision was made slowly until the lesion was visualized.  Once visualized, the lesion was removed with blunt dissection.
Excisional Biopsy Additional Text (Leave Blank If You Do Not Want): The margin was drawn around the clinically apparent lesion. An elliptical shape was then drawn on the skin incorporating the lesion and margins.  Incisions were then made along these lines to the appropriate tissue plane and the lesion was extirpated.
Perilesional Excision Additional Text (Leave Blank If You Do Not Want): The margin was drawn around the clinically apparent lesion. Incisions were then made along these lines to the appropriate tissue plane and the lesion was extirpated.
Repair Performed By Another Provider Text (Leave Blank If You Do Not Want): After the tissue was excised the defect was repaired by another provider.
No Repair - Repaired With Adjacent Surgical Defect Text (Leave Blank If You Do Not Want): After the excision the defect was repaired concurrently with another surgical defect which was in close approximation.
Adjacent Tissue Transfer Text: The defect edges were debeveled with a #15 scalpel blade. Given the location of the defect and the proximity to free margins an adjacent tissue transfer was deemed most appropriate. Using a sterile surgical marker, an appropriate flap was drawn incorporating the defect and placing the expected incisions within the relaxed skin tension lines where possible. The area thus outlined was incised deep to adipose tissue with a #15 scalpel blade. The skin margins were undermined to an appropriate distance in all directions utilizing iris scissors and carried over to close the primary defect.
Advancement Flap (Single) Text: The defect edges were debeveled with a #15 scalpel blade.  Given the location of the defect and the proximity to free margins a single advancement flap was deemed most appropriate.  Using a sterile surgical marker, an appropriate advancement flap was drawn incorporating the defect and placing the expected incisions within the relaxed skin tension lines where possible.    The area thus outlined was incised deep to adipose tissue with a #15 scalpel blade.  The skin margins were undermined to an appropriate distance in all directions utilizing iris scissors.
Advancement Flap (Double) Text: The defect edges were debeveled with a #15 scalpel blade.  Given the location of the defect and the proximity to free margins a double advancement flap was deemed most appropriate.  Using a sterile surgical marker, the appropriate advancement flaps were drawn incorporating the defect and placing the expected incisions within the relaxed skin tension lines where possible.    The area thus outlined was incised deep to adipose tissue with a #15 scalpel blade.  The skin margins were undermined to an appropriate distance in all directions utilizing iris scissors.
Burow's Advancement Flap Text: The defect edges were debeveled with a #15 scalpel blade.  Given the location of the defect and the proximity to free margins a Burow's advancement flap was deemed most appropriate.  Using a sterile surgical marker, the appropriate advancement flap was drawn incorporating the defect and placing the expected incisions within the relaxed skin tension lines where possible.    The area thus outlined was incised deep to adipose tissue with a #15 scalpel blade.  The skin margins were undermined to an appropriate distance in all directions utilizing iris scissors.
Chonodrocutaneous Helical Advancement Flap Text: The defect edges were debeveled with a #15 scalpel blade.  Given the location of the defect and the proximity to free margins a chondrocutaneous helical advancement flap was deemed most appropriate.  Using a sterile surgical marker, the appropriate advancement flap was drawn incorporating the defect and placing the expected incisions within the relaxed skin tension lines where possible.    The area thus outlined was incised deep to adipose tissue with a #15 scalpel blade.  The skin margins were undermined to an appropriate distance in all directions utilizing iris scissors.
Crescentic Advancement Flap Text: The defect edges were debeveled with a #15 scalpel blade.  Given the location of the defect and the proximity to free margins a crescentic advancement flap was deemed most appropriate.  Using a sterile surgical marker, the appropriate advancement flap was drawn incorporating the defect and placing the expected incisions within the relaxed skin tension lines where possible.    The area thus outlined was incised deep to adipose tissue with a #15 scalpel blade.  The skin margins were undermined to an appropriate distance in all directions utilizing iris scissors.
A-T Advancement Flap Text: The defect edges were debeveled with a #15 scalpel blade.  Given the location of the defect, shape of the defect and the proximity to free margins an A-T advancement flap was deemed most appropriate.  Using a sterile surgical marker, an appropriate advancement flap was drawn incorporating the defect and placing the expected incisions within the relaxed skin tension lines where possible.    The area thus outlined was incised deep to adipose tissue with a #15 scalpel blade.  The skin margins were undermined to an appropriate distance in all directions utilizing iris scissors.
O-T Advancement Flap Text: The defect edges were debeveled with a #15 scalpel blade.  Given the location of the defect, shape of the defect and the proximity to free margins an O-T advancement flap was deemed most appropriate.  Using a sterile surgical marker, an appropriate advancement flap was drawn incorporating the defect and placing the expected incisions within the relaxed skin tension lines where possible.    The area thus outlined was incised deep to adipose tissue with a #15 scalpel blade.  The skin margins were undermined to an appropriate distance in all directions utilizing iris scissors.
O-L Flap Text: The defect edges were debeveled with a #15 scalpel blade.  Given the location of the defect, shape of the defect and the proximity to free margins an O-L flap was deemed most appropriate.  Using a sterile surgical marker, an appropriate advancement flap was drawn incorporating the defect and placing the expected incisions within the relaxed skin tension lines where possible.    The area thus outlined was incised deep to adipose tissue with a #15 scalpel blade.  The skin margins were undermined to an appropriate distance in all directions utilizing iris scissors.
O-Z Flap Text: The defect edges were debeveled with a #15 scalpel blade.  Given the location of the defect, shape of the defect and the proximity to free margins an O-Z flap was deemed most appropriate.  Using a sterile surgical marker, an appropriate transposition flap was drawn incorporating the defect and placing the expected incisions within the relaxed skin tension lines where possible. The area thus outlined was incised deep to adipose tissue with a #15 scalpel blade.  The skin margins were undermined to an appropriate distance in all directions utilizing iris scissors.
Double O-Z Flap Text: The defect edges were debeveled with a #15 scalpel blade.  Given the location of the defect, shape of the defect and the proximity to free margins a Double O-Z flap was deemed most appropriate.  Using a sterile surgical marker, an appropriate transposition flap was drawn incorporating the defect and placing the expected incisions within the relaxed skin tension lines where possible. The area thus outlined was incised deep to adipose tissue with a #15 scalpel blade.  The skin margins were undermined to an appropriate distance in all directions utilizing iris scissors.
V-Y Flap Text: The defect edges were debeveled with a #15 scalpel blade.  Given the location of the defect, shape of the defect and the proximity to free margins a V-Y flap was deemed most appropriate.  Using a sterile surgical marker, an appropriate advancement flap was drawn incorporating the defect and placing the expected incisions within the relaxed skin tension lines where possible.    The area thus outlined was incised deep to adipose tissue with a #15 scalpel blade.  The skin margins were undermined to an appropriate distance in all directions utilizing iris scissors.
Advancement-Rotation Flap Text: The defect edges were debeveled with a #15 scalpel blade.  Given the location of the defect, shape of the defect and the proximity to free margins an advancement-rotation flap was deemed most appropriate.  Using a sterile surgical marker, an appropriate flap was drawn incorporating the defect and placing the expected incisions within the relaxed skin tension lines where possible. The area thus outlined was incised deep to adipose tissue with a #15 scalpel blade.  The skin margins were undermined to an appropriate distance in all directions utilizing iris scissors.
Mercedes Flap Text: The defect edges were debeveled with a #15 scalpel blade.  Given the location of the defect, shape of the defect and the proximity to free margins a Mercedes flap was deemed most appropriate.  Using a sterile surgical marker, an appropriate advancement flap was drawn incorporating the defect and placing the expected incisions within the relaxed skin tension lines where possible. The area thus outlined was incised deep to adipose tissue with a #15 scalpel blade.  The skin margins were undermined to an appropriate distance in all directions utilizing iris scissors.
Modified Advancement Flap Text: The defect edges were debeveled with a #15 scalpel blade.  Given the location of the defect, shape of the defect and the proximity to free margins a modified advancement flap was deemed most appropriate.  Using a sterile surgical marker, an appropriate advancement flap was drawn incorporating the defect and placing the expected incisions within the relaxed skin tension lines where possible.    The area thus outlined was incised deep to adipose tissue with a #15 scalpel blade.  The skin margins were undermined to an appropriate distance in all directions utilizing iris scissors.
Mucosal Advancement Flap Text: Given the location of the defect, shape of the defect and the proximity to free margins a mucosal advancement flap was deemed most appropriate. Incisions were made with a 15 blade scalpel in the appropriate fashion along the cutaneous vermilion border and the mucosal lip. The remaining actinically damaged mucosal tissue was excised.  The mucosal advancement flap was then elevated to the gingival sulcus with care taken to preserve the neurovascular structures and advanced into the primary defect. Care was taken to ensure that precise realignment of the vermilion border was achieved.
Peng Advancement Flap Text: The defect edges were debeveled with a #15 scalpel blade.  Given the location of the defect, shape of the defect and the proximity to free margins a Peng advancement flap was deemed most appropriate.  Using a sterile surgical marker, an appropriate advancement flap was drawn incorporating the defect and placing the expected incisions within the relaxed skin tension lines where possible. The area thus outlined was incised deep to adipose tissue with a #15 scalpel blade.  The skin margins were undermined to an appropriate distance in all directions utilizing iris scissors.
Hatchet Flap Text: The defect edges were debeveled with a #15 scalpel blade.  Given the location of the defect, shape of the defect and the proximity to free margins a hatchet flap was deemed most appropriate.  Using a sterile surgical marker, an appropriate hatchet flap was drawn incorporating the defect and placing the expected incisions within the relaxed skin tension lines where possible.    The area thus outlined was incised deep to adipose tissue with a #15 scalpel blade.  The skin margins were undermined to an appropriate distance in all directions utilizing iris scissors.
Rotation Flap Text: The defect edges were debeveled with a #15 scalpel blade.  Given the location of the defect, shape of the defect and the proximity to free margins a rotation flap was deemed most appropriate.  Using a sterile surgical marker, an appropriate rotation flap was drawn incorporating the defect and placing the expected incisions within the relaxed skin tension lines where possible.    The area thus outlined was incised deep to adipose tissue with a #15 scalpel blade.  The skin margins were undermined to an appropriate distance in all directions utilizing iris scissors.
Bilateral Rotation Flap Text: The defect edges were debeveled with a #15 scalpel blade. Given the location of the defect, shape of the defect and the proximity to free margins a bilateral rotation flap was deemed most appropriate. Using a sterile surgical marker, an appropriate rotation flap was drawn incorporating the defect and placing the expected incisions within the relaxed skin tension lines where possible. The area thus outlined was incised deep to adipose tissue with a #15 scalpel blade. The skin margins were undermined to an appropriate distance in all directions utilizing iris scissors. Following this, the designed flap was carried over into the primary defect and sutured into place.
Spiral Flap Text: The defect edges were debeveled with a #15 scalpel blade.  Given the location of the defect, shape of the defect and the proximity to free margins a spiral flap was deemed most appropriate.  Using a sterile surgical marker, an appropriate rotation flap was drawn incorporating the defect and placing the expected incisions within the relaxed skin tension lines where possible. The area thus outlined was incised deep to adipose tissue with a #15 scalpel blade.  The skin margins were undermined to an appropriate distance in all directions utilizing iris scissors.
Staged Advancement Flap Text: The defect edges were debeveled with a #15 scalpel blade.  Given the location of the defect, shape of the defect and the proximity to free margins a staged advancement flap was deemed most appropriate.  Using a sterile surgical marker, an appropriate advancement flap was drawn incorporating the defect and placing the expected incisions within the relaxed skin tension lines where possible. The area thus outlined was incised deep to adipose tissue with a #15 scalpel blade.  The skin margins were undermined to an appropriate distance in all directions utilizing iris scissors.
Star Wedge Flap Text: The defect edges were debeveled with a #15 scalpel blade.  Given the location of the defect, shape of the defect and the proximity to free margins a star wedge flap was deemed most appropriate.  Using a sterile surgical marker, an appropriate rotation flap was drawn incorporating the defect and placing the expected incisions within the relaxed skin tension lines where possible. The area thus outlined was incised deep to adipose tissue with a #15 scalpel blade.  The skin margins were undermined to an appropriate distance in all directions utilizing iris scissors.
Transposition Flap Text: The defect edges were debeveled with a #15 scalpel blade.  Given the location of the defect and the proximity to free margins a transposition flap was deemed most appropriate.  Using a sterile surgical marker, an appropriate transposition flap was drawn incorporating the defect.    The area thus outlined was incised deep to adipose tissue with a #15 scalpel blade.  The skin margins were undermined to an appropriate distance in all directions utilizing iris scissors.
Muscle Hinge Flap Text: The defect edges were debeveled with a #15 scalpel blade.  Given the size, depth and location of the defect and the proximity to free margins a muscle hinge flap was deemed most appropriate.  Using a sterile surgical marker, an appropriate hinge flap was drawn incorporating the defect. The area thus outlined was incised with a #15 scalpel blade.  The skin margins were undermined to an appropriate distance in all directions utilizing iris scissors.
Mustarde Flap Text: The defect edges were debeveled with a #15 scalpel blade.  Given the size, depth and location of the defect and the proximity to free margins a Mustarde flap was deemed most appropriate. Using a sterile surgical marker, an appropriate flap was drawn incorporating the defect. The area thus outlined was incised with a #15 scalpel blade. The skin margins were undermined to an appropriate distance in all directions utilizing iris scissors. Following this, the designed flap was carried into the primary defect and sutured into place.
Nasal Turnover Hinge Flap Text: The defect edges were debeveled with a #15 scalpel blade.  Given the size, depth, location of the defect and the defect being full thickness a nasal turnover hinge flap was deemed most appropriate.  Using a sterile surgical marker, an appropriate hinge flap was drawn incorporating the defect. The area thus outlined was incised with a #15 scalpel blade. The flap was designed to recreate the nasal mucosal lining and the alar rim. The skin margins were undermined to an appropriate distance in all directions utilizing iris scissors.
Nasalis-Muscle-Based Myocutaneous Island Pedicle Flap Text: Using a #15 blade, an incision was made around the donor flap to the level of the nasalis muscle. Wide lateral undermining was then performed in both the subcutaneous plane above the nasalis muscle, and in a submuscular plane just above periosteum. This allowed the formation of a free nasalis muscle axial pedicle (based on the angular artery) which was still attached to the actual cutaneous flap, increasing its mobility and vascular viability. Hemostasis was obtained with pinpoint electrocoagulation. The flap was mobilized into position and the pivotal anchor points positioned and stabilized with buried interrupted sutures. Subcutaneous and dermal tissues were closed in a multilayered fashion with sutures. Tissue redundancies were excised, and the epidermal edges were apposed without significant tension and sutured with sutures.
Orbicularis Oris Muscle Flap Text: The defect edges were debeveled with a #15 scalpel blade.  Given that the defect affected the competency of the oral sphincter an obicularis oris muscle flap was deemed most appropriate to restore this competency and normal muscle function.  Using a sterile surgical marker, an appropriate flap was drawn incorporating the defect. The area thus outlined was incised with a #15 scalpel blade.
Melolabial Transposition Flap Text: The defect edges were debeveled with a #15 scalpel blade.  Given the location of the defect and the proximity to free margins a melolabial flap was deemed most appropriate.  Using a sterile surgical marker, an appropriate melolabial transposition flap was drawn incorporating the defect.    The area thus outlined was incised deep to adipose tissue with a #15 scalpel blade.  The skin margins were undermined to an appropriate distance in all directions utilizing iris scissors.
Rhombic Flap Text: The defect edges were debeveled with a #15 scalpel blade.  Given the location of the defect and the proximity to free margins a rhombic flap was deemed most appropriate.  Using a sterile surgical marker, an appropriate rhombic flap was drawn incorporating the defect.    The area thus outlined was incised deep to adipose tissue with a #15 scalpel blade.  The skin margins were undermined to an appropriate distance in all directions utilizing iris scissors.
Rhomboid Transposition Flap Text: The defect edges were debeveled with a #15 scalpel blade.  Given the location of the defect and the proximity to free margins a rhomboid transposition flap was deemed most appropriate.  Using a sterile surgical marker, an appropriate rhomboid flap was drawn incorporating the defect.    The area thus outlined was incised deep to adipose tissue with a #15 scalpel blade.  The skin margins were undermined to an appropriate distance in all directions utilizing iris scissors.
Bi-Rhombic Flap Text: The defect edges were debeveled with a #15 scalpel blade.  Given the location of the defect and the proximity to free margins a bi-rhombic flap was deemed most appropriate.  Using a sterile surgical marker, an appropriate rhombic flap was drawn incorporating the defect. The area thus outlined was incised deep to adipose tissue with a #15 scalpel blade.  The skin margins were undermined to an appropriate distance in all directions utilizing iris scissors.
Helical Rim Advancement Flap Text: The defect edges were debeveled with a #15 blade scalpel.  Given the location of the defect and the proximity to free margins (helical rim) a double helical rim advancement flap was deemed most appropriate.  Using a sterile surgical marker, the appropriate advancement flaps were drawn incorporating the defect and placing the expected incisions between the helical rim and antihelix where possible.  The area thus outlined was incised through and through with a #15 scalpel blade.  With a skin hook and iris scissors, the flaps were gently and sharply undermined and freed up.
Bilateral Helical Rim Advancement Flap Text: The defect edges were debeveled with a #15 blade scalpel.  Given the location of the defect and the proximity to free margins (helical rim) a bilateral helical rim advancement flap was deemed most appropriate.  Using a sterile surgical marker, the appropriate advancement flaps were drawn incorporating the defect and placing the expected incisions between the helical rim and antihelix where possible.  The area thus outlined was incised through and through with a #15 scalpel blade.  With a skin hook and iris scissors, the flaps were gently and sharply undermined and freed up.
Ear Star Wedge Flap Text: The defect edges were debeveled with a #15 blade scalpel.  Given the location of the defect and the proximity to free margins (helical rim) an ear star wedge flap was deemed most appropriate.  Using a sterile surgical marker, the appropriate flap was drawn incorporating the defect and placing the expected incisions between the helical rim and antihelix where possible.  The area thus outlined was incised through and through with a #15 scalpel blade.
Banner Transposition Flap Text: The defect edges were debeveled with a #15 scalpel blade.  Given the location of the defect and the proximity to free margins a Banner transposition flap was deemed most appropriate.  Using a sterile surgical marker, an appropriate flap drawn around the defect. The area thus outlined was incised deep to adipose tissue with a #15 scalpel blade.  The skin margins were undermined to an appropriate distance in all directions utilizing iris scissors.
Bilobed Flap Text: The defect edges were debeveled with a #15 scalpel blade.  Given the location of the defect and the proximity to free margins a bilobe flap was deemed most appropriate.  Using a sterile surgical marker, an appropriate bilobe flap drawn around the defect.    The area thus outlined was incised deep to adipose tissue with a #15 scalpel blade.  The skin margins were undermined to an appropriate distance in all directions utilizing iris scissors.
Bilobed Transposition Flap Text: The defect edges were debeveled with a #15 scalpel blade.  Given the location of the defect and the proximity to free margins a bilobed transposition flap was deemed most appropriate.  Using a sterile surgical marker, an appropriate bilobe flap drawn around the defect.    The area thus outlined was incised deep to adipose tissue with a #15 scalpel blade.  The skin margins were undermined to an appropriate distance in all directions utilizing iris scissors.
Trilobed Flap Text: The defect edges were debeveled with a #15 scalpel blade.  Given the location of the defect and the proximity to free margins a trilobed flap was deemed most appropriate.  Using a sterile surgical marker, an appropriate trilobed flap drawn around the defect.    The area thus outlined was incised deep to adipose tissue with a #15 scalpel blade.  The skin margins were undermined to an appropriate distance in all directions utilizing iris scissors.
Dorsal Nasal Flap Text: The defect edges were debeveled with a #15 scalpel blade.  Given the location of the defect and the proximity to free margins a dorsal nasal flap was deemed most appropriate.  Using a sterile surgical marker, an appropriate dorsal nasal flap was drawn around the defect.    The area thus outlined was incised deep to adipose tissue with a #15 scalpel blade.  The skin margins were undermined to an appropriate distance in all directions utilizing iris scissors.
Island Pedicle Flap Text: The defect edges were debeveled with a #15 scalpel blade.  Given the location of the defect, shape of the defect and the proximity to free margins an island pedicle advancement flap was deemed most appropriate.  Using a sterile surgical marker, an appropriate advancement flap was drawn incorporating the defect, outlining the appropriate donor tissue and placing the expected incisions within the relaxed skin tension lines where possible.    The area thus outlined was incised deep to adipose tissue with a #15 scalpel blade.  The skin margins were undermined to an appropriate distance in all directions around the primary defect and laterally outward around the island pedicle utilizing iris scissors.  There was minimal undermining beneath the pedicle flap.
Island Pedicle Flap With Canthal Suspension Text: The defect edges were debeveled with a #15 scalpel blade.  Given the location of the defect, shape of the defect and the proximity to free margins an island pedicle advancement flap was deemed most appropriate.  Using a sterile surgical marker, an appropriate advancement flap was drawn incorporating the defect, outlining the appropriate donor tissue and placing the expected incisions within the relaxed skin tension lines where possible. The area thus outlined was incised deep to adipose tissue with a #15 scalpel blade.  The skin margins were undermined to an appropriate distance in all directions around the primary defect and laterally outward around the island pedicle utilizing iris scissors.  There was minimal undermining beneath the pedicle flap. A suspension suture was placed in the canthal tendon to prevent tension and prevent ectropion.
Alar Island Pedicle Flap Text: The defect edges were debeveled with a #15 scalpel blade.  Given the location of the defect, shape of the defect and the proximity to the alar rim an island pedicle advancement flap was deemed most appropriate.  Using a sterile surgical marker, an appropriate advancement flap was drawn incorporating the defect, outlining the appropriate donor tissue and placing the expected incisions within the nasal ala running parallel to the alar rim. The area thus outlined was incised with a #15 scalpel blade.  The skin margins were undermined minimally to an appropriate distance in all directions around the primary defect and laterally outward around the island pedicle utilizing iris scissors.  There was minimal undermining beneath the pedicle flap.
Double Island Pedicle Flap Text: The defect edges were debeveled with a #15 scalpel blade.  Given the location of the defect, shape of the defect and the proximity to free margins a double island pedicle advancement flap was deemed most appropriate.  Using a sterile surgical marker, an appropriate advancement flap was drawn incorporating the defect, outlining the appropriate donor tissue and placing the expected incisions within the relaxed skin tension lines where possible.    The area thus outlined was incised deep to adipose tissue with a #15 scalpel blade.  The skin margins were undermined to an appropriate distance in all directions around the primary defect and laterally outward around the island pedicle utilizing iris scissors.  There was minimal undermining beneath the pedicle flap.
Island Pedicle Flap-Requiring Vessel Identification Text: The defect edges were debeveled with a #15 scalpel blade.  Given the location of the defect, shape of the defect and the proximity to free margins an island pedicle advancement flap was deemed most appropriate.  Using a sterile surgical marker, an appropriate advancement flap was drawn, based on the axial vessel mentioned above, incorporating the defect, outlining the appropriate donor tissue and placing the expected incisions within the relaxed skin tension lines where possible.    The area thus outlined was incised deep to adipose tissue with a #15 scalpel blade.  The skin margins were undermined to an appropriate distance in all directions around the primary defect and laterally outward around the island pedicle utilizing iris scissors.  There was minimal undermining beneath the pedicle flap.
Keystone Flap Text: The defect edges were debeveled with a #15 scalpel blade.  Given the location of the defect, shape of the defect a keystone flap was deemed most appropriate.  Using a sterile surgical marker, an appropriate keystone flap was drawn incorporating the defect, outlining the appropriate donor tissue and placing the expected incisions within the relaxed skin tension lines where possible. The area thus outlined was incised deep to adipose tissue with a #15 scalpel blade.  The skin margins were undermined to an appropriate distance in all directions around the primary defect and laterally outward around the flap utilizing iris scissors.
O-T Plasty Text: The defect edges were debeveled with a #15 scalpel blade.  Given the location of the defect, shape of the defect and the proximity to free margins an O-T plasty was deemed most appropriate.  Using a sterile surgical marker, an appropriate O-T plasty was drawn incorporating the defect and placing the expected incisions within the relaxed skin tension lines where possible.    The area thus outlined was incised deep to adipose tissue with a #15 scalpel blade.  The skin margins were undermined to an appropriate distance in all directions utilizing iris scissors.
O-Z Plasty Text: The defect edges were debeveled with a #15 scalpel blade.  Given the location of the defect, shape of the defect and the proximity to free margins an O-Z plasty (double transposition flap) was deemed most appropriate.  Using a sterile surgical marker, the appropriate transposition flaps were drawn incorporating the defect and placing the expected incisions within the relaxed skin tension lines where possible.    The area thus outlined was incised deep to adipose tissue with a #15 scalpel blade.  The skin margins were undermined to an appropriate distance in all directions utilizing iris scissors.  Hemostasis was achieved with electrocautery.  The flaps were then transposed into place, one clockwise and the other counterclockwise, and anchored with interrupted buried subcutaneous sutures.
Double O-Z Plasty Text: The defect edges were debeveled with a #15 scalpel blade.  Given the location of the defect, shape of the defect and the proximity to free margins a Double O-Z plasty (double transposition flap) was deemed most appropriate.  Using a sterile surgical marker, the appropriate transposition flaps were drawn incorporating the defect and placing the expected incisions within the relaxed skin tension lines where possible. The area thus outlined was incised deep to adipose tissue with a #15 scalpel blade.  The skin margins were undermined to an appropriate distance in all directions utilizing iris scissors.  Hemostasis was achieved with electrocautery.  The flaps were then transposed into place, one clockwise and the other counterclockwise, and anchored with interrupted buried subcutaneous sutures.
V-Y Plasty Text: The defect edges were debeveled with a #15 scalpel blade.  Given the location of the defect, shape of the defect and the proximity to free margins an V-Y advancement flap was deemed most appropriate.  Using a sterile surgical marker, an appropriate advancement flap was drawn incorporating the defect and placing the expected incisions within the relaxed skin tension lines where possible.    The area thus outlined was incised deep to adipose tissue with a #15 scalpel blade.  The skin margins were undermined to an appropriate distance in all directions utilizing iris scissors.
H Plasty Text: Given the location of the defect, shape of the defect and the proximity to free margins a H-plasty was deemed most appropriate for repair.  Using a sterile surgical marker, the appropriate advancement arms of the H-plasty were drawn incorporating the defect and placing the expected incisions within the relaxed skin tension lines where possible. The area thus outlined was incised deep to adipose tissue with a #15 scalpel blade. The skin margins were undermined to an appropriate distance in all directions utilizing iris scissors.  The opposing advancement arms were then advanced into place in opposite direction and anchored with interrupted buried subcutaneous sutures.
W Plasty Text: The lesion was extirpated to the level of the fat with a #15 scalpel blade.  Given the location of the defect, shape of the defect and the proximity to free margins a W-plasty was deemed most appropriate for repair.  Using a sterile surgical marker, the appropriate transposition arms of the W-plasty were drawn incorporating the defect and placing the expected incisions within the relaxed skin tension lines where possible.    The area thus outlined was incised deep to adipose tissue with a #15 scalpel blade.  The skin margins were undermined to an appropriate distance in all directions utilizing iris scissors.  The opposing transposition arms were then transposed into place in opposite direction and anchored with interrupted buried subcutaneous sutures.
Z Plasty Text: The lesion was extirpated to the level of the fat with a #15 scalpel blade.  Given the location of the defect, shape of the defect and the proximity to free margins a Z-plasty was deemed most appropriate for repair.  Using a sterile surgical marker, the appropriate transposition arms of the Z-plasty were drawn incorporating the defect and placing the expected incisions within the relaxed skin tension lines where possible.    The area thus outlined was incised deep to adipose tissue with a #15 scalpel blade.  The skin margins were undermined to an appropriate distance in all directions utilizing iris scissors.  The opposing transposition arms were then transposed into place in opposite direction and anchored with interrupted buried subcutaneous sutures.
Double Z Plasty Text: The lesion was extirpated to the level of the fat with a #15 scalpel blade. Given the location of the defect, shape of the defect and the proximity to free margins a double Z-plasty was deemed most appropriate for repair. Using a sterile surgical marker, the appropriate transposition arms of the double Z-plasty were drawn incorporating the defect and placing the expected incisions within the relaxed skin tension lines where possible. The area thus outlined was incised deep to adipose tissue with a #15 scalpel blade. The skin margins were undermined to an appropriate distance in all directions utilizing iris scissors. The opposing transposition arms were then transposed and carried over into place in opposite direction and anchored with interrupted buried subcutaneous sutures.
Zygomaticofacial Flap Text: Given the location of the defect, shape of the defect and the proximity to free margins a zygomaticofacial flap was deemed most appropriate for repair.  Using a sterile surgical marker, the appropriate flap was drawn incorporating the defect and placing the expected incisions within the relaxed skin tension lines where possible. The area thus outlined was incised deep to adipose tissue with a #15 scalpel blade with preservation of a vascular pedicle.  The skin margins were undermined to an appropriate distance in all directions utilizing iris scissors.  The flap was then placed into the defect and anchored with interrupted buried subcutaneous sutures.
Cheek Interpolation Flap Text: A decision was made to reconstruct the defect utilizing an interpolation axial flap and a staged reconstruction.  A telfa template was made of the defect.  This telfa template was then used to outline the Cheek Interpolation flap.  The donor area for the pedicle flap was then injected with anesthesia.  The flap was excised through the skin and subcutaneous tissue down to the layer of the underlying musculature.  The interpolation flap was carefully excised within this deep plane to maintain its blood supply.  The edges of the donor site were undermined.   The donor site was closed in a primary fashion.  The pedicle was then rotated into position and sutured.  Once the tube was sutured into place, adequate blood supply was confirmed with blanching and refill.  The pedicle was then wrapped with xeroform gauze and dressed appropriately with a telfa and gauze bandage to ensure continued blood supply and protect the attached pedicle.
Cheek-To-Nose Interpolation Flap Text: A decision was made to reconstruct the defect utilizing an interpolation axial flap and a staged reconstruction.  A telfa template was made of the defect.  This telfa template was then used to outline the Cheek-To-Nose Interpolation flap.  The donor area for the pedicle flap was then injected with anesthesia.  The flap was excised through the skin and subcutaneous tissue down to the layer of the underlying musculature.  The interpolation flap was carefully excised within this deep plane to maintain its blood supply.  The edges of the donor site were undermined.   The donor site was closed in a primary fashion.  The pedicle was then rotated into position and sutured.  Once the tube was sutured into place, adequate blood supply was confirmed with blanching and refill.  The pedicle was then wrapped with xeroform gauze and dressed appropriately with a telfa and gauze bandage to ensure continued blood supply and protect the attached pedicle.
Interpolation Flap Text: A decision was made to reconstruct the defect utilizing an interpolation axial flap and a staged reconstruction.  A telfa template was made of the defect.  This telfa template was then used to outline the interpolation flap.  The donor area for the pedicle flap was then injected with anesthesia.  The flap was excised through the skin and subcutaneous tissue down to the layer of the underlying musculature.  The interpolation flap was carefully excised within this deep plane to maintain its blood supply.  The edges of the donor site were undermined.   The donor site was closed in a primary fashion.  The pedicle was then rotated into position and sutured.  Once the tube was sutured into place, adequate blood supply was confirmed with blanching and refill.  The pedicle was then wrapped with xeroform gauze and dressed appropriately with a telfa and gauze bandage to ensure continued blood supply and protect the attached pedicle.
Melolabial Interpolation Flap Text: A decision was made to reconstruct the defect utilizing an interpolation axial flap and a staged reconstruction.  A telfa template was made of the defect.  This telfa template was then used to outline the melolabial interpolation flap.  The donor area for the pedicle flap was then injected with anesthesia.  The flap was excised through the skin and subcutaneous tissue down to the layer of the underlying musculature.  The pedicle flap was carefully excised within this deep plane to maintain its blood supply.  The edges of the donor site were undermined.   The donor site was closed in a primary fashion.  The pedicle was then rotated into position and sutured.  Once the tube was sutured into place, adequate blood supply was confirmed with blanching and refill.  The pedicle was then wrapped with xeroform gauze and dressed appropriately with a telfa and gauze bandage to ensure continued blood supply and protect the attached pedicle.
Mastoid Interpolation Flap Text: A decision was made to reconstruct the defect utilizing an interpolation axial flap and a staged reconstruction.  A telfa template was made of the defect.  This telfa template was then used to outline the mastoid interpolation flap.  The donor area for the pedicle flap was then injected with anesthesia.  The flap was excised through the skin and subcutaneous tissue down to the layer of the underlying musculature.  The pedicle flap was carefully excised within this deep plane to maintain its blood supply.  The edges of the donor site were undermined.   The donor site was closed in a primary fashion.  The pedicle was then rotated into position and sutured.  Once the tube was sutured into place, adequate blood supply was confirmed with blanching and refill.  The pedicle was then wrapped with xeroform gauze and dressed appropriately with a telfa and gauze bandage to ensure continued blood supply and protect the attached pedicle.
Posterior Auricular Interpolation Flap Text: A decision was made to reconstruct the defect utilizing an interpolation axial flap and a staged reconstruction.  A telfa template was made of the defect.  This telfa template was then used to outline the posterior auricular interpolation flap.  The donor area for the pedicle flap was then injected with anesthesia.  The flap was excised through the skin and subcutaneous tissue down to the layer of the underlying musculature.  The pedicle flap was carefully excised within this deep plane to maintain its blood supply.  The edges of the donor site were undermined.   The donor site was closed in a primary fashion.  The pedicle was then rotated into position and sutured.  Once the tube was sutured into place, adequate blood supply was confirmed with blanching and refill.  The pedicle was then wrapped with xeroform gauze and dressed appropriately with a telfa and gauze bandage to ensure continued blood supply and protect the attached pedicle.
Paramedian Forehead Flap Text: A decision was made to reconstruct the defect utilizing an interpolation axial flap and a staged reconstruction.  A telfa template was made of the defect.  This telfa template was then used to outline the paramedian forehead pedicle flap.  The donor area for the pedicle flap was then injected with anesthesia.  The flap was excised through the skin and subcutaneous tissue down to the layer of the underlying musculature.  The pedicle flap was carefully excised within this deep plane to maintain its blood supply.  The edges of the donor site were undermined.   The donor site was closed in a primary fashion.  The pedicle was then rotated into position and sutured.  Once the tube was sutured into place, adequate blood supply was confirmed with blanching and refill.  The pedicle was then wrapped with xeroform gauze and dressed appropriately with a telfa and gauze bandage to ensure continued blood supply and protect the attached pedicle.
Abbe Flap (Upper To Lower Lip) Text: The defect of the lower lip was assessed and measured.  Given the location and size of the defect, an Abbe flap was deemed most appropriate. Using a sterile surgical marker, an appropriate Abbe flap was measured and drawn on the upper lip. Local anesthesia was then infiltrated.  A scalpel was then used to incise the upper lip through and through the skin, vermilion, muscle and mucosa, leaving the flap pedicled on the opposite side.  The flap was then rotated and transferred to the lower lip defect.  The flap was then sutured into place with a three layer technique, closing the orbicularis oris muscle layer with subcutaneous buried sutures, followed by a mucosal layer and an epidermal layer.
Abbe Flap (Lower To Upper Lip) Text: The defect of the upper lip was assessed and measured.  Given the location and size of the defect, an Abbe flap was deemed most appropriate. Using a sterile surgical marker, an appropriate Abbe flap was measured and drawn on the lower lip. Local anesthesia was then infiltrated. A scalpel was then used to incise the upper lip through and through the skin, vermilion, muscle and mucosa, leaving the flap pedicled on the opposite side.  The flap was then rotated and transferred to the lower lip defect.  The flap was then sutured into place with a three layer technique, closing the orbicularis oris muscle layer with subcutaneous buried sutures, followed by a mucosal layer and an epidermal layer.
Estlander Flap (Upper To Lower Lip) Text: The defect of the lower lip was assessed and measured.  Given the location and size of the defect, an Estlander flap was deemed most appropriate. Using a sterile surgical marker, an appropriate Estlander flap was measured and drawn on the upper lip. Local anesthesia was then infiltrated. A scalpel was then used to incise the lateral aspect of the flap, through skin, muscle and mucosa, leaving the flap pedicled medially.  The flap was then rotated and positioned to fill the lower lip defect.  The flap was then sutured into place with a three layer technique, closing the orbicularis oris muscle layer with subcutaneous buried sutures, followed by a mucosal layer and an epidermal layer.
Lip Wedge Excision Repair Text: Given the location of the defect and the proximity to free margins a full thickness wedge repair was deemed most appropriate.  Using a sterile surgical marker, the appropriate repair was drawn incorporating the defect and placing the expected incisions perpendicular to the vermilion border.  The vermilion border was also meticulously outlined to ensure appropriate reapproximation during the repair.  The area thus outlined was incised through and through with a #15 scalpel blade.  The muscularis and dermis were reaproximated with deep sutures following hemostasis. Care was taken to realign the vermilion border before proceeding with the superficial closure.  Once the vermilion was realigned the superfical and mucosal closure was finished.
Ftsg Text: The defect edges were debeveled with a #15 scalpel blade.  Given the location of the defect, shape of the defect and the proximity to free margins a full thickness skin graft was deemed most appropriate.  Using a sterile surgical marker, the primary defect shape was transferred to the donor site. The area thus outlined was incised deep to adipose tissue with a #15 scalpel blade.  The harvested graft was then trimmed of adipose tissue until only dermis and epidermis was left.  The skin margins of the secondary defect were undermined to an appropriate distance in all directions utilizing iris scissors.  The secondary defect was closed with interrupted buried subcutaneous sutures.  The skin edges were then re-apposed with running  sutures.  The skin graft was then placed in the primary defect and oriented appropriately.
Split-Thickness Skin Graft Text: The defect edges were debeveled with a #15 scalpel blade.  Given the location of the defect, shape of the defect and the proximity to free margins a split thickness skin graft was deemed most appropriate.  Using a sterile surgical marker, the primary defect shape was transferred to the donor site. The split thickness graft was then harvested.  The skin graft was then placed in the primary defect and oriented appropriately.
Pinch Graft Text: The defect edges were debeveled with a #15 scalpel blade. Given the location of the defect, shape of the defect and the proximity to free margins a pinch graft was deemed most appropriate. Using a sterile surgical marker, the primary defect shape was transferred to the donor site. The area thus outlined was incised deep to adipose tissue with a #15 scalpel blade.  The harvested graft was then trimmed of adipose tissue until only dermis and epidermis was left. The skin margins of the secondary defect were undermined to an appropriate distance in all directions utilizing iris scissors.  The secondary defect was closed with interrupted buried subcutaneous sutures.  The skin edges were then re-apposed with running  sutures.  The skin graft was then placed in the primary defect and oriented appropriately.
Burow's Graft Text: The defect edges were debeveled with a #15 scalpel blade.  Given the location of the defect, shape of the defect, the proximity to free margins and the presence of a standing cone deformity a Burow's skin graft was deemed most appropriate. The standing cone was removed and this tissue was then trimmed to the shape of the primary defect. The adipose tissue was also removed until only dermis and epidermis were left.  The skin margins of the secondary defect were undermined to an appropriate distance in all directions utilizing iris scissors.  The secondary defect was closed with interrupted buried subcutaneous sutures.  The skin edges were then re-apposed with running  sutures.  The skin graft was then placed in the primary defect and oriented appropriately.
Cartilage Graft Text: The defect edges were debeveled with a #15 scalpel blade.  Given the location of the defect, shape of the defect, the fact the defect involved a full thickness cartilage defect a cartilage graft was deemed most appropriate.  An appropriate donor site was identified, cleansed, and anesthetized. The cartilage graft was then harvested and transferred to the recipient site, oriented appropriately and then sutured into place.  The secondary defect was then repaired using a primary closure.
Composite Graft Text: The defect edges were debeveled with a #15 scalpel blade.  Given the location of the defect, shape of the defect, the proximity to free margins and the fact the defect was full thickness a composite graft was deemed most appropriate.  The defect was outline and then transferred to the donor site.  A full thickness graft was then excised from the donor site. The graft was then placed in the primary defect, oriented appropriately and then sutured into place.  The secondary defect was then repaired using a primary closure.
Epidermal Autograft Text: The defect edges were debeveled with a #15 scalpel blade.  Given the location of the defect, shape of the defect and the proximity to free margins an epidermal autograft was deemed most appropriate.  Using a sterile surgical marker, the primary defect shape was transferred to the donor site. The epidermal graft was then harvested.  The skin graft was then placed in the primary defect and oriented appropriately.
Dermal Autograft Text: The defect edges were debeveled with a #15 scalpel blade.  Given the location of the defect, shape of the defect and the proximity to free margins a dermal autograft was deemed most appropriate.  Using a sterile surgical marker, the primary defect shape was transferred to the donor site. The area thus outlined was incised deep to adipose tissue with a #15 scalpel blade.  The harvested graft was then trimmed of adipose and epidermal tissue until only dermis was left.  The skin graft was then placed in the primary defect and oriented appropriately.
Skin Substitute Text: The defect edges were debeveled with a #15 scalpel blade.  Given the location of the defect, shape of the defect and the proximity to free margins a skin substitute graft was deemed most appropriate.  The graft material was trimmed to fit the size of the defect. The graft was then placed in the primary defect and oriented appropriately.
Tissue Cultured Epidermal Autograft Text: The defect edges were debeveled with a #15 scalpel blade.  Given the location of the defect, shape of the defect and the proximity to free margins a tissue cultured epidermal autograft was deemed most appropriate.  The graft was then trimmed to fit the size of the defect.  The graft was then placed in the primary defect and oriented appropriately.
Xenograft Text: The defect edges were debeveled with a #15 scalpel blade.  Given the location of the defect, shape of the defect and the proximity to free margins a xenograft was deemed most appropriate.  The graft was then trimmed to fit the size of the defect.  The graft was then placed in the primary defect and oriented appropriately.
Purse String (Intermediate) Text: Given the location of the defect and the characteristics of the surrounding skin a purse string intermediate closure was deemed most appropriate.  Undermining was performed circumfirentially around the surgical defect.  A purse string suture was then placed and tightened.
Purse String (Simple) Text: Given the location of the defect and the characteristics of the surrounding skin a purse string simple closure was deemed most appropriate.  Undermining was performed circumferentially around the surgical defect.  A purse string suture was then placed and tightened.
Partial Purse String (Intermediate) Text: Given the location of the defect and the characteristics of the surrounding skin an intermediate purse string closure was deemed most appropriate.  Undermining was performed circumferentially around the surgical defect.  A purse string suture was then placed and tightened. Wound tension of the circular defect prevented complete closure of the wound.
Partial Purse String (Simple) Text: Given the location of the defect and the characteristics of the surrounding skin a simple purse string closure was deemed most appropriate.  Undermining was performed circumferentially around the surgical defect.  A purse string suture was then placed and tightened. Wound tension of the circular defect prevented complete closure of the wound.
Complex Repair And Single Advancement Flap Text: The defect edges were debeveled with a #15 scalpel blade.  The primary defect was closed partially with a complex linear closure.  Given the location of the remaining defect, shape of the defect and the proximity to free margins a single advancement flap was deemed most appropriate for complete closure of the defect.  Using a sterile surgical marker, an appropriate advancement flap was drawn incorporating the defect and placing the expected incisions within the relaxed skin tension lines where possible.    The area thus outlined was incised deep to adipose tissue with a #15 scalpel blade.  The skin margins were undermined to an appropriate distance in all directions utilizing iris scissors.
Complex Repair And Double Advancement Flap Text: The defect edges were debeveled with a #15 scalpel blade.  The primary defect was closed partially with a complex linear closure.  Given the location of the remaining defect, shape of the defect and the proximity to free margins a double advancement flap was deemed most appropriate for complete closure of the defect.  Using a sterile surgical marker, an appropriate advancement flap was drawn incorporating the defect and placing the expected incisions within the relaxed skin tension lines where possible.    The area thus outlined was incised deep to adipose tissue with a #15 scalpel blade.  The skin margins were undermined to an appropriate distance in all directions utilizing iris scissors.
Complex Repair And Modified Advancement Flap Text: The defect edges were debeveled with a #15 scalpel blade.  The primary defect was closed partially with a complex linear closure.  Given the location of the remaining defect, shape of the defect and the proximity to free margins a modified advancement flap was deemed most appropriate for complete closure of the defect.  Using a sterile surgical marker, an appropriate advancement flap was drawn incorporating the defect and placing the expected incisions within the relaxed skin tension lines where possible.    The area thus outlined was incised deep to adipose tissue with a #15 scalpel blade.  The skin margins were undermined to an appropriate distance in all directions utilizing iris scissors.
Complex Repair And A-T Advancement Flap Text: The defect edges were debeveled with a #15 scalpel blade.  The primary defect was closed partially with a complex linear closure.  Given the location of the remaining defect, shape of the defect and the proximity to free margins an A-T advancement flap was deemed most appropriate for complete closure of the defect.  Using a sterile surgical marker, an appropriate advancement flap was drawn incorporating the defect and placing the expected incisions within the relaxed skin tension lines where possible.    The area thus outlined was incised deep to adipose tissue with a #15 scalpel blade.  The skin margins were undermined to an appropriate distance in all directions utilizing iris scissors.
Complex Repair And O-T Advancement Flap Text: The defect edges were debeveled with a #15 scalpel blade.  The primary defect was closed partially with a complex linear closure.  Given the location of the remaining defect, shape of the defect and the proximity to free margins an O-T advancement flap was deemed most appropriate for complete closure of the defect.  Using a sterile surgical marker, an appropriate advancement flap was drawn incorporating the defect and placing the expected incisions within the relaxed skin tension lines where possible.    The area thus outlined was incised deep to adipose tissue with a #15 scalpel blade.  The skin margins were undermined to an appropriate distance in all directions utilizing iris scissors.
Complex Repair And O-L Flap Text: The defect edges were debeveled with a #15 scalpel blade.  The primary defect was closed partially with a complex linear closure.  Given the location of the remaining defect, shape of the defect and the proximity to free margins an O-L flap was deemed most appropriate for complete closure of the defect.  Using a sterile surgical marker, an appropriate flap was drawn incorporating the defect and placing the expected incisions within the relaxed skin tension lines where possible.    The area thus outlined was incised deep to adipose tissue with a #15 scalpel blade.  The skin margins were undermined to an appropriate distance in all directions utilizing iris scissors.
Complex Repair And Bilobe Flap Text: The defect edges were debeveled with a #15 scalpel blade.  The primary defect was closed partially with a complex linear closure.  Given the location of the remaining defect, shape of the defect and the proximity to free margins a bilobe flap was deemed most appropriate for complete closure of the defect.  Using a sterile surgical marker, an appropriate advancement flap was drawn incorporating the defect and placing the expected incisions within the relaxed skin tension lines where possible.    The area thus outlined was incised deep to adipose tissue with a #15 scalpel blade.  The skin margins were undermined to an appropriate distance in all directions utilizing iris scissors.
Complex Repair And Melolabial Flap Text: The defect edges were debeveled with a #15 scalpel blade.  The primary defect was closed partially with a complex linear closure.  Given the location of the remaining defect, shape of the defect and the proximity to free margins a melolabial flap was deemed most appropriate for complete closure of the defect.  Using a sterile surgical marker, an appropriate advancement flap was drawn incorporating the defect and placing the expected incisions within the relaxed skin tension lines where possible.    The area thus outlined was incised deep to adipose tissue with a #15 scalpel blade.  The skin margins were undermined to an appropriate distance in all directions utilizing iris scissors.
Complex Repair And Rotation Flap Text: The defect edges were debeveled with a #15 scalpel blade.  The primary defect was closed partially with a complex linear closure.  Given the location of the remaining defect, shape of the defect and the proximity to free margins a rotation flap was deemed most appropriate for complete closure of the defect.  Using a sterile surgical marker, an appropriate advancement flap was drawn incorporating the defect and placing the expected incisions within the relaxed skin tension lines where possible.    The area thus outlined was incised deep to adipose tissue with a #15 scalpel blade.  The skin margins were undermined to an appropriate distance in all directions utilizing iris scissors.
Complex Repair And Rhombic Flap Text: The defect edges were debeveled with a #15 scalpel blade.  The primary defect was closed partially with a complex linear closure.  Given the location of the remaining defect, shape of the defect and the proximity to free margins a rhombic flap was deemed most appropriate for complete closure of the defect.  Using a sterile surgical marker, an appropriate advancement flap was drawn incorporating the defect and placing the expected incisions within the relaxed skin tension lines where possible.    The area thus outlined was incised deep to adipose tissue with a #15 scalpel blade.  The skin margins were undermined to an appropriate distance in all directions utilizing iris scissors.
Complex Repair And Transposition Flap Text: The defect edges were debeveled with a #15 scalpel blade.  The primary defect was closed partially with a complex linear closure.  Given the location of the remaining defect, shape of the defect and the proximity to free margins a transposition flap was deemed most appropriate for complete closure of the defect.  Using a sterile surgical marker, an appropriate advancement flap was drawn incorporating the defect and placing the expected incisions within the relaxed skin tension lines where possible.    The area thus outlined was incised deep to adipose tissue with a #15 scalpel blade.  The skin margins were undermined to an appropriate distance in all directions utilizing iris scissors.
Complex Repair And V-Y Plasty Text: The defect edges were debeveled with a #15 scalpel blade.  The primary defect was closed partially with a complex linear closure.  Given the location of the remaining defect, shape of the defect and the proximity to free margins a V-Y plasty was deemed most appropriate for complete closure of the defect.  Using a sterile surgical marker, an appropriate advancement flap was drawn incorporating the defect and placing the expected incisions within the relaxed skin tension lines where possible.    The area thus outlined was incised deep to adipose tissue with a #15 scalpel blade.  The skin margins were undermined to an appropriate distance in all directions utilizing iris scissors.
Complex Repair And M Plasty Text: The defect edges were debeveled with a #15 scalpel blade.  The primary defect was closed partially with a complex linear closure.  Given the location of the remaining defect, shape of the defect and the proximity to free margins an M plasty was deemed most appropriate for complete closure of the defect.  Using a sterile surgical marker, an appropriate advancement flap was drawn incorporating the defect and placing the expected incisions within the relaxed skin tension lines where possible.    The area thus outlined was incised deep to adipose tissue with a #15 scalpel blade.  The skin margins were undermined to an appropriate distance in all directions utilizing iris scissors.
Complex Repair And Double M Plasty Text: The defect edges were debeveled with a #15 scalpel blade.  The primary defect was closed partially with a complex linear closure.  Given the location of the remaining defect, shape of the defect and the proximity to free margins a double M plasty was deemed most appropriate for complete closure of the defect.  Using a sterile surgical marker, an appropriate advancement flap was drawn incorporating the defect and placing the expected incisions within the relaxed skin tension lines where possible.    The area thus outlined was incised deep to adipose tissue with a #15 scalpel blade.  The skin margins were undermined to an appropriate distance in all directions utilizing iris scissors.
Complex Repair And W Plasty Text: The defect edges were debeveled with a #15 scalpel blade.  The primary defect was closed partially with a complex linear closure.  Given the location of the remaining defect, shape of the defect and the proximity to free margins a W plasty was deemed most appropriate for complete closure of the defect.  Using a sterile surgical marker, an appropriate advancement flap was drawn incorporating the defect and placing the expected incisions within the relaxed skin tension lines where possible.    The area thus outlined was incised deep to adipose tissue with a #15 scalpel blade.  The skin margins were undermined to an appropriate distance in all directions utilizing iris scissors.
Complex Repair And Z Plasty Text: The defect edges were debeveled with a #15 scalpel blade.  The primary defect was closed partially with a complex linear closure.  Given the location of the remaining defect, shape of the defect and the proximity to free margins a Z plasty was deemed most appropriate for complete closure of the defect.  Using a sterile surgical marker, an appropriate advancement flap was drawn incorporating the defect and placing the expected incisions within the relaxed skin tension lines where possible.    The area thus outlined was incised deep to adipose tissue with a #15 scalpel blade.  The skin margins were undermined to an appropriate distance in all directions utilizing iris scissors.
Complex Repair And Dorsal Nasal Flap Text: The defect edges were debeveled with a #15 scalpel blade.  The primary defect was closed partially with a complex linear closure.  Given the location of the remaining defect, shape of the defect and the proximity to free margins a dorsal nasal flap was deemed most appropriate for complete closure of the defect.  Using a sterile surgical marker, an appropriate flap was drawn incorporating the defect and placing the expected incisions within the relaxed skin tension lines where possible.    The area thus outlined was incised deep to adipose tissue with a #15 scalpel blade.  The skin margins were undermined to an appropriate distance in all directions utilizing iris scissors.
Complex Repair And Ftsg Text: The defect edges were debeveled with a #15 scalpel blade.  The primary defect was closed partially with a complex linear closure.  Given the location of the defect, shape of the defect and the proximity to free margins a full thickness skin graft was deemed most appropriate to repair the remaining defect.  The graft was trimmed to fit the size of the remaining defect.  The graft was then placed in the primary defect, oriented appropriately, and sutured into place.
Complex Repair And Burow's Graft Text: The defect edges were debeveled with a #15 scalpel blade.  The primary defect was closed partially with a complex linear closure.  Given the location of the defect, shape of the defect, the proximity to free margins and the presence of a standing cone deformity a Burow's graft was deemed most appropriate to repair the remaining defect.  The graft was trimmed to fit the size of the remaining defect.  The graft was then placed in the primary defect, oriented appropriately, and sutured into place.
Complex Repair And Split-Thickness Skin Graft Text: The defect edges were debeveled with a #15 scalpel blade.  The primary defect was closed partially with a complex linear closure.  Given the location of the defect, shape of the defect and the proximity to free margins a split thickness skin graft was deemed most appropriate to repair the remaining defect.  The graft was trimmed to fit the size of the remaining defect.  The graft was then placed in the primary defect, oriented appropriately, and sutured into place.
Complex Repair And Epidermal Autograft Text: The defect edges were debeveled with a #15 scalpel blade.  The primary defect was closed partially with a complex linear closure.  Given the location of the defect, shape of the defect and the proximity to free margins an epidermal autograft was deemed most appropriate to repair the remaining defect.  The graft was trimmed to fit the size of the remaining defect.  The graft was then placed in the primary defect, oriented appropriately, and sutured into place.
Complex Repair And Dermal Autograft Text: The defect edges were debeveled with a #15 scalpel blade.  The primary defect was closed partially with a complex linear closure.  Given the location of the defect, shape of the defect and the proximity to free margins an dermal autograft was deemed most appropriate to repair the remaining defect.  The graft was trimmed to fit the size of the remaining defect.  The graft was then placed in the primary defect, oriented appropriately, and sutured into place.
Complex Repair And Tissue Cultured Epidermal Autograft Text: The defect edges were debeveled with a #15 scalpel blade.  The primary defect was closed partially with a complex linear closure.  Given the location of the defect, shape of the defect and the proximity to free margins an tissue cultured epidermal autograft was deemed most appropriate to repair the remaining defect.  The graft was trimmed to fit the size of the remaining defect.  The graft was then placed in the primary defect, oriented appropriately, and sutured into place.
Complex Repair And Xenograft Text: The defect edges were debeveled with a #15 scalpel blade.  The primary defect was closed partially with a complex linear closure.  Given the location of the defect, shape of the defect and the proximity to free margins a xenograft was deemed most appropriate to repair the remaining defect.  The graft was trimmed to fit the size of the remaining defect.  The graft was then placed in the primary defect, oriented appropriately, and sutured into place.
Complex Repair And Skin Substitute Graft Text: The defect edges were debeveled with a #15 scalpel blade.  The primary defect was closed partially with a complex linear closure.  Given the location of the remaining defect, shape of the defect and the proximity to free margins a skin substitute graft was deemed most appropriate to repair the remaining defect.  The graft was trimmed to fit the size of the remaining defect.  The graft was then placed in the primary defect, oriented appropriately, and sutured into place.
Path Notes (To The Dermatopathologist): Please check margins.
Consent was obtained from the patient. The risks and benefits to therapy were discussed in detail. Specifically, the risks of infection, scarring, bleeding, prolonged wound healing, incomplete removal, allergy to anesthesia, nerve injury and recurrence were addressed. Prior to the procedure, the treatment site was clearly identified and confirmed by the patient. All components of Universal Protocol/PAUSE Rule completed.
Post-Care Instructions: I reviewed with the patient in detail post-care instructions. Patient is not to engage in any heavy lifting, exercise, or swimming for the next 14 days. Should the patient develop any fevers, chills, bleeding, severe pain patient will contact the office immediately.
Home Suture Removal Text: Patient was provided a home suture removal kit and will remove their sutures at home.  If they have any questions or difficulties they will call the office.
Where Do You Want The Question To Include Opioid Counseling Located?: Case Summary Tab
Information: Selecting Yes will display possible errors in your note based on the variables you have selected. This validation is only offered as a suggestion for you. PLEASE NOTE THAT THE VALIDATION TEXT WILL BE REMOVED WHEN YOU FINALIZE YOUR NOTE. IF YOU WANT TO FAX A PRELIMINARY NOTE YOU WILL NEED TO TOGGLE THIS TO 'NO' IF YOU DO NOT WANT IT IN YOUR FAXED NOTE.

## 2023-06-12 NOTE — PROCEDURE: REPAIR NOTE
Body Location Override (Optional - Billing Will Still Be Based On Selected Body Map Location If Applicable): excision
Anesthesia Volume In Cc: 5
Did You Provide Opioid Counseling: No
Repair Type: Graft
Suturegard Retention Suture: 2-0 Nylon
Retention Suture Bite Size: 3 mm
Length To Time In Minutes Device Was In Place: 10
Number Of Hemigard Strips Per Side: 1
Simple / Intermediate / Complex Repair - Final Wound Length In Cm: 0
Undermining Type: Entire Wound
Debridement Text: The wound edges were debrided prior to proceeding with the closure to facilitate wound healing.
Helical Rim Text: The closure involved the helical rim.
Vermilion Border Text: The closure involved the vermilion border.
Nostril Rim Text: The closure involved the nostril rim.
Retention Suture Text: Retention sutures were placed to support the closure and prevent dehiscence.
Graft Type: Skin Substitute
Skin Substitute: PuraPly
Skin Substitute: EpiFix Micronized
Skin Substitute Units (Will Override Primary Defect Units If Greater Than 0): 15
Suture Removal: 7 days
Detail Level: Detailed
Anesthesia Type: 1% lidocaine with epinephrine
Additional Anesthesia Volume In Cc: 3
Hemostasis: Electrocautery
Estimated Blood Loss (Cc): minimal
Brow Lift Text: A midfrontal incision was made medially to the defect to allow access to the tissues just superior to the left eyebrow. Following careful dissection inferiorly in a supraperiosteal plane to the level of the left eyebrow, several 3-0 monocryl sutures were used to resuspend the eyebrow orbicularis oculi muscular unit to the superior frontal bone periosteum. This resulted in an appropriate reapproximation of static eyebrow symmetry and correction of the left brow ptosis.
Epidermal Sutures: 5-0 Prolene
Epidermal Closure: simple interrupted
Wound Care: Vaseline
Dressing: pressure dressing with a bolster
Unna Boot Text: An Unna boot was placed to help immobilize the limb and facilitate more rapid healing.
Suturegard Intro: Intraoperative tissue expansion was performed, utilizing the SUTUREGARD device, in order to reduce wound tension.
Suturegard Body: The suture ends were repeatedly re-tightened and re-clamped to achieve the desired tissue expansion.
Hemigard Intro: Due to skin fragility and wound tension, it was decided to use HEMIGARD adhesive retention suture devices to permit a linear closure. The skin was cleaned and dried for a 6cm distance away from the wound. Excessive hair, if present, was removed to allow for adhesion.
Hemigard Postcare Instructions: The HEMIGARD strips are to remain completely dry for at least 5-7 days.
Graft Donor Site Bandage (Optional-Leave Blank If You Don't Want In Note): Aquaphor, Telfa, and a pressure bandage were applied to the donor site.
Closure 2 Information: This tab is for additional flaps and grafts, including complex repair and grafts and complex repair and flaps. You can also specify a different location for the additional defect, if the location is the same you do not need to select a new one. We will insert the automated text for the repair you select below just as we do for solitary flaps and grafts. Please note that at this time if you select a location with a different insurance zone you will need to override the ICD10 and CPT if appropriate.
Closure 3 Information: This tab is for additional flaps and grafts above and beyond our usual structured repairs.  Please note if you enter information here it will not currently bill and you will need to add the billing information manually.
Complex Repair Preamble Text (Leave Blank If You Do Not Want): Extensive wide undermining was performed.
Intermediate Repair Preamble Text (Leave Blank If You Do Not Want): Undermining was performed with blunt dissection.
Flap Thinning Complex Repair Preamble Text (Leave Blank If You Do Not Want): An incision was made along the previous flap suture line. Undermining was performed beneath the flap and redundant tissue was removed to restore the normal contour of the skin.
Non-Graft Cartilage Fenestration Text: The cartilage was fenestrated with a 2mm punch biopsy to help facilitate healing.
Graft Cartilage Fenestration Text: The cartilage was fenestrated with a 2mm punch biopsy to help facilitate graft survival and healing.
Preparation Of Recipient Site - Flap: The eschar was removed surgically with sharp dissection to facilitate appropriate wound healing of the following adjacent tissue rearrangement.
Preparation Of Recipient Site - Graft: The eschar was removed surgically with sharp dissection to facilitate appropriate survival of the following graft.
Preparation Of Recipient Site - Flap Takedown: The eschar and granulation tissue was removed surgically with sharp dissection to facilitate appropriate healing after division and inset of the proximal and distal interpolation flap.
Secondary Intention Text (Leave Blank If You Do Not Want): The defect will heal with secondary intention.
No Repair - Repaired With Adjacent Surgical Defect Text (Leave Blank If You Do Not Want): After obtaining clear surgical margins the defect was repaired concurrently with another surgical defect which was in close approximation.
Referred To Oculoplastics For Closure Text (Leave Blank If You Do Not Want): After obtaining clear surgical margins the patient was sent to oculoplastics for surgical repair.  The patient understands they will receive post-surgical care and follow-up from the referring physician's office.
Referred To Otolaryngology For Closure Text (Leave Blank If You Do Not Want): After obtaining clear surgical margins the patient was sent to otolaryngology for surgical repair.  The patient understands they will receive post-surgical care and follow-up from the referring physician's office.
Referred To Plastics For Closure Text (Leave Blank If You Do Not Want): After obtaining clear surgical margins the patient was sent to plastics for surgical repair.  The patient understands they will receive post-surgical care and follow-up from the referring physician's office.
Referred To Asc For Closure Text (Leave Blank If You Do Not Want): After obtaining clear surgical margins the patient was sent to an ASC for surgical repair.  The patient understands they will receive post-surgical care and follow-up from the ASC physician.
Referred To Mid-Level For Closure Text (Leave Blank If You Do Not Want): After obtaining clear surgical margins the patient was sent to a mid-level provider for surgical repair.  The patient understands they will receive post-surgical care and follow-up from the mid-level provider.
Repair Performed By Another Provider Text (Leave Blank If You Do Not Want): After obtaining clear surgical margins the defect was repaired by another provider.
Adjacent Tissue Transfer Text: The defect edges were debeveled with a #15 scalpel blade.  Given the location of the defect and the proximity to free margins an adjacent tissue transfer was deemed most appropriate.  Using a sterile surgical marker, an appropriate flap was drawn incorporating the defect and placing the expected incisions within the relaxed skin tension lines where possible.    The area thus outlined was incised deep to adipose tissue with a #15 scalpel blade.  The skin margins were undermined to an appropriate distance in all directions utilizing iris scissors.
Advancement Flap (Single) Text: The defect edges were debeveled with a #15 scalpel blade.  Given the location of the defect and the proximity to free margins a single advancement flap was deemed most appropriate.  Using a sterile surgical marker, an appropriate advancement flap was drawn incorporating the defect and placing the expected incisions within the relaxed skin tension lines where possible.    The area thus outlined was incised deep to adipose tissue with a #15 scalpel blade.  The skin margins were undermined to an appropriate distance in all directions utilizing iris scissors.
Advancement Flap (Double) Text: The defect edges were debeveled with a #15 scalpel blade.  Given the location of the defect and the proximity to free margins a double advancement flap was deemed most appropriate.  Using a sterile surgical marker, the appropriate advancement flaps were drawn incorporating the defect and placing the expected incisions within the relaxed skin tension lines where possible.    The area thus outlined was incised deep to adipose tissue with a #15 scalpel blade.  The skin margins were undermined to an appropriate distance in all directions utilizing iris scissors.
Burow's Advancement Flap Text: The defect edges were debeveled with a #15 scalpel blade.  Given the location of the defect and the proximity to free margins a Burow's advancement flap was deemed most appropriate.  Using a sterile surgical marker, the appropriate advancement flap was drawn incorporating the defect and placing the expected incisions within the relaxed skin tension lines where possible.    The area thus outlined was incised deep to adipose tissue with a #15 scalpel blade.  The skin margins were undermined to an appropriate distance in all directions utilizing iris scissors.
Chonodrocutaneous Helical Advancement Flap Text: The defect edges were debeveled with a #15 scalpel blade.  Given the location of the defect and the proximity to free margins a chondrocutaneous helical advancement flap was deemed most appropriate.  Using a sterile surgical marker, the appropriate advancement flap was drawn incorporating the defect and placing the expected incisions within the relaxed skin tension lines where possible.    The area thus outlined was incised deep to adipose tissue with a #15 scalpel blade.  The skin margins were undermined to an appropriate distance in all directions utilizing iris scissors.
Crescentic Advancement Flap Text: The defect edges were debeveled with a #15 scalpel blade.  Given the location of the defect and the proximity to free margins a crescentic advancement flap was deemed most appropriate.  Using a sterile surgical marker, the appropriate advancement flap was drawn incorporating the defect and placing the expected incisions within the relaxed skin tension lines where possible.    The area thus outlined was incised deep to adipose tissue with a #15 scalpel blade.  The skin margins were undermined to an appropriate distance in all directions utilizing iris scissors.
A-T Advancement Flap Text: The defect edges were debeveled with a #15 scalpel blade.  Given the location of the defect, shape of the defect and the proximity to free margins an A-T advancement flap was deemed most appropriate.  Using a sterile surgical marker, an appropriate advancement flap was drawn incorporating the defect and placing the expected incisions within the relaxed skin tension lines where possible.    The area thus outlined was incised deep to adipose tissue with a #15 scalpel blade.  The skin margins were undermined to an appropriate distance in all directions utilizing iris scissors.
O-T Advancement Flap Text: The defect edges were debeveled with a #15 scalpel blade.  Given the location of the defect, shape of the defect and the proximity to free margins an O-T advancement flap was deemed most appropriate.  Using a sterile surgical marker, an appropriate advancement flap was drawn incorporating the defect and placing the expected incisions within the relaxed skin tension lines where possible.    The area thus outlined was incised deep to adipose tissue with a #15 scalpel blade.  The skin margins were undermined to an appropriate distance in all directions utilizing iris scissors.
O-L Flap Text: The defect edges were debeveled with a #15 scalpel blade.  Given the location of the defect, shape of the defect and the proximity to free margins an O-L flap was deemed most appropriate.  Using a sterile surgical marker, an appropriate advancement flap was drawn incorporating the defect and placing the expected incisions within the relaxed skin tension lines where possible.    The area thus outlined was incised deep to adipose tissue with a #15 scalpel blade.  The skin margins were undermined to an appropriate distance in all directions utilizing iris scissors.
O-Z Flap Text: The defect edges were debeveled with a #15 scalpel blade.  Given the location of the defect, shape of the defect and the proximity to free margins an O-Z flap was deemed most appropriate.  Using a sterile surgical marker, an appropriate transposition flap was drawn incorporating the defect and placing the expected incisions within the relaxed skin tension lines where possible. The area thus outlined was incised deep to adipose tissue with a #15 scalpel blade.  The skin margins were undermined to an appropriate distance in all directions utilizing iris scissors.
Double O-Z Flap Text: The defect edges were debeveled with a #15 scalpel blade.  Given the location of the defect, shape of the defect and the proximity to free margins a Double O-Z flap was deemed most appropriate.  Using a sterile surgical marker, an appropriate transposition flap was drawn incorporating the defect and placing the expected incisions within the relaxed skin tension lines where possible. The area thus outlined was incised deep to adipose tissue with a #15 scalpel blade.  The skin margins were undermined to an appropriate distance in all directions utilizing iris scissors.
V-Y Flap Text: The defect edges were debeveled with a #15 scalpel blade.  Given the location of the defect, shape of the defect and the proximity to free margins a V-Y flap was deemed most appropriate.  Using a sterile surgical marker, an appropriate advancement flap was drawn incorporating the defect and placing the expected incisions within the relaxed skin tension lines where possible.    The area thus outlined was incised deep to adipose tissue with a #15 scalpel blade.  The skin margins were undermined to an appropriate distance in all directions utilizing iris scissors.
Advancement-Rotation Flap Text: The defect edges were debeveled with a #15 scalpel blade.  Given the location of the defect, shape of the defect and the proximity to free margins an advancement-rotation flap was deemed most appropriate.  Using a sterile surgical marker, an appropriate flap was drawn incorporating the defect and placing the expected incisions within the relaxed skin tension lines where possible. The area thus outlined was incised deep to adipose tissue with a #15 scalpel blade.  The skin margins were undermined to an appropriate distance in all directions utilizing iris scissors.
Mercedes Flap Text: The defect edges were debeveled with a #15 scalpel blade.  Given the location of the defect, shape of the defect and the proximity to free margins a Mercedes flap was deemed most appropriate.  Using a sterile surgical marker, an appropriate advancement flap was drawn incorporating the defect and placing the expected incisions within the relaxed skin tension lines where possible. The area thus outlined was incised deep to adipose tissue with a #15 scalpel blade.  The skin margins were undermined to an appropriate distance in all directions utilizing iris scissors.
Modified Advancement Flap Text: The defect edges were debeveled with a #15 scalpel blade.  Given the location of the defect, shape of the defect and the proximity to free margins a modified advancement flap was deemed most appropriate.  Using a sterile surgical marker, an appropriate advancement flap was drawn incorporating the defect and placing the expected incisions within the relaxed skin tension lines where possible.    The area thus outlined was incised deep to adipose tissue with a #15 scalpel blade.  The skin margins were undermined to an appropriate distance in all directions utilizing iris scissors.
Mucosal Advancement Flap Text: Given the location of the defect, shape of the defect and the proximity to free margins a mucosal advancement flap was deemed most appropriate. Incisions were made with a 15 blade scalpel in the appropriate fashion along the cutaneous vermilion border and the mucosal lip. The remaining actinically damaged mucosal tissue was excised.  The mucosal advancement flap was then elevated to the gingival sulcus with care taken to preserve the neurovascular structures and advanced into the primary defect. Care was taken to ensure that precise realignment of the vermilion border was achieved.
Peng Advancement Flap Text: The defect edges were debeveled with a #15 scalpel blade.  Given the location of the defect, shape of the defect and the proximity to free margins a Peng advancement flap was deemed most appropriate.  Using a sterile surgical marker, an appropriate advancement flap was drawn incorporating the defect and placing the expected incisions within the relaxed skin tension lines where possible. The area thus outlined was incised deep to adipose tissue with a #15 scalpel blade.  The skin margins were undermined to an appropriate distance in all directions utilizing iris scissors.
Hatchet Flap Text: The defect edges were debeveled with a #15 scalpel blade.  Given the location of the defect, shape of the defect and the proximity to free margins a hatchet flap was deemed most appropriate.  Using a sterile surgical marker, an appropriate hatchet flap was drawn incorporating the defect and placing the expected incisions within the relaxed skin tension lines where possible.    The area thus outlined was incised deep to adipose tissue with a #15 scalpel blade.  The skin margins were undermined to an appropriate distance in all directions utilizing iris scissors.
Rotation Flap Text: The defect edges were debeveled with a #15 scalpel blade.  Given the location of the defect, shape of the defect and the proximity to free margins a rotation flap was deemed most appropriate.  Using a sterile surgical marker, an appropriate rotation flap was drawn incorporating the defect and placing the expected incisions within the relaxed skin tension lines where possible.    The area thus outlined was incised deep to adipose tissue with a #15 scalpel blade.  The skin margins were undermined to an appropriate distance in all directions utilizing iris scissors.
Bilateral Rotation Flap Text: The defect edges were debeveled with a #15 scalpel blade. Given the location of the defect, shape of the defect and the proximity to free margins a bilateral rotation flap was deemed most appropriate. Using a sterile surgical marker, an appropriate rotation flap was drawn incorporating the defect and placing the expected incisions within the relaxed skin tension lines where possible. The area thus outlined was incised deep to adipose tissue with a #15 scalpel blade. The skin margins were undermined to an appropriate distance in all directions utilizing iris scissors. Following this, the designed flap was carried over into the primary defect and sutured into place.
Spiral Flap Text: The defect edges were debeveled with a #15 scalpel blade.  Given the location of the defect, shape of the defect and the proximity to free margins a spiral flap was deemed most appropriate.  Using a sterile surgical marker, an appropriate rotation flap was drawn incorporating the defect and placing the expected incisions within the relaxed skin tension lines where possible. The area thus outlined was incised deep to adipose tissue with a #15 scalpel blade.  The skin margins were undermined to an appropriate distance in all directions utilizing iris scissors.
Staged Advancement Flap Text: The defect edges were debeveled with a #15 scalpel blade.  Given the location of the defect, shape of the defect and the proximity to free margins a staged advancement flap was deemed most appropriate.  Using a sterile surgical marker, an appropriate advancement flap was drawn incorporating the defect and placing the expected incisions within the relaxed skin tension lines where possible. The area thus outlined was incised deep to adipose tissue with a #15 scalpel blade.  The skin margins were undermined to an appropriate distance in all directions utilizing iris scissors.
Star Wedge Flap Text: The defect edges were debeveled with a #15 scalpel blade.  Given the location of the defect, shape of the defect and the proximity to free margins a star wedge flap was deemed most appropriate.  Using a sterile surgical marker, an appropriate rotation flap was drawn incorporating the defect and placing the expected incisions within the relaxed skin tension lines where possible. The area thus outlined was incised deep to adipose tissue with a #15 scalpel blade.  The skin margins were undermined to an appropriate distance in all directions utilizing iris scissors.
Transposition Flap Text: The defect edges were debeveled with a #15 scalpel blade.  Given the location of the defect and the proximity to free margins a transposition flap was deemed most appropriate.  Using a sterile surgical marker, an appropriate transposition flap was drawn incorporating the defect.    The area thus outlined was incised deep to adipose tissue with a #15 scalpel blade.  The skin margins were undermined to an appropriate distance in all directions utilizing iris scissors.
Muscle Hinge Flap Text: The defect edges were debeveled with a #15 scalpel blade.  Given the size, depth and location of the defect and the proximity to free margins a muscle hinge flap was deemed most appropriate.  Using a sterile surgical marker, an appropriate hinge flap was drawn incorporating the defect. The area thus outlined was incised with a #15 scalpel blade.  The skin margins were undermined to an appropriate distance in all directions utilizing iris scissors.
Mustarde Flap Text: The defect edges were debeveled with a #15 scalpel blade.  Given the size, depth and location of the defect and the proximity to free margins a Mustarde flap was deemed most appropriate.  Using a sterile surgical marker, an appropriate flap was drawn incorporating the defect. The area thus outlined was incised with a #15 scalpel blade.  The skin margins were undermined to an appropriate distance in all directions utilizing iris scissors.
Nasal Turnover Hinge Flap Text: The defect edges were debeveled with a #15 scalpel blade.  Given the size, depth, location of the defect and the defect being full thickness a nasal turnover hinge flap was deemed most appropriate.  Using a sterile surgical marker, an appropriate hinge flap was drawn incorporating the defect. The area thus outlined was incised with a #15 scalpel blade. The flap was designed to recreate the nasal mucosal lining and the alar rim. The skin margins were undermined to an appropriate distance in all directions utilizing iris scissors.
Nasalis-Muscle-Based Myocutaneous Island Pedicle Flap Text: Using a #15 blade, an incision was made around the donor flap to the level of the nasalis muscle. Wide lateral undermining was then performed in both the subcutaneous plane above the nasalis muscle, and in a submuscular plane just above periosteum. This allowed the formation of a free nasalis muscle axial pedicle (based on the angular artery) which was still attached to the actual cutaneous flap, increasing its mobility and vascular viability. Hemostasis was obtained with pinpoint electrocoagulation. The flap was mobilized into position and the pivotal anchor points positioned and stabilized with buried interrupted sutures. Subcutaneous and dermal tissues were closed in a multilayered fashion with sutures. Tissue redundancies were excised, and the epidermal edges were apposed without significant tension and sutured with sutures.
Orbicularis Oris Muscle Flap Text: The defect edges were debeveled with a #15 scalpel blade.  Given that the defect affected the competency of the oral sphincter an obicularis oris muscle flap was deemed most appropriate to restore this competency and normal muscle function.  Using a sterile surgical marker, an appropriate flap was drawn incorporating the defect. The area thus outlined was incised with a #15 scalpel blade.
Melolabial Transposition Flap Text: The defect edges were debeveled with a #15 scalpel blade.  Given the location of the defect and the proximity to free margins a melolabial flap was deemed most appropriate.  Using a sterile surgical marker, an appropriate melolabial transposition flap was drawn incorporating the defect.    The area thus outlined was incised deep to adipose tissue with a #15 scalpel blade.  The skin margins were undermined to an appropriate distance in all directions utilizing iris scissors.
Rhombic Flap Text: The defect edges were debeveled with a #15 scalpel blade.  Given the location of the defect and the proximity to free margins a rhombic flap was deemed most appropriate.  Using a sterile surgical marker, an appropriate rhombic flap was drawn incorporating the defect.    The area thus outlined was incised deep to adipose tissue with a #15 scalpel blade.  The skin margins were undermined to an appropriate distance in all directions utilizing iris scissors.
Rhomboid Transposition Flap Text: The defect edges were debeveled with a #15 scalpel blade.  Given the location of the defect and the proximity to free margins a rhomboid transposition flap was deemed most appropriate.  Using a sterile surgical marker, an appropriate rhomboid flap was drawn incorporating the defect.    The area thus outlined was incised deep to adipose tissue with a #15 scalpel blade.  The skin margins were undermined to an appropriate distance in all directions utilizing iris scissors.
Bi-Rhombic Flap Text: The defect edges were debeveled with a #15 scalpel blade.  Given the location of the defect and the proximity to free margins a bi-rhombic flap was deemed most appropriate.  Using a sterile surgical marker, an appropriate rhombic flap was drawn incorporating the defect. The area thus outlined was incised deep to adipose tissue with a #15 scalpel blade.  The skin margins were undermined to an appropriate distance in all directions utilizing iris scissors.
Helical Rim Advancement Flap Text: The defect edges were debeveled with a #15 blade scalpel.  Given the location of the defect and the proximity to free margins (helical rim) a double helical rim advancement flap was deemed most appropriate.  Using a sterile surgical marker, the appropriate advancement flaps were drawn incorporating the defect and placing the expected incisions between the helical rim and antihelix where possible.  The area thus outlined was incised through and through with a #15 scalpel blade.  With a skin hook and iris scissors, the flaps were gently and sharply undermined and freed up.
Bilateral Helical Rim Advancement Flap Text: The defect edges were debeveled with a #15 blade scalpel.  Given the location of the defect and the proximity to free margins (helical rim) a bilateral helical rim advancement flap was deemed most appropriate.  Using a sterile surgical marker, the appropriate advancement flaps were drawn incorporating the defect and placing the expected incisions between the helical rim and antihelix where possible.  The area thus outlined was incised through and through with a #15 scalpel blade.  With a skin hook and iris scissors, the flaps were gently and sharply undermined and freed up.
Ear Star Wedge Flap Text: The defect edges were debeveled with a #15 blade scalpel.  Given the location of the defect and the proximity to free margins (helical rim) an ear star wedge flap was deemed most appropriate.  Using a sterile surgical marker, the appropriate flap was drawn incorporating the defect and placing the expected incisions between the helical rim and antihelix where possible.  The area thus outlined was incised through and through with a #15 scalpel blade.
Banner Transposition Flap Text: The defect edges were debeveled with a #15 scalpel blade.  Given the location of the defect and the proximity to free margins a Banner transposition flap was deemed most appropriate.  Using a sterile surgical marker, an appropriate flap drawn around the defect. The area thus outlined was incised deep to adipose tissue with a #15 scalpel blade.  The skin margins were undermined to an appropriate distance in all directions utilizing iris scissors.
Bilobed Flap Text: The defect edges were debeveled with a #15 scalpel blade.  Given the location of the defect and the proximity to free margins a bilobe flap was deemed most appropriate.  Using a sterile surgical marker, an appropriate bilobe flap drawn around the defect.    The area thus outlined was incised deep to adipose tissue with a #15 scalpel blade.  The skin margins were undermined to an appropriate distance in all directions utilizing iris scissors.
Bilobed Transposition Flap Text: The defect edges were debeveled with a #15 scalpel blade.  Given the location of the defect and the proximity to free margins a bilobed transposition flap was deemed most appropriate.  Using a sterile surgical marker, an appropriate bilobe flap drawn around the defect.    The area thus outlined was incised deep to adipose tissue with a #15 scalpel blade.  The skin margins were undermined to an appropriate distance in all directions utilizing iris scissors.
Trilobed Flap Text: The defect edges were debeveled with a #15 scalpel blade.  Given the location of the defect and the proximity to free margins a trilobed flap was deemed most appropriate.  Using a sterile surgical marker, an appropriate trilobed flap drawn around the defect.    The area thus outlined was incised deep to adipose tissue with a #15 scalpel blade.  The skin margins were undermined to an appropriate distance in all directions utilizing iris scissors.
Dorsal Nasal Flap Text: The defect edges were debeveled with a #15 scalpel blade.  Given the location of the defect and the proximity to free margins a dorsal nasal flap was deemed most appropriate.  Using a sterile surgical marker, an appropriate dorsal nasal flap was drawn around the defect.    The area thus outlined was incised deep to adipose tissue with a #15 scalpel blade.  The skin margins were undermined to an appropriate distance in all directions utilizing iris scissors.
Island Pedicle Flap Text: The defect edges were debeveled with a #15 scalpel blade.  Given the location of the defect, shape of the defect and the proximity to free margins an island pedicle advancement flap was deemed most appropriate.  Using a sterile surgical marker, an appropriate advancement flap was drawn incorporating the defect, outlining the appropriate donor tissue and placing the expected incisions within the relaxed skin tension lines where possible.    The area thus outlined was incised deep to adipose tissue with a #15 scalpel blade.  The skin margins were undermined to an appropriate distance in all directions around the primary defect and laterally outward around the island pedicle utilizing iris scissors.  There was minimal undermining beneath the pedicle flap.
Island Pedicle Flap With Canthal Suspension Text: The defect edges were debeveled with a #15 scalpel blade.  Given the location of the defect, shape of the defect and the proximity to free margins an island pedicle advancement flap was deemed most appropriate.  Using a sterile surgical marker, an appropriate advancement flap was drawn incorporating the defect, outlining the appropriate donor tissue and placing the expected incisions within the relaxed skin tension lines where possible. The area thus outlined was incised deep to adipose tissue with a #15 scalpel blade.  The skin margins were undermined to an appropriate distance in all directions around the primary defect and laterally outward around the island pedicle utilizing iris scissors.  There was minimal undermining beneath the pedicle flap. A suspension suture was placed in the canthal tendon to prevent tension and prevent ectropion.
Alar Island Pedicle Flap Text: The defect edges were debeveled with a #15 scalpel blade.  Given the location of the defect, shape of the defect and the proximity to the alar rim an island pedicle advancement flap was deemed most appropriate.  Using a sterile surgical marker, an appropriate advancement flap was drawn incorporating the defect, outlining the appropriate donor tissue and placing the expected incisions within the nasal ala running parallel to the alar rim. The area thus outlined was incised with a #15 scalpel blade.  The skin margins were undermined minimally to an appropriate distance in all directions around the primary defect and laterally outward around the island pedicle utilizing iris scissors.  There was minimal undermining beneath the pedicle flap.
Double Island Pedicle Flap Text: The defect edges were debeveled with a #15 scalpel blade.  Given the location of the defect, shape of the defect and the proximity to free margins a double island pedicle advancement flap was deemed most appropriate.  Using a sterile surgical marker, an appropriate advancement flap was drawn incorporating the defect, outlining the appropriate donor tissue and placing the expected incisions within the relaxed skin tension lines where possible.    The area thus outlined was incised deep to adipose tissue with a #15 scalpel blade.  The skin margins were undermined to an appropriate distance in all directions around the primary defect and laterally outward around the island pedicle utilizing iris scissors.  There was minimal undermining beneath the pedicle flap.
Island Pedicle Flap-Requiring Vessel Identification Text: The defect edges were debeveled with a #15 scalpel blade.  Given the location of the defect, shape of the defect and the proximity to free margins an island pedicle advancement flap was deemed most appropriate.  Using a sterile surgical marker, an appropriate advancement flap was drawn, based on the axial vessel mentioned above, incorporating the defect, outlining the appropriate donor tissue and placing the expected incisions within the relaxed skin tension lines where possible.    The area thus outlined was incised deep to adipose tissue with a #15 scalpel blade.  The skin margins were undermined to an appropriate distance in all directions around the primary defect and laterally outward around the island pedicle utilizing iris scissors.  There was minimal undermining beneath the pedicle flap.
Keystone Flap Text: The defect edges were debeveled with a #15 scalpel blade.  Given the location of the defect, shape of the defect a keystone flap was deemed most appropriate.  Using a sterile surgical marker, an appropriate keystone flap was drawn incorporating the defect, outlining the appropriate donor tissue and placing the expected incisions within the relaxed skin tension lines where possible. The area thus outlined was incised deep to adipose tissue with a #15 scalpel blade.  The skin margins were undermined to an appropriate distance in all directions around the primary defect and laterally outward around the flap utilizing iris scissors.
O-T Plasty Text: The defect edges were debeveled with a #15 scalpel blade.  Given the location of the defect, shape of the defect and the proximity to free margins an O-T plasty was deemed most appropriate.  Using a sterile surgical marker, an appropriate O-T plasty was drawn incorporating the defect and placing the expected incisions within the relaxed skin tension lines where possible.    The area thus outlined was incised deep to adipose tissue with a #15 scalpel blade.  The skin margins were undermined to an appropriate distance in all directions utilizing iris scissors.
O-Z Plasty Text: The defect edges were debeveled with a #15 scalpel blade.  Given the location of the defect, shape of the defect and the proximity to free margins an O-Z plasty (double transposition flap) was deemed most appropriate.  Using a sterile surgical marker, the appropriate transposition flaps were drawn incorporating the defect and placing the expected incisions within the relaxed skin tension lines where possible.    The area thus outlined was incised deep to adipose tissue with a #15 scalpel blade.  The skin margins were undermined to an appropriate distance in all directions utilizing iris scissors.  Hemostasis was achieved with electrocautery.  The flaps were then transposed into place, one clockwise and the other counterclockwise, and anchored with interrupted buried subcutaneous sutures.
Double O-Z Plasty Text: The defect edges were debeveled with a #15 scalpel blade.  Given the location of the defect, shape of the defect and the proximity to free margins a Double O-Z plasty (double transposition flap) was deemed most appropriate.  Using a sterile surgical marker, the appropriate transposition flaps were drawn incorporating the defect and placing the expected incisions within the relaxed skin tension lines where possible. The area thus outlined was incised deep to adipose tissue with a #15 scalpel blade.  The skin margins were undermined to an appropriate distance in all directions utilizing iris scissors.  Hemostasis was achieved with electrocautery.  The flaps were then transposed into place, one clockwise and the other counterclockwise, and anchored with interrupted buried subcutaneous sutures.
V-Y Plasty Text: The defect edges were debeveled with a #15 scalpel blade.  Given the location of the defect, shape of the defect and the proximity to free margins an V-Y advancement flap was deemed most appropriate.  Using a sterile surgical marker, an appropriate advancement flap was drawn incorporating the defect and placing the expected incisions within the relaxed skin tension lines where possible.    The area thus outlined was incised deep to adipose tissue with a #15 scalpel blade.  The skin margins were undermined to an appropriate distance in all directions utilizing iris scissors.
H Plasty Text: Given the location of the defect, shape of the defect and the proximity to free margins a H-plasty was deemed most appropriate for repair.  Using a sterile surgical marker, the appropriate advancement arms of the H-plasty were drawn incorporating the defect and placing the expected incisions within the relaxed skin tension lines where possible. The area thus outlined was incised deep to adipose tissue with a #15 scalpel blade. The skin margins were undermined to an appropriate distance in all directions utilizing iris scissors.  The opposing advancement arms were then advanced into place in opposite direction and anchored with interrupted buried subcutaneous sutures.
W Plasty Text: The lesion was extirpated to the level of the fat with a #15 scalpel blade.  Given the location of the defect, shape of the defect and the proximity to free margins a W-plasty was deemed most appropriate for repair.  Using a sterile surgical marker, the appropriate transposition arms of the W-plasty were drawn incorporating the defect and placing the expected incisions within the relaxed skin tension lines where possible.    The area thus outlined was incised deep to adipose tissue with a #15 scalpel blade.  The skin margins were undermined to an appropriate distance in all directions utilizing iris scissors.  The opposing transposition arms were then transposed into place in opposite direction and anchored with interrupted buried subcutaneous sutures.
Z Plasty Text: The lesion was extirpated to the level of the fat with a #15 scalpel blade.  Given the location of the defect, shape of the defect and the proximity to free margins a Z-plasty was deemed most appropriate for repair.  Using a sterile surgical marker, the appropriate transposition arms of the Z-plasty were drawn incorporating the defect and placing the expected incisions within the relaxed skin tension lines where possible.    The area thus outlined was incised deep to adipose tissue with a #15 scalpel blade.  The skin margins were undermined to an appropriate distance in all directions utilizing iris scissors.  The opposing transposition arms were then transposed into place in opposite direction and anchored with interrupted buried subcutaneous sutures.
Double Z Plasty Text: The lesion was extirpated to the level of the fat with a #15 scalpel blade. Given the location of the defect, shape of the defect and the proximity to free margins a double Z-plasty was deemed most appropriate for repair. Using a sterile surgical marker, the appropriate transposition arms of the double Z-plasty were drawn incorporating the defect and placing the expected incisions within the relaxed skin tension lines where possible. The area thus outlined was incised deep to adipose tissue with a #15 scalpel blade. The skin margins were undermined to an appropriate distance in all directions utilizing iris scissors. The opposing transposition arms were then transposed and carried over into place in opposite direction and anchored with interrupted buried subcutaneous sutures.
Zygomaticofacial Flap Text: Given the location of the defect, shape of the defect and the proximity to free margins a zygomaticofacial flap was deemed most appropriate for repair.  Using a sterile surgical marker, the appropriate flap was drawn incorporating the defect and placing the expected incisions within the relaxed skin tension lines where possible. The area thus outlined was incised deep to adipose tissue with a #15 scalpel blade with preservation of a vascular pedicle.  The skin margins were undermined to an appropriate distance in all directions utilizing iris scissors.  The flap was then placed into the defect and anchored with interrupted buried subcutaneous sutures.
Cheek Interpolation Flap Text: A decision was made to reconstruct the defect utilizing an interpolation axial flap and a staged reconstruction.  A telfa template was made of the defect.  This telfa template was then used to outline the Cheek Interpolation flap.  The donor area for the pedicle flap was then injected with anesthesia.  The flap was excised through the skin and subcutaneous tissue down to the layer of the underlying musculature.  The interpolation flap was carefully excised within this deep plane to maintain its blood supply.  The edges of the donor site were undermined.   The donor site was closed in a primary fashion.  The pedicle was then rotated into position and sutured.  Once the tube was sutured into place, adequate blood supply was confirmed with blanching and refill.  The pedicle was then wrapped with xeroform gauze and dressed appropriately with a telfa and gauze bandage to ensure continued blood supply and protect the attached pedicle.
Cheek-To-Nose Interpolation Flap Text: A decision was made to reconstruct the defect utilizing an interpolation axial flap and a staged reconstruction.  A telfa template was made of the defect.  This telfa template was then used to outline the Cheek-To-Nose Interpolation flap.  The donor area for the pedicle flap was then injected with anesthesia.  The flap was excised through the skin and subcutaneous tissue down to the layer of the underlying musculature.  The interpolation flap was carefully excised within this deep plane to maintain its blood supply.  The edges of the donor site were undermined.   The donor site was closed in a primary fashion.  The pedicle was then rotated into position and sutured.  Once the tube was sutured into place, adequate blood supply was confirmed with blanching and refill.  The pedicle was then wrapped with xeroform gauze and dressed appropriately with a telfa and gauze bandage to ensure continued blood supply and protect the attached pedicle.
Interpolation Flap Text: A decision was made to reconstruct the defect utilizing an interpolation axial flap and a staged reconstruction.  A telfa template was made of the defect.  This telfa template was then used to outline the interpolation flap.  The donor area for the pedicle flap was then injected with anesthesia.  The flap was excised through the skin and subcutaneous tissue down to the layer of the underlying musculature.  The interpolation flap was carefully excised within this deep plane to maintain its blood supply.  The edges of the donor site were undermined.   The donor site was closed in a primary fashion.  The pedicle was then rotated into position and sutured.  Once the tube was sutured into place, adequate blood supply was confirmed with blanching and refill.  The pedicle was then wrapped with xeroform gauze and dressed appropriately with a telfa and gauze bandage to ensure continued blood supply and protect the attached pedicle.
Melolabial Interpolation Flap Text: A decision was made to reconstruct the defect utilizing an interpolation axial flap and a staged reconstruction.  A telfa template was made of the defect.  This telfa template was then used to outline the melolabial interpolation flap.  The donor area for the pedicle flap was then injected with anesthesia.  The flap was excised through the skin and subcutaneous tissue down to the layer of the underlying musculature.  The pedicle flap was carefully excised within this deep plane to maintain its blood supply.  The edges of the donor site were undermined.   The donor site was closed in a primary fashion.  The pedicle was then rotated into position and sutured.  Once the tube was sutured into place, adequate blood supply was confirmed with blanching and refill.  The pedicle was then wrapped with xeroform gauze and dressed appropriately with a telfa and gauze bandage to ensure continued blood supply and protect the attached pedicle.
Mastoid Interpolation Flap Text: A decision was made to reconstruct the defect utilizing an interpolation axial flap and a staged reconstruction.  A telfa template was made of the defect.  This telfa template was then used to outline the mastoid interpolation flap.  The donor area for the pedicle flap was then injected with anesthesia.  The flap was excised through the skin and subcutaneous tissue down to the layer of the underlying musculature.  The pedicle flap was carefully excised within this deep plane to maintain its blood supply.  The edges of the donor site were undermined.   The donor site was closed in a primary fashion.  The pedicle was then rotated into position and sutured.  Once the tube was sutured into place, adequate blood supply was confirmed with blanching and refill.  The pedicle was then wrapped with xeroform gauze and dressed appropriately with a telfa and gauze bandage to ensure continued blood supply and protect the attached pedicle.
Posterior Auricular Interpolation Flap Text: A decision was made to reconstruct the defect utilizing an interpolation axial flap and a staged reconstruction.  A telfa template was made of the defect.  This telfa template was then used to outline the posterior auricular interpolation flap.  The donor area for the pedicle flap was then injected with anesthesia.  The flap was excised through the skin and subcutaneous tissue down to the layer of the underlying musculature.  The pedicle flap was carefully excised within this deep plane to maintain its blood supply.  The edges of the donor site were undermined.   The donor site was closed in a primary fashion.  The pedicle was then rotated into position and sutured.  Once the tube was sutured into place, adequate blood supply was confirmed with blanching and refill.  The pedicle was then wrapped with xeroform gauze and dressed appropriately with a telfa and gauze bandage to ensure continued blood supply and protect the attached pedicle.
Paramedian Forehead Flap Text: A decision was made to reconstruct the defect utilizing an interpolation axial flap and a staged reconstruction.  A telfa template was made of the defect.  This telfa template was then used to outline the paramedian forehead pedicle flap.  The donor area for the pedicle flap was then injected with anesthesia.  The flap was excised through the skin and subcutaneous tissue down to the layer of the underlying musculature.  The pedicle flap was carefully excised within this deep plane to maintain its blood supply.  The edges of the donor site were undermined.   The donor site was closed in a primary fashion.  The pedicle was then rotated into position and sutured.  Once the tube was sutured into place, adequate blood supply was confirmed with blanching and refill.  The pedicle was then wrapped with xeroform gauze and dressed appropriately with a telfa and gauze bandage to ensure continued blood supply and protect the attached pedicle.
Abbe Flap (Upper To Lower Lip) Text: The defect of the lower lip was assessed and measured.  Given the location and size of the defect, an Abbe flap was deemed most appropriate.  Using a sterile surgical marker, an appropriate Abbe flap was measured and drawn on the upper lip. Local anesthesia was then infiltrated.  A scalpel was then used to incise the upper lip through and through the skin, vermilion, muscle and mucosa, leaving the flap pedicled on the opposite side.  The flap was then rotated and transferred to the lower lip defect.  The flap was then sutured into place with a three layer technique, closing the orbicularis oris muscle layer with subcutaneous buried sutures, followed by a mucosal layer and an epidermal layer.
Abbe Flap (Lower To Upper Lip) Text: The defect of the upper lip was assessed and measured.  Given the location and size of the defect, an Abbe flap was deemed most appropriate.  Using a sterile surgical marker, an appropriate Abbe flap was measured and drawn on the lower lip. Local anesthesia was then infiltrated. A scalpel was then used to incise the upper lip through and through the skin, vermilion, muscle and mucosa, leaving the flap pedicled on the opposite side.  The flap was then rotated and transferred to the lower lip defect.  The flap was then sutured into place with a three layer technique, closing the orbicularis oris muscle layer with subcutaneous buried sutures, followed by a mucosal layer and an epidermal layer.
Estlander Flap (Upper To Lower Lip) Text: The defect of the lower lip was assessed and measured.  Given the location and size of the defect, an Estlander flap was deemed most appropriate.  Using a sterile surgical marker, an appropriate Estlander flap was measured and drawn on the upper lip. Local anesthesia was then infiltrated. A scalpel was then used to incise the lateral aspect of the flap, through skin, muscle and mucosa, leaving the flap pedicled medially.  The flap was then rotated and positioned to fill the lower lip defect.  The flap was then sutured into place with a three layer technique, closing the orbicularis oris muscle layer with subcutaneous buried sutures, followed by a mucosal layer and an epidermal layer.
Cheiloplasty (Less Than 50%) Text: A decision was made to reconstruct the defect with a  cheiloplasty.  The defect was undermined extensively.  Additional obicularis oris muscle was excised with a 15 blade scalpel.  The defect was converted into a full thickness wedge, of less than 50% of the vertical height of the lip, to facilite a better cosmetic result.  Small vessels were then tied off with 5-0 monocyrl. The obicularis oris, superficial fascia, adipose and dermis were then reapproximated.  After the deeper layers were approximated the epidermis was reapproximated with particular care given to realign the vermilion border.
Cheiloplasty (Complex) Text: A decision was made to reconstruct the defect with a  cheiloplasty.  The defect was undermined extensively.  Additional obicularis oris muscle was excised with a 15 blade scalpel.  The defect was converted into a full thickness wedge to facilite a better cosmetic result.  Small vessels were then tied off with 5-0 monocyrl. The obicularis oris, superficial fascia, adipose and dermis were then reapproximated.  After the deeper layers were approximated the epidermis was reapproximated with particular care given to realign the vermilion border.
Ear Wedge Repair Text: A wedge excision was completed by carrying down an excision through the full thickness of the ear and cartilage with an inward facing Burow's triangle. The wound was then closed in a layered fashion.
Full Thickness Lip Wedge Repair (Flap) Text: Given the location of the defect and the proximity to free margins a full thickness wedge repair was deemed most appropriate.  Using a sterile surgical marker, the appropriate repair was drawn incorporating the defect and placing the expected incisions perpendicular to the vermilion border.  The vermilion border was also meticulously outlined to ensure appropriate reapproximation during the repair.  The area thus outlined was incised through and through with a #15 scalpel blade.  The muscularis and dermis were reaproximated with deep sutures following hemostasis. Care was taken to realign the vermilion border before proceeding with the superficial closure.  Once the vermilion was realigned the superfical and mucosal closure was finished.
Ftsg Text: The defect edges were debeveled with a #15 scalpel blade.  Given the location of the defect, shape of the defect and the proximity to free margins a full thickness skin graft was deemed most appropriate.  Using a sterile surgical marker, the primary defect shape was transferred to the donor site. The area thus outlined was incised deep to adipose tissue with a #15 scalpel blade.  The harvested graft was then trimmed of adipose tissue until only dermis and epidermis was left.  The skin margins of the secondary defect were undermined to an appropriate distance in all directions utilizing iris scissors.  The secondary defect was closed with interrupted buried subcutaneous sutures.  The skin edges were then re-apposed with running  sutures.  The skin graft was then placed in the primary defect and oriented appropriately.
Split-Thickness Skin Graft Text: The defect edges were debeveled with a #15 scalpel blade.  Given the location of the defect, shape of the defect and the proximity to free margins a split thickness skin graft was deemed most appropriate.  Using a sterile surgical marker, the primary defect shape was transferred to the donor site. The split thickness graft was then harvested.  The skin graft was then placed in the primary defect and oriented appropriately.
Pinch Graft Text: The defect edges were debeveled with a #15 scalpel blade. Given the location of the defect, shape of the defect and the proximity to free margins a pinch graft was deemed most appropriate. Using a sterile surgical marker, the primary defect shape was transferred to the donor site. The area thus outlined was incised deep to adipose tissue with a #15 scalpel blade.  The harvested graft was then trimmed of adipose tissue until only dermis and epidermis was left. The skin margins of the secondary defect were undermined to an appropriate distance in all directions utilizing iris scissors.  The secondary defect was closed with interrupted buried subcutaneous sutures.  The skin edges were then re-apposed with running  sutures.  The skin graft was then placed in the primary defect and oriented appropriately.
Burow's Graft Text: The defect edges were debeveled with a #15 scalpel blade.  Given the location of the defect, shape of the defect, the proximity to free margins and the presence of a standing cone deformity a Burow's skin graft was deemed most appropriate. The standing cone was removed and this tissue was then trimmed to the shape of the primary defect. The adipose tissue was also removed until only dermis and epidermis were left.  The skin margins of the secondary defect were undermined to an appropriate distance in all directions utilizing iris scissors.  The secondary defect was closed with interrupted buried subcutaneous sutures.  The skin edges were then re-apposed with running  sutures.  The skin graft was then placed in the primary defect and oriented appropriately.
Cartilage Graft Text: The defect edges were debeveled with a #15 scalpel blade.  Given the location of the defect, shape of the defect, the fact the defect involved a full thickness cartilage defect a cartilage graft was deemed most appropriate.  An appropriate donor site was identified, cleansed, and anesthetized. The cartilage graft was then harvested and transferred to the recipient site, oriented appropriately and then sutured into place.  The secondary defect was then repaired using a primary closure.
Composite Graft Text: The defect edges were debeveled with a #15 scalpel blade.  Given the location of the defect, shape of the defect, the proximity to free margins and the fact the defect was full thickness a composite graft was deemed most appropriate.  The defect was outline and then transferred to the donor site.  A full thickness graft was then excised from the donor site. The graft was then placed in the primary defect, oriented appropriately and then sutured into place.  The secondary defect was then repaired using a primary closure.
Epidermal Autograft Text: The defect edges were debeveled with a #15 scalpel blade.  Given the location of the defect, shape of the defect and the proximity to free margins an epidermal autograft was deemed most appropriate.  Using a sterile surgical marker, the primary defect shape was transferred to the donor site. The epidermal graft was then harvested.  The skin graft was then placed in the primary defect and oriented appropriately.
Dermal Autograft Text: The defect edges were debeveled with a #15 scalpel blade.  Given the location of the defect, shape of the defect and the proximity to free margins a dermal autograft was deemed most appropriate.  Using a sterile surgical marker, the primary defect shape was transferred to the donor site. The area thus outlined was incised deep to adipose tissue with a #15 scalpel blade.  The harvested graft was then trimmed of adipose and epidermal tissue until only dermis was left.  The skin graft was then placed in the primary defect and oriented appropriately.
Skin Substitute Text: The defect edges were debeveled with a #15 scalpel blade.  Given the location of the defect, shape of the defect and the proximity to free margins a skin substitute graft was deemed most appropriate.  The graft material was trimmed to fit the size of the defect. The graft was then placed in the primary defect and oriented appropriately.
Skin Substitute Paste Text: The defect edges were debeveled with a #15 scalpel blade.  Given the location of the defect, shape of the defect and the proximity to free margins a skin substitute micronized graft was deemed most appropriate.  The entire vial contents were admixed with 0.5ccs of sterile saline, formed into a paste and then evenly spread over the entire wound bed.
Skin Substitute Injection Text: The defect edges were debeveled with a #15 scalpel blade.  Given the location of the defect, shape of the defect and the proximity to free margins a skin substitute micronized graft was deemed most appropriate.  The entire vial contents were admixed with 3.0ccs of sterile saline and then injected subcutaneously throughout the entire wound bed.
Tissue Cultured Epidermal Autograft Text: The defect edges were debeveled with a #15 scalpel blade.  Given the location of the defect, shape of the defect and the proximity to free margins a tissue cultured epidermal autograft was deemed most appropriate.  The graft was then trimmed to fit the size of the defect.  The graft was then placed in the primary defect and oriented appropriately.
Xenograft Text: The defect edges were debeveled with a #15 scalpel blade.  Given the location of the defect, shape of the defect and the proximity to free margins a xenograft was deemed most appropriate.  The graft was then trimmed to fit the size of the defect.  The graft was then placed in the primary defect and oriented appropriately.
Purse String (Simple) Text: Given the location of the defect and the characteristics of the surrounding skin a purse string closure was deemed most appropriate.  Undermining was performed circumfirentially around the surgical defect.  A purse string suture was then placed and tightened.
Purse String (Intermediate) Text: Given the location of the defect and the characteristics of the surrounding skin a purse string intermediate closure was deemed most appropriate.  Undermining was performed circumfirentially around the surgical defect.  A purse string suture was then placed and tightened.
Partial Purse String (Simple) Text: Given the location of the defect and the characteristics of the surrounding skin a simple purse string closure was deemed most appropriate.  Undermining was performed circumfirentially around the surgical defect.  A purse string suture was then placed and tightened. Wound tension only allowed a partial closure of the circular defect.
Partial Purse String (Intermediate) Text: Given the location of the defect and the characteristics of the surrounding skin an intermediate purse string closure was deemed most appropriate.  Undermining was performed circumfirentially around the surgical defect.  A purse string suture was then placed and tightened. Wound tension only allowed a partial closure of the circular defect.
Localized Dermabrasion With Wire Brush Text: The patient was draped in routine manner.  Localized dermabrasion using 3 x 17 mm wire brush was performed in routine manner to papillary dermis. This spot dermabrasion is being performed to complete skin cancer reconstruction. It also will eliminate the other sun damaged precancerous cells that are known to be part of the regional effect of a lifetime's worth of sun exposure. This localized dermabrasion is therapeutic and should not be considered cosmetic in any regard.
Tarsorrhaphy Text: A tarsorrhaphy was performed using Frost sutures.
Intermediate Repair And Flap Additional Text (Will Appearing After The Standard Complex Repair Text): The intermediate repair was not sufficient to completely close the primary defect. The remaining additional defect was repaired with the flap mentioned below.
Intermediate Repair And Graft Additional Text (Will Appearing After The Standard Complex Repair Text): The intermediate repair was not sufficient to completely close the primary defect. The remaining additional defect was repaired with the graft mentioned below.
Complex Repair And Flap Additional Text (Will Appearing After The Standard Complex Repair Text): The complex repair was not sufficient to completely close the primary defect. The remaining additional defect was repaired with the flap mentioned below.
Complex Repair And Graft Additional Text (Will Appearing After The Standard Complex Repair Text): The complex repair was not sufficient to completely close the primary defect. The remaining additional defect was repaired with the graft mentioned below.
Cheek Interpolation Flap Division And Inset Text: Division and inset of the cheek interpolation flap was performed to achieve optimal aesthetic result, restore normal anatomic appearance and avoid distortion of normal anatomy, expedite and facilitate wound healing, achieve optimal functional result and because linear closure either not possible or would produce suboptimal result. The patient was prepped and draped in the usual manner. The pedicle was infiltrated with local anesthesia. The pedicle was sectioned with a #15 blade. The pedicle was de-bulked and trimmed to match the shape of the defect. Hemostasis was achieved. The flap donor site and free margin of the flap were secured with deep buried sutures and the wound edges were re-approximated.
Cheek To Nose Interpolation Flap Division And Inset Text: Division and inset of the cheek to nose interpolation flap was performed to achieve optimal aesthetic result, restore normal anatomic appearance and avoid distortion of normal anatomy, expedite and facilitate wound healing, achieve optimal functional result and because linear closure either not possible or would produce suboptimal result. The patient was prepped and draped in the usual manner. The pedicle was infiltrated with local anesthesia. The pedicle was sectioned with a #15 blade. The pedicle was de-bulked and trimmed to match the shape of the defect. Hemostasis was achieved. The flap donor site and free margin of the flap were secured with deep buried sutures and the wound edges were re-approximated.
Melolabial Interpolation Flap Division And Inset Text: Division and inset of the melolabial interpolation flap was performed to achieve optimal aesthetic result, restore normal anatomic appearance and avoid distortion of normal anatomy, expedite and facilitate wound healing, achieve optimal functional result and because linear closure either not possible or would produce suboptimal result. The patient was prepped and draped in the usual manner. The pedicle was infiltrated with local anesthesia. The pedicle was sectioned with a #15 blade. The pedicle was de-bulked and trimmed to match the shape of the defect. Hemostasis was achieved. The flap donor site and free margin of the flap were secured with deep buried sutures and the wound edges were re-approximated.
Mastoid Interpolation Flap Division And Inset Text: Division and inset of the mastoid interpolation flap was performed to achieve optimal aesthetic result, restore normal anatomic appearance and avoid distortion of normal anatomy, expedite and facilitate wound healing, achieve optimal functional result and because linear closure either not possible or would produce suboptimal result. The patient was prepped and draped in the usual manner. The pedicle was infiltrated with local anesthesia. The pedicle was sectioned with a #15 blade. The pedicle was de-bulked and trimmed to match the shape of the defect. Hemostasis was achieved. The flap donor site and free margin of the flap were secured with deep buried sutures and the wound edges were re-approximated.
Paramedian Forehead Flap Division And Inset Text: Division and inset of the paramedian forehead flap was performed to achieve optimal aesthetic result, restore normal anatomic appearance and avoid distortion of normal anatomy, expedite and facilitate wound healing, achieve optimal functional result and because linear closure either not possible or would produce suboptimal result. The patient was prepped and draped in the usual manner. The pedicle was infiltrated with local anesthesia. The pedicle was sectioned with a #15 blade. The pedicle was de-bulked and trimmed to match the shape of the defect. Hemostasis was achieved. The flap donor site and free margin of the flap were secured with deep buried sutures and the wound edges were re-approximated.
Posterior Auricular Interpolation Flap Division And Inset Text: Division and inset of the posterior auricular interpolation flap was performed to achieve optimal aesthetic result, restore normal anatomic appearance and avoid distortion of normal anatomy, expedite and facilitate wound healing, achieve optimal functional result and because linear closure either not possible or would produce suboptimal result. The patient was prepped and draped in the usual manner. The pedicle was infiltrated with local anesthesia. The pedicle was sectioned with a #15 blade. The pedicle was de-bulked and trimmed to match the shape of the defect. Hemostasis was achieved. The flap donor site and free margin of the flap were secured with deep buried sutures and the wound edges were re-approximated.
Manual Repair Warning Statement: We plan on removing the manually selected variable below in favor of our much easier automatic structured text blocks found in the previous tab. We decided to do this to help make the flow better and give you the full power of structured data. Manual selection is never going to be ideal in our platform and I would encourage you to avoid using manual selection from this point on, especially since I will be sunsetting this feature. It is important that you do one of two things with the customized text below. First, you can save all of the text in a word file so you can have it for future reference. Second, transfer the text to the appropriate area in the Library tab. Lastly, if there is a flap or graft type which we do not have you need to let us know right away so I can add it in before the variable is hidden. No need to panic, we plan to give you roughly 6 months to make the change.
Consent: The rationale for the repair was explained to the patient and consent was obtained. The risks, benefits and alternatives to therapy were discussed in detail. Specifically, the risks of infection, scarring, bleeding, prolonged wound healing, incomplete removal, allergy to anesthesia, nerve injury and recurrence were addressed. Prior to the procedure, the treatment site was clearly identified and confirmed by the patient. All components of Universal Protocol/PAUSE Rule completed.
Post-Care Instructions: I reviewed with the patient in detail post-care instructions. Patient is not to engage in any heavy lifting, exercise, or swimming for the next 14 days. Should the patient develop any fevers, chills, bleeding, severe pain patient will contact the office immediately.
Pain Refusal Text: I offered to prescribe pain medication but the patient refused to take this medication.
Show Asc Variables: Yes
Where Do You Want The Question To Include Opioid Counseling Located?: Case Summary Tab
Information: Selecting Yes will display possible errors in your note based on the variables you have selected. This validation is only offered as a suggestion for you. PLEASE NOTE THAT THE VALIDATION TEXT WILL BE REMOVED WHEN YOU FINALIZE YOUR NOTE. IF YOU WANT TO FAX A PRELIMINARY NOTE YOU WILL NEED TO TOGGLE THIS TO 'NO' IF YOU DO NOT WANT IT IN YOUR FAXED NOTE.

## 2023-06-14 ENCOUNTER — PATIENT MESSAGE (OUTPATIENT)
Dept: SLEEP MEDICINE | Facility: MEDICAL CENTER | Age: 83
End: 2023-06-14
Payer: MEDICARE

## 2023-06-14 DIAGNOSIS — R05.1 ACUTE COUGH: ICD-10-CM

## 2023-06-14 DIAGNOSIS — J45.20 MILD INTERMITTENT ASTHMA WITHOUT COMPLICATION: ICD-10-CM

## 2023-06-14 RX ORDER — METHYLPREDNISOLONE 4 MG/1
TABLET ORAL
Qty: 21 TABLET | Refills: 0 | Status: SHIPPED | OUTPATIENT
Start: 2023-06-14 | End: 2023-06-20

## 2023-06-19 ENCOUNTER — APPOINTMENT (RX ONLY)
Dept: URBAN - METROPOLITAN AREA CLINIC 36 | Facility: CLINIC | Age: 83
Setting detail: DERMATOLOGY
End: 2023-06-19

## 2023-06-19 DIAGNOSIS — Z48.817 ENCOUNTER FOR SURGICAL AFTERCARE FOLLOWING SURGERY ON THE SKIN AND SUBCUTANEOUS TISSUE: ICD-10-CM

## 2023-06-19 PROCEDURE — 99024 POSTOP FOLLOW-UP VISIT: CPT

## 2023-06-19 PROCEDURE — ? DRESSING CHANGE (GLOBAL PERIOD)

## 2023-06-19 ASSESSMENT — LOCATION ZONE DERM: LOCATION ZONE: LEG

## 2023-06-19 ASSESSMENT — LOCATION SIMPLE DESCRIPTION DERM: LOCATION SIMPLE: LEFT ANKLE

## 2023-06-19 ASSESSMENT — LOCATION DETAILED DESCRIPTION DERM: LOCATION DETAILED: LEFT POSTERIOR ANKLE

## 2023-06-19 NOTE — PROCEDURE: DRESSING CHANGE (GLOBAL PERIOD)
Detail Level: Detailed
Add 24845 Cpt? (Important Note: In 2017 The Use Of 80765 Is Being Tracked By Cms To Determine Future Global Period Reimbursement For Global Periods): yes

## 2023-06-20 ENCOUNTER — OFFICE VISIT (OUTPATIENT)
Dept: MEDICAL GROUP | Facility: LAB | Age: 83
End: 2023-06-20
Payer: MEDICARE

## 2023-06-20 VITALS
HEART RATE: 60 BPM | RESPIRATION RATE: 12 BRPM | BODY MASS INDEX: 26.36 KG/M2 | TEMPERATURE: 97.4 F | DIASTOLIC BLOOD PRESSURE: 48 MMHG | OXYGEN SATURATION: 97 % | HEIGHT: 66 IN | SYSTOLIC BLOOD PRESSURE: 116 MMHG | WEIGHT: 164 LBS

## 2023-06-20 DIAGNOSIS — Z78.9 POLST (PHYSICIAN ORDERS FOR LIFE-SUSTAINING TREATMENT): ICD-10-CM

## 2023-06-20 DIAGNOSIS — R05.2 SUBACUTE COUGH: ICD-10-CM

## 2023-06-20 PROBLEM — N18.9 CHRONIC KIDNEY DISEASE: Status: RESOLVED | Noted: 2021-04-21 | Resolved: 2023-06-20

## 2023-06-20 PROCEDURE — 3078F DIAST BP <80 MM HG: CPT | Performed by: FAMILY MEDICINE

## 2023-06-20 PROCEDURE — 99213 OFFICE O/P EST LOW 20 MIN: CPT | Performed by: FAMILY MEDICINE

## 2023-06-20 PROCEDURE — 3074F SYST BP LT 130 MM HG: CPT | Performed by: FAMILY MEDICINE

## 2023-06-20 RX ORDER — FLUTICASONE FUROATE AND VILANTEROL 100; 25 UG/1; UG/1
1 POWDER RESPIRATORY (INHALATION)
COMMUNITY
End: 2023-07-25

## 2023-06-20 ASSESSMENT — FIBROSIS 4 INDEX: FIB4 SCORE: 1.45

## 2023-06-20 NOTE — PROGRESS NOTES
Subjective:     Chief Complaint   Patient presents with    Bronchitis     Took meds         HPI:   Karli presents today with bronchitis. Present for some time, cant get rid of cough.   Saw pulmonology. Given Breo. Used medrol dose pack in May, and then this was repeated. More sinus drainage as well. Started using flonase yesterday as well. Has Breo, hasnt used it yet.   Uses Ventolin in AM and PM.     Also recently with melanoma removed.     Doing well from a heart perspective lately.  She also has some new POLST forms for me to sign.  She is only changing her POA delegation's and not her desires.  She is of sound mind to make decisions.    Current Outpatient Medications Ordered in Epic   Medication Sig Dispense Refill    fluticasone furoate-vilanterol (BREO) 100-25 MCG/ACT AEROSOL POWDER, BREATH ACTIVATED Inhale 1 Puff.      ARMOUR THYROID 60 MG Tab TAKE 1 TABLET BY MOUTH EVERY DAY ON 5 DAYS A WEEK 65 Tablet 3    aspirin EC (ECOTRIN) 81 MG Tablet Delayed Response Take 1 Tablet by mouth every day. 100 Tablet 0    amLODIPine (NORVASC) 10 MG Tab Take 1 Tablet by mouth every day. 90 Tablet 0    atorvastatin (LIPITOR) 40 MG Tab Take 2 Tablets by mouth at bedtime. (Patient taking differently: Take 40 mg by mouth at bedtime.) 90 Tablet 0    clopidogrel (PLAVIX) 75 MG Tab Take 1 Tablet by mouth every day. 90 Tablet 0    losartan (COZAAR) 100 MG Tab Take 1 Tablet by mouth every day. 90 Tablet 0    metoprolol tartrate (LOPRESSOR) 50 MG Tab Take 1 Tablet by mouth 2 times a day. 180 Tablet 0    LORazepam (ATIVAN) 0.5 MG Tab Take 0.25-0.5 mg by mouth at bedtime as needed (Sleep).      VITAMIN E PO Take 1 Tablet by mouth every day.      Glucosamine-Chondroitin (MOVE FREE PO) Take 1 Tablet by mouth every day.      omeprazole (PRILOSEC) 20 MG delayed-release capsule Take 20 mg by mouth 1 time a day as needed. Indications: Heartburn      Faricimab-svoa (VABYSMO) 6 MG/0.05ML Solution Administer 6 mg into the left eye see  "administration instructions. Every 5 to 6 weeks  Due May 26th 2023      thyroid (ARMOUR THYROID) 90 MG Tab TAKE 1 TABLET EVERY DAY 2 DAYS PER WEEK Strength: 90 mg (Patient taking differently: Take 90 mg by mouth two times a week. Takes on Monday and Thursday) 90 Tablet 0    famotidine (PEPCID) 40 MG Tab Take 20 mg by mouth 2 times a day.      Omega-3 Fatty Acids (FISH OIL) 1000 MG Cap capsule Take 1,000 mg by mouth every day.      Lutein-Zeaxanthin 25-5 MG Cap Take  by mouth every day.      acetaminophen (TYLENOL) 500 MG Tab Take 1,000 mg by mouth every morning.      Calcium Carbonate-Vit D-Min (CALCIUM 1200 PO) Take 1 tablet  by mouth 2 times a day.      Cyanocobalamin (VITAMIN B12 PO) Take 1,000 mcg by mouth every day.      Turmeric 500 MG Tab Take 500 mg by mouth 2 times a day.      vitamin D (CHOLECALCIFEROL) 1000 UNIT TABS Take 1,000 Units by mouth 2 times a day.      MAGNESIUM PO Take 1 Tablet by mouth every day.      Bioflavonoid Products (LISSY-C) TABS Take 1,000 Tablets by mouth 2 times a day.      Multiple Vitamin (MULTIVITAMIN PO) Take 1 tablet by mouth every day.       No current [facility]-ordered facility-administered medications on file.         ROS:  Gen: no fevers/chills, no changes in weight  Eyes: no changes in vision  ENT: no sore throat, no hearing loss, no bloody nose  CV: no chest pain, no palpitations  GI: no nausea/vomiting, no diarrhea  : no dysuria  MSk: no myalgias  Skin: no rash  Neuro: no headaches, no numbness/tingling  Heme/Lymph: no easy bruising      Objective:     Exam:  /48 (BP Location: Left arm, Patient Position: Sitting, BP Cuff Size: Adult)   Pulse 60   Temp 36.3 °C (97.4 °F)   Resp 12   Ht 1.676 m (5' 6\")   Wt 74.4 kg (164 lb)   SpO2 97%   BMI 26.47 kg/m²  Body mass index is 26.47 kg/m².    Gen: Alert and oriented, No apparent distress.  Neck: Neck is supple without lymphadenopathy.  Lungs: Normal effort, CTA bilaterally, no wheezes, rhonchi, or rales  CV: Regular " rate and rhythm. No murmurs, rubs, or gallops.  Ext: No clubbing, cyanosis, edema.      Assessment & Plan:     82 y.o. female with the following -     1. Subacute cough  Discussed continuing her Flonase right now.  She can consider some saline nasal flushes as well.  Should go ahead and start the Breo was given to her by pulmonology.  She will let me know how things are going over the next week to see if we need to trial an antibiotic for any sinus drainage.  She denies any headaches or pain otherwise at this point.    2. POLST (Physician Orders for Life-Sustaining Treatment)  We did discuss her desires and delegation is on her POLST form.  These are signed for her today.  She does have meeting this week with her  Ep[employer]anio change her POA paperwork as well.              No follow-ups on file.    Please note that this dictation was created using voice recognition software. I have made every reasonable attempt to correct obvious errors, but I expect that there are errors of grammar and possibly content that I did not discover before finalizing the note.

## 2023-06-26 ENCOUNTER — PATIENT MESSAGE (OUTPATIENT)
Dept: MEDICAL GROUP | Facility: LAB | Age: 83
End: 2023-06-26
Payer: MEDICARE

## 2023-06-26 ENCOUNTER — APPOINTMENT (RX ONLY)
Dept: URBAN - METROPOLITAN AREA CLINIC 36 | Facility: CLINIC | Age: 83
Setting detail: DERMATOLOGY
End: 2023-06-26

## 2023-06-26 DIAGNOSIS — Z48.817 ENCOUNTER FOR SURGICAL AFTERCARE FOLLOWING SURGERY ON THE SKIN AND SUBCUTANEOUS TISSUE: ICD-10-CM

## 2023-06-26 PROBLEM — D03.72 MELANOMA IN SITU OF LEFT LOWER LIMB, INCLUDING HIP: Status: ACTIVE | Noted: 2023-06-26

## 2023-06-26 PROCEDURE — ? POST-OP WOUND CHECK

## 2023-06-26 PROCEDURE — 15271 SKIN SUB GRAFT TRNK/ARM/LEG: CPT

## 2023-06-26 PROCEDURE — 99024 POSTOP FOLLOW-UP VISIT: CPT

## 2023-06-26 PROCEDURE — ? REPAIR NOTE

## 2023-06-26 RX ORDER — AMOXICILLIN AND CLAVULANATE POTASSIUM 875; 125 MG/1; MG/1
1 TABLET, FILM COATED ORAL 2 TIMES DAILY
Qty: 14 TABLET | Refills: 0 | Status: SHIPPED | OUTPATIENT
Start: 2023-06-26 | End: 2023-07-03

## 2023-06-26 ASSESSMENT — LOCATION DETAILED DESCRIPTION DERM: LOCATION DETAILED: LEFT POSTERIOR ANKLE

## 2023-06-26 ASSESSMENT — LOCATION SIMPLE DESCRIPTION DERM: LOCATION SIMPLE: LEFT ANKLE

## 2023-06-26 ASSESSMENT — LOCATION ZONE DERM: LOCATION ZONE: LEG

## 2023-06-26 NOTE — PROCEDURE: REPAIR NOTE

## 2023-06-26 NOTE — PROCEDURE: MIPS QUALITY
Quality 138: Melanoma: Coordination Of Care: A treatment plan was communicated to the physicians providing continuing care within one month of diagnosis outlining: diagnosis, tumor thickness and a plan for surgery or alternate care.
Quality 226: Preventive Care And Screening: Tobacco Use: Screening And Cessation Intervention: Patient screened for tobacco use and is an ex/non-smoker
Quality 130: Documentation Of Current Medications In The Medical Record: Current Medications Documented
Detail Level: Detailed
Quality 137: Melanoma: Continuity Of Care - Recall System: Patient information entered into a recall system that includes: target date for the next exam specified AND a process to follow up with patients regarding missed or unscheduled appointments

## 2023-06-26 NOTE — PROCEDURE: POST-OP WOUND CHECK
Detail Level: Detailed
Add 89789 Cpt? (Important Note: In 2017 The Use Of 45982 Is Being Tracked By Cms To Determine Future Global Period Reimbursement For Global Periods): yes

## 2023-06-26 NOTE — PROCEDURE: MIPS QUALITY
Quality 138: Melanoma: Coordination Of Care: A treatment plan was communicated to the physicians providing continuing care within one month of diagnosis outlining: diagnosis, tumor thickness and a plan for surgery or alternate care.
Quality 226: Preventive Care And Screening: Tobacco Use: Screening And Cessation Intervention: Patient screened for tobacco use and is an ex/non-smoker
Quality 137: Melanoma: Continuity Of Care - Recall System: Patient information entered into a recall system that includes: target date for the next exam specified AND a process to follow up with patients regarding missed or unscheduled appointments
Detail Level: Detailed
Quality 130: Documentation Of Current Medications In The Medical Record: Current Medications Documented

## 2023-06-29 ENCOUNTER — PATIENT MESSAGE (OUTPATIENT)
Dept: SLEEP MEDICINE | Facility: MEDICAL CENTER | Age: 83
End: 2023-06-29
Payer: MEDICARE

## 2023-06-29 RX ORDER — BENZONATATE 100 MG/1
100 CAPSULE ORAL 3 TIMES DAILY PRN
Qty: 60 CAPSULE | Refills: 0 | Status: SHIPPED | OUTPATIENT
Start: 2023-06-29 | End: 2023-10-10

## 2023-06-30 ENCOUNTER — TELEPHONE (OUTPATIENT)
Dept: HEALTH INFORMATION MANAGEMENT | Facility: OTHER | Age: 83
End: 2023-06-30
Payer: MEDICARE

## 2023-07-06 ENCOUNTER — TELEPHONE (OUTPATIENT)
Dept: CARDIOLOGY | Facility: MEDICAL CENTER | Age: 83
End: 2023-07-06

## 2023-07-06 ENCOUNTER — APPOINTMENT (OUTPATIENT)
Dept: SLEEP MEDICINE | Facility: MEDICAL CENTER | Age: 83
End: 2023-07-06
Attending: NURSE PRACTITIONER
Payer: MEDICARE

## 2023-07-06 ENCOUNTER — PATIENT MESSAGE (OUTPATIENT)
Dept: SLEEP MEDICINE | Facility: MEDICAL CENTER | Age: 83
End: 2023-07-06
Payer: MEDICARE

## 2023-07-06 NOTE — TELEPHONE ENCOUNTER
DAFNE    Caller: Danica from [facility]    Medication Name and Dosage:  clopidogrel (PLAVIX) 75 MG Tab  losartan (COZAAR) 100 MG Tab  atorvastatin (LIPITOR) 40 MG Tab  metoprolol tartrate (LOPRESSOR) 50 MG Tab  amLODIPine (NORVASC) 10 MG Tab    Medication amount left: Unknown    Preferred Pharmacy:  [facility] [address]   Phone: [phone]  Fax: [phone]    Other questions (Topic): Patient had called [facility] and would like to have this prescriptions filled by them and not [facility].    Callback Number (Will only call for issues): [phone]    Thank you,  -Eli RAMIREZ

## 2023-07-07 ENCOUNTER — OFFICE VISIT (OUTPATIENT)
Dept: MEDICAL GROUP | Facility: LAB | Age: 83
End: 2023-07-07
Payer: MEDICARE

## 2023-07-07 ENCOUNTER — HOSPITAL ENCOUNTER (OUTPATIENT)
Dept: RADIOLOGY | Facility: MEDICAL CENTER | Age: 83
End: 2023-07-07
Attending: FAMILY MEDICINE
Payer: MEDICARE

## 2023-07-07 VITALS
DIASTOLIC BLOOD PRESSURE: 72 MMHG | WEIGHT: 165 LBS | BODY MASS INDEX: 26.52 KG/M2 | SYSTOLIC BLOOD PRESSURE: 124 MMHG | HEIGHT: 66 IN | OXYGEN SATURATION: 96 % | RESPIRATION RATE: 16 BRPM | HEART RATE: 79 BPM | TEMPERATURE: 98.3 F

## 2023-07-07 DIAGNOSIS — R05.3 CHRONIC COUGH: ICD-10-CM

## 2023-07-07 PROCEDURE — 3074F SYST BP LT 130 MM HG: CPT | Performed by: FAMILY MEDICINE

## 2023-07-07 PROCEDURE — 99213 OFFICE O/P EST LOW 20 MIN: CPT | Performed by: FAMILY MEDICINE

## 2023-07-07 PROCEDURE — 3078F DIAST BP <80 MM HG: CPT | Performed by: FAMILY MEDICINE

## 2023-07-07 PROCEDURE — 71046 X-RAY EXAM CHEST 2 VIEWS: CPT

## 2023-07-07 ASSESSMENT — FIBROSIS 4 INDEX: FIB4 SCORE: 1.45

## 2023-07-07 NOTE — PROGRESS NOTES
Subjective:   Karli Berrios is a 82 y.o. female here today for   Chief Complaint   Patient presents with    Cough     Ongoing cough since early June, did multiple rounds methylprednisolone, Augmentin, tessalon, breo INH,   Feeling of flush and fevery, taking tylenol, hard to deep breath      #Cough   -Ongoing symptoms of cough with occasional production for 1 month   -Patient does have a history of asthma.  She is treated with 2 rounds of steroids, Augmentin, Tessalon Perles but with no significant improvement of cough.  -Concomitant symptoms of myalgias, feeling flush, subjective fevers, started occurring approximately 2 weeks ago.  -Patient denies any chest pain, shortness of breath, wheezing.  Denies any sick contacts, recent traveling.  -Patient has follow-up with pulmonologist.  She states the pulmonologist told her that it was more allergy like symptoms and recommended intranasal corticosteroid which patient has not yet started.      Allergies   Allergen Reactions    Bactrim Ds Unspecified    Bactrim [Sulfamethoxazole W-Trimethoprim]      She states this was always an antibiotic that should be in her chart     Codeine Diarrhea     Pt stated on 06/26/2022    Denosumab Unspecified    Atenolol Unspecified     Leg cramps          Current medicines (including changes today)  Current Outpatient Medications   Medication Sig Dispense Refill    benzonatate (TESSALON) 100 MG Cap Take 1 Capsule by mouth 3 times a day as needed for Cough. 60 Capsule 0    fluticasone furoate-vilanterol (BREO) 100-25 MCG/ACT AEROSOL POWDER, BREATH ACTIVATED Inhale 1 Puff.      ARMOUR THYROID 60 MG Tab TAKE 1 TABLET BY MOUTH EVERY DAY ON 5 DAYS A WEEK 65 Tablet 3    aspirin EC (ECOTRIN) 81 MG Tablet Delayed Response Take 1 Tablet by mouth every day. 100 Tablet 0    amLODIPine (NORVASC) 10 MG Tab Take 1 Tablet by mouth every day. 90 Tablet 0    atorvastatin (LIPITOR) 40 MG Tab Take 2 Tablets by mouth at bedtime. (Patient taking  differently: Take 40 mg by mouth at bedtime.) 90 Tablet 0    clopidogrel (PLAVIX) 75 MG Tab Take 1 Tablet by mouth every day. 90 Tablet 0    losartan (COZAAR) 100 MG Tab Take 1 Tablet by mouth every day. 90 Tablet 0    metoprolol tartrate (LOPRESSOR) 50 MG Tab Take 1 Tablet by mouth 2 times a day. 180 Tablet 0    LORazepam (ATIVAN) 0.5 MG Tab Take 0.25-0.5 mg by mouth at bedtime as needed (Sleep).      VITAMIN E PO Take 1 Tablet by mouth every day.      Glucosamine-Chondroitin (MOVE FREE PO) Take 1 Tablet by mouth every day.      omeprazole (PRILOSEC) 20 MG delayed-release capsule Take 20 mg by mouth 1 time a day as needed. Indications: Heartburn      Faricimab-svoa (VABYSMO) 6 MG/0.05ML Solution Administer 6 mg into the left eye see administration instructions. Every 5 to 6 weeks  Due May 26th 2023      thyroid (ARMOUR THYROID) 90 MG Tab TAKE 1 TABLET EVERY DAY 2 DAYS PER WEEK Strength: 90 mg (Patient taking differently: Take 90 mg by mouth two times a week. Takes on Monday and Thursday) 90 Tablet 0    famotidine (PEPCID) 40 MG Tab Take 20 mg by mouth 2 times a day.      Omega-3 Fatty Acids (FISH OIL) 1000 MG Cap capsule Take 1,000 mg by mouth every day.      Lutein-Zeaxanthin 25-5 MG Cap Take  by mouth every day.      acetaminophen (TYLENOL) 500 MG Tab Take 1,000 mg by mouth every morning.      Calcium Carbonate-Vit D-Min (CALCIUM 1200 PO) Take 1 tablet  by mouth 2 times a day.      Cyanocobalamin (VITAMIN B12 PO) Take 1,000 mcg by mouth every day.      Turmeric 500 MG Tab Take 500 mg by mouth 2 times a day.      vitamin D (CHOLECALCIFEROL) 1000 UNIT TABS Take 1,000 Units by mouth 2 times a day.      MAGNESIUM PO Take 1 Tablet by mouth every day.      Bioflavonoid Products (LISSY-C) TABS Take 1,000 Tablets by mouth 2 times a day.      Multiple Vitamin (MULTIVITAMIN PO) Take 1 tablet by mouth every day.       No current facility-administered medications for this visit.     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    She has no past medical history of Addisons disease (HCC), Adrenal disorder (HCC), Allergy, Anemia, Arrhythmia, Blood transfusion without reported diagnosis, Breast cancer (HCC), Cancer (HCC), Cancer of peritoneum (HCC), CHF (congestive heart failure) (HCC), Clotting disorder (HCC), COPD (chronic obstructive pulmonary disease) (HCC), Cushings syndrome (HCC), Depression, Diabetes (HCC), Diabetic neuropathy (HCC), Glaucoma, Goiter, Head ache, Heart attack (HCC), Heart murmur, HIV (human immunodeficiency virus infection) (HCC), IBD (inflammatory bowel disease), Malignant neoplasm of fallopian tube (HCC), Meningitis, Migraine, Ovarian cancer (HCC), Parathyroid disorder (HCC), Pituitary disease (HCC), Pulmonary emphysema (HCC), Seizure (HCC), Sickle cell disease (HCC), Stroke (HCC), Substance abuse (HCC), or Tuberculosis.    ROS   -See HPI     Objective:     Physical Exam:  /72   Pulse 79   Temp 36.8 °C (98.3 °F) (Temporal)   Resp 16   Ht 1.67 m (5' 5.75\")   Wt 74.8 kg (165 lb)   SpO2 96%  Body mass index is 26.84 kg/m².   Constitutional: Alert, no distress.  Skin: Warm, dry, good turgor, no rashes in visible areas.  Eye: Equal, round and reactive, conjunctiva clear, lids normal.  ENMT: TM's clear bilaterally, lips without lesions, good dentition, oropharynx clear.  Neck: Trachea midline, no masses, no thyromegaly. No " cervical or supraclavicular lymphadenopathy.  Respiratory: Unlabored respiratory effort, lungs clear to auscultation, no wheezes, no rhonchi.  Cardiovascular: Normal S1, S2, no murmur, no edema.  Psych: Alert and oriented x3, normal affect and mood.    Assessment and Plan:   1. Chronic cough  -Uncertain etiology at this time; however, given previous treatment with several rounds of steroids I do not think this is asthma exacerbation.  She did treat with Augmentin for potential bacterial etiology.  We will get a chest x-ray to rule out any pneumonia to the point where we will have to expand antibiotic regiment.  -Allergies are potential cause and I do recommend that patient begin treatment with intranasal corticosteroid.  -She can continue taking OTC antitussive such as Mucinex  -We will follow-up with patient with x-ray results.  Discussed return precautions.  - DX-CHEST-2 VIEWS; Future      Followup: No follow-ups on file.         PLEASE NOTE: This dictation was created using voice recognition software. I have made every reasonable attempt to correct obvious errors, but I expect that there are errors of grammar and possibly content that I did not discover before finalizing the note.

## 2023-07-10 ENCOUNTER — HOSPITAL ENCOUNTER (OUTPATIENT)
Dept: LAB | Facility: MEDICAL CENTER | Age: 83
End: 2023-07-10
Attending: FAMILY MEDICINE
Payer: MEDICARE

## 2023-07-10 ENCOUNTER — OFFICE VISIT (OUTPATIENT)
Dept: MEDICAL GROUP | Facility: LAB | Age: 83
End: 2023-07-10
Payer: MEDICARE

## 2023-07-10 VITALS
TEMPERATURE: 97 F | HEART RATE: 78 BPM | SYSTOLIC BLOOD PRESSURE: 110 MMHG | HEIGHT: 66 IN | WEIGHT: 165 LBS | RESPIRATION RATE: 12 BRPM | BODY MASS INDEX: 26.52 KG/M2 | OXYGEN SATURATION: 94 % | DIASTOLIC BLOOD PRESSURE: 46 MMHG

## 2023-07-10 DIAGNOSIS — R05.2 SUBACUTE COUGH: ICD-10-CM

## 2023-07-10 LAB
BASOPHILS # BLD AUTO: 0.7 % (ref 0–1.8)
BASOPHILS # BLD: 0.09 K/UL (ref 0–0.12)
EOSINOPHIL # BLD AUTO: 0.68 K/UL (ref 0–0.51)
EOSINOPHIL NFR BLD: 5 % (ref 0–6.9)
ERYTHROCYTE [DISTWIDTH] IN BLOOD BY AUTOMATED COUNT: 44.3 FL (ref 35.9–50)
FLUAV RNA SPEC QL NAA+PROBE: NEGATIVE
FLUBV RNA SPEC QL NAA+PROBE: NEGATIVE
HCT VFR BLD AUTO: 38 % (ref 37–47)
HGB BLD-MCNC: 12.4 G/DL (ref 12–16)
IMM GRANULOCYTES # BLD AUTO: 0.15 K/UL (ref 0–0.11)
IMM GRANULOCYTES NFR BLD AUTO: 1.1 % (ref 0–0.9)
LYMPHOCYTES # BLD AUTO: 1.59 K/UL (ref 1–4.8)
LYMPHOCYTES NFR BLD: 11.7 % (ref 22–41)
MCH RBC QN AUTO: 30.8 PG (ref 27–33)
MCHC RBC AUTO-ENTMCNC: 32.6 G/DL (ref 32.2–35.5)
MCV RBC AUTO: 94.3 FL (ref 81.4–97.8)
MONOCYTES # BLD AUTO: 1.68 K/UL (ref 0–0.85)
MONOCYTES NFR BLD AUTO: 12.4 % (ref 0–13.4)
NEUTROPHILS # BLD AUTO: 9.39 K/UL (ref 1.82–7.42)
NEUTROPHILS NFR BLD: 69.1 % (ref 44–72)
NRBC # BLD AUTO: 0 K/UL
NRBC BLD-RTO: 0 /100 WBC (ref 0–0.2)
PLATELET # BLD AUTO: 433 K/UL (ref 164–446)
PMV BLD AUTO: 9.2 FL (ref 9–12.9)
RBC # BLD AUTO: 4.03 M/UL (ref 4.2–5.4)
RSV RNA SPEC QL NAA+PROBE: NEGATIVE
SARS-COV-2 RNA RESP QL NAA+PROBE: NEGATIVE
WBC # BLD AUTO: 13.6 K/UL (ref 4.8–10.8)

## 2023-07-10 PROCEDURE — 36415 COLL VENOUS BLD VENIPUNCTURE: CPT

## 2023-07-10 PROCEDURE — 99214 OFFICE O/P EST MOD 30 MIN: CPT | Performed by: FAMILY MEDICINE

## 2023-07-10 PROCEDURE — 0241U POCT CEPHEID COV-2, FLU A/B, RSV - PCR: CPT | Performed by: FAMILY MEDICINE

## 2023-07-10 PROCEDURE — 85025 COMPLETE CBC W/AUTO DIFF WBC: CPT

## 2023-07-10 PROCEDURE — 3074F SYST BP LT 130 MM HG: CPT | Performed by: FAMILY MEDICINE

## 2023-07-10 PROCEDURE — 3078F DIAST BP <80 MM HG: CPT | Performed by: FAMILY MEDICINE

## 2023-07-10 RX ORDER — METOPROLOL TARTRATE 50 MG/1
50 TABLET, FILM COATED ORAL 2 TIMES DAILY
Qty: 180 TABLET | Refills: 0 | Status: SHIPPED | OUTPATIENT
Start: 2023-07-10 | End: 2023-09-03 | Stop reason: SDUPTHER

## 2023-07-10 RX ORDER — CLOPIDOGREL BISULFATE 75 MG/1
75 TABLET ORAL DAILY
Qty: 90 TABLET | Refills: 0 | Status: SHIPPED | OUTPATIENT
Start: 2023-07-10 | End: 2023-09-03 | Stop reason: SDUPTHER

## 2023-07-10 RX ORDER — ATORVASTATIN CALCIUM 40 MG/1
80 TABLET, FILM COATED ORAL
Qty: 90 TABLET | Refills: 0 | Status: SHIPPED | OUTPATIENT
Start: 2023-07-10 | End: 2023-07-31

## 2023-07-10 RX ORDER — LOSARTAN POTASSIUM 100 MG/1
100 TABLET ORAL DAILY
Qty: 90 TABLET | Refills: 0 | Status: SHIPPED | OUTPATIENT
Start: 2023-07-10 | End: 2023-09-03 | Stop reason: SDUPTHER

## 2023-07-10 RX ORDER — AMLODIPINE BESYLATE 10 MG/1
10 TABLET ORAL DAILY
Qty: 90 TABLET | Refills: 0 | Status: SHIPPED | OUTPATIENT
Start: 2023-07-10 | End: 2023-07-31 | Stop reason: SDUPTHER

## 2023-07-10 ASSESSMENT — FIBROSIS 4 INDEX: FIB4 SCORE: 1.45

## 2023-07-10 NOTE — PROGRESS NOTES
Subjective:     Chief Complaint   Patient presents with    Cough     Breathing issues having trouble using inhalers          HPI:   Karli presents today with continued cough.  She does see pulmonology and has tried some different inhalers including Breo and albuterol but these do not seem to be helping.  She was advised to start some Flonase as well as Zyrtec.  Has tried some different cough medicines as well.  Has been treated multiple times with antibiotics as well as prednisone.  Recent chest x-ray was negative for infection.  She does report low-grade fever, aches, chills. This seems to be happening every morning. Some difficulty with inhaling due to coughing, so not able to get inhalers in.     Does feel more SOB as well.   Has been treated with 2 rounds of Medrol Dosepak.  This does not seem to improve things at all.          Current Outpatient Medications Ordered in Epic   Medication Sig Dispense Refill    amLODIPine (NORVASC) 10 MG Tab Take 1 Tablet by mouth every day. 90 Tablet 0    atorvastatin (LIPITOR) 40 MG Tab Take 2 Tablets by mouth at bedtime. 90 Tablet 0    clopidogrel (PLAVIX) 75 MG Tab Take 1 Tablet by mouth every day. 90 Tablet 0    losartan (COZAAR) 100 MG Tab Take 1 Tablet by mouth every day. 90 Tablet 0    metoprolol tartrate (LOPRESSOR) 50 MG Tab Take 1 Tablet by mouth 2 times a day. 180 Tablet 0    benzonatate (TESSALON) 100 MG Cap Take 1 Capsule by mouth 3 times a day as needed for Cough. 60 Capsule 0    fluticasone furoate-vilanterol (BREO) 100-25 MCG/ACT AEROSOL POWDER, BREATH ACTIVATED Inhale 1 Puff.      ARMOUR THYROID 60 MG Tab TAKE 1 TABLET BY MOUTH EVERY DAY ON 5 DAYS A WEEK 65 Tablet 3    aspirin EC (ECOTRIN) 81 MG Tablet Delayed Response Take 1 Tablet by mouth every day. 100 Tablet 0    LORazepam (ATIVAN) 0.5 MG Tab Take 0.25-0.5 mg by mouth at bedtime as needed (Sleep).      VITAMIN E PO Take 1 Tablet by mouth every day.      Glucosamine-Chondroitin (MOVE FREE PO) Take 1 Tablet by  "mouth every day.      omeprazole (PRILOSEC) 20 MG delayed-release capsule Take 20 mg by mouth 1 time a day as needed. Indications: Heartburn      Faricimab-svoa (VABYSMO) 6 MG/0.05ML Solution Administer 6 mg into the left eye see administration instructions. Every 5 to 6 weeks  Due May 26th 2023      thyroid (ARMOUR THYROID) 90 MG Tab TAKE 1 TABLET EVERY DAY 2 DAYS PER WEEK Strength: 90 mg (Patient taking differently: Take 90 mg by mouth two times a week. Takes on Monday and Thursday) 90 Tablet 0    famotidine (PEPCID) 40 MG Tab Take 20 mg by mouth 2 times a day.      Omega-3 Fatty Acids (FISH OIL) 1000 MG Cap capsule Take 1,000 mg by mouth every day.      Lutein-Zeaxanthin 25-5 MG Cap Take  by mouth every day.      acetaminophen (TYLENOL) 500 MG Tab Take 1,000 mg by mouth every morning.      Calcium Carbonate-Vit D-Min (CALCIUM 1200 PO) Take 1 tablet  by mouth 2 times a day.      Cyanocobalamin (VITAMIN B12 PO) Take 1,000 mcg by mouth every day.      Turmeric 500 MG Tab Take 500 mg by mouth 2 times a day.      vitamin D (CHOLECALCIFEROL) 1000 UNIT TABS Take 1,000 Units by mouth 2 times a day.      MAGNESIUM PO Take 1 Tablet by mouth every day.      Bioflavonoid Products (LISSY-C) TABS Take 1,000 Tablets by mouth 2 times a day.      Multiple Vitamin (MULTIVITAMIN PO) Take 1 tablet by mouth every day.       No current [facility]-ordered facility-administered medications on file.         ROS:  Gen: no fevers/chills, no changes in weight  Eyes: no changes in vision  ENT: no sore throat, no hearing loss, no bloody nose  CV: no chest pain, no palpitations  GI: no nausea/vomiting, no diarrhea  : no dysuria  Skin: no rash  Neuro: no headaches, no numbness/tingling  Heme/Lymph: no easy bruising      Objective:     Exam:  /46 (BP Location: Left arm, Patient Position: Sitting, BP Cuff Size: Adult)   Pulse 78   Temp 36.1 °C (97 °F)   Resp 12   Ht 1.676 m (5' 6\")   Wt 74.8 kg (165 lb)   SpO2 94%   BMI 26.63 kg/m²  Body " mass index is 26.63 kg/m².    Gen: Alert and oriented, No apparent distress.  Neck: Neck is supple without lymphadenopathy.  Lungs: Normal effort, CTA bilaterally, no wheezes, rhonchi, or rales.  Deep breathing does prompt a lot of coughing.  CV: Regular rate and rhythm. No murmurs, rubs, or gallops.  Ext: No clubbing, cyanosis, edema.      Assessment & Plan:     82 y.o. female with the following -     1. Subacute cough  Cough does seem to be very reactive to deep breathing.  It seems like she cannot get a good breath in.  This would be in favor of some reactivity or allergy related but she has not had any benefit with methylprednisolone.  She also has been having more low-grade fevers as well as muscle aches and pains.  We will go and do some further screening with blood work today as well as upper respiratory screening and then get a CT chest for further evaluation.  - CBC WITH DIFFERENTIAL; Future  - POCT CoV-2, Flu A/B, RSV by PCR  - CT-CHEST (THORAX) W/O; Future            No follow-ups on file.    Please note that this dictation was created using voice recognition software. I have made every reasonable attempt to correct obvious errors, but I expect that there are errors of grammar and possibly content that I did not discover before finalizing the note.

## 2023-07-11 ENCOUNTER — HOSPITAL ENCOUNTER (OUTPATIENT)
Dept: RADIOLOGY | Facility: MEDICAL CENTER | Age: 83
End: 2023-07-11
Attending: FAMILY MEDICINE
Payer: MEDICARE

## 2023-07-11 DIAGNOSIS — R05.2 SUBACUTE COUGH: ICD-10-CM

## 2023-07-11 DIAGNOSIS — J18.9 ATYPICAL PNEUMONIA: ICD-10-CM

## 2023-07-11 PROCEDURE — 71250 CT THORAX DX C-: CPT

## 2023-07-11 RX ORDER — PREDNISONE 20 MG/1
TABLET ORAL
Qty: 18 TABLET | Refills: 0 | Status: SHIPPED | OUTPATIENT
Start: 2023-07-11 | End: 2023-08-02 | Stop reason: SDUPTHER

## 2023-07-11 RX ORDER — AZITHROMYCIN 500 MG/1
500 TABLET, FILM COATED ORAL DAILY
Qty: 5 TABLET | Refills: 0 | Status: SHIPPED | OUTPATIENT
Start: 2023-07-11 | End: 2023-07-16

## 2023-07-12 ENCOUNTER — APPOINTMENT (RX ONLY)
Dept: URBAN - METROPOLITAN AREA CLINIC 36 | Facility: CLINIC | Age: 83
Setting detail: DERMATOLOGY
End: 2023-07-12

## 2023-07-12 ENCOUNTER — PATIENT MESSAGE (OUTPATIENT)
Dept: CARDIOLOGY | Facility: MEDICAL CENTER | Age: 83
End: 2023-07-12
Payer: MEDICARE

## 2023-07-12 DIAGNOSIS — E78.5 DYSLIPIDEMIA: ICD-10-CM

## 2023-07-12 DIAGNOSIS — Z48.817 ENCOUNTER FOR SURGICAL AFTERCARE FOLLOWING SURGERY ON THE SKIN AND SUBCUTANEOUS TISSUE: ICD-10-CM

## 2023-07-12 PROCEDURE — ? POST-OP WOUND CHECK

## 2023-07-12 ASSESSMENT — LOCATION DETAILED DESCRIPTION DERM: LOCATION DETAILED: LEFT POSTERIOR ANKLE

## 2023-07-12 ASSESSMENT — LOCATION SIMPLE DESCRIPTION DERM: LOCATION SIMPLE: LEFT ANKLE

## 2023-07-12 ASSESSMENT — LOCATION ZONE DERM: LOCATION ZONE: LEG

## 2023-07-12 NOTE — PROCEDURE: POST-OP WOUND CHECK
Detail Level: Detailed
Add 29976 Cpt? (Important Note: In 2017 The Use Of 16922 Is Being Tracked By Cms To Determine Future Global Period Reimbursement For Global Periods): no
Wound Evaluated By: SHEYLA

## 2023-07-14 ENCOUNTER — PATIENT MESSAGE (OUTPATIENT)
Dept: MEDICAL GROUP | Facility: LAB | Age: 83
End: 2023-07-14
Payer: MEDICARE

## 2023-07-14 ENCOUNTER — TELEPHONE (OUTPATIENT)
Dept: CARDIOLOGY | Facility: MEDICAL CENTER | Age: 83
End: 2023-07-14

## 2023-07-14 DIAGNOSIS — F41.1 GAD (GENERALIZED ANXIETY DISORDER): ICD-10-CM

## 2023-07-14 DIAGNOSIS — J45.20 MILD INTERMITTENT ASTHMA WITHOUT COMPLICATION: ICD-10-CM

## 2023-07-14 RX ORDER — ATORVASTATIN CALCIUM 40 MG/1
40 TABLET, FILM COATED ORAL NIGHTLY
Qty: 30 TABLET | Refills: 0 | Status: SHIPPED | OUTPATIENT
Start: 2023-07-14 | End: 2023-10-31 | Stop reason: SDUPTHER

## 2023-07-14 NOTE — TELEPHONE ENCOUNTER
DAFNE    Caller: Karli Berrios    Topic/issue: Calling in regards to my-chart message 07/12/23.     Callback Number: [phone]    Thank You  Caitlyn MARSH

## 2023-07-14 NOTE — PATIENT COMMUNICATION
30 day refill for atorvastatin sent to local pharmacy until patient has access to her mail order prescription.

## 2023-07-17 DIAGNOSIS — J45.20 MILD INTERMITTENT ASTHMA WITHOUT COMPLICATION: ICD-10-CM

## 2023-07-17 RX ORDER — ALBUTEROL SULFATE 90 UG/1
2 AEROSOL, METERED RESPIRATORY (INHALATION) EVERY 4 HOURS PRN
Qty: 1 EACH | Refills: 0 | Status: SHIPPED | OUTPATIENT
Start: 2023-07-17 | End: 2023-07-25 | Stop reason: SDUPTHER

## 2023-07-17 RX ORDER — LORAZEPAM 0.5 MG/1
.25-.5 TABLET ORAL NIGHTLY PRN
Qty: 30 TABLET | Refills: 0 | Status: SHIPPED | OUTPATIENT
Start: 2023-07-17 | End: 2023-09-25 | Stop reason: SDUPTHER

## 2023-07-17 RX ORDER — ALBUTEROL SULFATE 90 UG/1
2 AEROSOL, METERED RESPIRATORY (INHALATION) EVERY 4 HOURS PRN
Qty: 1 EACH | Refills: 0 | Status: SHIPPED | OUTPATIENT
Start: 2023-07-17 | End: 2023-07-17 | Stop reason: SDUPTHER

## 2023-07-18 ENCOUNTER — APPOINTMENT (RX ONLY)
Dept: URBAN - METROPOLITAN AREA CLINIC 36 | Facility: CLINIC | Age: 83
Setting detail: DERMATOLOGY
End: 2023-07-18

## 2023-07-18 DIAGNOSIS — Z48.817 ENCOUNTER FOR SURGICAL AFTERCARE FOLLOWING SURGERY ON THE SKIN AND SUBCUTANEOUS TISSUE: ICD-10-CM

## 2023-07-18 PROCEDURE — ? POST-OP WOUND CHECK

## 2023-07-18 ASSESSMENT — LOCATION SIMPLE DESCRIPTION DERM: LOCATION SIMPLE: LEFT ANKLE

## 2023-07-18 ASSESSMENT — LOCATION DETAILED DESCRIPTION DERM: LOCATION DETAILED: LEFT POSTERIOR ANKLE

## 2023-07-18 ASSESSMENT — LOCATION ZONE DERM: LOCATION ZONE: LEG

## 2023-07-18 NOTE — PROCEDURE: POST-OP WOUND CHECK
Detail Level: Detailed
Add 71236 Cpt? (Important Note: In 2017 The Use Of 61622 Is Being Tracked By Cms To Determine Future Global Period Reimbursement For Global Periods): no

## 2023-07-25 ENCOUNTER — OFFICE VISIT (OUTPATIENT)
Dept: SLEEP MEDICINE | Facility: MEDICAL CENTER | Age: 83
End: 2023-07-25
Payer: MEDICARE

## 2023-07-25 VITALS
SYSTOLIC BLOOD PRESSURE: 118 MMHG | WEIGHT: 161 LBS | HEIGHT: 66 IN | RESPIRATION RATE: 16 BRPM | DIASTOLIC BLOOD PRESSURE: 74 MMHG | HEART RATE: 59 BPM | BODY MASS INDEX: 25.88 KG/M2 | OXYGEN SATURATION: 94 %

## 2023-07-25 DIAGNOSIS — R91.8 ABNORMAL CT SCAN OF LUNG: ICD-10-CM

## 2023-07-25 DIAGNOSIS — J45.20 MILD INTERMITTENT ASTHMA WITHOUT COMPLICATION: ICD-10-CM

## 2023-07-25 DIAGNOSIS — R07.9 CHEST PAIN, UNSPECIFIED TYPE: ICD-10-CM

## 2023-07-25 PROCEDURE — 3078F DIAST BP <80 MM HG: CPT

## 2023-07-25 PROCEDURE — 99213 OFFICE O/P EST LOW 20 MIN: CPT

## 2023-07-25 PROCEDURE — 3074F SYST BP LT 130 MM HG: CPT

## 2023-07-25 PROCEDURE — 99214 OFFICE O/P EST MOD 30 MIN: CPT

## 2023-07-25 RX ORDER — FLUTICASONE PROPIONATE 110 UG/1
2 AEROSOL, METERED RESPIRATORY (INHALATION) 2 TIMES DAILY
Qty: 36 G | Refills: 3 | Status: SHIPPED | OUTPATIENT
Start: 2023-07-25 | End: 2024-03-04

## 2023-07-25 RX ORDER — ALBUTEROL SULFATE 90 UG/1
2 AEROSOL, METERED RESPIRATORY (INHALATION) EVERY 4 HOURS PRN
Qty: 1 EACH | Refills: 0 | Status: SHIPPED | OUTPATIENT
Start: 2023-07-25

## 2023-07-25 RX ORDER — FLUTICASONE PROPIONATE 110 UG/1
2 AEROSOL, METERED RESPIRATORY (INHALATION) 2 TIMES DAILY
Qty: 36 G | Refills: 3 | Status: SHIPPED | OUTPATIENT
Start: 2023-07-25 | End: 2023-07-25

## 2023-07-25 ASSESSMENT — ENCOUNTER SYMPTOMS
WEAKNESS: 0
FALLS: 0
SPUTUM PRODUCTION: 0
NAUSEA: 0
DIARRHEA: 0
SHORTNESS OF BREATH: 0
CHILLS: 0
MYALGIAS: 0
HEARTBURN: 0
VOMITING: 0
PALPITATIONS: 0
SINUS PAIN: 0
HEMOPTYSIS: 0
FEVER: 0
HEADACHES: 0
DIZZINESS: 0
DIAPHORESIS: 0
WHEEZING: 0
COUGH: 0

## 2023-07-25 ASSESSMENT — FIBROSIS 4 INDEX: FIB4 SCORE: 0.89

## 2023-07-25 NOTE — ASSESSMENT & PLAN NOTE
PFTs in 2021 showed FVC of 2.31 L or 82%, FEV1 1.80 L or 84%, FEV1/FVC 78%, %, TLC 92%, DLCO 119% predicted, without positive bronchodilator response.   Patient is currently on Flovent 110 and albuterol as needed.  Symptomatically, since recovering from pneumonia, she has stable.  -- Continue Flovent  -- Continue albuterol as needed  -- Advised patient to increase exercise as tolerated  -- Advised patient to make vaccines influenza pneumococcal

## 2023-07-25 NOTE — ASSESSMENT & PLAN NOTE
Patient started feeling sick in late June after having a stent placed in the LAD.  CXR at that time was negative.  Patient received Augmentin and Medrol packs x 2, which did not help.  She then had a CT chest which showed multifocal patchy GGO in bilateral lungs predominantly in the upper lobes consistent with multifocal pneumonitis possible atypical infection.  WBC at that time was 13.6.  She was then prescribed azithromycin and prednisone taper.  Since finishing the last antibiotics and prednisone taper, she feels much better.  She denies any significant shortness of breath, cough, sputum production, and wheezing, fevers, chills.  -- Repeat CT chest 6-8 weeks after finishing antibiotics to monitor for resolution

## 2023-07-25 NOTE — PROGRESS NOTES
Pulmonary Clinic Note    Date of Visit: 7/25/2023     Chief Complaint:  Chief Complaint   Patient presents with    Follow-Up     Last seen 7/18/22 SOFY Wolf     HPI:   Karli Berrios is a very pleasant 82 y.o. year old female former smoker (20 pack-years, quit in 1977), with a PMHx of asthma, pulmonary nodules, GERD, LUIS FELIPE, anxiety who presented to the Pulmonary Clinic for a regular follow up. Last seen in the office on 7/18/2022 with SOFY Means.     Patient is followed by the pulmonary office for asthma.  PFTs in 2021 showed FVC of 2.31 L or 82%, FEV1 1.80 L or 84%, FEV1/FVC 78%, %, TLC 92%, DLCO 119% predicted, without positive bronchodilator response.  CT chest in July 2023 shows multifocal patchy GGO in bilateral lungs predominantly in the upper lobes consistent with multifocal pneumonitis possible atypical infection, stable 4.1 mm RLL nodule and other small nodules unchanged, with tree-in-bud opacities in the ALEXIS which are unchanged.  She has a history of bilateral pulmonary nodules that have been stable since 2011.     Patient started feeling sick in late June after having a stent placed in the LAD.  CXR at that time was negative.  Patient received Augmentin and Medrol packs x 2, which did not help.  She then had a CT chest which showed multifocal patchy GGO in bilateral lungs predominantly in the upper lobes consistent with multifocal pneumonitis possible atypical infection.  WBC at that time was 13.6.  She was then prescribed azithromycin and prednisone taper.  Since finishing the last antibiotics and prednisone taper, she feels much better.  She denies any significant shortness of breath, cough, sputum production, and wheezing.  She does state that she has right-sided pleuritic chest pain that gets worse when she takes a deep breath in or moves a certain way and has been occurring for the last 5 days.  She describes the pain as being dull.  This pain does not radiate.  She has not  had any recent travel or swelling in her lower extremities.  She does not have an increase in her shortness of breath or cough or hemoptysis.  No diaphoresis, nausea, vomiting.  The pain is not reproducible upon palpation.  Patient states that her reflux has remained stable on Pepcid.  She denies any fevers or chills.    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), wet (HCC) 12/09/2022    OS    Anxiety     Arthritis     Asthma     Bronchitis     H/O Multiple Times    Cataract     IOL OU    Chronic kidney disease     Detached retina 12/09/2022    OS    Dry age-related macular degeneration of right eye 12/09/2022    GERD (gastroesophageal reflux disease)     High cholesterol     HTN (hypertension)     Hyperlipidemia     Hypertensive urgency, 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    Hypothyroidism     Lung nodules 07/2009    stable 2010    Macular pucker of both eyes     Osteoporosis     Pain in the chest 5/9/2023    Strain of left trapezius muscle     Urinary tract infection     H/O     Past Surgical History:   Procedure Laterality Date    CATARACT EXTRACTION WITH IOL Bilateral 2007    CARPAL TUNNEL RELEASE  2006    ABDOMINAL EXPLORATION      peritonitis    ABDOMINAL HYSTERECTOMY TOTAL      APPENDECTOMY      EYE SURGERY Bilateral     Vitrectomy-OD 2008 & OS 2011    HEMORRHOIDECTOMY      OTHER      D & C    OTHER      FISTULECTOMY    OTHER      HYSTERECTOMY    OTHER      LEFTY BREAST LIPOMA RESECTION    OTHER      H/O Oral Surgery-Dental Extractions & Implants.     Social History     Socioeconomic History    Marital status:      Spouse name: Not on file    Number of children: Not on file    Years of education: Not on file    Highest education level: 12th grade   Occupational History    Not on file   Tobacco Use    Smoking  status: Former     Packs/day: 1.00     Years: 20.00     Pack years: 20.00     Types: Cigarettes     Start date: 1957     Quit date: 1977     Years since quittin.1     Passive exposure: Past    Smokeless tobacco: Never   Vaping Use    Vaping Use: Never used   Substance and Sexual Activity    Alcohol use: Not Currently    Drug use: No    Sexual activity: Not Currently     Partners: Male   Other Topics Concern    Not on file   Social History Narrative    Not on file     Social Determinants of Health     Financial Resource Strain: Low Risk  (3/6/2022)    Overall Financial Resource Strain (CARDIA)     Difficulty of Paying Living Expenses: Not hard at all   Food Insecurity: No Food Insecurity (3/6/2022)    Hunger Vital Sign     Worried About Running Out of Food in the Last Year: Never true     Ran Out of Food in the Last Year: Never true   Transportation Needs: No Transportation Needs (3/6/2022)    PRAPARE - Transportation     Lack of Transportation (Medical): No     Lack of Transportation (Non-Medical): No   Physical Activity: Insufficiently Active (3/6/2022)    Exercise Vital Sign     Days of Exercise per Week: 3 days     Minutes of Exercise per Session: 20 min   Stress: Stress Concern Present (3/6/2022)    [employer] - Occupational Stress Questionnaire     Feeling of Stress : To some extent   Social Connections: Moderately Isolated (3/6/2022)    Social Connection and Isolation Panel [NHANES]     Frequency of Communication with Friends and Family: More than three times a week     Frequency of Social Gatherings with Friends and Family: Once a week     Attends Yazidi Services: Never     Active Member of Clubs or Organizations: Yes     Attends Club or Organization Meetings: 1 to 4 times per year     Marital Status:    Intimate Partner Violence: Not on file   Housing Stability: Unknown (3/6/2022)    Housing Stability Vital Sign     Unable to Pay for Housing in the Last  Year: No     Number of Places Lived in the Last Year: Not on file     Unstable Housing in the Last Year: No        Family History   Problem Relation Age of Onset    Arthritis Mother     Hyperlipidemia Mother     Glaucoma Maternal Grandfather      Current Outpatient Medications on File Prior to Visit   Medication Sig Dispense Refill    LORazepam (ATIVAN) 0.5 MG Tab Take 0.5-1 Tablets by mouth at bedtime as needed (Sleep) for up to 30 days. 30 Tablet 0    atorvastatin (LIPITOR) 40 MG Tab Take 1 Tablet by mouth every evening. 30 Tablet 0    predniSONE (DELTASONE) 20 MG Tab 3 tabs daily x3 days, 2 tabs daily x3 days, 1 tab daily x3 days 18 Tablet 0    amLODIPine (NORVASC) 10 MG Tab Take 1 Tablet by mouth every day. 90 Tablet 0    clopidogrel (PLAVIX) 75 MG Tab Take 1 Tablet by mouth every day. 90 Tablet 0    losartan (COZAAR) 100 MG Tab Take 1 Tablet by mouth every day. 90 Tablet 0    metoprolol tartrate (LOPRESSOR) 50 MG Tab Take 1 Tablet by mouth 2 times a day. 180 Tablet 0    benzonatate (TESSALON) 100 MG Cap Take 1 Capsule by mouth 3 times a day as needed for Cough. 60 Capsule 0    ARMOUR THYROID 60 MG Tab TAKE 1 TABLET BY MOUTH EVERY DAY ON 5 DAYS A WEEK 65 Tablet 3    aspirin EC (ECOTRIN) 81 MG Tablet Delayed Response Take 1 Tablet by mouth every day. 100 Tablet 0    Glucosamine-Chondroitin (MOVE FREE PO) Take 1 Tablet by mouth every day.      omeprazole (PRILOSEC) 20 MG delayed-release capsule Take 20 mg by mouth 1 time a day as needed. Indications: Heartburn      Faricimab-svoa (VABYSMO) 6 MG/0.05ML Solution Administer 6 mg into the left eye see administration instructions. Every 5 to 6 weeks  Due May 26th 2023      thyroid (ARMOUR THYROID) 90 MG Tab TAKE 1 TABLET EVERY DAY 2 DAYS PER WEEK Strength: 90 mg (Patient taking differently: Take 90 mg by mouth two times a week. Takes on Monday and Thursday) 90 Tablet 0    famotidine (PEPCID) 40 MG Tab Take 20 mg by mouth 2 times a day.      Lutein-Zeaxanthin 25-5 MG  "Cap Take  by mouth every day.      acetaminophen (TYLENOL) 500 MG Tab Take 1,000 mg by mouth every morning.      Calcium Carbonate-Vit D-Min (CALCIUM 1200 PO) Take 1 tablet  by mouth 2 times a day.      Cyanocobalamin (VITAMIN B12 PO) Take 1,000 mcg by mouth every day.      Turmeric 500 MG Tab Take 500 mg by mouth 2 times a day.      vitamin D (CHOLECALCIFEROL) 1000 UNIT TABS Take 1,000 Units by mouth 2 times a day.      MAGNESIUM PO Take 1 Tablet by mouth every day.      Bioflavonoid Products (LISSY-C) TABS Take 1,000 Tablets by mouth 2 times a day.      Multiple Vitamin (MULTIVITAMIN PO) Take 1 tablet by mouth every day.      atorvastatin (LIPITOR) 40 MG Tab Take 2 Tablets by mouth at bedtime. 90 Tablet 0     No current facility-administered medications on file prior to visit.     Allergies: Bactrim ds, Bactrim [sulfamethoxazole w-trimethoprim], Codeine, Denosumab, and Atenolol    ROS:   Review of Systems   Constitutional:  Negative for chills, diaphoresis, fever and malaise/fatigue.   HENT:  Negative for congestion and sinus pain.    Respiratory:  Negative for cough, hemoptysis, sputum production, shortness of breath and wheezing.    Cardiovascular:  Positive for chest pain. Negative for palpitations and leg swelling.   Gastrointestinal:  Negative for diarrhea, heartburn, nausea and vomiting.   Musculoskeletal:  Negative for falls and myalgias.   Neurological:  Negative for dizziness, weakness and headaches.     Vitals:  /74 (BP Location: Right arm, Patient Position: Sitting, BP Cuff Size: Adult)   Pulse (!) 59   Resp 16   Ht 1.676 m (5' 6\")   Wt 73 kg (161 lb)   SpO2 94%     Physical Exam  Constitutional:       General: She is not in acute distress.     Appearance: Normal appearance. She is not ill-appearing, toxic-appearing or diaphoretic.   Cardiovascular:      Rate and Rhythm: Normal rate and regular rhythm.      Heart sounds: No murmur heard.     No friction rub. No gallop.   Pulmonary:      " Effort: No respiratory distress.      Breath sounds: Normal breath sounds. No stridor. No wheezing, rhonchi or rales.   Musculoskeletal:         General: No swelling.      Right lower leg: No edema.      Left lower leg: No edema.   Skin:     General: Skin is warm.   Neurological:      General: No focal deficit present.      Mental Status: She is alert and oriented to person, place, and time.   Psychiatric:         Mood and Affect: Mood normal.         Behavior: Behavior normal.         Thought Content: Thought content normal.         Judgment: Judgment normal.         Laboratory Data:  PFTs: (Date: 9/7/2021)-      Impression:  No definite evidence of significant obstructive or restrictive pulmonary disease is noted on the study.    CT Chest: (Date: 7/11/2023)-  Impression:  1.  Multifocal patchy groundglass opacities within both lungs predominantly in the upper lobes consistent with multifocal pneumonitis possible atypical infection.  2.  Trace left pleural effusion.  3.  4.1 mm nodule right lower lobe and smaller nodules within the lungs unchanged.  4.  Tree-in-bud opacities anterior inferior right upper lobe unchanged.  5.  Coronary artery calcifications.    ECHO: (Date: 5/10/2023)-  Impression:  No prior study is available for comparison.   Normal left ventricular size and systolic function.  The left ventricular ejection fraction is visually estimated to be 70%.  No significant valvular disease.       Assessment and Plan:    Problem List Items Addressed This Visit       Abnormal CT scan of lung     Patient started feeling sick in late June after having a stent placed in the LAD.  CXR at that time was negative.  Patient received Augmentin and Medrol packs x 2, which did not help.  She then had a CT chest which showed multifocal patchy GGO in bilateral lungs predominantly in the upper lobes consistent with multifocal pneumonitis possible atypical infection.  WBC at that time was 13.6.  She was then prescribed  azithromycin and prednisone taper.  Since finishing the last antibiotics and prednisone taper, she feels much better.  She denies any significant shortness of breath, cough, sputum production, and wheezing, fevers, chills.  -- Repeat CT chest 6-8 weeks after finishing antibiotics to monitor for resolution         Relevant Orders    CT-CHEST (THORAX) W/O    Asthma, mild intermittent     PFTs in 2021 showed FVC of 2.31 L or 82%, FEV1 1.80 L or 84%, FEV1/FVC 78%, %, TLC 92%, DLCO 119% predicted, without positive bronchodilator response.   Patient is currently on Flovent 110 and albuterol as needed.  Symptomatically, since recovering from pneumonia, she has stable.  -- Continue Flovent  -- Continue albuterol as needed  -- Advised patient to increase exercise as tolerated  -- Advised patient to make vaccines influenza pneumococcal         Relevant Medications    albuterol (VENTOLIN HFA) 108 (90 Base) MCG/ACT Aero Soln inhalation aerosol    fluticasone (FLOVENT HFA) 110 MCG/ACT Aerosol    Chest pain     I do think her chest pain is musculoskeletal related.  She does not have any risk factors or symptoms of a PE, her GERD is well controlled, she does not have any symptoms of MI or unstable angina, the pain was not reproducible upon palpation ruling out costochondritis.  Patient does have back pain and states that she has been sleeping in different positions lately.  I did advise patient to increase her Tylenol intake to 1000 mg twice a day but not to exceed 1000 mg.  If patient continues to have this chest pain, I did advise patient to proceed to the ER for further evaluation.            Diagnostic studies have been reviewed with the patient.    Return in about 2 months (around 9/25/2023), or if symptoms worsen or fail to improve, for Abnormal CT w/ CT Chest results, with Christian.     This note was generated using voice recognition software which has a chance of producing errors of grammar and possibly content.  I  have made every reasonable attempt to find and correct any obvious errors, but it should be expected that some may not be found prior to finalization of this note.    Time spent in record review prior to patient arrival, reviewing results, and in face-to-face encounter totaled 38 min.  __________  SOFY Bermeo  Pulmonary Medicine  [facility]

## 2023-07-25 NOTE — ASSESSMENT & PLAN NOTE
I do think her chest pain is musculoskeletal related.  She does not have any risk factors or symptoms of a PE, her GERD is well controlled, she does not have any symptoms of MI or unstable angina, the pain was not reproducible upon palpation ruling out costochondritis.  Patient does have back pain and states that she has been sleeping in different positions lately.  I did advise patient to increase her Tylenol intake to 1000 mg twice a day but not to exceed 1000 mg.  If patient continues to have this chest pain, I did advise patient to proceed to the ER for further evaluation.

## 2023-07-26 ENCOUNTER — APPOINTMENT (RX ONLY)
Dept: URBAN - METROPOLITAN AREA CLINIC 36 | Facility: CLINIC | Age: 83
Setting detail: DERMATOLOGY
End: 2023-07-26

## 2023-07-26 DIAGNOSIS — Z41.9 ENCOUNTER FOR PROCEDURE FOR PURPOSES OTHER THAN REMEDYING HEALTH STATE, UNSPECIFIED: ICD-10-CM

## 2023-07-26 DIAGNOSIS — Z48.817 ENCOUNTER FOR SURGICAL AFTERCARE FOLLOWING SURGERY ON THE SKIN AND SUBCUTANEOUS TISSUE: ICD-10-CM

## 2023-07-26 PROCEDURE — ? CO2RE

## 2023-07-26 PROCEDURE — ? POST-OP WOUND CHECK

## 2023-07-26 ASSESSMENT — LOCATION SIMPLE DESCRIPTION DERM: LOCATION SIMPLE: LEFT ANKLE

## 2023-07-26 ASSESSMENT — LOCATION ZONE DERM: LOCATION ZONE: LEG

## 2023-07-26 ASSESSMENT — LOCATION DETAILED DESCRIPTION DERM: LOCATION DETAILED: LEFT POSTERIOR ANKLE

## 2023-07-26 NOTE — PROCEDURE: CO2RE
Add Another Laser Setting?: no
External Cooling Fan Speed: 5
Treatment Number: 1
Energy (Mj): 80
Eye Protection Text: A corneal shield was inserted after appropriate application of topical anesthesia.
Price (Use Numbers Only, No Special Characters Or $): 0
Other Location: left posterior ankle
Detail Level: Simple
Consent: Written consent obtained, risks reviewed including but not limited to crusting, scabbing, blistering, scarring, darker or lighter pigmentary change, incomplete improvement of dyschromia, wrinkles, prolonged erythema and facial swelling, infection and bleeding.
Other Location: left distal pretibial region
Post-Care Instructions: I reviewed with the patient in detail post-care instructions. Patient should avoid sun until area fully healed. Pt should apply vaseline to treated areas, and remove crusts gently with water-vinegar soaks.
Anesthesia Type: 1% lidocaine with epinephrine

## 2023-07-26 NOTE — PROCEDURE: POST-OP WOUND CHECK
Detail Level: Detailed
Add 43220 Cpt? (Important Note: In 2017 The Use Of 50443 Is Being Tracked By Cms To Determine Future Global Period Reimbursement For Global Periods): no

## 2023-07-31 ENCOUNTER — OFFICE VISIT (OUTPATIENT)
Dept: MEDICAL GROUP | Facility: LAB | Age: 83
End: 2023-07-31
Payer: MEDICARE

## 2023-07-31 VITALS
BODY MASS INDEX: 26.36 KG/M2 | WEIGHT: 164 LBS | HEART RATE: 97 BPM | DIASTOLIC BLOOD PRESSURE: 50 MMHG | TEMPERATURE: 97.4 F | OXYGEN SATURATION: 99 % | HEIGHT: 66 IN | SYSTOLIC BLOOD PRESSURE: 110 MMHG | RESPIRATION RATE: 12 BRPM

## 2023-07-31 DIAGNOSIS — I95.9 HYPOTENSION, UNSPECIFIED HYPOTENSION TYPE: ICD-10-CM

## 2023-07-31 DIAGNOSIS — R09.1 PLEURISY, RESIDUAL: ICD-10-CM

## 2023-07-31 DIAGNOSIS — J18.9 ATYPICAL PNEUMONIA: ICD-10-CM

## 2023-07-31 PROCEDURE — 99214 OFFICE O/P EST MOD 30 MIN: CPT | Performed by: FAMILY MEDICINE

## 2023-07-31 PROCEDURE — 3074F SYST BP LT 130 MM HG: CPT | Performed by: FAMILY MEDICINE

## 2023-07-31 PROCEDURE — 3078F DIAST BP <80 MM HG: CPT | Performed by: FAMILY MEDICINE

## 2023-07-31 RX ORDER — AMLODIPINE BESYLATE 5 MG/1
5 TABLET ORAL DAILY
Qty: 90 TABLET | Refills: 1 | Status: SHIPPED | OUTPATIENT
Start: 2023-07-31 | End: 2023-08-04

## 2023-07-31 ASSESSMENT — FIBROSIS 4 INDEX: FIB4 SCORE: 0.89

## 2023-07-31 NOTE — PROGRESS NOTES
Subjective:     Chief Complaint   Patient presents with    Chest Pain     Right cardio said its a muscle issues  back pain med issues side effects          HPI:   Karli presents today with right sided chest pain.   Has seen pulmonary recently for abnormal CT scan and persistent asthma. Recently treated for possible atypical pneumonitis with ground glass opacities. Feeling better finally from this.   Fairly recent coronary artery stenting.   Pulm thought chest pain was likely MSK in nature.   No other symptoms of MI or CAD, SOB, INGRAM, etc.   Very localized. Hard to lie down at night time due to pain. Worsened pain with coughing, sharp, stabbing. Deep breathing causes more pain, lying on the right side causes more pain.   Laughing causes more pain.   This all started 2 weeks ago.   Feels like her head is a bit swimmy as well. At times very fatigued. Very overwhelmed in general with health concerns.   Complains also of low back pain persistently. Doesnt feel like she could do PT right now with all of her other things going on.     Recently saw derm for melanoma as well. Wide excision left lower leg. Following them weekly for progress.       Current Outpatient Medications Ordered in Epic   Medication Sig Dispense Refill    albuterol (VENTOLIN HFA) 108 (90 Base) MCG/ACT Aero Soln inhalation aerosol Inhale 2 Puffs every four hours as needed for Shortness of Breath (Wheezing). 1 Each 0    fluticasone (FLOVENT HFA) 110 MCG/ACT Aerosol Inhale 2 Puffs 2 times a day. Use spacer. Rinse mouth after each use. 36 g 3    LORazepam (ATIVAN) 0.5 MG Tab Take 0.5-1 Tablets by mouth at bedtime as needed (Sleep) for up to 30 days. 30 Tablet 0    atorvastatin (LIPITOR) 40 MG Tab Take 1 Tablet by mouth every evening. 30 Tablet 0    predniSONE (DELTASONE) 20 MG Tab 3 tabs daily x3 days, 2 tabs daily x3 days, 1 tab daily x3 days 18 Tablet 0    amLODIPine (NORVASC) 10 MG Tab Take 1 Tablet by mouth every day. 90 Tablet 0    clopidogrel (PLAVIX)  75 MG Tab Take 1 Tablet by mouth every day. 90 Tablet 0    losartan (COZAAR) 100 MG Tab Take 1 Tablet by mouth every day. 90 Tablet 0    metoprolol tartrate (LOPRESSOR) 50 MG Tab Take 1 Tablet by mouth 2 times a day. 180 Tablet 0    benzonatate (TESSALON) 100 MG Cap Take 1 Capsule by mouth 3 times a day as needed for Cough. 60 Capsule 0    ARMOUR THYROID 60 MG Tab TAKE 1 TABLET BY MOUTH EVERY DAY ON 5 DAYS A WEEK 65 Tablet 3    aspirin EC (ECOTRIN) 81 MG Tablet Delayed Response Take 1 Tablet by mouth every day. 100 Tablet 0    Glucosamine-Chondroitin (MOVE FREE PO) Take 1 Tablet by mouth every day.      omeprazole (PRILOSEC) 20 MG delayed-release capsule Take 20 mg by mouth 1 time a day as needed. Indications: Heartburn      Faricimab-svoa (VABYSMO) 6 MG/0.05ML Solution Administer 6 mg into the left eye see administration instructions. Every 5 to 6 weeks  Due May 26th 2023      thyroid (ARMOUR THYROID) 90 MG Tab TAKE 1 TABLET EVERY DAY 2 DAYS PER WEEK Strength: 90 mg (Patient taking differently: Take 90 mg by mouth two times a week. Takes on Monday and Thursday) 90 Tablet 0    famotidine (PEPCID) 40 MG Tab Take 20 mg by mouth 2 times a day.      Lutein-Zeaxanthin 25-5 MG Cap Take  by mouth every day.      acetaminophen (TYLENOL) 500 MG Tab Take 1,000 mg by mouth every morning.      Calcium Carbonate-Vit D-Min (CALCIUM 1200 PO) Take 1 tablet  by mouth 2 times a day.      Cyanocobalamin (VITAMIN B12 PO) Take 1,000 mcg by mouth every day.      Turmeric 500 MG Tab Take 500 mg by mouth 2 times a day.      vitamin D (CHOLECALCIFEROL) 1000 UNIT TABS Take 1,000 Units by mouth 2 times a day.      MAGNESIUM PO Take 1 Tablet by mouth every day.      Bioflavonoid Products (LISSY-C) TABS Take 1,000 Tablets by mouth 2 times a day.      Multiple Vitamin (MULTIVITAMIN PO) Take 1 tablet by mouth every day.       No current [facility]-ordered facility-administered medications on file.         ROS:  Gen: no fevers/chills, no changes in  "weight  Eyes: no changes in vision  ENT: no sore throat, no hearing loss, no bloody nose  Pulm: no sob, no cough  CV: no chest pain, no palpitations  GI: no nausea/vomiting, no diarrhea  : no dysuria  MSk: no myalgias  Skin: no rash  Neuro: no headaches, no numbness/tingling  Heme/Lymph: no easy bruising      Objective:     Exam:  /50 (BP Location: Right arm, Patient Position: Sitting, BP Cuff Size: Adult)   Pulse 97   Temp 36.3 °C (97.4 °F)   Resp 12   Ht 1.67 m (5' 5.75\")   Wt 74.4 kg (164 lb)   SpO2 99%   BMI 26.67 kg/m²  Body mass index is 26.67 kg/m².    Gen: Alert and oriented, No apparent distress.  Neck: Neck is supple without lymphadenopathy.  Lungs: Normal effort, CTA bilaterally, no wheezes, rhonchi, or rales.  She does have some pain with raising her right arm overhead.  She also has more pleuritic type pain when she coughs or sneezes or laughs.  CV: Regular rate and rhythm. No murmurs, rubs, or gallops.  Ext: No clubbing, cyanosis, edema.      Assessment & Plan:     82 y.o. female with the following -     1. Atypical pneumonia  Discussed her recent history with atypical pneumonia.  She does have a CT scheduled for the next 2 months.  Like to go ahead and get a repeat of her CBC as well as a CRP to see if we are missing anything.  She may have an altered CBC due to recurrent steroid use in the past.  - CBC WITH DIFFERENTIAL; Future  - CRP QUANTITIVE (NON-CARDIAC); Future    2. Pleurisy, residual  We did discuss typically using pain management and anti-inflammatories for these kinds of issues.  May consider up your pain medication if we can get a hold of this.  She does have increased risk of bleeding with the Plavix and aspirin on board due to her recent coronary artery stent.  She will go ahead and do an experiment with 800 mg of ibuprofen at home.  If she does get a lot of relief out of this we may consider getting her back on a steroid for the time being.  May also consider repeating CT " imaging sooner rather than later if this continues.  - CBC WITH DIFFERENTIAL; Future  - CRP QUANTITIVE (NON-CARDIAC); Future    3. Hypotension, unspecified hypotension type  She may be feeling so good because of the low blood pressure.  Would like to go on half her amlodipine.  We discussed that this can cause the swimmy feeling that she feels.  At this point she does not feel well enough to participate in physical therapy so her back pain will continue to be a bother at this time.  May need to evaluate more thoracic spine issues at this chest pain continues as well.  - amLODIPine (NORVASC) 5 MG Tab; Take 1 Tablet by mouth every day.  Dispense: 90 Tablet; Refill: 1              No follow-ups on file.    Please note that this dictation was created using voice recognition software. I have made every reasonable attempt to correct obvious errors, but I expect that there are errors of grammar and possibly content that I did not discover before finalizing the note.

## 2023-08-01 ENCOUNTER — PATIENT MESSAGE (OUTPATIENT)
Dept: MEDICAL GROUP | Facility: LAB | Age: 83
End: 2023-08-01
Payer: MEDICARE

## 2023-08-01 DIAGNOSIS — J18.9 ATYPICAL PNEUMONIA: ICD-10-CM

## 2023-08-02 ENCOUNTER — HOSPITAL ENCOUNTER (OUTPATIENT)
Dept: LAB | Facility: MEDICAL CENTER | Age: 83
End: 2023-08-02
Attending: FAMILY MEDICINE
Payer: MEDICARE

## 2023-08-02 ENCOUNTER — HOSPITAL ENCOUNTER (OUTPATIENT)
Dept: LAB | Facility: MEDICAL CENTER | Age: 83
End: 2023-08-02
Attending: INTERNAL MEDICINE
Payer: MEDICARE

## 2023-08-02 DIAGNOSIS — E78.5 DYSLIPIDEMIA: ICD-10-CM

## 2023-08-02 DIAGNOSIS — J18.9 ATYPICAL PNEUMONIA: ICD-10-CM

## 2023-08-02 DIAGNOSIS — R09.1 PLEURISY, RESIDUAL: ICD-10-CM

## 2023-08-02 DIAGNOSIS — I25.10 CORONARY ARTERY DISEASE INVOLVING NATIVE CORONARY ARTERY OF NATIVE HEART WITHOUT ANGINA PECTORIS: ICD-10-CM

## 2023-08-02 LAB
BASOPHILS # BLD AUTO: 0.8 % (ref 0–1.8)
BASOPHILS # BLD: 0.07 K/UL (ref 0–0.12)
CHOLEST SERPL-MCNC: 158 MG/DL (ref 100–199)
CRP SERPL HS-MCNC: 0.48 MG/DL (ref 0–0.75)
EOSINOPHIL # BLD AUTO: 0.39 K/UL (ref 0–0.51)
EOSINOPHIL NFR BLD: 4.6 % (ref 0–6.9)
ERYTHROCYTE [DISTWIDTH] IN BLOOD BY AUTOMATED COUNT: 48.5 FL (ref 35.9–50)
FASTING STATUS PATIENT QL REPORTED: NORMAL
HCT VFR BLD AUTO: 40 % (ref 37–47)
HDLC SERPL-MCNC: 61 MG/DL
HGB BLD-MCNC: 12.9 G/DL (ref 12–16)
IMM GRANULOCYTES # BLD AUTO: 0.02 K/UL (ref 0–0.11)
IMM GRANULOCYTES NFR BLD AUTO: 0.2 % (ref 0–0.9)
LDLC SERPL CALC-MCNC: 80 MG/DL
LYMPHOCYTES # BLD AUTO: 1.27 K/UL (ref 1–4.8)
LYMPHOCYTES NFR BLD: 15 % (ref 22–41)
MCH RBC QN AUTO: 30.8 PG (ref 27–33)
MCHC RBC AUTO-ENTMCNC: 32.3 G/DL (ref 32.2–35.5)
MCV RBC AUTO: 95.5 FL (ref 81.4–97.8)
MONOCYTES # BLD AUTO: 0.97 K/UL (ref 0–0.85)
MONOCYTES NFR BLD AUTO: 11.4 % (ref 0–13.4)
NEUTROPHILS # BLD AUTO: 5.76 K/UL (ref 1.82–7.42)
NEUTROPHILS NFR BLD: 68 % (ref 44–72)
NRBC # BLD AUTO: 0 K/UL
NRBC BLD-RTO: 0 /100 WBC (ref 0–0.2)
PLATELET # BLD AUTO: 319 K/UL (ref 164–446)
PMV BLD AUTO: 9.6 FL (ref 9–12.9)
RBC # BLD AUTO: 4.19 M/UL (ref 4.2–5.4)
TRIGL SERPL-MCNC: 83 MG/DL (ref 0–149)
WBC # BLD AUTO: 8.5 K/UL (ref 4.8–10.8)

## 2023-08-02 PROCEDURE — 85025 COMPLETE CBC W/AUTO DIFF WBC: CPT

## 2023-08-02 PROCEDURE — 36415 COLL VENOUS BLD VENIPUNCTURE: CPT

## 2023-08-02 PROCEDURE — 80061 LIPID PANEL: CPT

## 2023-08-02 PROCEDURE — 86140 C-REACTIVE PROTEIN: CPT

## 2023-08-02 RX ORDER — PREDNISONE 10 MG/1
10 TABLET ORAL DAILY
Qty: 7 TABLET | Refills: 0 | Status: SHIPPED | OUTPATIENT
Start: 2023-08-02 | End: 2023-08-09

## 2023-08-03 ENCOUNTER — TELEPHONE (OUTPATIENT)
Dept: CARDIOLOGY | Facility: MEDICAL CENTER | Age: 83
End: 2023-08-03
Payer: MEDICARE

## 2023-08-03 ENCOUNTER — APPOINTMENT (RX ONLY)
Dept: URBAN - METROPOLITAN AREA CLINIC 36 | Facility: CLINIC | Age: 83
Setting detail: DERMATOLOGY
End: 2023-08-03

## 2023-08-03 ENCOUNTER — APPOINTMENT (OUTPATIENT)
Dept: SLEEP MEDICINE | Facility: MEDICAL CENTER | Age: 83
End: 2023-08-03
Attending: NURSE PRACTITIONER
Payer: MEDICARE

## 2023-08-03 DIAGNOSIS — Z41.9 ENCOUNTER FOR PROCEDURE FOR PURPOSES OTHER THAN REMEDYING HEALTH STATE, UNSPECIFIED: ICD-10-CM

## 2023-08-03 DIAGNOSIS — Z48.817 ENCOUNTER FOR SURGICAL AFTERCARE FOLLOWING SURGERY ON THE SKIN AND SUBCUTANEOUS TISSUE: ICD-10-CM

## 2023-08-03 PROCEDURE — ? CO2RE

## 2023-08-03 PROCEDURE — ? POST-OP WOUND CHECK

## 2023-08-03 ASSESSMENT — LOCATION SIMPLE DESCRIPTION DERM: LOCATION SIMPLE: LEFT ANKLE

## 2023-08-03 ASSESSMENT — LOCATION DETAILED DESCRIPTION DERM: LOCATION DETAILED: LEFT POSTERIOR ANKLE

## 2023-08-03 ASSESSMENT — LOCATION ZONE DERM: LOCATION ZONE: LEG

## 2023-08-03 NOTE — PROCEDURE: POST-OP WOUND CHECK
Detail Level: Detailed
Add 62982 Cpt? (Important Note: In 2017 The Use Of 99893 Is Being Tracked By Cms To Determine Future Global Period Reimbursement For Global Periods): no

## 2023-08-03 NOTE — TELEPHONE ENCOUNTER
DAFNE    Caller: Karli Berrios     Topic/issue: Pt called in regards to wanting to go over her lab test results pt stated in the notes of her results there was a medication change she would like a call back to discuss further please advise.     Callback Number: [phone] (home)  [phone] (Mobile)     Thank you,     Smith WORKMAN

## 2023-08-04 ENCOUNTER — HOSPITAL ENCOUNTER (OUTPATIENT)
Dept: LAB | Facility: MEDICAL CENTER | Age: 83
End: 2023-08-04
Attending: FAMILY MEDICINE
Payer: MEDICARE

## 2023-08-04 ENCOUNTER — OFFICE VISIT (OUTPATIENT)
Dept: MEDICAL GROUP | Facility: LAB | Age: 83
End: 2023-08-04
Payer: MEDICARE

## 2023-08-04 VITALS
RESPIRATION RATE: 12 BRPM | SYSTOLIC BLOOD PRESSURE: 118 MMHG | WEIGHT: 165 LBS | DIASTOLIC BLOOD PRESSURE: 50 MMHG | BODY MASS INDEX: 26.52 KG/M2 | HEIGHT: 66 IN | HEART RATE: 81 BPM | OXYGEN SATURATION: 94 % | TEMPERATURE: 97 F

## 2023-08-04 DIAGNOSIS — R09.1 PLEURISY, RESIDUAL: ICD-10-CM

## 2023-08-04 DIAGNOSIS — R60.9 PERIPHERAL EDEMA: ICD-10-CM

## 2023-08-04 LAB
ANION GAP SERPL CALC-SCNC: 12 MMOL/L (ref 7–16)
APPEARANCE UR: CLEAR
BILIRUB UR STRIP-MCNC: NORMAL MG/DL
BUN SERPL-MCNC: 19 MG/DL (ref 8–22)
CALCIUM SERPL-MCNC: 9.6 MG/DL (ref 8.5–10.5)
CHLORIDE SERPL-SCNC: 96 MMOL/L (ref 96–112)
CO2 SERPL-SCNC: 26 MMOL/L (ref 20–33)
COLOR UR AUTO: YELLOW
CREAT SERPL-MCNC: 0.83 MG/DL (ref 0.5–1.4)
D DIMER PPP IA.FEU-MCNC: 1.93 UG/ML (FEU) (ref 0–0.5)
GFR SERPLBLD CREATININE-BSD FMLA CKD-EPI: 71 ML/MIN/1.73 M 2
GLUCOSE SERPL-MCNC: 116 MG/DL (ref 65–99)
GLUCOSE UR STRIP.AUTO-MCNC: NORMAL MG/DL
KETONES UR STRIP.AUTO-MCNC: NORMAL MG/DL
LEUKOCYTE ESTERASE UR QL STRIP.AUTO: NORMAL
NITRITE UR QL STRIP.AUTO: NORMAL
PH UR STRIP.AUTO: 7.5 [PH] (ref 5–8)
POTASSIUM SERPL-SCNC: 4.6 MMOL/L (ref 3.6–5.5)
PROT UR QL STRIP: NORMAL MG/DL
RBC UR QL AUTO: NORMAL
SODIUM SERPL-SCNC: 134 MMOL/L (ref 135–145)
SP GR UR STRIP.AUTO: 1.01
UROBILINOGEN UR STRIP-MCNC: 0.2 MG/DL

## 2023-08-04 PROCEDURE — 81002 URINALYSIS NONAUTO W/O SCOPE: CPT | Performed by: FAMILY MEDICINE

## 2023-08-04 PROCEDURE — 36415 COLL VENOUS BLD VENIPUNCTURE: CPT

## 2023-08-04 PROCEDURE — 3078F DIAST BP <80 MM HG: CPT | Performed by: FAMILY MEDICINE

## 2023-08-04 PROCEDURE — 99214 OFFICE O/P EST MOD 30 MIN: CPT | Performed by: FAMILY MEDICINE

## 2023-08-04 PROCEDURE — 80048 BASIC METABOLIC PNL TOTAL CA: CPT

## 2023-08-04 PROCEDURE — 85379 FIBRIN DEGRADATION QUANT: CPT

## 2023-08-04 PROCEDURE — 3074F SYST BP LT 130 MM HG: CPT | Performed by: FAMILY MEDICINE

## 2023-08-04 ASSESSMENT — FIBROSIS 4 INDEX: FIB4 SCORE: 1.2

## 2023-08-04 NOTE — PROGRESS NOTES
Subjective:     Chief Complaint   Patient presents with    Edema     Both legs         HPI:   Karli presents today with swelling in lower legs.   This been going on for the last quite a few weeks.  She does report this started before her most recent dosing of prednisone but she had been on prednisone previous to that as well.  She seems to be having multiple issues, up more often since her stent in May.  Since May she was started on multiple blood pressure medications as well as Plavix after her stenting.  1 of these medications was amlodipine.  She has been having some procedures with dermatology with skin cancer removal on the left lower leg posteriorly but this is healing.  The swelling started after that initial surgery and is continued.  It is also encompassing the right lower leg.    Pleuritic chest pain does continue.  She does feel little bit better with the prednisone that is now on board but it is still present.  Present when she coughs, lays on that side, takes deep breaths.          Current Outpatient Medications Ordered in Epic   Medication Sig Dispense Refill    predniSONE (DELTASONE) 10 MG Tab Take 1 Tablet by mouth every day for 7 days. 7 Tablet 0    amLODIPine (NORVASC) 5 MG Tab Take 1 Tablet by mouth every day. 90 Tablet 1    albuterol (VENTOLIN HFA) 108 (90 Base) MCG/ACT Aero Soln inhalation aerosol Inhale 2 Puffs every four hours as needed for Shortness of Breath (Wheezing). 1 Each 0    fluticasone (FLOVENT HFA) 110 MCG/ACT Aerosol Inhale 2 Puffs 2 times a day. Use spacer. Rinse mouth after each use. 36 g 3    LORazepam (ATIVAN) 0.5 MG Tab Take 0.5-1 Tablets by mouth at bedtime as needed (Sleep) for up to 30 days. 30 Tablet 0    atorvastatin (LIPITOR) 40 MG Tab Take 1 Tablet by mouth every evening. 30 Tablet 0    clopidogrel (PLAVIX) 75 MG Tab Take 1 Tablet by mouth every day. 90 Tablet 0    losartan (COZAAR) 100 MG Tab Take 1 Tablet by mouth every day. 90 Tablet 0    metoprolol tartrate  (LOPRESSOR) 50 MG Tab Take 1 Tablet by mouth 2 times a day. 180 Tablet 0    benzonatate (TESSALON) 100 MG Cap Take 1 Capsule by mouth 3 times a day as needed for Cough. 60 Capsule 0    ARMOUR THYROID 60 MG Tab TAKE 1 TABLET BY MOUTH EVERY DAY ON 5 DAYS A WEEK 65 Tablet 3    aspirin EC (ECOTRIN) 81 MG Tablet Delayed Response Take 1 Tablet by mouth every day. 100 Tablet 0    Glucosamine-Chondroitin (MOVE FREE PO) Take 1 Tablet by mouth every day.      omeprazole (PRILOSEC) 20 MG delayed-release capsule Take 20 mg by mouth 1 time a day as needed. Indications: Heartburn      Faricimab-svoa (VABYSMO) 6 MG/0.05ML Solution Administer 6 mg into the left eye see administration instructions. Every 5 to 6 weeks  Due May 26th 2023      thyroid (ARMOUR THYROID) 90 MG Tab TAKE 1 TABLET EVERY DAY 2 DAYS PER WEEK Strength: 90 mg (Patient taking differently: Take 90 mg by mouth two times a week. Takes on Monday and Thursday) 90 Tablet 0    famotidine (PEPCID) 40 MG Tab Take 20 mg by mouth 2 times a day.      Lutein-Zeaxanthin 25-5 MG Cap Take  by mouth every day.      acetaminophen (TYLENOL) 500 MG Tab Take 1,000 mg by mouth every morning.      Calcium Carbonate-Vit D-Min (CALCIUM 1200 PO) Take 1 tablet  by mouth 2 times a day.      Cyanocobalamin (VITAMIN B12 PO) Take 1,000 mcg by mouth every day.      Turmeric 500 MG Tab Take 500 mg by mouth 2 times a day.      vitamin D (CHOLECALCIFEROL) 1000 UNIT TABS Take 1,000 Units by mouth 2 times a day.      MAGNESIUM PO Take 1 Tablet by mouth every day.      Bioflavonoid Products (LISSY-C) TABS Take 1,000 Tablets by mouth 2 times a day.      Multiple Vitamin (MULTIVITAMIN PO) Take 1 tablet by mouth every day.       No current [facility]-ordered facility-administered medications on file.         ROS:  Gen: no fevers/chills, no changes in weight  Eyes: no changes in vision  ENT: no sore throat, no hearing loss, no bloody nose  Pulm: no sob, no cough  CV: no chest pain, no palpitations  GI: no  "nausea/vomiting, no diarrhea  : no dysuria  MSk: no myalgias  Skin: no rash  Neuro: no headaches, no numbness/tingling  Heme/Lymph: no easy bruising      Objective:     Exam:  /50 (BP Location: Right arm, Patient Position: Sitting, BP Cuff Size: Adult)   Pulse 81   Temp 36.1 °C (97 °F)   Resp 12   Ht 1.676 m (5' 6\")   Wt 74.8 kg (165 lb)   SpO2 94%   BMI 26.63 kg/m²  Body mass index is 26.63 kg/m².    Gen: Alert and oriented, No apparent distress.  Neck: Neck is supple without lymphadenopathy.  Lungs: Normal effort, CTA bilaterally, no wheezes, rhonchi, or rales  CV: Regular rate and rhythm. No murmurs, rubs, or gallops.  Ext: No clubbing, cyanosis.  1+ to 2+ pitting edema to both lower extremities.  Capillary refill normal.  Distal pulses normal.      Assessment & Plan:     82 y.o. female with the following -     1. Pleurisy, residual  We did discuss concern for continued pleurisy and also continued prednisone use.  This could be helping with fluid retention and swelling but also her amlodipine could be contributing a little bit.  Would like her to stop the amlodipine for now especially because her blood pressure is fairly low.  She will continue to monitor her blood pressure at home.  She will get a D-dimer drawn with her basic panel today and if need be we will have her schedule her CT scan.  If D-dimer is positive we will make this stat.  This may be a completely different issue than the edema especially again because the edema in her lower legs is bilateral.  - D-DIMER; Future  - CT-CHEST (THORAX) WITH; Future    2. Peripheral edema  No protein spilling in the urine today in clinic.  Would like to get an electrolyte check to make sure that this is not related to some of her swelling as well.  - POCT Urinalysis  - Basic Metabolic Panel; Future            No follow-ups on file.    Please note that this dictation was created using voice recognition software. I have made every reasonable attempt to " correct obvious errors, but I expect that there are errors of grammar and possibly content that I did not discover before finalizing the note.

## 2023-08-04 NOTE — TELEPHONE ENCOUNTER
Called pt [phone]  Relayed DA results and recommendations. Pt reports having several acute health issues and has now been started on a steroid therapy. After much discussion, pt has decided to hold off starting new medication until steroid therapy is completed and all FU appts have been completed regarding acute issues. Pt agrees to send [facility] message when she is comfortable starting new medication. Advised pt that DA will be understanding as this is pt decision at this time. Pt verbalized understanding and is appreciative of call back.         SUZANNE to DAFNE

## 2023-08-05 ENCOUNTER — TELEPHONE (OUTPATIENT)
Dept: RADIOLOGY | Facility: MEDICAL CENTER | Age: 83
End: 2023-08-05
Payer: MEDICARE

## 2023-08-06 ENCOUNTER — HOSPITAL ENCOUNTER (OUTPATIENT)
Dept: RADIOLOGY | Facility: MEDICAL CENTER | Age: 83
End: 2023-08-06
Attending: FAMILY MEDICINE
Payer: MEDICARE

## 2023-08-06 DIAGNOSIS — R09.1 PLEURISY, RESIDUAL: ICD-10-CM

## 2023-08-06 PROCEDURE — 71260 CT THORAX DX C+: CPT

## 2023-08-06 PROCEDURE — 700117 HCHG RX CONTRAST REV CODE 255: Performed by: FAMILY MEDICINE

## 2023-08-06 RX ADMIN — IOHEXOL 75 ML: 350 INJECTION, SOLUTION INTRAVENOUS at 16:13

## 2023-08-07 ENCOUNTER — PATIENT MESSAGE (OUTPATIENT)
Dept: MEDICAL GROUP | Facility: LAB | Age: 83
End: 2023-08-07
Payer: MEDICARE

## 2023-08-07 RX ORDER — POTASSIUM CHLORIDE 20 MEQ/1
20 TABLET, EXTENDED RELEASE ORAL DAILY
Qty: 30 TABLET | Refills: 1 | Status: SHIPPED | OUTPATIENT
Start: 2023-08-07 | End: 2023-10-10

## 2023-08-07 RX ORDER — FUROSEMIDE 20 MG/1
20 TABLET ORAL DAILY
Qty: 30 TABLET | Refills: 1 | Status: SHIPPED | OUTPATIENT
Start: 2023-08-07 | End: 2023-10-10

## 2023-08-08 ENCOUNTER — PATIENT MESSAGE (OUTPATIENT)
Dept: SLEEP MEDICINE | Facility: MEDICAL CENTER | Age: 83
End: 2023-08-08
Payer: MEDICARE

## 2023-08-09 ENCOUNTER — APPOINTMENT (RX ONLY)
Dept: URBAN - METROPOLITAN AREA CLINIC 36 | Facility: CLINIC | Age: 83
Setting detail: DERMATOLOGY
End: 2023-08-09

## 2023-08-09 DIAGNOSIS — Z41.9 ENCOUNTER FOR PROCEDURE FOR PURPOSES OTHER THAN REMEDYING HEALTH STATE, UNSPECIFIED: ICD-10-CM

## 2023-08-09 DIAGNOSIS — Z48.817 ENCOUNTER FOR SURGICAL AFTERCARE FOLLOWING SURGERY ON THE SKIN AND SUBCUTANEOUS TISSUE: ICD-10-CM

## 2023-08-09 PROCEDURE — ? POST-OP WOUND CHECK

## 2023-08-09 PROCEDURE — ? LASER RESURFACING

## 2023-08-09 ASSESSMENT — LOCATION ZONE DERM: LOCATION ZONE: LEG

## 2023-08-09 ASSESSMENT — LOCATION SIMPLE DESCRIPTION DERM: LOCATION SIMPLE: LEFT ANKLE

## 2023-08-09 ASSESSMENT — LOCATION DETAILED DESCRIPTION DERM: LOCATION DETAILED: LEFT POSTERIOR ANKLE

## 2023-08-09 NOTE — PROCEDURE: POST-OP WOUND CHECK
Detail Level: Detailed
Add 02360 Cpt? (Important Note: In 2017 The Use Of 70584 Is Being Tracked By Cms To Determine Future Global Period Reimbursement For Global Periods): no
Wound Evaluated By: SHEYLA

## 2023-08-09 NOTE — PROCEDURE: LASER RESURFACING
Treatment Number: 1
Pulse Duration (Usec): 0
Power (Breen): 40
Number Of Passes: 4
Energy(Mj): 900
Laser Type: Erbium-YAG laser
Post-Care Instructions: I reviewed with the patient in detail post-care instructions. Patient should avoid sun until area fully healed. Pt should apply vaseline to treated areas, and remove crusts gently with water-vinegar soaks.
Corneal Shield Text: A corneal shield was inserted after appropriate application of topical anesthesia.
Wavelength: 2,940nm
Consent: Written consent obtained, risks reviewed including but not limited to crusting, scabbing, blistering, scarring, darker or lighter pigmentary change, incomplete improvement of dyschromia, wrinkles, prolonged erythema and facial swelling, infection and bleeding.
Detail Level: Detailed
Was A Corneal Shield Used?: No
External Cooling Fan Speed: 5

## 2023-08-21 ENCOUNTER — PATIENT MESSAGE (OUTPATIENT)
Dept: CARDIOLOGY | Facility: MEDICAL CENTER | Age: 83
End: 2023-08-21
Payer: MEDICARE

## 2023-08-21 DIAGNOSIS — E78.5 DYSLIPIDEMIA: ICD-10-CM

## 2023-08-22 RX ORDER — EZETIMIBE 10 MG/1
10 TABLET ORAL DAILY
Qty: 90 TABLET | Refills: 3 | Status: SHIPPED | OUTPATIENT
Start: 2023-08-22 | End: 2023-12-06 | Stop reason: SDUPTHER

## 2023-08-22 NOTE — TELEPHONE ENCOUNTER
Please let her know that ezetimibe 10 mg is the lowest and only dose available.  This is not a statin so should not give statin related side effects.  Can do repeat lipid panel prior to her visit in December.    Thanks.

## 2023-08-23 NOTE — PATIENT COMMUNICATION
Maicol Westbrook M.D.  You 3 hours ago (2:06 PM)     Please let her know that ezetimibe 10 mg is the lowest and only dose available.  This is not a statin so should not give statin related side effects.  Can do repeat lipid panel prior to her visit in December.     Thanks.         Note

## 2023-08-27 ENCOUNTER — PATIENT MESSAGE (OUTPATIENT)
Dept: CARDIOLOGY | Facility: MEDICAL CENTER | Age: 83
End: 2023-08-27
Payer: MEDICARE

## 2023-08-30 ENCOUNTER — APPOINTMENT (RX ONLY)
Dept: URBAN - METROPOLITAN AREA CLINIC 36 | Facility: CLINIC | Age: 83
Setting detail: DERMATOLOGY
End: 2023-08-30

## 2023-08-30 DIAGNOSIS — Z48.817 ENCOUNTER FOR SURGICAL AFTERCARE FOLLOWING SURGERY ON THE SKIN AND SUBCUTANEOUS TISSUE: ICD-10-CM

## 2023-08-30 PROCEDURE — ? POST-OP WOUND CHECK

## 2023-08-30 ASSESSMENT — LOCATION ZONE DERM: LOCATION ZONE: LEG

## 2023-08-30 ASSESSMENT — LOCATION SIMPLE DESCRIPTION DERM: LOCATION SIMPLE: LEFT ANKLE

## 2023-08-30 ASSESSMENT — LOCATION DETAILED DESCRIPTION DERM: LOCATION DETAILED: LEFT POSTERIOR ANKLE

## 2023-08-30 NOTE — PROCEDURE: POST-OP WOUND CHECK
Detail Level: Detailed
Add 50597 Cpt? (Important Note: In 2017 The Use Of 68492 Is Being Tracked By Cms To Determine Future Global Period Reimbursement For Global Periods): no
Wound Evaluated By: SHEYLA

## 2023-09-03 DIAGNOSIS — I20.0 UNSTABLE ANGINA (HCC): ICD-10-CM

## 2023-09-03 DIAGNOSIS — I16.0 HYPERTENSIVE URGENCY: ICD-10-CM

## 2023-09-05 NOTE — TELEPHONE ENCOUNTER
Is the patient due for a refill? Yes    Was the patient seen the past year? Yes    Date of last office visit: 6/7/2023    Does the patient have an upcoming appointment?  Yes   If yes, When? 12/18/2023    Provider to refill:DAFNE    Does the patients insurance require a 100 day supply?  No

## 2023-09-07 RX ORDER — CLOPIDOGREL BISULFATE 75 MG/1
75 TABLET ORAL DAILY
Qty: 90 TABLET | Refills: 0 | Status: SHIPPED | OUTPATIENT
Start: 2023-09-07 | End: 2023-12-06 | Stop reason: SDUPTHER

## 2023-09-07 RX ORDER — LOSARTAN POTASSIUM 100 MG/1
100 TABLET ORAL DAILY
Qty: 90 TABLET | Refills: 0 | Status: SHIPPED | OUTPATIENT
Start: 2023-09-07 | End: 2023-12-06 | Stop reason: SDUPTHER

## 2023-09-07 RX ORDER — METOPROLOL TARTRATE 50 MG/1
50 TABLET, FILM COATED ORAL 2 TIMES DAILY
Qty: 180 TABLET | Refills: 0 | Status: SHIPPED | OUTPATIENT
Start: 2023-09-07 | End: 2023-12-06 | Stop reason: SDUPTHER

## 2023-09-25 DIAGNOSIS — F41.1 GAD (GENERALIZED ANXIETY DISORDER): ICD-10-CM

## 2023-09-25 RX ORDER — LORAZEPAM 0.5 MG/1
.25-.5 TABLET ORAL NIGHTLY PRN
Qty: 30 TABLET | Refills: 0 | Status: SHIPPED | OUTPATIENT
Start: 2023-09-25 | End: 2023-12-07 | Stop reason: SDUPTHER

## 2023-09-25 RX ORDER — LORAZEPAM 0.5 MG/1
.25-.5 TABLET ORAL NIGHTLY PRN
Qty: 30 TABLET | Refills: 0 | Status: CANCELLED | OUTPATIENT
Start: 2023-09-25 | End: 2023-10-25

## 2023-09-25 NOTE — TELEPHONE ENCOUNTER
Would it be possible to get an RX for Lorazepam 0.5mg sent to [facility] at [facility]?     Thanks.     Karli Berrios

## 2023-10-06 ENCOUNTER — PATIENT MESSAGE (OUTPATIENT)
Dept: MEDICAL GROUP | Facility: LAB | Age: 83
End: 2023-10-06
Payer: MEDICARE

## 2023-10-06 DIAGNOSIS — I25.83 CORONARY ARTERY DISEASE DUE TO LIPID RICH PLAQUE: ICD-10-CM

## 2023-10-06 DIAGNOSIS — I25.10 CORONARY ARTERY DISEASE DUE TO LIPID RICH PLAQUE: ICD-10-CM

## 2023-10-06 DIAGNOSIS — E55.9 VITAMIN D DEFICIENCY: ICD-10-CM

## 2023-10-06 DIAGNOSIS — R73.09 ELEVATED GLUCOSE: ICD-10-CM

## 2023-10-10 ENCOUNTER — OFFICE VISIT (OUTPATIENT)
Dept: SLEEP MEDICINE | Facility: MEDICAL CENTER | Age: 83
End: 2023-10-10
Attending: FAMILY MEDICINE
Payer: MEDICARE

## 2023-10-10 VITALS
SYSTOLIC BLOOD PRESSURE: 110 MMHG | OXYGEN SATURATION: 96 % | HEIGHT: 66 IN | BODY MASS INDEX: 27.16 KG/M2 | RESPIRATION RATE: 16 BRPM | HEART RATE: 74 BPM | DIASTOLIC BLOOD PRESSURE: 74 MMHG | WEIGHT: 169 LBS

## 2023-10-10 DIAGNOSIS — Z87.891 FORMER SMOKER: ICD-10-CM

## 2023-10-10 DIAGNOSIS — K21.9 GASTROESOPHAGEAL REFLUX DISEASE, UNSPECIFIED WHETHER ESOPHAGITIS PRESENT: ICD-10-CM

## 2023-10-10 DIAGNOSIS — R91.1 LUNG NODULE SEEN ON IMAGING STUDY: ICD-10-CM

## 2023-10-10 DIAGNOSIS — I10 ESSENTIAL HYPERTENSION: ICD-10-CM

## 2023-10-10 DIAGNOSIS — J45.20 MILD INTERMITTENT ASTHMA WITHOUT COMPLICATION: ICD-10-CM

## 2023-10-10 PROBLEM — S46.812A STRAIN OF LEFT TRAPEZIUS MUSCLE: Status: RESOLVED | Noted: 2021-08-30 | Resolved: 2023-10-10

## 2023-10-10 PROBLEM — M25.512 ACUTE PAIN OF LEFT SHOULDER: Status: RESOLVED | Noted: 2021-08-30 | Resolved: 2023-10-10

## 2023-10-10 PROBLEM — G47.9 SLEEP DISTURBANCE: Status: RESOLVED | Noted: 2021-04-21 | Resolved: 2023-10-10

## 2023-10-10 PROCEDURE — 99214 OFFICE O/P EST MOD 30 MIN: CPT | Performed by: NURSE PRACTITIONER

## 2023-10-10 PROCEDURE — 3074F SYST BP LT 130 MM HG: CPT | Performed by: NURSE PRACTITIONER

## 2023-10-10 PROCEDURE — 3078F DIAST BP <80 MM HG: CPT | Performed by: NURSE PRACTITIONER

## 2023-10-10 PROCEDURE — 99212 OFFICE O/P EST SF 10 MIN: CPT | Performed by: NURSE PRACTITIONER

## 2023-10-10 ASSESSMENT — FIBROSIS 4 INDEX: FIB4 SCORE: 1.22

## 2023-10-10 NOTE — PROGRESS NOTES
Chief Complaint   Patient presents with    Follow-Up     Mild Internittent Asthma without complication // last seen 7/25/2023    Results     CT 8/7/2023        HPI:  Karli Berrios is a 83 y.o. year old female here today for follow-up on asthma.  Last OV 7/25/23 with Christian DE LEONN     [facility] stGstrstastdstest:st st1st Exacerbations this year: 0    Patient presents today noting May 2023 having chest pain and went to ER and had stent placed in LAD.  Since then she has felt much improved.  She has also had pleurisy, pneumonia July 2023 diagnosed by primary care and treated with antibiotics.  She was also diagnosed with melanoma to her left leg that has been removed.  She notes having increased stress at this time and not sleeping well due to moving and life changes.  She feels her asthma is stable with no significant complaints.  She does feel she is having some increased sinus drainage which may be secondary to the weather and pollen count.  She notes postnasal drip with throat clearing.  She notes rare indigestion and remains on famotidine twice daily.    She remains on Flovent 110 mcg 2 puffs twice daily, and PRATIMA as needed.  She rarely uses PRATIMA.  She notes no exacerbations since last office visit.    History of lung nodules with abnormal findings on CT of chest July 2023.  CT chest 7/11/23:  1.  Multifocal patchy groundglass opacities within both lungs predominantly in the upper lobes consistent with multifocal pneumonitis possible atypical infection.  2.  Trace left pleural effusion.  3.  4.1 mm nodule right lower lobe and smaller nodules within the lungs unchanged.  4.  Tree-in-bud opacities anterior inferior right upper lobe unchanged.  5.  Coronary artery calcifications.  History by PCP for pneumonia and repeat imaging obtained  CT chest w/contrast 8/6/23:  1.  No acute abnormality in the chest. Previously described pulmonary opacities have resolved.  2.  Stable tiny pulmonary nodules are stable since the prior studies and  are considered benign. They do not require further follow-up.  Reviewed with patient.  No further follow-up necessary at this time.    PULM HX:  Former smoker, quit 1977 with 15PYH.  Spirometry August 2018 indicated FEV1 of 1.77 L 77% predicted, FEV1 FVC ratio of 77, no significant bronchodilator response.  She uses Flovent 110 mcg 2 puffs twice a day, rare use of albuterol.  She rarely uses PRATIMA.  She does have a history of tiny bilateral pulmonary nodules stable on chest imaging from 2009 through 2011.  Chest x-ray in April 2019 was unremarkable.   CT Chest 8/5/2021 indicated no rib fractures, no acute cardiopulmonary process, small tree-in-bud nodules in the anterior inferior right upper lobe and a 3 mm nodule in the inferior right upper lobe that is stable.     ROS: As per HPI and otherwise negative if not stated.    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    Hypothyroidism     Lung nodules 07/2009    stable 2010    Macular pucker of both eyes     Osteoporosis     Pain in the chest 05/09/2023    Strain of left trapezius muscle     Urinary tract infection     H/O       Past Surgical History:   Procedure Laterality Date    CATARACT EXTRACTION WITH IOL Bilateral 2007    CARPAL TUNNEL RELEASE  2006    ABDOMINAL EXPLORATION      peritonitis    ABDOMINAL HYSTERECTOMY TOTAL      APPENDECTOMY      EYE SURGERY Bilateral     Vitrectomy-OD 2008 & OS 2011    HEMORRHOIDECTOMY      OTHER      D & C    OTHER      FISTULECTOMY    OTHER      HYSTERECTOMY    OTHER       LEFTY BREAST LIPOMA RESECTION    OTHER      H/O Oral Surgery-Dental Extractions & Implants.       Family History   Problem Relation Age of Onset    Arthritis Mother     Hyperlipidemia Mother     Glaucoma Maternal Grandfather        Social History     Socioeconomic History    Marital status:      Spouse name: Not on file    Number of children: Not on file    Years of education: Not on file    Highest education level: 12th grade   Occupational History    Not on file   Tobacco Use    Smoking status: Former     Current packs/day: 0.00     Average packs/day: 1 pack/day for 20.0 years (20.0 ttl pk-yrs)     Types: Cigarettes     Start date: 1957     Quit date: 1977     Years since quittin.3     Passive exposure: Past    Smokeless tobacco: Never   Vaping Use    Vaping Use: Never used   Substance and Sexual Activity    Alcohol use: Not Currently    Drug use: No    Sexual activity: Not Currently     Partners: Male   Other Topics Concern    Not on file   Social History Narrative    Not on file     Social Determinants of Health     Financial Resource Strain: Low Risk  (3/6/2022)    Overall Financial Resource Strain (CARDIA)     Difficulty of Paying Living Expenses: Not hard at all   Food Insecurity: No Food Insecurity (3/6/2022)    Hunger Vital Sign     Worried About Running Out of Food in the Last Year: Never true     Ran Out of Food in the Last Year: Never true   Transportation Needs: No Transportation Needs (3/6/2022)    PRAPARE - Transportation     Lack of Transportation (Medical): No     Lack of Transportation (Non-Medical): No   Physical Activity: Insufficiently Active (3/6/2022)    Exercise Vital Sign     Days of Exercise per Week: 3 days     Minutes of Exercise per Session: 20 min   Stress: Stress Concern Present (3/6/2022)    [employer] [place] [employer] - Occupational Stress Questionnaire     Feeling of Stress : To some extent   Social Connections: Moderately Isolated (3/6/2022)     "Social Connection and Isolation Panel [NHANES]     Frequency of Communication with Friends and Family: More than three times a week     Frequency of Social Gatherings with Friends and Family: Once a week     Attends Jewish Services: Never     Active Member of Clubs or Organizations: Yes     Attends Club or Organization Meetings: 1 to 4 times per year     Marital Status:    Intimate Partner Violence: Not on file   Housing Stability: Unknown (3/6/2022)    Housing Stability Vital Sign     Unable to Pay for Housing in the Last Year: No     Number of Places Lived in the Last Year: Not on file     Unstable Housing in the Last Year: No       Allergies as of 10/10/2023 - Reviewed 10/10/2023   Allergen Reaction Noted    Bactrim ds Unspecified 11/14/2022    Bactrim [sulfamethoxazole w-trimethoprim]  09/27/2018    Codeine Diarrhea 06/26/2022    Denosumab Unspecified 10/05/2020    Atenolol Unspecified 10/15/2014        Vitals:  /74 (BP Location: Left arm, Patient Position: Sitting, BP Cuff Size: Adult)   Pulse 74   Resp 16   Ht 1.676 m (5' 6\")   Wt 76.7 kg (169 lb)   SpO2 96%     Current medications as of today   Current Outpatient Medications   Medication Sig Dispense Refill    LORazepam (ATIVAN) 0.5 MG Tab Take 0.5-1 Tablets by mouth at bedtime as needed (Sleep) for up to 30 days. 30 Tablet 0    clopidogrel (PLAVIX) 75 MG Tab Take 1 Tablet by mouth every day. 90 Tablet 0    losartan (COZAAR) 100 MG Tab Take 1 Tablet by mouth every day. 90 Tablet 0    metoprolol tartrate (LOPRESSOR) 50 MG Tab Take 1 Tablet by mouth 2 times a day. 180 Tablet 0    ezetimibe (ZETIA) 10 MG Tab Take 1 Tablet by mouth every day. 90 Tablet 3    albuterol (VENTOLIN HFA) 108 (90 Base) MCG/ACT Aero Soln inhalation aerosol Inhale 2 Puffs every four hours as needed for Shortness of Breath (Wheezing). 1 Each 0    fluticasone (FLOVENT HFA) 110 MCG/ACT Aerosol Inhale 2 Puffs 2 times a day. Use spacer. Rinse mouth after each use. 36 g 3 "    atorvastatin (LIPITOR) 40 MG Tab Take 1 Tablet by mouth every evening. 30 Tablet 0    ARMOUR THYROID 60 MG Tab TAKE 1 TABLET BY MOUTH EVERY DAY ON 5 DAYS A WEEK 65 Tablet 3    aspirin EC (ECOTRIN) 81 MG Tablet Delayed Response Take 1 Tablet by mouth every day. 100 Tablet 0    Glucosamine-Chondroitin (MOVE FREE PO) Take 1 Tablet by mouth every day.      Faricimab-svoa (VABYSMO) 6 MG/0.05ML Solution Administer 6 mg into the left eye see administration instructions. Every 5 to 6 weeks  Due May 26th 2023      thyroid (ARMOUR THYROID) 90 MG Tab TAKE 1 TABLET EVERY DAY 2 DAYS PER WEEK Strength: 90 mg (Patient taking differently: Take 90 mg by mouth two times a week. Takes on Monday and Thursday) 90 Tablet 0    famotidine (PEPCID) 40 MG Tab Take 20 mg by mouth 2 times a day.      Lutein-Zeaxanthin 25-5 MG Cap Take  by mouth every day.      acetaminophen (TYLENOL) 500 MG Tab Take 1,000 mg by mouth every morning.      Calcium Carbonate-Vit D-Min (CALCIUM 1200 PO) Take 1 tablet  by mouth 2 times a day.      Cyanocobalamin (VITAMIN B12 PO) Take 1,000 mcg by mouth every day.      Turmeric 500 MG Tab Take 500 mg by mouth 2 times a day.      vitamin D (CHOLECALCIFEROL) 1000 UNIT TABS Take 1,000 Units by mouth 2 times a day.      MAGNESIUM PO Take 1 Tablet by mouth every day.      Bioflavonoid Products (LISSY-C) TABS Take 1,000 Tablets by mouth 2 times a day.      Multiple Vitamin (MULTIVITAMIN PO) Take 1 tablet by mouth every day.       No current facility-administered medications for this visit.         Physical Exam:   Gen:           Alert and oriented, No apparent distress. Mood and affect appropriate, normal interaction with examiner.  Eyes:          PERRL, EOM intact, sclere white, conjunctive moist.  Ears:          Not examined.   Hearing:     Grossly intact.  Nose:          Normal, no lesions or deformities.  Dentition:    Not examined.   Oropharynx:   Not examined.   Mallampati Classification: Not examined.   Neck:         Supple, trachea midline, no masses.  Respiratory Effort: No intercostal retractions or use of accessory muscles.   Lung Auscultation:      Clear to auscultation bilaterally; no rales, rhonchi or wheezing.  CV:            Regular rate and rhythm. No murmurs, rubs or gallops.  Abd:           Not examined.   Lymphadenopathy: Not examined.   Gait and Station: Normal.  Digits and Nails: No clubbing, cyanosis, petechiae, or nodes.   Cranial Nerves: II-XII grossly intact.  Skin:        No rashes, lesions or ulcers noted.               Ext:           No cyanosis or edema.      Assessment:  1. Mild intermittent asthma without complication  PULMONARY FUNCTION TESTS -Test requested: Complete Pulmonary Function Test; Include MIPS/MEPS? No      2. Lung nodule seen on imaging study        3. Gastroesophageal reflux disease, unspecified whether esophagitis present        4. Essential hypertension        5. BMI 27.0-27.9,adult        6. Former smoker  PULMONARY FUNCTION TESTS -Test requested: Complete Pulmonary Function Test; Include MIPS/MEPS? No               Immunizations:    Flu:9/8/23  Pneumovax 23:2008  Prevnar 13:2015  PCV 20: not due  COVID-19: 10/10/22    Plan:  Asthma is well controlled.  She will continue Flovent 2 puffs twice daily and PRATIMA as needed.   PFT at next OV  Lung nodules are stable less than 5 mm on CT imaging.  No further follow-up required at this time.  Continue famotidine for control of GERD and history of hiatal hernia.  Follow-up with cardiology for ongoing manage hypertension.  Follow-up with primary care further health concerns.  Follow-up in 6 months with PFT results, sooner if needed.    Please note that this dictation was created using voice recognition software. I have made every reasonable attempt to correct obvious errors, but it is possible there are errors of grammar and possibly content that I did not discover before finalizing the note.

## 2023-11-01 ENCOUNTER — APPOINTMENT (RX ONLY)
Dept: URBAN - METROPOLITAN AREA CLINIC 15 | Facility: CLINIC | Age: 83
Setting detail: DERMATOLOGY
End: 2023-11-01

## 2023-11-01 DIAGNOSIS — D18.0 HEMANGIOMA: ICD-10-CM

## 2023-11-01 DIAGNOSIS — D22 MELANOCYTIC NEVI: ICD-10-CM

## 2023-11-01 DIAGNOSIS — L57.8 OTHER SKIN CHANGES DUE TO CHRONIC EXPOSURE TO NONIONIZING RADIATION: ICD-10-CM

## 2023-11-01 DIAGNOSIS — D485 NEOPLASM OF UNCERTAIN BEHAVIOR OF SKIN: ICD-10-CM

## 2023-11-01 DIAGNOSIS — L82.1 OTHER SEBORRHEIC KERATOSIS: ICD-10-CM

## 2023-11-01 DIAGNOSIS — L81.4 OTHER MELANIN HYPERPIGMENTATION: ICD-10-CM

## 2023-11-01 DIAGNOSIS — Z86.006 PERSONAL HISTORY OF MELANOMA IN-SITU: ICD-10-CM

## 2023-11-01 PROBLEM — D22.61 MELANOCYTIC NEVI OF RIGHT UPPER LIMB, INCLUDING SHOULDER: Status: ACTIVE | Noted: 2023-11-01

## 2023-11-01 PROBLEM — D22.71 MELANOCYTIC NEVI OF RIGHT LOWER LIMB, INCLUDING HIP: Status: ACTIVE | Noted: 2023-11-01

## 2023-11-01 PROBLEM — D18.01 HEMANGIOMA OF SKIN AND SUBCUTANEOUS TISSUE: Status: ACTIVE | Noted: 2023-11-01

## 2023-11-01 PROBLEM — D22.5 MELANOCYTIC NEVI OF TRUNK: Status: ACTIVE | Noted: 2023-11-01

## 2023-11-01 PROBLEM — D22.72 MELANOCYTIC NEVI OF LEFT LOWER LIMB, INCLUDING HIP: Status: ACTIVE | Noted: 2023-11-01

## 2023-11-01 PROBLEM — D48.5 NEOPLASM OF UNCERTAIN BEHAVIOR OF SKIN: Status: ACTIVE | Noted: 2023-11-01

## 2023-11-01 PROBLEM — D22.62 MELANOCYTIC NEVI OF LEFT UPPER LIMB, INCLUDING SHOULDER: Status: ACTIVE | Noted: 2023-11-01

## 2023-11-01 PROCEDURE — ? ADDITIONAL NOTES

## 2023-11-01 PROCEDURE — 11102 TANGNTL BX SKIN SINGLE LES: CPT

## 2023-11-01 PROCEDURE — 99213 OFFICE O/P EST LOW 20 MIN: CPT | Mod: 25

## 2023-11-01 PROCEDURE — ? COUNSELING

## 2023-11-01 PROCEDURE — ? BIOPSY BY SHAVE METHOD

## 2023-11-01 ASSESSMENT — LOCATION DETAILED DESCRIPTION DERM
LOCATION DETAILED: RIGHT VENTRAL DISTAL FOREARM
LOCATION DETAILED: RIGHT PROXIMAL DORSAL FOREARM
LOCATION DETAILED: RIGHT MID-UPPER BACK
LOCATION DETAILED: RIGHT SUPERIOR LATERAL MIDBACK
LOCATION DETAILED: LEFT LATERAL PROXIMAL UPPER ARM
LOCATION DETAILED: RIGHT DISTAL POSTERIOR THIGH
LOCATION DETAILED: LEFT INFERIOR UPPER BACK
LOCATION DETAILED: RIGHT ANTERIOR DISTAL UPPER ARM
LOCATION DETAILED: RIGHT VENTRAL PROXIMAL FOREARM
LOCATION DETAILED: LEFT DISTAL POSTERIOR THIGH
LOCATION DETAILED: RIGHT ANTERIOR PROXIMAL THIGH
LOCATION DETAILED: LEFT VENTRAL DISTAL FOREARM
LOCATION DETAILED: RIGHT MEDIAL INFERIOR CHEST
LOCATION DETAILED: LEFT ANTERIOR DISTAL UPPER ARM
LOCATION DETAILED: LEFT LATERAL POSTERIOR ANKLE
LOCATION DETAILED: LEFT PROXIMAL CALF
LOCATION DETAILED: RIGHT PROXIMAL CALF
LOCATION DETAILED: LEFT CENTRAL MALAR CHEEK
LOCATION DETAILED: LEFT ANTERIOR PROXIMAL THIGH
LOCATION DETAILED: LEFT PROXIMAL DORSAL FOREARM

## 2023-11-01 ASSESSMENT — LOCATION SIMPLE DESCRIPTION DERM
LOCATION SIMPLE: CHEST
LOCATION SIMPLE: LEFT UPPER BACK
LOCATION SIMPLE: RIGHT FOREARM
LOCATION SIMPLE: RIGHT POSTERIOR THIGH
LOCATION SIMPLE: RIGHT UPPER ARM
LOCATION SIMPLE: RIGHT THIGH
LOCATION SIMPLE: LEFT CALF
LOCATION SIMPLE: LEFT CHEEK
LOCATION SIMPLE: LEFT POSTERIOR THIGH
LOCATION SIMPLE: RIGHT CALF
LOCATION SIMPLE: LEFT UPPER ARM
LOCATION SIMPLE: RIGHT LOWER BACK
LOCATION SIMPLE: LEFT THIGH
LOCATION SIMPLE: LEFT FOREARM
LOCATION SIMPLE: LEFT ANKLE
LOCATION SIMPLE: RIGHT UPPER BACK

## 2023-11-01 ASSESSMENT — LOCATION ZONE DERM
LOCATION ZONE: TRUNK
LOCATION ZONE: LEG
LOCATION ZONE: ARM
LOCATION ZONE: FACE

## 2023-11-01 NOTE — PROCEDURE: ADDITIONAL NOTES
Detail Level: Simple
Additional Notes: mis on L lower leg s/p wle 6/2023
Render Risk Assessment In Note?: no

## 2023-11-03 ENCOUNTER — APPOINTMENT (OUTPATIENT)
Dept: RADIOLOGY | Facility: MEDICAL CENTER | Age: 83
End: 2023-11-03
Attending: NURSE PRACTITIONER
Payer: MEDICARE

## 2023-11-07 ENCOUNTER — OFFICE VISIT (OUTPATIENT)
Dept: MEDICAL GROUP | Facility: LAB | Age: 83
End: 2023-11-07
Payer: MEDICARE

## 2023-11-07 VITALS
SYSTOLIC BLOOD PRESSURE: 120 MMHG | HEART RATE: 68 BPM | HEIGHT: 66 IN | OXYGEN SATURATION: 94 % | BODY MASS INDEX: 26.68 KG/M2 | RESPIRATION RATE: 12 BRPM | TEMPERATURE: 97.4 F | DIASTOLIC BLOOD PRESSURE: 60 MMHG | WEIGHT: 166 LBS

## 2023-11-07 DIAGNOSIS — J06.9 UPPER RESPIRATORY TRACT INFECTION, UNSPECIFIED TYPE: ICD-10-CM

## 2023-11-07 DIAGNOSIS — E78.5 DYSLIPIDEMIA: ICD-10-CM

## 2023-11-07 DIAGNOSIS — I25.10 CORONARY ARTERY DISEASE DUE TO LIPID RICH PLAQUE: ICD-10-CM

## 2023-11-07 DIAGNOSIS — J40 BRONCHITIS: ICD-10-CM

## 2023-11-07 DIAGNOSIS — I25.83 CORONARY ARTERY DISEASE DUE TO LIPID RICH PLAQUE: ICD-10-CM

## 2023-11-07 LAB
FLUAV RNA SPEC QL NAA+PROBE: NEGATIVE
FLUBV RNA SPEC QL NAA+PROBE: NEGATIVE
RSV RNA SPEC QL NAA+PROBE: NEGATIVE
SARS-COV-2 RNA RESP QL NAA+PROBE: NEGATIVE

## 2023-11-07 PROCEDURE — 3074F SYST BP LT 130 MM HG: CPT | Performed by: FAMILY MEDICINE

## 2023-11-07 PROCEDURE — 3078F DIAST BP <80 MM HG: CPT | Performed by: FAMILY MEDICINE

## 2023-11-07 PROCEDURE — 0241U POCT CEPHEID COV-2, FLU A/B, RSV - PCR: CPT | Performed by: FAMILY MEDICINE

## 2023-11-07 PROCEDURE — 99214 OFFICE O/P EST MOD 30 MIN: CPT | Performed by: FAMILY MEDICINE

## 2023-11-07 RX ORDER — ATORVASTATIN CALCIUM 40 MG/1
20 TABLET, FILM COATED ORAL NIGHTLY
Qty: 30 TABLET | Refills: 0
Start: 2023-11-07 | End: 2023-12-06 | Stop reason: SDUPTHER

## 2023-11-07 RX ORDER — AZITHROMYCIN 500 MG/1
500 TABLET, FILM COATED ORAL DAILY
Qty: 5 TABLET | Refills: 0 | Status: SHIPPED | OUTPATIENT
Start: 2023-11-07 | End: 2023-11-12

## 2023-11-07 RX ORDER — PREDNISONE 20 MG/1
40 TABLET ORAL DAILY
Qty: 10 TABLET | Refills: 0 | Status: SHIPPED | OUTPATIENT
Start: 2023-11-07 | End: 2023-11-12

## 2023-11-07 ASSESSMENT — FIBROSIS 4 INDEX: FIB4 SCORE: 1.22

## 2023-11-07 NOTE — PROGRESS NOTES
Subjective:     Chief Complaint   Patient presents with    Cough     X last Thursday went away then came back     Bronchitis         HPI:   Karli presents today with last Thursday felt joints/muscle aches. Has been a bit on and off since then, then cough started. Tylenol arthritis does help a bit.   Some phlegm coughing up, hard to get deep breath.   [place]     Some SOB.     Reports she stopped atorvastatin and zetia. Has cardio appt upcoming.       Current Outpatient Medications Ordered in Epic   Medication Sig Dispense Refill    atorvastatin (LIPITOR) 40 MG Tab Take 1 Tablet by mouth every evening. 30 Tablet 0    clopidogrel (PLAVIX) 75 MG Tab Take 1 Tablet by mouth every day. 90 Tablet 0    losartan (COZAAR) 100 MG Tab Take 1 Tablet by mouth every day. 90 Tablet 0    metoprolol tartrate (LOPRESSOR) 50 MG Tab Take 1 Tablet by mouth 2 times a day. 180 Tablet 0    ezetimibe (ZETIA) 10 MG Tab Take 1 Tablet by mouth every day. 90 Tablet 3    albuterol (VENTOLIN HFA) 108 (90 Base) MCG/ACT Aero Soln inhalation aerosol Inhale 2 Puffs every four hours as needed for Shortness of Breath (Wheezing). 1 Each 0    fluticasone (FLOVENT HFA) 110 MCG/ACT Aerosol Inhale 2 Puffs 2 times a day. Use spacer. Rinse mouth after each use. 36 g 3    ARMOUR THYROID 60 MG Tab TAKE 1 TABLET BY MOUTH EVERY DAY ON 5 DAYS A WEEK 65 Tablet 3    aspirin EC (ECOTRIN) 81 MG Tablet Delayed Response Take 1 Tablet by mouth every day. 100 Tablet 0    Glucosamine-Chondroitin (MOVE FREE PO) Take 1 Tablet by mouth every day.      Faricimab-svoa (VABYSMO) 6 MG/0.05ML Solution Administer 6 mg into both eyes see administration instructions. Every 5 to 6 weeks  Due May 26th 2023      thyroid (ARMOUR THYROID) 90 MG Tab TAKE 1 TABLET EVERY DAY 2 DAYS PER WEEK Strength: 90 mg (Patient taking differently: Take 90 mg by mouth two times a week. Takes on Monday and Thursday) 90 Tablet 0    famotidine (PEPCID) 40 MG Tab Take 20  "mg by mouth 2 times a day.      Lutein-Zeaxanthin 25-5 MG Cap Take  by mouth every day.      acetaminophen (TYLENOL) 500 MG Tab Take 1,000 mg by mouth every morning.      Calcium Carbonate-Vit D-Min (CALCIUM 1200 PO) Take 1 tablet  by mouth 2 times a day.      Cyanocobalamin (VITAMIN B12 PO) Take 1,000 mcg by mouth every day.      Turmeric 500 MG Tab Take 500 mg by mouth 2 times a day.      vitamin D (CHOLECALCIFEROL) 1000 UNIT TABS Take 1,000 Units by mouth 2 times a day.      MAGNESIUM PO Take 1 Tablet by mouth every day.      Bioflavonoid Products (LISSY-C) TABS Take 1,000 Tablets by mouth 2 times a day.      Multiple Vitamin (MULTIVITAMIN PO) Take 1 tablet by mouth every day.       No current [facility]-ordered facility-administered medications on file.         ROS:  Gen: no fevers/chills, no changes in weight  Eyes: no changes in vision  ENT: no sore throat, no hearing loss, no bloody nose  Pulm: no sob, no cough  CV: no chest pain, no palpitations  GI: no nausea/vomiting, no diarrhea  : no dysuria  MSk: no myalgias  Skin: no rash  Neuro: no headaches, no numbness/tingling  Heme/Lymph: no easy bruising      Objective:     Exam:  /60 (BP Location: Right arm, Patient Position: Sitting, BP Cuff Size: Adult)   Pulse 68   Temp 36.3 °C (97.4 °F)   Resp 12   Ht 1.676 m (5' 6\")   Wt 75.3 kg (166 lb)   SpO2 94%   BMI 26.79 kg/m²  Body mass index is 26.79 kg/m².    Gen: Alert and oriented, No apparent distress.  Neck: Neck is supple without lymphadenopathy.  Lungs: Normal effort, CTA bilaterally, no wheezes, rhonchi, or rales  CV: Regular rate and rhythm. No murmurs, rubs, or gallops.  Ext: No clubbing, cyanosis, edema.      Assessment & Plan:     83 y.o. female with the following -     1. Bronchitis  Discussed negative viral testing. Abx and prednisone sent in.   - POCT CEPHEID COV-2, FLU A/B, RSV - PCR  - azithromycin (ZITHROMAX) 500 MG tablet; Take 1 Tablet by mouth every day for 5 days.  Dispense: 5 Tablet; " Refill: 0  - predniSONE (DELTASONE) 20 MG Tab; Take 2 Tablets by mouth every day for 5 days.  Dispense: 10 Tablet; Refill: 0    2. Upper respiratory tract infection, unspecified type    - POCT CEPHEID COV-2, FLU A/B, RSV - PCR    3. Dyslipidemia  Discussed benefit of statin medication due to CAD and prior stent. She will decrease dose, but go back on meds. Has appt upcoming in December.   - atorvastatin (LIPITOR) 40 MG Tab; Take 0.5 Tablets by mouth every evening.  Dispense: 30 Tablet; Refill: 0    4. Coronary artery disease due to lipid rich plaque            No follow-ups on file.    Please note that this dictation was created using voice recognition software. I have made every reasonable attempt to correct obvious errors, but I expect that there are errors of grammar and possibly content that I did not discover before finalizing the note.

## 2023-11-20 ENCOUNTER — HOSPITAL ENCOUNTER (OUTPATIENT)
Dept: RADIOLOGY | Facility: MEDICAL CENTER | Age: 83
End: 2023-11-20
Attending: NURSE PRACTITIONER
Payer: MEDICARE

## 2023-11-20 DIAGNOSIS — E78.5 DYSLIPIDEMIA: ICD-10-CM

## 2023-11-20 DIAGNOSIS — I24.0 OCCLUSION OF LAD (LEFT ANTERIOR DESCENDING) ARTERY (HCC): ICD-10-CM

## 2023-11-20 PROCEDURE — 93880 EXTRACRANIAL BILAT STUDY: CPT

## 2023-11-20 PROCEDURE — 93880 EXTRACRANIAL BILAT STUDY: CPT | Mod: 26 | Performed by: INTERNAL MEDICINE

## 2023-11-28 ENCOUNTER — HOSPITAL ENCOUNTER (OUTPATIENT)
Dept: LAB | Facility: MEDICAL CENTER | Age: 83
End: 2023-11-28
Attending: FAMILY MEDICINE
Payer: MEDICARE

## 2023-11-28 ENCOUNTER — HOSPITAL ENCOUNTER (OUTPATIENT)
Dept: LAB | Facility: MEDICAL CENTER | Age: 83
End: 2023-11-28
Attending: STUDENT IN AN ORGANIZED HEALTH CARE EDUCATION/TRAINING PROGRAM
Payer: MEDICARE

## 2023-11-28 DIAGNOSIS — I25.10 CORONARY ARTERY DISEASE DUE TO LIPID RICH PLAQUE: ICD-10-CM

## 2023-11-28 DIAGNOSIS — E55.9 VITAMIN D DEFICIENCY: ICD-10-CM

## 2023-11-28 DIAGNOSIS — R73.09 ELEVATED GLUCOSE: ICD-10-CM

## 2023-11-28 DIAGNOSIS — E78.5 DYSLIPIDEMIA: ICD-10-CM

## 2023-11-28 DIAGNOSIS — I25.83 CORONARY ARTERY DISEASE DUE TO LIPID RICH PLAQUE: ICD-10-CM

## 2023-11-28 LAB
25(OH)D3 SERPL-MCNC: 51 NG/ML (ref 30–100)
CHOLEST SERPL-MCNC: 165 MG/DL (ref 100–199)
EST. AVERAGE GLUCOSE BLD GHB EST-MCNC: 126 MG/DL
FASTING STATUS PATIENT QL REPORTED: NORMAL
HBA1C MFR BLD: 6 % (ref 4–5.6)
HDLC SERPL-MCNC: 60 MG/DL
LDLC SERPL CALC-MCNC: 91 MG/DL
TRIGL SERPL-MCNC: 68 MG/DL (ref 0–149)
TSH SERPL DL<=0.005 MIU/L-ACNC: 3.74 UIU/ML (ref 0.38–5.33)

## 2023-11-28 PROCEDURE — 83036 HEMOGLOBIN GLYCOSYLATED A1C: CPT | Mod: GA

## 2023-11-28 PROCEDURE — 36415 COLL VENOUS BLD VENIPUNCTURE: CPT

## 2023-11-28 PROCEDURE — 80053 COMPREHEN METABOLIC PANEL: CPT

## 2023-11-28 PROCEDURE — 82306 VITAMIN D 25 HYDROXY: CPT

## 2023-11-28 PROCEDURE — 84443 ASSAY THYROID STIM HORMONE: CPT | Mod: GA

## 2023-11-28 PROCEDURE — 80061 LIPID PANEL: CPT

## 2023-11-29 ENCOUNTER — PATIENT MESSAGE (OUTPATIENT)
Dept: HEALTH INFORMATION MANAGEMENT | Facility: OTHER | Age: 83
End: 2023-11-29

## 2023-11-29 LAB
ALBUMIN SERPL BCP-MCNC: 4.4 G/DL (ref 3.2–4.9)
ALBUMIN/GLOB SERPL: 1.8 G/DL
ALP SERPL-CCNC: 85 U/L (ref 30–99)
ALT SERPL-CCNC: 21 U/L (ref 2–50)
ANION GAP SERPL CALC-SCNC: 12 MMOL/L (ref 7–16)
AST SERPL-CCNC: 21 U/L (ref 12–45)
BILIRUB SERPL-MCNC: 0.3 MG/DL (ref 0.1–1.5)
BUN SERPL-MCNC: 18 MG/DL (ref 8–22)
CALCIUM ALBUM COR SERPL-MCNC: 8.5 MG/DL (ref 8.5–10.5)
CALCIUM SERPL-MCNC: 8.8 MG/DL (ref 8.5–10.5)
CHLORIDE SERPL-SCNC: 94 MMOL/L (ref 96–112)
CO2 SERPL-SCNC: 24 MMOL/L (ref 20–33)
CREAT SERPL-MCNC: 0.69 MG/DL (ref 0.5–1.4)
FASTING STATUS PATIENT QL REPORTED: NORMAL
GFR SERPLBLD CREATININE-BSD FMLA CKD-EPI: 87 ML/MIN/1.73 M 2
GLOBULIN SER CALC-MCNC: 2.4 G/DL (ref 1.9–3.5)
GLUCOSE SERPL-MCNC: 84 MG/DL (ref 65–99)
POTASSIUM SERPL-SCNC: 4.5 MMOL/L (ref 3.6–5.5)
PROT SERPL-MCNC: 6.8 G/DL (ref 6–8.2)
SODIUM SERPL-SCNC: 130 MMOL/L (ref 135–145)

## 2023-12-05 ENCOUNTER — TELEPHONE (OUTPATIENT)
Dept: CARDIOLOGY | Facility: MEDICAL CENTER | Age: 83
End: 2023-12-05
Payer: MEDICARE

## 2023-12-06 ENCOUNTER — OFFICE VISIT (OUTPATIENT)
Dept: CARDIOLOGY | Facility: MEDICAL CENTER | Age: 83
End: 2023-12-06
Attending: INTERNAL MEDICINE
Payer: MEDICARE

## 2023-12-06 VITALS
HEIGHT: 66 IN | HEART RATE: 68 BPM | WEIGHT: 170 LBS | OXYGEN SATURATION: 98 % | BODY MASS INDEX: 27.32 KG/M2 | RESPIRATION RATE: 16 BRPM | DIASTOLIC BLOOD PRESSURE: 76 MMHG | SYSTOLIC BLOOD PRESSURE: 116 MMHG

## 2023-12-06 DIAGNOSIS — I16.0 HYPERTENSIVE URGENCY: ICD-10-CM

## 2023-12-06 DIAGNOSIS — I25.10 CORONARY ARTERY DISEASE DUE TO LIPID RICH PLAQUE: ICD-10-CM

## 2023-12-06 DIAGNOSIS — I20.0 UNSTABLE ANGINA (HCC): ICD-10-CM

## 2023-12-06 DIAGNOSIS — E78.5 DYSLIPIDEMIA: ICD-10-CM

## 2023-12-06 DIAGNOSIS — Z78.9 STATIN INTOLERANCE: ICD-10-CM

## 2023-12-06 DIAGNOSIS — I25.83 CORONARY ARTERY DISEASE DUE TO LIPID RICH PLAQUE: ICD-10-CM

## 2023-12-06 DIAGNOSIS — I10 ESSENTIAL HYPERTENSION: Chronic | ICD-10-CM

## 2023-12-06 PROCEDURE — 3078F DIAST BP <80 MM HG: CPT | Performed by: INTERNAL MEDICINE

## 2023-12-06 PROCEDURE — 3074F SYST BP LT 130 MM HG: CPT | Performed by: INTERNAL MEDICINE

## 2023-12-06 PROCEDURE — 99214 OFFICE O/P EST MOD 30 MIN: CPT | Performed by: INTERNAL MEDICINE

## 2023-12-06 PROCEDURE — 99213 OFFICE O/P EST LOW 20 MIN: CPT | Performed by: INTERNAL MEDICINE

## 2023-12-06 RX ORDER — METOPROLOL TARTRATE 50 MG/1
50 TABLET, FILM COATED ORAL 2 TIMES DAILY
Qty: 200 TABLET | Refills: 3 | Status: SHIPPED | OUTPATIENT
Start: 2023-12-06

## 2023-12-06 RX ORDER — ATORVASTATIN CALCIUM 40 MG/1
20 TABLET, FILM COATED ORAL NIGHTLY
Qty: 100 TABLET | Refills: 3 | Status: SHIPPED | OUTPATIENT
Start: 2023-12-06

## 2023-12-06 RX ORDER — EZETIMIBE 10 MG/1
10 TABLET ORAL DAILY
Qty: 100 TABLET | Refills: 3 | Status: SHIPPED | OUTPATIENT
Start: 2023-12-06

## 2023-12-06 RX ORDER — CLOPIDOGREL BISULFATE 75 MG/1
75 TABLET ORAL DAILY
Qty: 100 TABLET | Refills: 3 | Status: SHIPPED | OUTPATIENT
Start: 2023-12-06

## 2023-12-06 RX ORDER — LOSARTAN POTASSIUM 100 MG/1
100 TABLET ORAL DAILY
Qty: 100 TABLET | Refills: 3 | Status: SHIPPED | OUTPATIENT
Start: 2023-12-06

## 2023-12-06 ASSESSMENT — FIBROSIS 4 INDEX: FIB4 SCORE: 1.16

## 2023-12-06 NOTE — PROGRESS NOTES
CARDIOLOGY OUTPATIENT FOLLOWUP    PCP: Deepa Joe M.D.    1. Coronary artery disease due to lipid rich plaque    2. Dyslipidemia    3. Essential hypertension    4. Hypertensive urgency    5. Unstable angina (HCC)    6. Statin intolerance        Karli Berrios is doing well from a cardiovascular standpoint but I believe would benefit yet from the cardiac rehab program.  A referral was placed.  We also discussed her intolerance to statin medication and I am unclear as to whether there is true side effect or not.  I encouraged longer N of 1 trials with 2 weeks on 2 weeks off to better understand the symptoms.  For now though she will remain on 20 mg of atorvastatin as well as Zetia and update a lipid profile in 6 weeks.  LDL target is less than 55-70.  I will contact you around that time.    Follow up: 6 months    History: Karli Berrios is a 83 y.o. female with history of LAD PCI 5/2023 for unstable angina, normal LVEF presenting for follow-up.     She had a trouble year in 2023 with retinal detachment as well as melanoma surgery in addition to the cardiovascular events.  All this left her mostly inactive and unable to participate in cardiac rehab.  She has battled statin side effects since being transition from simvastatin over to atorvastatin.  She did find that symptoms resolved after 5 days off the medication.    She splits her time between [facility] as well as [facility].  She shares with me that she was born in [facility] on the [place] of the [employer] and lived in the area for through her early childhood.      Physical Exam:  May 2023    The ASCVD Risk score (Millen DK, et al., 2019) failed to calculate.         Studies  Lab Results   Component Value Date/Time    CHOLSTRLTOT 165 11/28/2023 09:42 AM    LDL 91 11/28/2023 09:42 AM    HDL 60 11/28/2023 09:42 AM    TRIGLYCERIDE 68 11/28/2023 09:42 AM       Lab Results   Component Value Date/Time    SODIUM 130 (L)  11/28/2023 09:35 AM    POTASSIUM 4.5 11/28/2023 09:35 AM    CHLORIDE 94 (L) 11/28/2023 09:35 AM    CO2 24 11/28/2023 09:35 AM    GLUCOSE 84 11/28/2023 09:35 AM    BUN 18 11/28/2023 09:35 AM    CREATININE 0.69 11/28/2023 09:35 AM    BUNCREATRAT 16 10/14/2020 08:05 AM     Lab Results   Component Value Date/Time    ALKPHOSPHAT 85 11/28/2023 09:35 AM    ASTSGOT 21 11/28/2023 09:35 AM    ALTSGPT 21 11/28/2023 09:35 AM    TBILIRUBIN 0.3 11/28/2023 09:35 AM        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    Hypothyroidism     Lung nodules 07/2009 stable 2010    Macular pucker of both eyes     Osteoporosis     Pain in the chest 05/09/2023    Strain of left trapezius muscle     Urinary tract infection     H/O     Allergies   Allergen Reactions    Bactrim Ds Unspecified    Bactrim [Sulfamethoxazole W-Trimethoprim]      She states this was always an antibiotic that should be in her chart     Codeine Diarrhea     Pt stated on 06/26/2022    Denosumab Unspecified    Atenolol Unspecified     Leg cramps      Outpatient Encounter Medications as of 12/6/2023   Medication Sig Dispense Refill    atorvastatin (LIPITOR) 40 MG Tab Take 0.5 Tablets by mouth every evening. 30 Tablet 0    clopidogrel (PLAVIX) 75 MG Tab Take 1 Tablet by mouth every day. 90 Tablet 0    losartan (COZAAR) 100 MG Tab Take 1 Tablet by mouth every day. 90 Tablet 0    metoprolol tartrate (LOPRESSOR) 50 MG Tab Take 1 Tablet by mouth 2 times a day. 180 Tablet 0    ezetimibe (ZETIA) 10 MG Tab Take 1 Tablet  by mouth every day. 90 Tablet 3    albuterol (VENTOLIN HFA) 108 (90 Base) MCG/ACT Aero Soln inhalation aerosol Inhale 2 Puffs every four hours as needed for Shortness of Breath (Wheezing). 1 Each 0    fluticasone (FLOVENT HFA) 110 MCG/ACT Aerosol Inhale 2 Puffs 2 times a day. Use spacer. Rinse mouth after each use. 36 g 3    ARMOUR THYROID 60 MG Tab TAKE 1 TABLET BY MOUTH EVERY DAY ON 5 DAYS A WEEK 65 Tablet 3    aspirin EC (ECOTRIN) 81 MG Tablet Delayed Response Take 1 Tablet by mouth every day. 100 Tablet 0    Glucosamine-Chondroitin (MOVE FREE PO) Take 1 Tablet by mouth every day.      Faricimab-svoa (VABYSMO) 6 MG/0.05ML Solution Administer 6 mg into both eyes see administration instructions. Every 5 to 6 weeks  Due May 26th 2023      thyroid (ARMOUR THYROID) 90 MG Tab TAKE 1 TABLET EVERY DAY 2 DAYS PER WEEK Strength: 90 mg (Patient taking differently: Take 90 mg by mouth two times a week. Takes on Monday and Thursday) 90 Tablet 0    famotidine (PEPCID) 40 MG Tab Take 20 mg by mouth 2 times a day.      Lutein-Zeaxanthin 25-5 MG Cap Take  by mouth every day.      acetaminophen (TYLENOL) 500 MG Tab Take 1,000 mg by mouth every morning.      Calcium Carbonate-Vit D-Min (CALCIUM 1200 PO) Take 1 tablet  by mouth 2 times a day.      Cyanocobalamin (VITAMIN B12 PO) Take 1,000 mcg by mouth every day.      Turmeric 500 MG Tab Take 500 mg by mouth 2 times a day.      vitamin D (CHOLECALCIFEROL) 1000 UNIT TABS Take 1,000 Units by mouth 2 times a day.      MAGNESIUM PO Take 1 Tablet by mouth every day.      Bioflavonoid Products (LISSY-C) TABS Take 1,000 Tablets by mouth 2 times a day.      Multiple Vitamin (MULTIVITAMIN PO) Take 1 tablet by mouth every day.       No facility-administered encounter medications on file as of 12/6/2023.     Social History     Socioeconomic History    Marital status:      Spouse name: Not on file    Number of children: Not on file    Years of education: Not on file    Highest education  level: 12th grade   Occupational History    Not on file   Tobacco Use    Smoking status: Former     Current packs/day: 0.00     Average packs/day: 1 pack/day for 20.0 years (20.0 ttl pk-yrs)     Types: Cigarettes     Start date: 1957     Quit date: 1977     Years since quittin.5     Passive exposure: Past    Smokeless tobacco: Never   Vaping Use    Vaping Use: Never used   Substance and Sexual Activity    Alcohol use: Not Currently    Drug use: No    Sexual activity: Not Currently     Partners: Male   Other Topics Concern    Not on file   Social History Narrative    Not on file     Social Determinants of Health     Financial Resource Strain: Low Risk  (10/16/2023)    Overall Financial Resource Strain (CARDIA)     Difficulty of Paying Living Expenses: Not hard at all   Food Insecurity: No Food Insecurity (10/16/2023)    Hunger Vital Sign     Worried About Running Out of Food in the Last Year: Never true     Ran Out of Food in the Last Year: Never true   Transportation Needs: No Transportation Needs (10/16/2023)    PRAPARE - Transportation     Lack of Transportation (Medical): No     Lack of Transportation (Non-Medical): No   Physical Activity: Insufficiently Active (10/16/2023)    Exercise Vital Sign     Days of Exercise per Week: 2 days     Minutes of Exercise per Session: 30 min   Stress: Stress Concern Present (10/16/2023)    [place] [place] [employer] - Occupational Stress Questionnaire     Feeling of Stress : To some extent   Social Connections: Socially Isolated (10/16/2023)    Social Connection and Isolation Panel [NHANES]     Frequency of Communication with Friends and Family: More than three times a week     Frequency of Social Gatherings with Friends and Family: Once a week     Attends Sikh Services: Never     Active Member of Clubs or Organizations: No     Attends Club or Organization Meetings: Never     Marital Status:    Intimate Partner Violence: Not on file    Housing Stability: Low Risk  (10/16/2023)    Housing Stability Vital Sign     Unable to Pay for Housing in the Last Year: No     Number of Places Lived in the Last Year: 1     Unstable Housing in the Last Year: No         ROS:   10 point review systems is otherwise negative except as per the HPI    No chief complaint on file.

## 2023-12-07 ENCOUNTER — APPOINTMENT (RX ONLY)
Dept: URBAN - METROPOLITAN AREA CLINIC 15 | Facility: CLINIC | Age: 83
Setting detail: DERMATOLOGY
End: 2023-12-07

## 2023-12-07 DIAGNOSIS — F41.1 GAD (GENERALIZED ANXIETY DISORDER): ICD-10-CM

## 2023-12-07 DIAGNOSIS — Z71.89 OTHER SPECIFIED COUNSELING: ICD-10-CM

## 2023-12-07 PROBLEM — C44.619 BASAL CELL CARCINOMA OF SKIN OF LEFT UPPER LIMB, INCLUDING SHOULDER: Status: ACTIVE | Noted: 2023-12-07

## 2023-12-07 PROCEDURE — ? COUNSELING

## 2023-12-07 PROCEDURE — ? CURETTAGE AND DESTRUCTION

## 2023-12-07 PROCEDURE — 99212 OFFICE O/P EST SF 10 MIN: CPT | Mod: 25

## 2023-12-07 PROCEDURE — 17261 DSTRJ MAL LES T/A/L .6-1.0CM: CPT

## 2023-12-07 RX ORDER — LORAZEPAM 0.5 MG/1
.25-.5 TABLET ORAL NIGHTLY PRN
Qty: 30 TABLET | Refills: 0 | Status: SHIPPED | OUTPATIENT
Start: 2023-12-07 | End: 2024-01-12 | Stop reason: SDUPTHER

## 2023-12-07 NOTE — TELEPHONE ENCOUNTER
Need RX for lorazepam sent to [facility] at [facility]. Since I have been taking 0.5mg it would be more convenient if I got 1mg tabs and cut them in half so RX would last longer.     Also, got established with [facility] cardiologist Ranjith Delgadillo MD yesterday - really like him - YAY!     Thanks.

## 2023-12-07 NOTE — PROCEDURE: CURETTAGE AND DESTRUCTION

## 2024-01-10 ENCOUNTER — TELEPHONE (OUTPATIENT)
Dept: CARDIOLOGY | Facility: MEDICAL CENTER | Age: 84
End: 2024-01-10
Payer: MEDICARE

## 2024-01-10 NOTE — LETTER
PROCEDURE/SURGERY CLEARANCE FORM      Encounter Date: 1/10/2024    Patient: Karli Berrios  YOB: 1940    The above patient is cleared to have the following procedure/surgery: dental cleanings                                             Dr. Wilmer Delgadillo M.D         Electronic Signature

## 2024-01-10 NOTE — TELEPHONE ENCOUNTER
BE  Caller: April - Dental    Office Name, phone number, fax number: [facility]  Fax Number: [phone]    Procedure Name: Cleaning    Procedure Scheduled Date: 01/10/2024    Topic: Patient was unaware she needed clearance.     Callback Number: [phone]     Thank you,  Johana SAMANIEGO   none

## 2024-01-12 DIAGNOSIS — F41.1 GAD (GENERALIZED ANXIETY DISORDER): ICD-10-CM

## 2024-01-12 NOTE — TELEPHONE ENCOUNTER
Ranjith Delgadillo M.D.  You2 days ago     yes     Clearance letter drafted and faxed back to [facility] at this time.

## 2024-01-12 NOTE — TELEPHONE ENCOUNTER
Received request via: Pharmacy  11/7/23lov  Was the patient seen in the last year in this department? Yes    Does the patient have an active prescription (recently filled or refills available) for medication(s) requested? No    Does the patient have residential Plus and need 100 day supply (blood pressure, diabetes and cholesterol meds only)? Patient does not have SCP

## 2024-01-13 DIAGNOSIS — F41.1 GAD (GENERALIZED ANXIETY DISORDER): ICD-10-CM

## 2024-01-13 RX ORDER — LORAZEPAM 0.5 MG/1
.25-.5 TABLET ORAL NIGHTLY PRN
Qty: 30 TABLET | Refills: 0 | Status: CANCELLED | OUTPATIENT
Start: 2024-01-13 | End: 2024-02-12

## 2024-01-15 RX ORDER — LORAZEPAM 0.5 MG/1
.25-.5 TABLET ORAL NIGHTLY PRN
Qty: 30 TABLET | Refills: 0 | Status: SHIPPED | OUTPATIENT
Start: 2024-01-15 | End: 2024-02-26 | Stop reason: SDUPTHER

## 2024-01-16 ENCOUNTER — PATIENT MESSAGE (OUTPATIENT)
Dept: MEDICAL GROUP | Facility: LAB | Age: 84
End: 2024-01-16
Payer: MEDICARE

## 2024-01-22 ENCOUNTER — TELEPHONE (OUTPATIENT)
Dept: CARDIOLOGY | Facility: MEDICAL CENTER | Age: 84
End: 2024-01-22
Payer: MEDICARE

## 2024-01-22 NOTE — TELEPHONE ENCOUNTER
----- Message from Ranjith Delgadillo M.D. sent at 1/22/2024  2:51 PM PST -----  Can you check on patient and see if she is able to be taking statin?    Thx  BE

## 2024-01-23 ENCOUNTER — PATIENT MESSAGE (OUTPATIENT)
Dept: CARDIOLOGY | Facility: MEDICAL CENTER | Age: 84
End: 2024-01-23

## 2024-01-23 ENCOUNTER — HOSPITAL ENCOUNTER (OUTPATIENT)
Dept: LAB | Facility: MEDICAL CENTER | Age: 84
End: 2024-01-23
Attending: INTERNAL MEDICINE
Payer: MEDICARE

## 2024-01-23 DIAGNOSIS — I25.83 CORONARY ARTERY DISEASE DUE TO LIPID RICH PLAQUE: ICD-10-CM

## 2024-01-23 DIAGNOSIS — I25.10 CORONARY ARTERY DISEASE DUE TO LIPID RICH PLAQUE: ICD-10-CM

## 2024-01-23 LAB
ALBUMIN SERPL BCP-MCNC: 4.3 G/DL (ref 3.2–4.9)
ALBUMIN/GLOB SERPL: 1.5 G/DL
ALP SERPL-CCNC: 91 U/L (ref 30–99)
ALT SERPL-CCNC: 27 U/L (ref 2–50)
ANION GAP SERPL CALC-SCNC: 10 MMOL/L (ref 7–16)
AST SERPL-CCNC: 26 U/L (ref 12–45)
BILIRUB SERPL-MCNC: 0.5 MG/DL (ref 0.1–1.5)
BUN SERPL-MCNC: 13 MG/DL (ref 8–22)
CALCIUM ALBUM COR SERPL-MCNC: 8.5 MG/DL (ref 8.5–10.5)
CALCIUM SERPL-MCNC: 8.7 MG/DL (ref 8.5–10.5)
CHLORIDE SERPL-SCNC: 93 MMOL/L (ref 96–112)
CHOLEST SERPL-MCNC: 153 MG/DL (ref 100–199)
CO2 SERPL-SCNC: 26 MMOL/L (ref 20–33)
CREAT SERPL-MCNC: 0.67 MG/DL (ref 0.5–1.4)
FASTING STATUS PATIENT QL REPORTED: NORMAL
GFR SERPLBLD CREATININE-BSD FMLA CKD-EPI: 88 ML/MIN/1.73 M 2
GLOBULIN SER CALC-MCNC: 2.8 G/DL (ref 1.9–3.5)
GLUCOSE SERPL-MCNC: 85 MG/DL (ref 65–99)
HDLC SERPL-MCNC: 67 MG/DL
LDLC SERPL CALC-MCNC: 62 MG/DL
POTASSIUM SERPL-SCNC: 4.4 MMOL/L (ref 3.6–5.5)
PROT SERPL-MCNC: 7.1 G/DL (ref 6–8.2)
SODIUM SERPL-SCNC: 129 MMOL/L (ref 135–145)
TRIGL SERPL-MCNC: 121 MG/DL (ref 0–149)

## 2024-01-23 PROCEDURE — 80053 COMPREHEN METABOLIC PANEL: CPT

## 2024-01-23 PROCEDURE — 80061 LIPID PANEL: CPT

## 2024-01-23 PROCEDURE — 36415 COLL VENOUS BLD VENIPUNCTURE: CPT

## 2024-02-05 ENCOUNTER — NON-PROVIDER VISIT (OUTPATIENT)
Dept: CARDIOLOGY | Facility: MEDICAL CENTER | Age: 84
End: 2024-02-05
Payer: MEDICARE

## 2024-02-05 DIAGNOSIS — R07.89 OTHER CHEST PAIN: ICD-10-CM

## 2024-02-05 DIAGNOSIS — E78.5 DYSLIPIDEMIA: ICD-10-CM

## 2024-02-05 DIAGNOSIS — Z95.5 S/P CORONARY ARTERY STENT PLACEMENT: Primary | ICD-10-CM

## 2024-02-05 DIAGNOSIS — Z87.891 FORMER SMOKER: ICD-10-CM

## 2024-02-05 DIAGNOSIS — R94.39 ABNORMAL STRESS TEST: ICD-10-CM

## 2024-02-05 PROCEDURE — G0422 INTENS CARDIAC REHAB W/EXERC: HCPCS | Performed by: INTERNAL MEDICINE

## 2024-02-05 PROCEDURE — G0423 INTENS CARDIAC REHAB NO EXER: HCPCS | Mod: 59 | Performed by: INTERNAL MEDICINE

## 2024-02-05 NOTE — PROGRESS NOTES
Patient arrived for initial 1:1 Intensive Cardiac Rehabilitation Consultation and Education session with the Registered Nurse.  A total of 60 minutes was spent with the patient during which time a focused cardiovascular assessment was completed and musculoskeletal limitations were addressed in preparation to safely start the exercise portion of the program.  Education regarding the program was explained including exercise goals, precautions, and target heart rate during exercise.  Nutrition goals were reviewed and patient was introduced to the [facility] Program.  Stress management goals were dicussed and patient concerns and questions were answered at this time. Patient arrived for education at 1130 and visit was concluded at 1230. Exercise time was from 1230 to 1330.

## 2024-02-06 ENCOUNTER — NON-PROVIDER VISIT (OUTPATIENT)
Dept: CARDIOLOGY | Facility: MEDICAL CENTER | Age: 84
End: 2024-02-06
Payer: MEDICARE

## 2024-02-06 DIAGNOSIS — Z95.5 S/P CORONARY ARTERY STENT PLACEMENT: ICD-10-CM

## 2024-02-06 PROCEDURE — G0423 INTENS CARDIAC REHAB NO EXER: HCPCS | Mod: 59 | Performed by: INTERNAL MEDICINE

## 2024-02-06 PROCEDURE — G0422 INTENS CARDIAC REHAB W/EXERC: HCPCS | Performed by: INTERNAL MEDICINE

## 2024-02-06 NOTE — PROGRESS NOTES
Karli Berrios attended Intensive Cardiac Rehab today from 1300 to 1500. During her time she exercised and attended class. Her education today was a video titled: fueling a healthy body. Patient received handouts and class discussion pertaining to the topic.

## 2024-02-07 ENCOUNTER — APPOINTMENT (RX ONLY)
Dept: URBAN - METROPOLITAN AREA CLINIC 15 | Facility: CLINIC | Age: 84
Setting detail: DERMATOLOGY
End: 2024-02-07

## 2024-02-07 DIAGNOSIS — L81.4 OTHER MELANIN HYPERPIGMENTATION: ICD-10-CM

## 2024-02-07 DIAGNOSIS — L85.3 XEROSIS CUTIS: ICD-10-CM

## 2024-02-07 DIAGNOSIS — Z86.006 PERSONAL HISTORY OF MELANOMA IN-SITU: ICD-10-CM

## 2024-02-07 DIAGNOSIS — D22 MELANOCYTIC NEVI: ICD-10-CM

## 2024-02-07 DIAGNOSIS — Z71.89 OTHER SPECIFIED COUNSELING: ICD-10-CM

## 2024-02-07 DIAGNOSIS — D18.0 HEMANGIOMA: ICD-10-CM

## 2024-02-07 DIAGNOSIS — Z85.828 PERSONAL HISTORY OF OTHER MALIGNANT NEOPLASM OF SKIN: ICD-10-CM

## 2024-02-07 DIAGNOSIS — L82.0 INFLAMED SEBORRHEIC KERATOSIS: ICD-10-CM

## 2024-02-07 DIAGNOSIS — L82.1 OTHER SEBORRHEIC KERATOSIS: ICD-10-CM

## 2024-02-07 PROBLEM — D22.72 MELANOCYTIC NEVI OF LEFT LOWER LIMB, INCLUDING HIP: Status: ACTIVE | Noted: 2024-02-07

## 2024-02-07 PROBLEM — D22.71 MELANOCYTIC NEVI OF RIGHT LOWER LIMB, INCLUDING HIP: Status: ACTIVE | Noted: 2024-02-07

## 2024-02-07 PROBLEM — D22.62 MELANOCYTIC NEVI OF LEFT UPPER LIMB, INCLUDING SHOULDER: Status: ACTIVE | Noted: 2024-02-07

## 2024-02-07 PROBLEM — D22.61 MELANOCYTIC NEVI OF RIGHT UPPER LIMB, INCLUDING SHOULDER: Status: ACTIVE | Noted: 2024-02-07

## 2024-02-07 PROBLEM — D22.5 MELANOCYTIC NEVI OF TRUNK: Status: ACTIVE | Noted: 2024-02-07

## 2024-02-07 PROBLEM — D18.01 HEMANGIOMA OF SKIN AND SUBCUTANEOUS TISSUE: Status: ACTIVE | Noted: 2024-02-07

## 2024-02-07 PROCEDURE — 99213 OFFICE O/P EST LOW 20 MIN: CPT | Mod: 25

## 2024-02-07 PROCEDURE — 17110 DESTRUCTION B9 LES UP TO 14: CPT

## 2024-02-07 PROCEDURE — ? LIQUID NITROGEN

## 2024-02-07 PROCEDURE — ? ADDITIONAL NOTES

## 2024-02-07 PROCEDURE — ? COUNSELING

## 2024-02-07 ASSESSMENT — LOCATION DETAILED DESCRIPTION DERM
LOCATION DETAILED: LEFT ANTERIOR DISTAL UPPER ARM
LOCATION DETAILED: LEFT PROXIMAL POSTERIOR UPPER ARM
LOCATION DETAILED: RIGHT MID-UPPER BACK
LOCATION DETAILED: RIGHT ANTERIOR DISTAL UPPER ARM
LOCATION DETAILED: RIGHT MEDIAL INFERIOR CHEST
LOCATION DETAILED: LEFT PROXIMAL DORSAL FOREARM
LOCATION DETAILED: LEFT ANTERIOR DISTAL THIGH
LOCATION DETAILED: RIGHT VENTRAL DISTAL FOREARM
LOCATION DETAILED: LEFT PROXIMAL PRETIBIAL REGION
LOCATION DETAILED: LEFT PROXIMAL CALF
LOCATION DETAILED: LEFT DISTAL POSTERIOR THIGH
LOCATION DETAILED: RIGHT ANTERIOR PROXIMAL THIGH
LOCATION DETAILED: RIGHT PROXIMAL PRETIBIAL REGION
LOCATION DETAILED: RIGHT SUPERIOR LATERAL MIDBACK
LOCATION DETAILED: LEFT POSTERIOR LATERAL DISTAL THIGH
LOCATION DETAILED: RIGHT PROXIMAL CALF
LOCATION DETAILED: LEFT LATERAL PROXIMAL CALF
LOCATION DETAILED: RIGHT PROXIMAL DORSAL FOREARM
LOCATION DETAILED: LEFT VENTRAL DISTAL FOREARM
LOCATION DETAILED: LEFT CENTRAL MALAR CHEEK
LOCATION DETAILED: LEFT INFERIOR UPPER BACK
LOCATION DETAILED: RIGHT VENTRAL PROXIMAL FOREARM
LOCATION DETAILED: RIGHT DISTAL CALF
LOCATION DETAILED: RIGHT ANTERIOR LATERAL PROXIMAL THIGH
LOCATION DETAILED: LEFT RADIAL DORSAL HAND
LOCATION DETAILED: LEFT ANTERIOR PROXIMAL THIGH
LOCATION DETAILED: RIGHT DISTAL POSTERIOR THIGH
LOCATION DETAILED: LEFT LATERAL POSTERIOR ANKLE

## 2024-02-07 ASSESSMENT — LOCATION SIMPLE DESCRIPTION DERM
LOCATION SIMPLE: LEFT FOREARM
LOCATION SIMPLE: LEFT UPPER ARM
LOCATION SIMPLE: LEFT ANKLE
LOCATION SIMPLE: LEFT UPPER BACK
LOCATION SIMPLE: RIGHT UPPER BACK
LOCATION SIMPLE: LEFT HAND
LOCATION SIMPLE: RIGHT UPPER ARM
LOCATION SIMPLE: LEFT THIGH
LOCATION SIMPLE: RIGHT PRETIBIAL REGION
LOCATION SIMPLE: LEFT PRETIBIAL REGION
LOCATION SIMPLE: RIGHT POSTERIOR THIGH
LOCATION SIMPLE: LEFT LOWER LEG
LOCATION SIMPLE: LEFT POSTERIOR UPPER ARM
LOCATION SIMPLE: RIGHT THIGH
LOCATION SIMPLE: RIGHT CALF
LOCATION SIMPLE: LEFT CALF
LOCATION SIMPLE: RIGHT LOWER BACK
LOCATION SIMPLE: LEFT CHEEK
LOCATION SIMPLE: LEFT POSTERIOR THIGH
LOCATION SIMPLE: RIGHT FOREARM
LOCATION SIMPLE: CHEST

## 2024-02-07 ASSESSMENT — LOCATION ZONE DERM
LOCATION ZONE: TRUNK
LOCATION ZONE: HAND
LOCATION ZONE: ARM
LOCATION ZONE: LEG
LOCATION ZONE: FACE

## 2024-02-07 NOTE — PROCEDURE: LIQUID NITROGEN
Add 52 Modifier (Optional): no
Consent: The patient's consent was obtained including but not limited to risks of crusting, scabbing, blistering, scarring, darker or lighter pigmentary change, recurrence, incomplete removal and infection.
Show Spray Paint Technique Variable?: Yes
Medical Necessity Information: It is in your best interest to select a reason for this procedure from the list below. All of these items fulfill various CMS LCD requirements except the new and changing color options.
Number Of Freeze-Thaw Cycles: 2 freeze-thaw cycles
Medical Necessity Clause: This procedure was medically necessary because the lesions that were treated were:
Detail Level: Detailed
Pared With?: curette
Spray Paint Text: The liquid nitrogen was applied to the skin utilizing a spray paint frosting technique.
Post-Care Instructions: I reviewed with the patient in detail post-care instructions. Patient is to wear sunprotection, and avoid picking at any of the treated lesions. Pt may apply Vaseline to crusted or scabbing areas.

## 2024-02-07 NOTE — PROCEDURE: ADDITIONAL NOTES
Detail Level: Simple
Additional Notes: mis on L lower leg s/p wle 6/2023.\\nNo LAD
Render Risk Assessment In Note?: no

## 2024-02-08 ENCOUNTER — NON-PROVIDER VISIT (OUTPATIENT)
Dept: CARDIOLOGY | Facility: MEDICAL CENTER | Age: 84
End: 2024-02-08
Payer: MEDICARE

## 2024-02-08 DIAGNOSIS — Z95.5 S/P CORONARY ARTERY STENT PLACEMENT: ICD-10-CM

## 2024-02-08 PROCEDURE — G0422 INTENS CARDIAC REHAB W/EXERC: HCPCS | Performed by: INTERNAL MEDICINE

## 2024-02-08 PROCEDURE — G0423 INTENS CARDIAC REHAB NO EXER: HCPCS | Mod: 59 | Performed by: INTERNAL MEDICINE

## 2024-02-08 NOTE — PROGRESS NOTES
Karli Berrios attended Intensive Cardiac Rehab today from 1300 to 1500. During her time she exercised and attended class. Her education today was a workshop titled: Fueling A Healthy Body. Patient received handouts and class discussion pertaining to the topic.

## 2024-02-09 ENCOUNTER — APPOINTMENT (OUTPATIENT)
Dept: MEDICAL GROUP | Facility: LAB | Age: 84
End: 2024-02-09
Payer: MEDICARE

## 2024-02-26 ENCOUNTER — PATIENT MESSAGE (OUTPATIENT)
Dept: MEDICAL GROUP | Facility: LAB | Age: 84
End: 2024-02-26
Payer: MEDICARE

## 2024-02-26 DIAGNOSIS — R73.09 ELEVATED GLUCOSE: ICD-10-CM

## 2024-02-26 DIAGNOSIS — F41.1 GAD (GENERALIZED ANXIETY DISORDER): ICD-10-CM

## 2024-02-26 RX ORDER — LORAZEPAM 0.5 MG/1
.25-.5 TABLET ORAL NIGHTLY PRN
Qty: 30 TABLET | Refills: 0 | Status: SHIPPED | OUTPATIENT
Start: 2024-02-26 | End: 2024-03-27

## 2024-02-26 NOTE — TELEPHONE ENCOUNTER
Received request via: Patient    Was the patient seen in the last year in this department? Yes LOV: 11/7/23    Does the patient have an active prescription (recently filled or refills available) for medication(s) requested? No    [facility]     Does the patient have assisted Plus and need 100 day supply (blood pressure, diabetes and cholesterol meds only)? Patient does not have SCP

## 2024-02-28 RX ORDER — THYROID 90 MG/1
TABLET ORAL
Qty: 26 TABLET | Refills: 3 | Status: SHIPPED | OUTPATIENT
Start: 2024-02-28

## 2024-02-28 NOTE — TELEPHONE ENCOUNTER
Received request via: Pharmacy    Was the patient seen in the last year in this department? Yes  11/7/23  Does the patient have an active prescription (recently filled or refills available) for medication(s) requested? No    [facility]    Does the patient have shelter Plus and need 100 day supply (blood pressure, diabetes and cholesterol meds only)? Medication is not for cholesterol, blood pressure or diabetes

## 2024-03-04 ENCOUNTER — TELEPHONE (OUTPATIENT)
Dept: SLEEP MEDICINE | Facility: MEDICAL CENTER | Age: 84
End: 2024-03-04
Payer: MEDICARE

## 2024-03-04 DIAGNOSIS — J45.20 MILD INTERMITTENT ASTHMA WITHOUT COMPLICATION: ICD-10-CM

## 2024-03-04 RX ORDER — FLUTICASONE FUROATE 100 UG/1
1 POWDER RESPIRATORY (INHALATION) DAILY
Qty: 90 EACH | Refills: 3 | Status: SHIPPED | OUTPATIENT
Start: 2024-03-04

## 2024-03-04 NOTE — TELEPHONE ENCOUNTER
The RX for Arnuity is most likely to take the place of Flovent HFA. So maybe the insurance is no longer covering that?

## 2024-03-04 NOTE — TELEPHONE ENCOUNTER
We received a a fax request from [facility] pharmacy asking for a new Rx for Arnuity for patient. Upon review of patients chart I do not see that patient is current on this inhaler. Did I miss something? Or should I call patient to clarify? Request scanned for review.

## 2024-03-05 ENCOUNTER — APPOINTMENT (OUTPATIENT)
Dept: MEDICAL GROUP | Facility: LAB | Age: 84
End: 2024-03-05
Payer: MEDICARE

## 2024-03-05 NOTE — TELEPHONE ENCOUNTER
Spoke to patient and this is the reason. Her insurance will no longer cover the flovent they now prefer the Arnuity. Please update Rx to [facility] pharmacy for pt.

## 2024-03-20 ENCOUNTER — HOSPITAL ENCOUNTER (OUTPATIENT)
Dept: LAB | Facility: MEDICAL CENTER | Age: 84
End: 2024-03-20
Attending: FAMILY MEDICINE
Payer: MEDICARE

## 2024-03-20 DIAGNOSIS — R73.09 ELEVATED GLUCOSE: ICD-10-CM

## 2024-03-20 LAB
EST. AVERAGE GLUCOSE BLD GHB EST-MCNC: 120 MG/DL
HBA1C MFR BLD: 5.8 % (ref 4–5.6)

## 2024-03-20 PROCEDURE — 83036 HEMOGLOBIN GLYCOSYLATED A1C: CPT | Mod: GA

## 2024-03-20 PROCEDURE — 36415 COLL VENOUS BLD VENIPUNCTURE: CPT | Mod: GA

## 2024-04-06 ENCOUNTER — PATIENT MESSAGE (OUTPATIENT)
Dept: CARDIOLOGY | Facility: MEDICAL CENTER | Age: 84
End: 2024-04-06
Payer: MEDICARE

## 2024-04-07 DIAGNOSIS — F41.1 GAD (GENERALIZED ANXIETY DISORDER): ICD-10-CM

## 2024-04-08 RX ORDER — LORAZEPAM 0.5 MG/1
.25-.5 TABLET ORAL NIGHTLY PRN
Qty: 30 TABLET | Refills: 3 | Status: SHIPPED | OUTPATIENT
Start: 2024-04-08 | End: 2024-05-08

## 2024-04-08 NOTE — TELEPHONE ENCOUNTER
Received request via: Pharmacy    Was the patient seen in the last year in this department? Yes  LOV : 11/7/2023   Does the patient have an active prescription (recently filled or refills available) for medication(s) requested? No    Pharmacy Name: [facility]     Does the patient have half-way Plus and need 100 day supply (blood pressure, diabetes and cholesterol meds only)? Patient does not have SCP

## 2024-04-10 ENCOUNTER — NON-PROVIDER VISIT (OUTPATIENT)
Dept: SLEEP MEDICINE | Facility: MEDICAL CENTER | Age: 84
End: 2024-04-10
Attending: NURSE PRACTITIONER
Payer: MEDICARE

## 2024-04-10 VITALS — BODY MASS INDEX: 27.32 KG/M2 | HEIGHT: 66 IN | WEIGHT: 170 LBS

## 2024-04-10 DIAGNOSIS — Z87.891 FORMER SMOKER: ICD-10-CM

## 2024-04-10 DIAGNOSIS — J45.20 MILD INTERMITTENT ASTHMA WITHOUT COMPLICATION: ICD-10-CM

## 2024-04-10 PROCEDURE — 94060 EVALUATION OF WHEEZING: CPT | Mod: 26 | Performed by: INTERNAL MEDICINE

## 2024-04-10 PROCEDURE — 94726 PLETHYSMOGRAPHY LUNG VOLUMES: CPT | Performed by: NURSE PRACTITIONER

## 2024-04-10 PROCEDURE — 94726 PLETHYSMOGRAPHY LUNG VOLUMES: CPT | Mod: 26 | Performed by: INTERNAL MEDICINE

## 2024-04-10 PROCEDURE — 94729 DIFFUSING CAPACITY: CPT | Performed by: NURSE PRACTITIONER

## 2024-04-10 PROCEDURE — 94060 EVALUATION OF WHEEZING: CPT | Performed by: NURSE PRACTITIONER

## 2024-04-10 PROCEDURE — 94729 DIFFUSING CAPACITY: CPT | Mod: 26 | Performed by: INTERNAL MEDICINE

## 2024-04-10 ASSESSMENT — PULMONARY FUNCTION TESTS
FEV1: 1.74
FEV1/FVC: 79
FVC: 2.2
FVC: 2.27
FEV1/FVC_PREDICTED: 77
FVC_LLN: 2.24
FEV1_PERCENT_PREDICTED: 89
FEV1/FVC_PERCENT_PREDICTED: 75
FEV1/FVC_PERCENT_PREDICTED: 103
FEV1_PERCENT_CHANGE: 2
FEV1_PERCENT_PREDICTED: 85
FEV1_PREDICTED: 2.02
FVC_PREDICTED: 2.68
FEV1/FVC_PERCENT_LLN: 64
FVC_PERCENT_PREDICTED: 82
FEV1/FVC: 79
FEV1/FVC: 79
FEV1: 1.8
FEV1/FVC_PERCENT_PREDICTED: 103
FVC_PERCENT_PREDICTED: 84
FEV1/FVC_PERCENT_CHANGE: 0
FEV1/FVC_PERCENT_PREDICTED: 104
FEV1_LLN: 1.69
FEV1/FVC_PERCENT_PREDICTED: 106
FEV1_PERCENT_CHANGE: 3
FEV1/FVC: 79.3
FEV1/FVC_PERCENT_CHANGE: 150

## 2024-04-10 ASSESSMENT — FIBROSIS 4 INDEX: FIB4 SCORE: 1.27

## 2024-04-10 NOTE — PROCEDURES
Technician: Yudith Armstrong RRT, CPFT  Good patient effort & cooperation.  The results of this test meet the ATS/ERS standards for acceptability & reproducibility.  Test was performed on the [employer] Body Plethysmograph-Elite DX system.  Predicted equations for Spirometry are GLI-2012, ITS for lung volumes, and GLI-2017 for DLCO.  The DLCO was uncorrected for Hgb.  A bronchodilator of Ventolin HFA -2puffs via spacer administered.  DLCO performed during dilation period.    Interpretation;   Baseline spirometry shows normal airflows.  No significant bronchodilator response.  Lung volumes are normal.  Diffusion capacity is normal.  Normal pulmonary function testing with normal flow volume loop.  This does not rule out reactive airways disease.  Correlate clinically.

## 2024-04-17 ENCOUNTER — HOSPITAL ENCOUNTER (OUTPATIENT)
Dept: LAB | Facility: MEDICAL CENTER | Age: 84
End: 2024-04-17
Attending: INTERNAL MEDICINE
Payer: MEDICARE

## 2024-04-17 LAB
BASOPHILS # BLD AUTO: 0.6 % (ref 0–1.8)
BASOPHILS # BLD: 0.04 K/UL (ref 0–0.12)
EOSINOPHIL # BLD AUTO: 0.27 K/UL (ref 0–0.51)
EOSINOPHIL NFR BLD: 3.7 % (ref 0–6.9)
ERYTHROCYTE [DISTWIDTH] IN BLOOD BY AUTOMATED COUNT: 40.8 FL (ref 35.9–50)
HCT VFR BLD AUTO: 40.9 % (ref 37–47)
HGB BLD-MCNC: 13.8 G/DL (ref 12–16)
IMM GRANULOCYTES # BLD AUTO: 0.03 K/UL (ref 0–0.11)
IMM GRANULOCYTES NFR BLD AUTO: 0.4 % (ref 0–0.9)
LYMPHOCYTES # BLD AUTO: 1.78 K/UL (ref 1–4.8)
LYMPHOCYTES NFR BLD: 24.7 % (ref 22–41)
MCH RBC QN AUTO: 31.4 PG (ref 27–33)
MCHC RBC AUTO-ENTMCNC: 33.7 G/DL (ref 32.2–35.5)
MCV RBC AUTO: 93.2 FL (ref 81.4–97.8)
MONOCYTES # BLD AUTO: 0.78 K/UL (ref 0–0.85)
MONOCYTES NFR BLD AUTO: 10.8 % (ref 0–13.4)
NEUTROPHILS # BLD AUTO: 4.31 K/UL (ref 1.82–7.42)
NEUTROPHILS NFR BLD: 59.8 % (ref 44–72)
NRBC # BLD AUTO: 0 K/UL
NRBC BLD-RTO: 0 /100 WBC (ref 0–0.2)
PLATELET # BLD AUTO: 300 K/UL (ref 164–446)
PMV BLD AUTO: 9.8 FL (ref 9–12.9)
RBC # BLD AUTO: 4.39 M/UL (ref 4.2–5.4)
WBC # BLD AUTO: 7.2 K/UL (ref 4.8–10.8)

## 2024-04-17 PROCEDURE — 82550 ASSAY OF CK (CPK): CPT

## 2024-04-17 PROCEDURE — 36415 COLL VENOUS BLD VENIPUNCTURE: CPT

## 2024-04-17 PROCEDURE — 80053 COMPREHEN METABOLIC PANEL: CPT

## 2024-04-17 PROCEDURE — 86140 C-REACTIVE PROTEIN: CPT

## 2024-04-17 PROCEDURE — 85025 COMPLETE CBC W/AUTO DIFF WBC: CPT

## 2024-04-17 PROCEDURE — 85652 RBC SED RATE AUTOMATED: CPT

## 2024-04-18 LAB
ALBUMIN SERPL BCP-MCNC: 4.1 G/DL (ref 3.2–4.9)
ALBUMIN/GLOB SERPL: 1.5 G/DL
ALP SERPL-CCNC: 81 U/L (ref 30–99)
ALT SERPL-CCNC: 19 U/L (ref 2–50)
ANION GAP SERPL CALC-SCNC: 12 MMOL/L (ref 7–16)
AST SERPL-CCNC: 21 U/L (ref 12–45)
BILIRUB SERPL-MCNC: 0.4 MG/DL (ref 0.1–1.5)
BUN SERPL-MCNC: 17 MG/DL (ref 8–22)
CALCIUM ALBUM COR SERPL-MCNC: 8.8 MG/DL (ref 8.5–10.5)
CALCIUM SERPL-MCNC: 8.9 MG/DL (ref 8.5–10.5)
CHLORIDE SERPL-SCNC: 95 MMOL/L (ref 96–112)
CO2 SERPL-SCNC: 24 MMOL/L (ref 20–33)
CREAT SERPL-MCNC: 0.73 MG/DL (ref 0.5–1.4)
CRP SERPL HS-MCNC: <0.3 MG/DL (ref 0–0.75)
ERYTHROCYTE [SEDIMENTATION RATE] IN BLOOD BY WESTERGREN METHOD: 1 MM/HOUR (ref 0–25)
GFR SERPLBLD CREATININE-BSD FMLA CKD-EPI: 82 ML/MIN/1.73 M 2
GLOBULIN SER CALC-MCNC: 2.7 G/DL (ref 1.9–3.5)
GLUCOSE SERPL-MCNC: 108 MG/DL (ref 65–99)
POTASSIUM SERPL-SCNC: 4.3 MMOL/L (ref 3.6–5.5)
PROT SERPL-MCNC: 6.8 G/DL (ref 6–8.2)
SODIUM SERPL-SCNC: 131 MMOL/L (ref 135–145)

## 2024-04-22 ENCOUNTER — HOSPITAL ENCOUNTER (OUTPATIENT)
Dept: RADIOLOGY | Facility: MEDICAL CENTER | Age: 84
End: 2024-04-22
Attending: FAMILY MEDICINE
Payer: MEDICARE

## 2024-04-22 LAB — CK SERPL-CCNC: 89 U/L (ref 0–154)

## 2024-04-23 ENCOUNTER — TELEPHONE (OUTPATIENT)
Dept: CARDIOLOGY | Facility: MEDICAL CENTER | Age: 84
End: 2024-04-23
Payer: MEDICARE

## 2024-04-23 NOTE — LETTER
PROCEDURE/SURGERY CLEARANCE FORM      Encounter Date: 4/23/2024    Patient: Karli Berrios  YOB: 1940    CARDIOLOGIST:  Ranjith Delgadillo M.D.    REFERRING DOCTOR:  No ref. provider found    The  following procedure/surgery: Back injection                                            Additional comments: Okay to interrupt antiplatelets 1 year following PCI.  I believe this will be May 2024.       Electronically signed         MD Signature   Ranjith Delgadillo M.D.

## 2024-04-23 NOTE — TELEPHONE ENCOUNTER
BE    Caller: Karli Berrios    Office Name, phone number, fax number:     [facility]   Phone: [phone]  Fax: [phone]    Fax number was provided to the patient.     Procedure Name: Back injection     Procedure Scheduled Date: unscheduled awaiting clearance and medication hold    Callback Number: [phone]    Thank you,     Leah DENG

## 2024-04-23 NOTE — TELEPHONE ENCOUNTER
Last OV: 12/06/2023  Proposed Surgery: Back injection   Surgery Date: TBD  Requesting Office Name: [facility]   Fax Number: [phone]   Preference of Location (default is surgery center unless specified by Cardiologist or JAYA)  Prior Clearance Addressed: No      Anticoags/Antiplatelets: Clopidogrel  and Aspirin  Anticoags/Antiplatelet managed by Cardiology? YES    Outstanding Cardiac Imaging : No  Stent, Cardiac Devices, or Catheterization: Yes  Date : 2/5/2024   Ablation, TAVR/Valve (including open heart), Cardioversion: No  Recent Cardiac Hospitalization: Yes  Date:  2/5/24            When: Hospitalized in the last 3 months. Forward to provider to review.   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    Hypothyroidism     Lung nodules 07/2009    stable 2010    Macular pucker of both eyes     Osteoporosis     Pain in the chest 05/09/2023    Strain of left trapezius muscle     Urinary tract infection     H/O             Surgical Clearance Letter Sent: No Provider to advise.   **[facility]

## 2024-04-24 ENCOUNTER — HOSPITAL ENCOUNTER (OUTPATIENT)
Dept: RADIOLOGY | Facility: MEDICAL CENTER | Age: 84
End: 2024-04-24
Attending: FAMILY MEDICINE
Payer: MEDICARE

## 2024-04-24 DIAGNOSIS — Z12.31 VISIT FOR SCREENING MAMMOGRAM: ICD-10-CM

## 2024-04-24 PROCEDURE — 77067 SCR MAMMO BI INCL CAD: CPT

## 2024-04-24 NOTE — TELEPHONE ENCOUNTER
Per BE - Okay to interrupt antiplatelets 1 year following PCI.  I believe this will be May 2024.      Letter created and faxed.

## 2024-04-25 ENCOUNTER — PATIENT MESSAGE (OUTPATIENT)
Dept: CARDIOLOGY | Facility: MEDICAL CENTER | Age: 84
End: 2024-04-25

## 2024-04-25 ENCOUNTER — APPOINTMENT (OUTPATIENT)
Dept: RADIOLOGY | Facility: MEDICAL CENTER | Age: 84
End: 2024-04-25
Attending: FAMILY MEDICINE
Payer: MEDICARE

## 2024-04-25 NOTE — PATIENT COMMUNICATION
Last OV: 12/06/2023  Proposed Surgery: spinal injection for pain control  Surgery Date: TBD  Requesting Office Name: Mariela Tucker (Kyle Quick)   Fax Number: [phone]  Preference of Location (default is surgery center unless specified by Cardiologist or JAYA)  Prior Clearance Addressed: No      Anticoags/Antiplatelets: Clopidogrel  and Aspirin  Anticoags/Antiplatelet managed by Cardiology? 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    Hypothyroidism     Lung nodules 07/2009    stable 2010    Macular pucker of both eyes     Osteoporosis     Pain in the chest 05/09/2023    Strain of left trapezius muscle     Urinary tract infection     H/O     Surgical Clearance Letter Sent: No Provider to advise.   **Scan clearance request letter into [employer].**       To BE: okay to proceed?

## 2024-04-29 NOTE — PATIENT COMMUNICATION
Ranjith Delgadillo M.D.  You10 minutes ago (10:14 AM)     Can interrupt one year post PCI - 5/10/25.    Thx  BE     Recommendations sent via [employer].

## 2024-05-01 ENCOUNTER — APPOINTMENT (OUTPATIENT)
Dept: CARDIOLOGY | Facility: MEDICAL CENTER | Age: 84
End: 2024-05-01
Attending: INTERNAL MEDICINE
Payer: MEDICARE

## 2024-05-01 VITALS
RESPIRATION RATE: 16 BRPM | SYSTOLIC BLOOD PRESSURE: 118 MMHG | BODY MASS INDEX: 27.8 KG/M2 | OXYGEN SATURATION: 95 % | WEIGHT: 173 LBS | HEIGHT: 66 IN | HEART RATE: 70 BPM | DIASTOLIC BLOOD PRESSURE: 70 MMHG

## 2024-05-01 DIAGNOSIS — I25.112 CORONARY ARTERY DISEASE INVOLVING NATIVE CORONARY ARTERY OF NATIVE HEART WITH REFRACTORY ANGINA PECTORIS (HCC): ICD-10-CM

## 2024-05-01 DIAGNOSIS — I10 ESSENTIAL HYPERTENSION: Chronic | ICD-10-CM

## 2024-05-01 DIAGNOSIS — E78.5 DYSLIPIDEMIA: ICD-10-CM

## 2024-05-01 PROBLEM — I20.0 UNSTABLE ANGINA (HCC): Status: RESOLVED | Noted: 2023-05-09 | Resolved: 2024-05-01

## 2024-05-01 PROBLEM — I16.0 HYPERTENSIVE URGENCY: Status: RESOLVED | Noted: 2023-05-09 | Resolved: 2024-05-01

## 2024-05-01 PROCEDURE — 99214 OFFICE O/P EST MOD 30 MIN: CPT | Performed by: INTERNAL MEDICINE

## 2024-05-01 PROCEDURE — 3078F DIAST BP <80 MM HG: CPT | Performed by: INTERNAL MEDICINE

## 2024-05-01 PROCEDURE — G2211 COMPLEX E/M VISIT ADD ON: HCPCS | Performed by: INTERNAL MEDICINE

## 2024-05-01 PROCEDURE — 3074F SYST BP LT 130 MM HG: CPT | Performed by: INTERNAL MEDICINE

## 2024-05-01 ASSESSMENT — FIBROSIS 4 INDEX: FIB4 SCORE: 1.3

## 2024-05-01 NOTE — PROGRESS NOTES
"CARDIOLOGY OUTPATIENT FOLLOWUP    PCP: Deepa Joe M.D.    1. 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 Essential hypertension    3. Dyslipidemia        Karli Berrios has well-controlled risk factors and is clinically stable nearly 1 year following PCI.  At this juncture she can stop Plavix.  Given her concerns of statin side effect I recommended a N of 1 trial.  I will check in on her progress in 1 month    Follow up: 1 year    History: Karli Berrios is a 83 y.o. female with history of PCI to the LAD 1 year ago for unstable angina, normal LVEF presenting for follow-up.    She is concerned about aches and pains and wonders if they are related to atorvastatin.  Did not like being on 80 before.  Was on simvastatin before the PCI.  Is hopeful to have back injections    Is eager to get back to [place] for the summer-spends the winter in [place]. Oroginally from [facility]      Physical Exam:  /70 (BP Location: Left arm, Patient Position: Sitting, BP Cuff Size: Adult)   Pulse 70   Resp 16   Ht 1.676 m (5' 6\")   Wt 78.5 kg (173 lb)   SpO2 95%   BMI 27.92 kg/m²   GEN: NAD  RESP: CTAB  CVS: RRR, No M/R/G  ABD: Soft, NT/ND  EXT: WWP, no edema    () Today's E/M visit is associated with medical care services that serve as the continuing focal point for all needed health care services and/or with medical care services that  are part of ongoing care related to a patient's single, serious condition, or a complex condition: This includes  furnishing services to patients on an ongoing basis that result in care that is personalized  to the patient. The services result in a comprehensive, longitudinal, and continuous  relationship with the patient and involve delivery of team-based care that is accessible, coordinated with other practitioners and providers, and integrated with the broader health  care landscape. "     The ASCVD Risk score (Patsy GUILLEN, et al., 2019) failed to calculate.    Studies  Lab Results   Component Value Date/Time    CHOLSTRLTOT 153 01/23/2024 09:43 AM    LDL 62 01/23/2024 09:43 AM    HDL 67 01/23/2024 09:43 AM    TRIGLYCERIDE 121 01/23/2024 09:43 AM       Lab Results   Component Value Date/Time    SODIUM 131 (L) 04/17/2024 01:30 PM    POTASSIUM 4.3 04/17/2024 01:30 PM    CHLORIDE 95 (L) 04/17/2024 01:30 PM    CO2 24 04/17/2024 01:30 PM    GLUCOSE 108 (H) 04/17/2024 01:30 PM    BUN 17 04/17/2024 01:30 PM    CREATININE 0.73 04/17/2024 01:30 PM    BUNCREATRAT 16 10/14/2020 08:05 AM     Lab Results   Component Value Date/Time    ALKPHOSPHAT 81 04/17/2024 01:30 PM    ASTSGOT 21 04/17/2024 01:30 PM    ALTSGPT 19 04/17/2024 01:30 PM    TBILIRUBIN 0.4 04/17/2024 01:30 PM          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    Hypothyroidism     Lung nodules 07/2009    stable 2010    Macular pucker of both eyes     Osteoporosis     Pain in the chest 05/09/2023    Strain of left trapezius muscle     Urinary tract infection     H/O     Allergies   Allergen Reactions    Bactrim Ds Unspecified    Bactrim [Sulfamethoxazole W-Trimethoprim]      She states this was always an antibiotic that should be in her chart     Codeine Diarrhea     Pt stated on 06/26/2022    Denosumab Unspecified    Atenolol Unspecified     Leg cramps      Outpatient Encounter Medications as of 5/1/2024   Medication Sig Dispense Refill    LORazepam (ATIVAN) 0.5 MG Tab Take 0.5-1 Tablets by  mouth at bedtime as needed (Sleep) for up to 30 days. 30 Tablet 3    fluticasone furoate (ARNUITY ELLIPTA) 100 MCG/ACT AEROSOL POWDER, BREATH ACTIVATED inhaler Inhale 1 Puff every day. Rinse mouth after each use. 90 Each 3    thyroid (ARMOUR THYROID) 90 MG Tab TAKE 1 TABLET DAILY ON 2 DAYS PER WEEK 26 Tablet 3    atorvastatin (LIPITOR) 40 MG Tab Take 0.5 Tablets by mouth every evening. 100 Tablet 3    losartan (COZAAR) 100 MG Tab Take 1 Tablet by mouth every day. 100 Tablet 3    metoprolol tartrate (LOPRESSOR) 50 MG Tab Take 1 Tablet by mouth 2 times a day. 200 Tablet 3    ezetimibe (ZETIA) 10 MG Tab Take 1 Tablet by mouth every day. 100 Tablet 3    ARMOUR THYROID 60 MG Tab TAKE 1 TABLET BY MOUTH EVERY DAY ON 5 DAYS A WEEK 65 Tablet 3    aspirin EC (ECOTRIN) 81 MG Tablet Delayed Response Take 1 Tablet by mouth every day. 100 Tablet 0    Glucosamine-Chondroitin (MOVE FREE PO) Take 1 Tablet by mouth every day.      Faricimab-svoa (VABYSMO) 6 MG/0.05ML Solution Administer 6 mg into both eyes see administration instructions. Every 5 to 6 weeks  Due May 26th 2023      famotidine (PEPCID) 40 MG Tab Take 20 mg by mouth 2 times a day.      Lutein-Zeaxanthin 25-5 MG Cap Take  by mouth every day.      acetaminophen (TYLENOL) 500 MG Tab Take 1,000 mg by mouth every morning.      Calcium Carbonate-Vit D-Min (CALCIUM 1200 PO) Take 1 tablet  by mouth 2 times a day.      Cyanocobalamin (VITAMIN B12 PO) Take 1,000 mcg by mouth every day.      Turmeric 500 MG Tab Take 500 mg by mouth 2 times a day.      vitamin D (CHOLECALCIFEROL) 1000 UNIT TABS Take 1,000 Units by mouth 2 times a day.      MAGNESIUM PO Take 1 Tablet by mouth every day.      Bioflavonoid Products (LISSY-C) TABS Take 1,000 Tablets by mouth 2 times a day.      Multiple Vitamin (MULTIVITAMIN PO) Take 1 tablet by mouth every day.      [DISCONTINUED] clopidogrel (PLAVIX) 75 MG Tab Take 1 Tablet by mouth every day. 100 Tablet 3    [DISCONTINUED] albuterol (VENTOLIN HFA)  108 (90 Base) MCG/ACT Aero Soln inhalation aerosol Inhale 2 Puffs every four hours as needed for Shortness of Breath (Wheezing). 1 Each 0     No facility-administered encounter medications on file as of 2024.     Social History     Socioeconomic History    Marital status:      Spouse name: Not on file    Number of children: Not on file    Years of education: Not on file    Highest education level: 12th grade   Occupational History    Not on file   Tobacco Use    Smoking status: Former     Current packs/day: 0.00     Average packs/day: 1 pack/day for 20.0 years (20.0 ttl pk-yrs)     Types: Cigarettes     Start date: 1957     Quit date: 1977     Years since quittin.9     Passive exposure: Past    Smokeless tobacco: Never   Vaping Use    Vaping Use: Never used   Substance and Sexual Activity    Alcohol use: Not Currently    Drug use: No    Sexual activity: Not Currently     Partners: Male   Other Topics Concern    Not on file   Social History Narrative    Not on file     Social Determinants of Health     Financial Resource Strain: Low Risk  (10/16/2023)    Overall Financial Resource Strain (CARDIA)     Difficulty of Paying Living Expenses: Not hard at all   Food Insecurity: No Food Insecurity (10/16/2023)    Hunger Vital Sign     Worried About Running Out of Food in the Last Year: Never true     Ran Out of Food in the Last Year: Never true   Transportation Needs: No Transportation Needs (10/16/2023)    PRAPARE - Transportation     Lack of Transportation (Medical): No     Lack of Transportation (Non-Medical): No   Physical Activity: Insufficiently Active (10/16/2023)    Exercise Vital Sign     Days of Exercise per Week: 2 days     Minutes of Exercise per Session: 30 min   Stress: Stress Concern Present (10/16/2023)    [employer] [place] [employer] - Occupational Stress Questionnaire     Feeling of Stress : To some extent   Social Connections: Socially Isolated (10/16/2023)    Social  Connection and Isolation Panel [NHANES]     Frequency of Communication with Friends and Family: More than three times a week     Frequency of Social Gatherings with Friends and Family: Once a week     Attends Shinto Services: Never     Active Member of Clubs or Organizations: No     Attends Club or Organization Meetings: Never     Marital Status:    Intimate Partner Violence: Not on file   Housing Stability: Low Risk  (10/16/2023)    Housing Stability Vital Sign     Unable to Pay for Housing in the Last Year: No     Number of Places Lived in the Last Year: 1     Unstable Housing in the Last Year: No         ROS:   10 point review systems is otherwise negative except as per the HPI    Chief Complaint   Patient presents with    Coronary Artery Disease      Coronary artery disease due to lipid rich plaque

## 2024-05-01 NOTE — Clinical Note
Can you check on patients statin symptoms in one month? She will try 2 weeks on 2 weeks off to see if symptoms are related

## 2024-05-13 RX ORDER — THYROID 60 MG
TABLET ORAL
Qty: 65 TABLET | Refills: 3 | Status: SHIPPED | OUTPATIENT
Start: 2024-05-13

## 2024-05-13 NOTE — TELEPHONE ENCOUNTER
Received request via: Pharmacy    Was the patient seen in the last year in this department? Yes 11/7/23    Does the patient have an active prescription (recently filled or refills available) for medication(s) requested? No    Pharmacy Name: [facility] - [place], [place] - [address]     Does the patient have residential Plus and need 100 day supply (blood pressure, diabetes and cholesterol meds only)? Patient does not have SCP

## 2024-05-17 ENCOUNTER — TELEPHONE (OUTPATIENT)
Dept: CARDIOLOGY | Facility: MEDICAL CENTER | Age: 84
End: 2024-05-17
Payer: MEDICARE

## 2024-05-17 DIAGNOSIS — E78.5 DYSLIPIDEMIA: ICD-10-CM

## 2024-05-17 RX ORDER — ATORVASTATIN CALCIUM 20 MG/1
20 TABLET, FILM COATED ORAL NIGHTLY
Qty: 90 TABLET | Refills: 3 | Status: SHIPPED | OUTPATIENT
Start: 2024-05-17

## 2024-05-17 NOTE — TELEPHONE ENCOUNTER
BE    Caller: Karli Berrios     Topic/issue: Pt called in regards to her atorvastatin (LIPITOR) 40 MG medication pt would like the dosage to be 20mg instead of 40mg so she doesn't have to cut them in half please advise.     Callback Number: [phone] (home)      Thank you,     Smith WORKMAN

## 2024-05-17 NOTE — TELEPHONE ENCOUNTER
Phone Number Called: [phone]    Call outcome: Spoke to patient regarding message below.    Message: Called to inform patient that we can put order in for just the 20 mg of atorvastatin. Order placed at this time. All questions answered at this time. Advised to call back with any further questions or concerns.

## 2024-05-23 ENCOUNTER — APPOINTMENT (RX ONLY)
Dept: URBAN - METROPOLITAN AREA CLINIC 15 | Facility: CLINIC | Age: 84
Setting detail: DERMATOLOGY
End: 2024-05-23

## 2024-05-23 DIAGNOSIS — L85.3 XEROSIS CUTIS: ICD-10-CM

## 2024-05-23 DIAGNOSIS — L81.4 OTHER MELANIN HYPERPIGMENTATION: ICD-10-CM

## 2024-05-23 DIAGNOSIS — Z71.89 OTHER SPECIFIED COUNSELING: ICD-10-CM

## 2024-05-23 DIAGNOSIS — D18.0 HEMANGIOMA: ICD-10-CM

## 2024-05-23 DIAGNOSIS — D22 MELANOCYTIC NEVI: ICD-10-CM

## 2024-05-23 DIAGNOSIS — Z86.006 PERSONAL HISTORY OF MELANOMA IN-SITU: ICD-10-CM

## 2024-05-23 DIAGNOSIS — Z85.828 PERSONAL HISTORY OF OTHER MALIGNANT NEOPLASM OF SKIN: ICD-10-CM

## 2024-05-23 DIAGNOSIS — L82.1 OTHER SEBORRHEIC KERATOSIS: ICD-10-CM

## 2024-05-23 PROBLEM — D22.62 MELANOCYTIC NEVI OF LEFT UPPER LIMB, INCLUDING SHOULDER: Status: ACTIVE | Noted: 2024-05-23

## 2024-05-23 PROBLEM — D22.71 MELANOCYTIC NEVI OF RIGHT LOWER LIMB, INCLUDING HIP: Status: ACTIVE | Noted: 2024-05-23

## 2024-05-23 PROBLEM — D22.61 MELANOCYTIC NEVI OF RIGHT UPPER LIMB, INCLUDING SHOULDER: Status: ACTIVE | Noted: 2024-05-23

## 2024-05-23 PROBLEM — D22.5 MELANOCYTIC NEVI OF TRUNK: Status: ACTIVE | Noted: 2024-05-23

## 2024-05-23 PROBLEM — D22.72 MELANOCYTIC NEVI OF LEFT LOWER LIMB, INCLUDING HIP: Status: ACTIVE | Noted: 2024-05-23

## 2024-05-23 PROBLEM — D18.01 HEMANGIOMA OF SKIN AND SUBCUTANEOUS TISSUE: Status: ACTIVE | Noted: 2024-05-23

## 2024-05-23 PROCEDURE — ? COUNSELING

## 2024-05-23 PROCEDURE — ? ADDITIONAL NOTES

## 2024-05-23 PROCEDURE — 99213 OFFICE O/P EST LOW 20 MIN: CPT

## 2024-05-23 ASSESSMENT — LOCATION SIMPLE DESCRIPTION DERM
LOCATION SIMPLE: RIGHT UPPER ARM
LOCATION SIMPLE: RIGHT POSTERIOR THIGH
LOCATION SIMPLE: LEFT LOWER LEG
LOCATION SIMPLE: LEFT POSTERIOR UPPER ARM
LOCATION SIMPLE: RIGHT UPPER BACK
LOCATION SIMPLE: LEFT UPPER BACK
LOCATION SIMPLE: RIGHT PRETIBIAL REGION
LOCATION SIMPLE: LEFT POSTERIOR THIGH
LOCATION SIMPLE: LEFT CHEEK
LOCATION SIMPLE: LEFT HAND
LOCATION SIMPLE: RIGHT CALF
LOCATION SIMPLE: RIGHT FOREARM
LOCATION SIMPLE: LEFT UPPER ARM
LOCATION SIMPLE: LEFT THIGH
LOCATION SIMPLE: LEFT CALF
LOCATION SIMPLE: LEFT ANKLE
LOCATION SIMPLE: CHEST
LOCATION SIMPLE: LEFT FOREARM
LOCATION SIMPLE: LEFT PRETIBIAL REGION
LOCATION SIMPLE: RIGHT THIGH
LOCATION SIMPLE: RIGHT LOWER BACK

## 2024-05-23 ASSESSMENT — LOCATION DETAILED DESCRIPTION DERM
LOCATION DETAILED: LEFT POSTERIOR LATERAL DISTAL THIGH
LOCATION DETAILED: RIGHT DISTAL POSTERIOR THIGH
LOCATION DETAILED: RIGHT SUPERIOR LATERAL MIDBACK
LOCATION DETAILED: LEFT LATERAL POSTERIOR ANKLE
LOCATION DETAILED: LEFT ANTERIOR DISTAL UPPER ARM
LOCATION DETAILED: LEFT PROXIMAL PRETIBIAL REGION
LOCATION DETAILED: LEFT PROXIMAL DORSAL FOREARM
LOCATION DETAILED: LEFT CENTRAL MALAR CHEEK
LOCATION DETAILED: RIGHT DISTAL CALF
LOCATION DETAILED: RIGHT PROXIMAL PRETIBIAL REGION
LOCATION DETAILED: RIGHT VENTRAL DISTAL FOREARM
LOCATION DETAILED: LEFT INFERIOR UPPER BACK
LOCATION DETAILED: LEFT VENTRAL DISTAL FOREARM
LOCATION DETAILED: RIGHT PROXIMAL CALF
LOCATION DETAILED: RIGHT ANTERIOR PROXIMAL THIGH
LOCATION DETAILED: RIGHT MEDIAL INFERIOR CHEST
LOCATION DETAILED: RIGHT PROXIMAL DORSAL FOREARM
LOCATION DETAILED: LEFT DISTAL POSTERIOR THIGH
LOCATION DETAILED: LEFT PROXIMAL CALF
LOCATION DETAILED: LEFT ANTERIOR PROXIMAL THIGH
LOCATION DETAILED: RIGHT ANTERIOR DISTAL UPPER ARM
LOCATION DETAILED: RIGHT VENTRAL PROXIMAL FOREARM
LOCATION DETAILED: LEFT LATERAL PROXIMAL CALF
LOCATION DETAILED: LEFT PROXIMAL POSTERIOR UPPER ARM
LOCATION DETAILED: LEFT RADIAL DORSAL HAND
LOCATION DETAILED: RIGHT MID-UPPER BACK

## 2024-05-23 ASSESSMENT — LOCATION ZONE DERM
LOCATION ZONE: LEG
LOCATION ZONE: HAND
LOCATION ZONE: TRUNK
LOCATION ZONE: FACE
LOCATION ZONE: ARM

## 2024-05-31 ENCOUNTER — TELEPHONE (OUTPATIENT)
Dept: CARDIOLOGY | Facility: MEDICAL CENTER | Age: 84
End: 2024-05-31
Payer: MEDICARE

## 2024-05-31 ENCOUNTER — PATIENT MESSAGE (OUTPATIENT)
Dept: CARDIOLOGY | Facility: MEDICAL CENTER | Age: 84
End: 2024-05-31
Payer: MEDICARE

## 2024-05-31 NOTE — TELEPHONE ENCOUNTER
----- Message from Ranjith Delgadillo M.D. sent at 5/1/2024  3:04 PM PDT -----  Can you check on patients statin symptoms in one month? She will try 2 weeks on 2 weeks off to see if symptoms are related

## 2024-06-04 ENCOUNTER — OFFICE VISIT (OUTPATIENT)
Dept: SLEEP MEDICINE | Facility: MEDICAL CENTER | Age: 84
End: 2024-06-04
Attending: NURSE PRACTITIONER
Payer: MEDICARE

## 2024-06-04 VITALS
HEART RATE: 70 BPM | OXYGEN SATURATION: 94 % | WEIGHT: 166 LBS | RESPIRATION RATE: 16 BRPM | DIASTOLIC BLOOD PRESSURE: 74 MMHG | BODY MASS INDEX: 26.68 KG/M2 | SYSTOLIC BLOOD PRESSURE: 110 MMHG | HEIGHT: 66 IN

## 2024-06-04 DIAGNOSIS — Z87.891 FORMER SMOKER: ICD-10-CM

## 2024-06-04 DIAGNOSIS — I10 ESSENTIAL HYPERTENSION: Chronic | ICD-10-CM

## 2024-06-04 DIAGNOSIS — J45.20 MILD INTERMITTENT ASTHMA WITHOUT COMPLICATION: Chronic | ICD-10-CM

## 2024-06-04 PROCEDURE — 99213 OFFICE O/P EST LOW 20 MIN: CPT | Performed by: NURSE PRACTITIONER

## 2024-06-04 PROCEDURE — 3078F DIAST BP <80 MM HG: CPT | Performed by: NURSE PRACTITIONER

## 2024-06-04 PROCEDURE — 3074F SYST BP LT 130 MM HG: CPT | Performed by: NURSE PRACTITIONER

## 2024-06-04 RX ORDER — AZITHROMYCIN 250 MG/1
TABLET, FILM COATED ORAL
Qty: 6 TABLET | Refills: 0 | Status: SHIPPED | OUTPATIENT
Start: 2024-06-04 | End: 2024-06-25

## 2024-06-04 RX ORDER — PREDNISONE 10 MG/1
TABLET ORAL
Qty: 18 TABLET | Refills: 0 | Status: SHIPPED | OUTPATIENT
Start: 2024-06-04 | End: 2024-06-25

## 2024-06-04 ASSESSMENT — FIBROSIS 4 INDEX: FIB4 SCORE: 1.3

## 2024-06-04 ASSESSMENT — PATIENT HEALTH QUESTIONNAIRE - PHQ9: CLINICAL INTERPRETATION OF PHQ2 SCORE: 0

## 2024-06-04 NOTE — PROGRESS NOTES
Chief Complaint   Patient presents with    Asthma     Dx/Last seen: asthma 10/10/2023- YURI GOETZ     Tests completed: PFT 4/10/2024      Other     Trial on Medications:  Flovent 110 mcg PRATIMA       HPI:  Karli Berrios is a 83 y.o. year old female here today for follow-up on asthma.  Last OV 10/10/23    PFT 4/10/24 normal airflows and no significant bronchodilator response.  Lung volumes normal and DLCO normal.  Reviewed with patient.    MMRC stGstrstastdstest:st st1st Exacerbations this year: 0    She remains on Flovent 110mcg 2 puffs BID, and PRATIMA as needed. She will start Arnuity 100mcg 1 puff QD once she completes the Flovent she has on hand.     Interval Events:  6/4/24: She notes moving up to [facility] for the summer being exposed to significant massive tree pollen but trying to stay inside.  She has had some increased cough with sinus drainage and wheezing.  She notes phlegm to be clear but can be thick and difficult to get out at times.  She was experiencing some shortness of breath and used albuterol 1 time and has felt fine since.  She is back down in [place] for multiple doctors appointments.  She has a history of back pain managed by a spine physician and also arthritis and eye injections for glaucoma.  She would like to have an antibiotic and steroid on hand in the event of getting bronchitis although she has not suffered from this in quite some time.  She would like to avoid nasal sprays that could impair her eyes.  She does have a sinus rinse and saline spray on hand but has not been using these.    PULM HX:  Former smoker, quit 1977 with 15PYH.  Spirometry August 2018 indicated FEV1 of 1.77 L 77% predicted, FEV1 FVC ratio of 77, no significant bronchodilator response.  She uses Flovent 110 mcg 2 puffs twice a day, rare use of albuterol.  She rarely uses PRATIMA.  She does have a history of tiny bilateral pulmonary nodules stable on chest imaging from 2009 through 2011.  Chest x-ray in April 2019 was  unremarkable.   CT Chest 8/5/2021 indicated no rib fractures, no acute cardiopulmonary process, small tree-in-bud nodules in the anterior inferior right upper lobe and a 3 mm nodule in the inferior right upper lobe that is stable.   History of lung nodules with abnormal findings on CT of chest July 2023.  CT chest 7/11/23:  1.  Multifocal patchy groundglass opacities within both lungs predominantly in the upper lobes consistent with multifocal pneumonitis possible atypical infection.  2.  Trace left pleural effusion.  3.  4.1 mm nodule right lower lobe and smaller nodules within the lungs unchanged.  4.  Tree-in-bud opacities anterior inferior right upper lobe unchanged.  5.  Coronary artery calcifications.  History by PCP for pneumonia and repeat imaging obtained  CT chest w/contrast 8/6/23:  1.  No acute abnormality in the chest. Previously described pulmonary opacities have resolved.  2.  Stable tiny pulmonary nodules are stable since the prior studies and are considered benign. They do not require further follow-up.  Reviewed with patient.  No further follow-up necessary at this time.    ROS: As per HPI and otherwise negative if not stated.    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    Hypothyroidism     Lung nodules 07/2009    stable 2010    Macular pucker of both eyes     Osteoporosis     Pain in the chest 05/09/2023    Strain of left trapezius muscle     Urinary tract infection     H/O       Past Surgical History:   Procedure Laterality Date     CATARACT EXTRACTION WITH IOL Bilateral 2007    CARPAL TUNNEL RELEASE  2006    ABDOMINAL EXPLORATION      peritonitis    ABDOMINAL HYSTERECTOMY TOTAL      APPENDECTOMY      EYE SURGERY Bilateral     Vitrectomy-OD 2008 & OS     HEMORRHOIDECTOMY      OTHER      D & C    OTHER      FISTULECTOMY    OTHER      HYSTERECTOMY    OTHER      LEFTY BREAST LIPOMA RESECTION    OTHER      H/O Oral Surgery-Dental Extractions & Implants.       Family History   Problem Relation Age of Onset    Arthritis Mother     Hyperlipidemia Mother     Glaucoma Maternal Grandfather        Social History     Socioeconomic History    Marital status:      Spouse name: Not on file    Number of children: Not on file    Years of education: Not on file    Highest education level: 12th grade   Occupational History    Not on file   Tobacco Use    Smoking status: Former     Current packs/day: 0.00     Average packs/day: 1 pack/day for 20.0 years (20.0 ttl pk-yrs)     Types: Cigarettes     Start date: 1957     Quit date: 1977     Years since quittin.0     Passive exposure: Past    Smokeless tobacco: Never   Vaping Use    Vaping status: Never Used   Substance and Sexual Activity    Alcohol use: Not Currently     Comment: occ    Drug use: No    Sexual activity: Not Currently     Partners: Male   Other Topics Concern    Not on file   Social History Narrative    Not on file     Social Determinants of Health     Financial Resource Strain: Low Risk  (10/16/2023)    Overall Financial Resource Strain (CARDIA)     Difficulty of Paying Living Expenses: Not hard at all   Food Insecurity: No Food Insecurity (10/16/2023)    Hunger Vital Sign     Worried About Running Out of Food in the Last Year: Never true     Ran Out of Food in the Last Year: Never true   Transportation Needs: No Transportation Needs (10/16/2023)    PRAPARE - Transportation     Lack of Transportation (Medical): No     Lack of Transportation (Non-Medical): No   Physical  "Activity: Insufficiently Active (10/16/2023)    Exercise Vital Sign     Days of Exercise per Week: 2 days     Minutes of Exercise per Session: 30 min   Stress: Stress Concern Present (10/16/2023)    [employer] [place] [employer] - Occupational Stress Questionnaire     Feeling of Stress : To some extent   Social Connections: Socially Isolated (10/16/2023)    Social Connection and Isolation Panel [NHANES]     Frequency of Communication with Friends and Family: More than three times a week     Frequency of Social Gatherings with Friends and Family: Once a week     Attends Latter-day Services: Never     Active Member of Clubs or Organizations: No     Attends Club or Organization Meetings: Never     Marital Status:    Intimate Partner Violence: Not on file   Housing Stability: Low Risk  (10/16/2023)    Housing Stability Vital Sign     Unable to Pay for Housing in the Last Year: No     Number of Places Lived in the Last Year: 1     Unstable Housing in the Last Year: No       Allergies as of 06/04/2024 - Reviewed 06/04/2024   Allergen Reaction Noted    Bactrim ds Unspecified 11/14/2022    Bactrim [sulfamethoxazole w-trimethoprim]  09/27/2018    Codeine Diarrhea 06/26/2022    Denosumab Unspecified 10/05/2020    Atenolol Unspecified 10/15/2014        Vitals:  /74 (BP Location: Left arm, Patient Position: Sitting, BP Cuff Size: Adult)   Pulse 70   Resp 16   Ht 1.676 m (5' 6\")   Wt 75.3 kg (166 lb)   SpO2 94%     Current medications as of today   Current Outpatient Medications   Medication Sig Dispense Refill    atorvastatin (LIPITOR) 20 MG Tab Take 1 Tablet by mouth every evening. 90 Tablet 3    ARMOUR THYROID 60 MG Tab TAKE 1 TABLET EVERY DAY ON 5 DAYS A WEEK 65 Tablet 3    fluticasone furoate (ARNUITY ELLIPTA) 100 MCG/ACT AEROSOL POWDER, BREATH ACTIVATED inhaler Inhale 1 Puff every day. Rinse mouth after each use. 90 Each 3    thyroid (ARMOUR THYROID) 90 MG Tab TAKE 1 TABLET DAILY ON 2 DAYS PER " WEEK 26 Tablet 3    losartan (COZAAR) 100 MG Tab Take 1 Tablet by mouth every day. 100 Tablet 3    metoprolol tartrate (LOPRESSOR) 50 MG Tab Take 1 Tablet by mouth 2 times a day. 200 Tablet 3    ezetimibe (ZETIA) 10 MG Tab Take 1 Tablet by mouth every day. 100 Tablet 3    aspirin EC (ECOTRIN) 81 MG Tablet Delayed Response Take 1 Tablet by mouth every day. 100 Tablet 0    Glucosamine-Chondroitin (MOVE FREE PO) Take 1 Tablet by mouth every day.      Faricimab-svoa (VABYSMO) 6 MG/0.05ML Solution Administer 6 mg into both eyes see administration instructions. Every 5 to 6 weeks  Due May 26th 2023      famotidine (PEPCID) 40 MG Tab Take 20 mg by mouth 2 times a day.      Lutein-Zeaxanthin 25-5 MG Cap Take  by mouth every day.      acetaminophen (TYLENOL) 500 MG Tab Take 1,000 mg by mouth every morning.      Calcium Carbonate-Vit D-Min (CALCIUM 1200 PO) Take 1 tablet  by mouth 2 times a day.      Cyanocobalamin (VITAMIN B12 PO) Take 1,000 mcg by mouth every day.      Turmeric 500 MG Tab Take 500 mg by mouth 2 times a day.      vitamin D (CHOLECALCIFEROL) 1000 UNIT TABS Take 1,000 Units by mouth 2 times a day.      MAGNESIUM PO Take 1 Tablet by mouth every day.      Bioflavonoid Products (LISSY-C) TABS Take 1,000 Tablets by mouth 2 times a day.      Multiple Vitamin (MULTIVITAMIN PO) Take 1 tablet by mouth every day.       No current facility-administered medications for this visit.         Physical Exam:   Gen:           Alert and oriented, No apparent distress. Mood and affect appropriate, normal interaction with examiner.  Eyes:          PERRL, EOM intact, sclere white, conjunctive moist.  Ears:          Not examined.   Hearing:     Grossly intact.  Nose:          Normal, no lesions or deformities.  Dentition:    Not examined.  Oropharynx:   Not examined.   Mallampati Classification: Not examined.  Neck:        Supple, trachea midline, no masses.  Respiratory Effort: No intercostal retractions or use of accessory  muscles.   Lung Auscultation:      Clear to auscultation bilaterally; no rales, rhonchi or wheezing.  CV:            Regular rate and rhythm. No murmurs, rubs or gallops.  Abd:           Not examined  Lymphadenopathy: Not examined.  Gait and Station: Normal.  Digits and Nails: No clubbing, cyanosis, petechiae, or nodes.   Cranial Nerves: II-XII grossly intact.  Skin:        No rashes, lesions or ulcers noted.               Ext:           No cyanosis or edema.      Assessment:  1. Mild intermittent asthma without complication        2. Essential hypertension        3. BMI 26.0-26.9,adult        4. Former smoker                 Immunizations:    Flu:recommend fall 2024  Pneumovax 23:2008  Prevnar 13:2015  PCV 20: not due  COVID-19: 2022    Plan:  Mild intermittent asthma is clinically stable.  She is having some increased cough and congestion due to seasonal allergies.  She will start saline spray and sinus rinse for management at this time.  She also has albuterol HFA inhaler on hand as needed.  Reviewed appropriate use.   RX zpak/pre taper for acute symptoms changes  Follow-up with primary care for only manage hypertension.  Encourage healthy diet with regular activity for conditioning.  Follow-up in 1 year to review respiratory symptoms, sooner if needed.    Please note that this dictation was created using voice recognition software. I have made every reasonable attempt to correct obvious errors, but it is possible there are errors of grammar and possibly content that I did not discover before finalizing the note.

## 2024-06-18 DIAGNOSIS — E78.5 DYSLIPIDEMIA: ICD-10-CM

## 2024-06-18 DIAGNOSIS — I16.0 HYPERTENSIVE URGENCY: ICD-10-CM

## 2024-06-18 RX ORDER — METOPROLOL TARTRATE 50 MG/1
50 TABLET, FILM COATED ORAL 2 TIMES DAILY
Qty: 200 TABLET | Refills: 3 | Status: SHIPPED | OUTPATIENT
Start: 2024-06-18

## 2024-06-18 RX ORDER — ATORVASTATIN CALCIUM 20 MG/1
20 TABLET, FILM COATED ORAL NIGHTLY
Qty: 90 TABLET | Refills: 3 | Status: SHIPPED | OUTPATIENT
Start: 2024-06-18

## 2024-06-18 RX ORDER — EZETIMIBE 10 MG/1
10 TABLET ORAL DAILY
Qty: 100 TABLET | Refills: 3 | Status: SHIPPED | OUTPATIENT
Start: 2024-06-18

## 2024-06-18 NOTE — TELEPHONE ENCOUNTER
Is the patient due for a refill? No pharmacy change to mail order.    Was the patient seen the past year? Yes    Date of last office visit: 5/1/24    Does the patient have an upcoming appointment?  No    Provider to refill:BE    Does the patients insurance require a 100 day supply?  No

## 2024-06-25 ENCOUNTER — HOSPITAL ENCOUNTER (OUTPATIENT)
Dept: LAB | Facility: MEDICAL CENTER | Age: 84
End: 2024-06-25
Attending: FAMILY MEDICINE
Payer: MEDICARE

## 2024-06-25 ENCOUNTER — OFFICE VISIT (OUTPATIENT)
Dept: MEDICAL GROUP | Facility: LAB | Age: 84
End: 2024-06-25
Payer: MEDICARE

## 2024-06-25 VITALS
RESPIRATION RATE: 16 BRPM | SYSTOLIC BLOOD PRESSURE: 112 MMHG | BODY MASS INDEX: 26.68 KG/M2 | OXYGEN SATURATION: 96 % | HEART RATE: 64 BPM | HEIGHT: 66 IN | TEMPERATURE: 97.2 F | WEIGHT: 166 LBS | DIASTOLIC BLOOD PRESSURE: 58 MMHG

## 2024-06-25 DIAGNOSIS — R10.30 LOWER ABDOMINAL PAIN: ICD-10-CM

## 2024-06-25 LAB
ALBUMIN SERPL BCP-MCNC: 3.8 G/DL (ref 3.2–4.9)
ALBUMIN/GLOB SERPL: 1.3 G/DL
ALP SERPL-CCNC: 79 U/L (ref 30–99)
ALT SERPL-CCNC: 20 U/L (ref 2–50)
ANION GAP SERPL CALC-SCNC: 9 MMOL/L (ref 7–16)
APPEARANCE UR: CLEAR
AST SERPL-CCNC: 28 U/L (ref 12–45)
BACTERIA #/AREA URNS HPF: NEGATIVE /HPF
BASOPHILS # BLD AUTO: 0.9 % (ref 0–1.8)
BASOPHILS # BLD: 0.06 K/UL (ref 0–0.12)
BILIRUB SERPL-MCNC: 0.3 MG/DL (ref 0.1–1.5)
BILIRUB UR QL STRIP.AUTO: NEGATIVE
BUN SERPL-MCNC: 17 MG/DL (ref 8–22)
CALCIUM ALBUM COR SERPL-MCNC: 9.3 MG/DL (ref 8.5–10.5)
CALCIUM SERPL-MCNC: 9.1 MG/DL (ref 8.5–10.5)
CHLORIDE SERPL-SCNC: 100 MMOL/L (ref 96–112)
CO2 SERPL-SCNC: 24 MMOL/L (ref 20–33)
COLOR UR: YELLOW
CREAT SERPL-MCNC: 0.76 MG/DL (ref 0.5–1.4)
EOSINOPHIL # BLD AUTO: 0.45 K/UL (ref 0–0.51)
EOSINOPHIL NFR BLD: 6.8 % (ref 0–6.9)
EPI CELLS #/AREA URNS HPF: NEGATIVE /HPF
ERYTHROCYTE [DISTWIDTH] IN BLOOD BY AUTOMATED COUNT: 41.3 FL (ref 35.9–50)
GFR SERPLBLD CREATININE-BSD FMLA CKD-EPI: 78 ML/MIN/1.73 M 2
GLOBULIN SER CALC-MCNC: 3 G/DL (ref 1.9–3.5)
GLUCOSE SERPL-MCNC: 96 MG/DL (ref 65–99)
GLUCOSE UR STRIP.AUTO-MCNC: NEGATIVE MG/DL
HCT VFR BLD AUTO: 43.2 % (ref 37–47)
HGB BLD-MCNC: 14.4 G/DL (ref 12–16)
HYALINE CASTS #/AREA URNS LPF: ABNORMAL /LPF
IMM GRANULOCYTES # BLD AUTO: 0.02 K/UL (ref 0–0.11)
IMM GRANULOCYTES NFR BLD AUTO: 0.3 % (ref 0–0.9)
KETONES UR STRIP.AUTO-MCNC: NEGATIVE MG/DL
LEUKOCYTE ESTERASE UR QL STRIP.AUTO: ABNORMAL
LYMPHOCYTES # BLD AUTO: 1.46 K/UL (ref 1–4.8)
LYMPHOCYTES NFR BLD: 22.1 % (ref 22–41)
MCH RBC QN AUTO: 31.8 PG (ref 27–33)
MCHC RBC AUTO-ENTMCNC: 33.3 G/DL (ref 32.2–35.5)
MCV RBC AUTO: 95.4 FL (ref 81.4–97.8)
MICRO URNS: ABNORMAL
MONOCYTES # BLD AUTO: 0.8 K/UL (ref 0–0.85)
MONOCYTES NFR BLD AUTO: 12.1 % (ref 0–13.4)
NEUTROPHILS # BLD AUTO: 3.83 K/UL (ref 1.82–7.42)
NEUTROPHILS NFR BLD: 57.8 % (ref 44–72)
NITRITE UR QL STRIP.AUTO: NEGATIVE
NRBC # BLD AUTO: 0 K/UL
NRBC BLD-RTO: 0 /100 WBC (ref 0–0.2)
PH UR STRIP.AUTO: 6.5 [PH] (ref 5–8)
PLATELET # BLD AUTO: 329 K/UL (ref 164–446)
PMV BLD AUTO: 10.2 FL (ref 9–12.9)
POTASSIUM SERPL-SCNC: 4.4 MMOL/L (ref 3.6–5.5)
PROT SERPL-MCNC: 6.8 G/DL (ref 6–8.2)
PROT UR QL STRIP: NEGATIVE MG/DL
RBC # BLD AUTO: 4.53 M/UL (ref 4.2–5.4)
RBC # URNS HPF: ABNORMAL /HPF
RBC UR QL AUTO: NEGATIVE
SODIUM SERPL-SCNC: 133 MMOL/L (ref 135–145)
SP GR UR STRIP.AUTO: 1.02
UROBILINOGEN UR STRIP.AUTO-MCNC: 0.2 MG/DL
WBC # BLD AUTO: 6.6 K/UL (ref 4.8–10.8)
WBC #/AREA URNS HPF: ABNORMAL /HPF

## 2024-06-25 PROCEDURE — 81001 URINALYSIS AUTO W/SCOPE: CPT

## 2024-06-25 PROCEDURE — 80053 COMPREHEN METABOLIC PANEL: CPT

## 2024-06-25 PROCEDURE — 99214 OFFICE O/P EST MOD 30 MIN: CPT | Performed by: FAMILY MEDICINE

## 2024-06-25 PROCEDURE — 3074F SYST BP LT 130 MM HG: CPT | Performed by: FAMILY MEDICINE

## 2024-06-25 PROCEDURE — 3078F DIAST BP <80 MM HG: CPT | Performed by: FAMILY MEDICINE

## 2024-06-25 PROCEDURE — 36415 COLL VENOUS BLD VENIPUNCTURE: CPT

## 2024-06-25 PROCEDURE — 85025 COMPLETE CBC W/AUTO DIFF WBC: CPT

## 2024-06-25 RX ORDER — OMEPRAZOLE 20 MG/1
20 CAPSULE, DELAYED RELEASE ORAL DAILY
Qty: 30 CAPSULE | Refills: 1 | Status: SHIPPED | OUTPATIENT
Start: 2024-06-25

## 2024-06-25 RX ORDER — LORAZEPAM 0.5 MG/1
0.5 TABLET ORAL EVERY 8 HOURS PRN
COMMUNITY
Start: 2024-06-24

## 2024-06-25 ASSESSMENT — FIBROSIS 4 INDEX: FIB4 SCORE: 1.3

## 2024-06-25 NOTE — PROGRESS NOTES
Subjective:     Chief Complaint   Patient presents with    Abdominal Pain     X1 week ago          HPI:   Karli presents today with isolated abdominal pain. No fever, no diarrhea, no vomiting. Started a week ago. Still present. Went out to dinner to [employer], Red sauce, meatball, pasta, a dessert and then had pain when she got home.   Pain present all over. Not eating much since then. No nausea, but worse after eating, more bloated and uncomfortable. Tylenol seemed to help last night. Tried some omeprazole for the last 3 days. This is getting better.   Did switch from Pepcid.     PMH significant for HTN, Dyslipidemia, GERD, Asthma, Hypothyroidism, CAD with LAD stenting in 2023.     Feeling now very shaky, tired, like she just wants to sleep.   Has routine back pain, nothing new. No dysuria, no other symptoms.   Has appt next Monday [facility].           Current Outpatient Medications Ordered in Epic   Medication Sig Dispense Refill    LORazepam (ATIVAN) 0.5 MG Tab Take 0.5 mg by mouth every 8 hours as needed.      omeprazole (PRILOSEC) 20 MG delayed-release capsule Take 1 Capsule by mouth every day. 30 Capsule 1    atorvastatin (LIPITOR) 20 MG Tab Take 1 Tablet by mouth every evening. 90 Tablet 3    metoprolol tartrate (LOPRESSOR) 50 MG Tab Take 1 Tablet by mouth 2 times a day. 200 Tablet 3    ezetimibe (ZETIA) 10 MG Tab Take 1 Tablet by mouth every day. 100 Tablet 3    ARMOUR THYROID 60 MG Tab TAKE 1 TABLET EVERY DAY ON 5 DAYS A WEEK 65 Tablet 3    fluticasone furoate (ARNUITY ELLIPTA) 100 MCG/ACT AEROSOL POWDER, BREATH ACTIVATED inhaler Inhale 1 Puff every day. Rinse mouth after each use. 90 Each 3    thyroid (ARMOUR THYROID) 90 MG Tab TAKE 1 TABLET DAILY ON 2 DAYS PER WEEK 26 Tablet 3    losartan (COZAAR) 100 MG Tab Take 1 Tablet by mouth every day. 100 Tablet 3    aspirin EC (ECOTRIN) 81 MG Tablet Delayed Response Take 1 Tablet by mouth every day. 100 Tablet 0    Glucosamine-Chondroitin (MOVE FREE PO) Take 1 Tablet  "by mouth every day.      Faricimab-svoa (VABYSMO) 6 MG/0.05ML Solution Administer 6 mg into both eyes see administration instructions. Every 5 to 6 weeks  Due May 26th 2023      Lutein-Zeaxanthin 25-5 MG Cap Take  by mouth every day.      acetaminophen (TYLENOL) 500 MG Tab Take 1,000 mg by mouth every morning.      Calcium Carbonate-Vit D-Min (CALCIUM 1200 PO) Take 1 tablet  by mouth 2 times a day.      Cyanocobalamin (VITAMIN B12 PO) Take 1,000 mcg by mouth every day.      Turmeric 500 MG Tab Take 500 mg by mouth 2 times a day.      vitamin D (CHOLECALCIFEROL) 1000 UNIT TABS Take 1,000 Units by mouth 2 times a day.      MAGNESIUM PO Take 1 Tablet by mouth every day.      Bioflavonoid Products (LISSY-C) TABS Take 1,000 Tablets by mouth 2 times a day.      Multiple Vitamin (MULTIVITAMIN PO) Take 1 tablet by mouth every day.       No current [facility]-ordered facility-administered medications on file.         ROS:  Gen: no fevers/chills, no changes in weight  Eyes: no changes in vision  ENT: no sore throat, no hearing loss, no bloody nose  Pulm: no sob, no cough  CV: no chest pain, no palpitations  : no dysuria  MSk: no myalgias  Skin: no rash  Neuro: no headaches, no numbness/tingling  Heme/Lymph: no easy bruising      Objective:     Exam:  /58   Pulse 64   Temp 36.2 °C (97.2 °F)   Resp 16   Ht 1.676 m (5' 6\")   Wt 75.3 kg (166 lb)   SpO2 96%   BMI 26.79 kg/m²  Body mass index is 26.79 kg/m².    Gen: Alert and oriented, No apparent distress.  Neck: Neck is supple without lymphadenopathy.  Lungs: Normal effort, CTA bilaterally, no wheezes, rhonchi, or rales  CV: Regular rate and rhythm. No murmurs, rubs, or gallops.  Ext: No clubbing, cyanosis, edema.  Abd: BS hypoactive, soft, tender in lower quadrants. No guarding or rebound.     Assessment & Plan:     83 y.o. female with the following -     1. Lower abdominal pain  She is feeling a bit better.  I think she probably just had a little bit of reaction to " some of the food that she was eating that she is not used to.  Will go ahead and make sure that we screen for other things given that every time she eats something it flares up a bit.  She does have a visit with [facility] coming up on Monday so we can likely wait for any other imaging until then unless her labs show something different.  She does still have her gallbladder so this certainly is in the equation which is a traditional right upper quadrant pain.  Discussed getting some labs and some urine to make sure we rule out other sources for discomfort.  She will continue with her omeprazole for right now.  Refill is sent in.  - CBC WITH DIFFERENTIAL; Future  - Comp Metabolic Panel; Future  - URINALYSIS,CULTURE IF INDICATED; Future            No follow-ups on file.    Please note that this dictation was created using voice recognition software. I have made every reasonable attempt to correct obvious errors, but I expect that there are errors of grammar and possibly content that I did not discover before finalizing the note.

## 2024-06-26 ENCOUNTER — OFFICE VISIT (OUTPATIENT)
Dept: URGENT CARE | Facility: CLINIC | Age: 84
End: 2024-06-26
Payer: MEDICARE

## 2024-06-26 VITALS
DIASTOLIC BLOOD PRESSURE: 62 MMHG | BODY MASS INDEX: 26.84 KG/M2 | WEIGHT: 167 LBS | OXYGEN SATURATION: 94 % | TEMPERATURE: 97.2 F | RESPIRATION RATE: 14 BRPM | SYSTOLIC BLOOD PRESSURE: 134 MMHG | HEART RATE: 68 BPM | HEIGHT: 66 IN

## 2024-06-26 DIAGNOSIS — R42 DIZZY: ICD-10-CM

## 2024-06-26 DIAGNOSIS — E03.9 HYPOTHYROIDISM, UNSPECIFIED TYPE: ICD-10-CM

## 2024-06-26 PROCEDURE — 3078F DIAST BP <80 MM HG: CPT | Performed by: FAMILY MEDICINE

## 2024-06-26 PROCEDURE — 3075F SYST BP GE 130 - 139MM HG: CPT | Performed by: FAMILY MEDICINE

## 2024-06-26 PROCEDURE — 99214 OFFICE O/P EST MOD 30 MIN: CPT | Performed by: FAMILY MEDICINE

## 2024-06-26 ASSESSMENT — ENCOUNTER SYMPTOMS
CARDIOVASCULAR NEGATIVE: 1
MUSCULOSKELETAL NEGATIVE: 1
RESPIRATORY NEGATIVE: 1
ABDOMINAL PAIN: 1
CONSTITUTIONAL NEGATIVE: 1
EYES NEGATIVE: 1
DIZZINESS: 1

## 2024-06-26 ASSESSMENT — FIBROSIS 4 INDEX: FIB4 SCORE: 1.54

## 2024-06-26 NOTE — PROGRESS NOTES
"Subjective:   Karli Berrios is a 83 y.o. female who presents for Dizziness (Started today, patient states did do labs recently and the results did come back indicates potential issues she states)      Has had gi symptoms for the last couple days, took prilosec stomach feels better, no n/v, no diarrhea or constipation.  Labs reviewed, low sodium.    Dizziness  Associated symptoms include abdominal pain.       Review of Systems   Constitutional: Negative.    HENT: Negative.     Eyes: Negative.    Respiratory: Negative.     Cardiovascular: Negative.    Gastrointestinal:  Positive for abdominal pain.   Genitourinary: Negative.    Musculoskeletal: Negative.    Skin: Negative.    Neurological:  Positive for dizziness.       Medications, Allergies, and current problem list reviewed today in Epic.     Objective:     /62 (BP Location: Left arm, Patient Position: Sitting, BP Cuff Size: Large adult)   Pulse 68   Temp 36.2 °C (97.2 °F)   Resp 14   Ht 1.676 m (5' 6\")   Wt 75.8 kg (167 lb)   SpO2 94%     Physical Exam  Vitals and nursing note reviewed.   Constitutional:       Appearance: Normal appearance.   HENT:      Head: Normocephalic and atraumatic.      Right Ear: Tympanic membrane normal.      Left Ear: Tympanic membrane normal.      Nose: Nose normal.   Eyes:      Extraocular Movements: Extraocular movements intact.      Pupils: Pupils are equal, round, and reactive to light.   Neck:      Vascular: No carotid bruit.   Cardiovascular:      Rate and Rhythm: Normal rate and regular rhythm.      Pulses: Normal pulses.      Heart sounds: Normal heart sounds.   Pulmonary:      Effort: Pulmonary effort is normal.      Breath sounds: Normal breath sounds.   Abdominal:      General: Abdomen is flat. Bowel sounds are normal.      Palpations: Abdomen is soft.   Musculoskeletal:         General: Normal range of motion.      Cervical back: Normal range of motion and neck supple. No tenderness.   Lymphadenopathy:     "  Cervical: No cervical adenopathy.   Skin:     General: Skin is warm and dry.   Neurological:      General: No focal deficit present.      Mental Status: She is alert and oriented to person, place, and time. Mental status is at baseline.      Cranial Nerves: No cranial nerve deficit.      Sensory: No sensory deficit.      Motor: No weakness.      Coordination: Coordination normal.      Gait: Gait normal.      Deep Tendon Reflexes: Reflexes normal.         Assessment/Plan:     Diagnosis and associated orders:     1. Hypothyroidism, unspecified type  THYROID PANEL WITH TSH      2. Dizzy           Comments/MDM:     Will follow up with primary tomorrow, or go to er if chest pain, sob         Differential diagnosis, natural history, supportive care, and indications for immediate follow-up discussed.    Advised the patient to follow-up with the primary care physician for recheck, reevaluation, and consideration of further management.    Please note that this dictation was created using voice recognition software. I have made a reasonable attempt to correct obvious errors, but I expect that there are errors of grammar and possibly content that I did not discover before finalizing the note.    This note was electronically signed by Satya Davis M.D.

## 2024-06-27 ENCOUNTER — HOSPITAL ENCOUNTER (OUTPATIENT)
Dept: LAB | Facility: MEDICAL CENTER | Age: 84
End: 2024-06-27
Attending: FAMILY MEDICINE
Payer: MEDICARE

## 2024-06-27 LAB
T4 FREE SERPL-MCNC: 1.09 NG/DL (ref 0.93–1.7)
TSH SERPL DL<=0.005 MIU/L-ACNC: 0.64 UIU/ML (ref 0.38–5.33)

## 2024-06-27 PROCEDURE — 36415 COLL VENOUS BLD VENIPUNCTURE: CPT

## 2024-06-27 PROCEDURE — 84443 ASSAY THYROID STIM HORMONE: CPT

## 2024-06-27 PROCEDURE — 84439 ASSAY OF FREE THYROXINE: CPT

## 2024-07-05 ENCOUNTER — PATIENT MESSAGE (OUTPATIENT)
Dept: CARDIOLOGY | Facility: MEDICAL CENTER | Age: 84
End: 2024-07-05
Payer: MEDICARE

## 2024-07-05 DIAGNOSIS — I16.0 HYPERTENSIVE URGENCY: ICD-10-CM

## 2024-07-09 RX ORDER — LOSARTAN POTASSIUM 100 MG/1
100 TABLET ORAL DAILY
Qty: 100 TABLET | Refills: 3 | Status: SHIPPED | OUTPATIENT
Start: 2024-07-09

## 2024-07-17 ENCOUNTER — TELEPHONE (OUTPATIENT)
Dept: CARDIOLOGY | Facility: MEDICAL CENTER | Age: 84
End: 2024-07-17
Payer: MEDICARE

## 2024-07-17 DIAGNOSIS — R07.89 OTHER CHEST PAIN: ICD-10-CM

## 2024-07-23 ENCOUNTER — OFFICE VISIT (OUTPATIENT)
Dept: MEDICAL GROUP | Facility: LAB | Age: 84
End: 2024-07-23
Payer: MEDICARE

## 2024-07-23 VITALS
WEIGHT: 167.2 LBS | DIASTOLIC BLOOD PRESSURE: 60 MMHG | TEMPERATURE: 96.6 F | SYSTOLIC BLOOD PRESSURE: 108 MMHG | OXYGEN SATURATION: 94 % | RESPIRATION RATE: 14 BRPM | BODY MASS INDEX: 26.87 KG/M2 | HEIGHT: 66 IN | HEART RATE: 66 BPM

## 2024-07-23 DIAGNOSIS — R07.2 PRECORDIAL PAIN: ICD-10-CM

## 2024-07-23 PROCEDURE — 3078F DIAST BP <80 MM HG: CPT | Performed by: FAMILY MEDICINE

## 2024-07-23 PROCEDURE — 99213 OFFICE O/P EST LOW 20 MIN: CPT | Performed by: FAMILY MEDICINE

## 2024-07-23 PROCEDURE — 3074F SYST BP LT 130 MM HG: CPT | Performed by: FAMILY MEDICINE

## 2024-07-23 ASSESSMENT — FIBROSIS 4 INDEX: FIB4 SCORE: 1.609968943799848581

## 2024-08-20 ENCOUNTER — PATIENT MESSAGE (OUTPATIENT)
Dept: MEDICAL GROUP | Facility: LAB | Age: 84
End: 2024-08-20
Payer: MEDICARE

## 2024-08-20 DIAGNOSIS — F41.1 GAD (GENERALIZED ANXIETY DISORDER): ICD-10-CM

## 2024-08-20 RX ORDER — LORAZEPAM 0.5 MG/1
0.5 TABLET ORAL
Qty: 30 TABLET | Refills: 3 | Status: SHIPPED | OUTPATIENT
Start: 2024-08-20 | End: 2024-09-19

## 2024-08-27 NOTE — PROCEDURE: MIPS QUALITY
hide
Quality 130: Documentation Of Current Medications In The Medical Record: Current Medications Documented
Quality 226: Preventive Care And Screening: Tobacco Use: Screening And Cessation Intervention: Patient screened for tobacco use and is an ex/non-smoker
Detail Level: Detailed
Quality 111:Pneumonia Vaccination Status For Older Adults: Patient received any pneumococcal conjugate or polysaccharide vaccine on or after their 60th birthday and before the end of the measurement period

## 2024-08-29 ENCOUNTER — APPOINTMENT (OUTPATIENT)
Dept: CARDIOLOGY | Facility: MEDICAL CENTER | Age: 84
End: 2024-08-29
Attending: INTERNAL MEDICINE
Payer: MEDICARE

## 2024-09-09 ENCOUNTER — TELEPHONE (OUTPATIENT)
Dept: CARDIOLOGY | Facility: MEDICAL CENTER | Age: 84
End: 2024-09-09
Payer: MEDICARE

## 2024-09-09 NOTE — TELEPHONE ENCOUNTER
Phone Number Called: [phone]      Call outcome: Did not leave a detailed message. Requested patient to call back.

## 2024-09-09 NOTE — TELEPHONE ENCOUNTER
BE    Caller: Karli Berrios     Topic/issue: Pt called in regards to wanting to know if she has to do the echo she would like a call back to discuss further, please advise.     Callback Number: [phone]    Thank you,     Smith WORKMAN

## 2024-09-12 NOTE — TELEPHONE ENCOUNTER
Phone number called: [phone]     Call outcome: Spoke with patient and she is asking if she still needs to complete the echo stress test. She states she determined the cause of her previous chest pain and that it was heart burn. She states she hasn't had any pains in over a month now and is concerned about the stress test because she has osteoarthritis and is worried about the treadmill.     To BE: Please advise if patient still needs the stress echo and if so she requests one that doesn't require the treadmill. Thank you!

## 2024-09-12 NOTE — TELEPHONE ENCOUNTER
BE     Caller: Karli Berrios     Topic/issue: Pt is returning phone call please advise,     Callback Number: [phone]     Thank you,    Smith WORKMAN

## 2024-09-16 NOTE — TELEPHONE ENCOUNTER
Ranjith Delgadillo M.D.  You3 days ago     If feeling better she can cancel the stress  Thx  BE     Phone number called: [phone]     Call outcome: Spoke with patient and let her know she can cancel if no longer having any cardiac symptoms/concerns. All questions/concerns answered at this time, patient appreciative of information given. Order cancelled in Epic.

## 2024-10-07 ENCOUNTER — NON-PROVIDER VISIT (OUTPATIENT)
Dept: CARDIOLOGY | Facility: MEDICAL CENTER | Age: 84
End: 2024-10-07
Attending: INTERNAL MEDICINE
Payer: MEDICARE

## 2024-10-07 DIAGNOSIS — R07.89 OTHER CHEST PAIN: ICD-10-CM

## 2024-10-07 PROCEDURE — 93246 EXT ECG>7D<15D RECORDING: CPT

## 2024-10-14 ENCOUNTER — OFFICE VISIT (OUTPATIENT)
Dept: MEDICAL GROUP | Facility: LAB | Age: 84
End: 2024-10-14
Payer: MEDICARE

## 2024-10-14 VITALS
RESPIRATION RATE: 16 BRPM | SYSTOLIC BLOOD PRESSURE: 132 MMHG | DIASTOLIC BLOOD PRESSURE: 68 MMHG | BODY MASS INDEX: 27.64 KG/M2 | TEMPERATURE: 97.6 F | OXYGEN SATURATION: 98 % | WEIGHT: 172 LBS | HEART RATE: 60 BPM | HEIGHT: 66 IN

## 2024-10-14 DIAGNOSIS — J06.9 VIRAL URI: ICD-10-CM

## 2024-10-14 PROCEDURE — 3078F DIAST BP <80 MM HG: CPT | Performed by: FAMILY MEDICINE

## 2024-10-14 PROCEDURE — 3075F SYST BP GE 130 - 139MM HG: CPT | Performed by: FAMILY MEDICINE

## 2024-10-14 PROCEDURE — 99213 OFFICE O/P EST LOW 20 MIN: CPT | Performed by: FAMILY MEDICINE

## 2024-10-14 PROCEDURE — 0241U POCT CEPHEID COV-2, FLU A/B, RSV - PCR: CPT | Performed by: FAMILY MEDICINE

## 2024-10-14 RX ORDER — METHYLPREDNISOLONE 4 MG/1
TABLET ORAL
Qty: 21 TABLET | Refills: 0 | Status: SHIPPED | OUTPATIENT
Start: 2024-10-14

## 2024-10-14 RX ORDER — AZITHROMYCIN 250 MG/1
250 TABLET, FILM COATED ORAL DAILY
Qty: 6 TABLET | Refills: 0 | Status: SHIPPED | OUTPATIENT
Start: 2024-10-14 | End: 2024-10-19

## 2024-10-14 ASSESSMENT — FIBROSIS 4 INDEX: FIB4 SCORE: 1.63

## 2024-10-29 ENCOUNTER — TELEPHONE (OUTPATIENT)
Dept: CARDIOLOGY | Facility: MEDICAL CENTER | Age: 84
End: 2024-10-29
Payer: MEDICARE

## 2024-11-07 ENCOUNTER — OFFICE VISIT (OUTPATIENT)
Dept: CARDIOLOGY | Facility: MEDICAL CENTER | Age: 84
End: 2024-11-07
Payer: MEDICARE

## 2024-11-07 VITALS
HEART RATE: 69 BPM | BODY MASS INDEX: 27.69 KG/M2 | DIASTOLIC BLOOD PRESSURE: 68 MMHG | WEIGHT: 172.3 LBS | SYSTOLIC BLOOD PRESSURE: 118 MMHG | RESPIRATION RATE: 16 BRPM | HEIGHT: 66 IN | OXYGEN SATURATION: 94 %

## 2024-11-07 DIAGNOSIS — I10 ESSENTIAL HYPERTENSION: Chronic | ICD-10-CM

## 2024-11-07 DIAGNOSIS — Z95.5 S/P CORONARY ARTERY STENT PLACEMENT: ICD-10-CM

## 2024-11-07 DIAGNOSIS — E78.5 DYSLIPIDEMIA: ICD-10-CM

## 2024-11-07 PROCEDURE — 3078F DIAST BP <80 MM HG: CPT

## 2024-11-07 PROCEDURE — 3074F SYST BP LT 130 MM HG: CPT

## 2024-11-07 PROCEDURE — 99214 OFFICE O/P EST MOD 30 MIN: CPT

## 2024-11-07 PROCEDURE — 99213 OFFICE O/P EST LOW 20 MIN: CPT

## 2024-11-07 RX ORDER — ROSUVASTATIN CALCIUM 20 MG/1
20 TABLET, COATED ORAL EVERY EVENING
Qty: 100 TABLET | Refills: 3 | Status: SHIPPED | OUTPATIENT
Start: 2024-11-07

## 2024-11-07 RX ORDER — NITROGLYCERIN 0.4 MG/1
0.4 TABLET SUBLINGUAL PRN
Qty: 25 TABLET | Refills: 3 | Status: SHIPPED | OUTPATIENT
Start: 2024-11-07

## 2024-11-07 ASSESSMENT — ENCOUNTER SYMPTOMS
PALPITATIONS: 0
DEPRESSION: 0
GASTROINTESTINAL NEGATIVE: 1
ORTHOPNEA: 0
SHORTNESS OF BREATH: 0
CONSTITUTIONAL NEGATIVE: 1
NEUROLOGICAL NEGATIVE: 1
NERVOUS/ANXIOUS: 0
MUSCULOSKELETAL NEGATIVE: 1
PND: 0
EYES NEGATIVE: 1

## 2024-11-07 ASSESSMENT — FIBROSIS 4 INDEX: FIB4 SCORE: 1.63

## 2024-11-07 NOTE — PROGRESS NOTES
Chief Complaint   Patient presents with    Coronary Artery Disease     Coronary artery disease involving native coronary artery of native heart with refractory angina pectoris (HCC)       Subjective     Karli Berrios is a 84 y.o. female who presents today for follow-up.  She has a history of CAD status post PCI to LAD in 2023 for unstable angina, normal LVEF,    She was seen by Dr. Delgadillo on 5/1/2024 for her 1 year post cath follow-up, at that visit she was taken off of Plavix in favor of aspirin monotherapy.  She was trialed off of atorvastatin due to potential cause of back pain    She recently had a cardiac event monitor did not show any significant arrhythmias.  Today 11/7/2024 she presents to review her cardiac event monitor.  She reports that back in July she was having chest pain and went to the ER for evaluation, workup was negative for ACS.  She was placed on omeprazole and her chest pain symptoms resolved for the most part.  She occasionally will have a sharp chest pain in the middle of her chest that resolves after a few minutes, not associated with activity.    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    Hypothyroidism     Lung nodules 07/2009    stable 2010    Macular pucker of both eyes     Osteoporosis     Pain in the chest 05/09/2023    Strain of left trapezius muscle     Urinary tract infection     H/O     Past Surgical History:   Procedure Laterality Date    CATARACT EXTRACTION WITH IOL Bilateral 2007    CARPAL TUNNEL RELEASE  2006     ABDOMINAL EXPLORATION      peritonitis    ABDOMINAL HYSTERECTOMY TOTAL      APPENDECTOMY      EYE SURGERY Bilateral     Vitrectomy-OD  & OS     HEMORRHOIDECTOMY      OTHER      D & C    OTHER      FISTULECTOMY    OTHER      HYSTERECTOMY    OTHER      LEFTY BREAST LIPOMA RESECTION    OTHER      H/O Oral Surgery-Dental Extractions & Implants.     Family History   Problem Relation Age of Onset    Arthritis Mother     Hyperlipidemia Mother     Glaucoma Maternal Grandfather      Social History     Socioeconomic History    Marital status:      Spouse name: Not on file    Number of children: Not on file    Years of education: Not on file    Highest education level: 12th grade   Occupational History    Not on file   Tobacco Use    Smoking status: Former     Current packs/day: 0.00     Average packs/day: 1 pack/day for 20.0 years (20.0 ttl pk-yrs)     Types: Cigarettes     Start date: 1957     Quit date: 1977     Years since quittin.4     Passive exposure: Past    Smokeless tobacco: Never   Vaping Use    Vaping status: Never Used   Substance and Sexual Activity    Alcohol use: Not Currently     Comment: occ    Drug use: No    Sexual activity: Not Currently     Partners: Male   Other Topics Concern    Not on file   Social History Narrative    Not on file     Social Drivers of Health     Financial Resource Strain: Low Risk  (10/16/2023)    Overall Financial Resource Strain (CARDIA)     Difficulty of Paying Living Expenses: Not hard at all   Food Insecurity: No Food Insecurity (10/16/2023)    Hunger Vital Sign     Worried About Running Out of Food in the Last Year: Never true     Ran Out of Food in the Last Year: Never true   Transportation Needs: No Transportation Needs (10/16/2023)    PRAPARE - Transportation     Lack of Transportation (Medical): No     Lack of Transportation (Non-Medical): No   Physical Activity: Insufficiently Active (10/16/2023)    Exercise Vital Sign     Days of Exercise per  Week: 2 days     Minutes of Exercise per Session: 30 min   Stress: Stress Concern Present (10/16/2023)    [employer] [place] [employer] - Occupational Stress Questionnaire     Feeling of Stress : To some extent   Social Connections: Socially Isolated (10/16/2023)    Social Connection and Isolation Panel [NHANES]     Frequency of Communication with Friends and Family: More than three times a week     Frequency of Social Gatherings with Friends and Family: Once a week     Attends Nondenominational Services: Never     Active Member of Clubs or Organizations: No     Attends Club or Organization Meetings: Never     Marital Status:    Intimate Partner Violence: Not on file   Housing Stability: Low Risk  (7/17/2024)    Housing Stability Vital Sign     Unable to Pay for Housing in the Last Year: No     Number of Places Lived in the Last Year: 1     Unstable Housing in the Last Year: No     Allergies   Allergen Reactions    Bactrim Ds Unspecified    Bactrim [Sulfamethoxazole W-Trimethoprim]      She states this was always an antibiotic that should be in her chart     Codeine Diarrhea     Pt stated on 06/26/2022    Denosumab Unspecified    Atenolol Unspecified     Leg cramps      Outpatient Encounter Medications as of 11/7/2024   Medication Sig Dispense Refill    nitroglycerin (NITROSTAT) 0.4 MG SL Tab Place 1 Tablet under the tongue as needed for Chest Pain (may repeat after 5 minutes may repeat x 3, if chest apin does not resolve see emergency care). 25 Tablet 3    rosuvastatin (CRESTOR) 20 MG Tab Take 1 Tablet by mouth every evening. 100 Tablet 3    methylPREDNISolone (MEDROL DOSEPAK) 4 MG Tablet Therapy Pack As directed on the packaging label. 21 Tablet 0    losartan (COZAAR) 100 MG Tab Take 1 Tablet by mouth every day. 100 Tablet 3    omeprazole (PRILOSEC) 20 MG delayed-release capsule Take 1 Capsule by mouth every day. 30 Capsule 1    metoprolol tartrate (LOPRESSOR) 50 MG Tab Take 1 Tablet by mouth 2 times a  day. 200 Tablet 3    ezetimibe (ZETIA) 10 MG Tab Take 1 Tablet by mouth every day. 100 Tablet 3    ARMOUR THYROID 60 MG Tab TAKE 1 TABLET EVERY DAY ON 5 DAYS A WEEK 65 Tablet 3    fluticasone furoate (ARNUITY ELLIPTA) 100 MCG/ACT AEROSOL POWDER, BREATH ACTIVATED inhaler Inhale 1 Puff every day. Rinse mouth after each use. 90 Each 3    thyroid (ARMOUR THYROID) 90 MG Tab TAKE 1 TABLET DAILY ON 2 DAYS PER WEEK 26 Tablet 3    aspirin EC (ECOTRIN) 81 MG Tablet Delayed Response Take 1 Tablet by mouth every day. 100 Tablet 0    Glucosamine-Chondroitin (MOVE FREE PO) Take 1 Tablet by mouth every day.      Faricimab-svoa (VABYSMO) 6 MG/0.05ML Solution Administer 6 mg into both eyes see administration instructions. Every 5 to 6 weeks  Due May 26th 2023      Lutein-Zeaxanthin 25-5 MG Cap Take  by mouth every day.      acetaminophen (TYLENOL) 500 MG Tab Take 1,000 mg by mouth every morning.      Calcium Carbonate-Vit D-Min (CALCIUM 1200 PO) Take 1 tablet  by mouth 2 times a day.      Cyanocobalamin (VITAMIN B12 PO) Take 1,000 mcg by mouth every day.      Turmeric 500 MG Tab Take 500 mg by mouth 2 times a day.      vitamin D (CHOLECALCIFEROL) 1000 UNIT TABS Take 1,000 Units by mouth 2 times a day.      MAGNESIUM PO Take 1 Tablet by mouth every day.      Bioflavonoid Products (LISSY-C) TABS Take 1,000 Tablets by mouth 2 times a day.      Multiple Vitamin (MULTIVITAMIN PO) Take 1 tablet by mouth every day.      [DISCONTINUED] atorvastatin (LIPITOR) 20 MG Tab Take 1 Tablet by mouth every evening. 90 Tablet 3     No facility-administered encounter medications on file as of 11/7/2024.     Review of Systems   Constitutional: Negative.    HENT: Negative.     Eyes: Negative.    Respiratory:  Negative for shortness of breath.    Cardiovascular:  Positive for chest pain. Negative for palpitations, orthopnea, leg swelling and PND.   Gastrointestinal: Negative.    Genitourinary: Negative.    Musculoskeletal: Negative.    Skin: Negative.   "  Neurological: Negative.    Endo/Heme/Allergies: Negative.    Psychiatric/Behavioral:  Negative for depression. The patient is not nervous/anxious.               Objective     /68 (BP Location: Left arm, Patient Position: Sitting, BP Cuff Size: Adult)   Pulse 69   Resp 16   Ht 1.676 m (5' 6\")   Wt 78.2 kg (172 lb 4.8 oz)   SpO2 94%   BMI 27.81 kg/m²     Physical Exam  Constitutional:       Appearance: Normal appearance.   HENT:      Head: Normocephalic and atraumatic.   Neck:      Vascular: No JVD.   Cardiovascular:      Rate and Rhythm: Normal rate and regular rhythm.      Pulses: Normal pulses.      Heart sounds: Normal heart sounds. No murmur heard.     No friction rub.   Pulmonary:      Effort: Pulmonary effort is normal. No respiratory distress.      Breath sounds: Normal breath sounds.   Abdominal:      General: There is no distension.      Palpations: Abdomen is soft.   Musculoskeletal:      Right lower leg: No edema.      Left lower leg: No edema.   Skin:     General: Skin is warm and dry.   Neurological:      General: No focal deficit present.      Mental Status: She is alert and oriented to person, place, and time.   Psychiatric:         Mood and Affect: Mood normal.         Behavior: Behavior normal.            Lab Results   Component Value Date/Time    WBC 8.1 07/17/2024 05:30 PM    RBC 4.95 07/17/2024 05:30 PM    HEMOGLOBIN 15.7 07/17/2024 05:30 PM    HEMATOCRIT 45.7 07/17/2024 05:30 PM    MCV 92.3 07/17/2024 05:30 PM    MCH 31.7 07/17/2024 05:30 PM    MCHC 34.4 07/17/2024 05:30 PM    MPV 10.1 07/17/2024 05:30 PM    NEUTSPOLYS 57.80 06/25/2024 01:29 PM    LYMPHOCYTES 22.10 06/25/2024 01:29 PM    MONOCYTES 12.10 06/25/2024 01:29 PM    EOSINOPHILS 6.80 06/25/2024 01:29 PM    BASOPHILS 0.90 06/25/2024 01:29 PM      Lab Results   Component Value Date/Time    CHOLSTRLTOT 153 01/23/2024 09:43 AM    LDL 62 01/23/2024 09:43 AM    HDL 67 01/23/2024 09:43 AM    TRIGLYCERIDE 121 01/23/2024 09:43 AM    "    Lab Results   Component Value Date/Time    SODIUM 133 (L) 07/17/2024 05:30 PM    POTASSIUM 4.3 07/17/2024 05:30 PM    CHLORIDE 102 07/17/2024 05:30 PM    CO2 25 07/17/2024 05:30 PM    GLUCOSE 91 07/17/2024 05:30 PM    BUN 25 (H) 07/17/2024 05:30 PM    CREATININE 0.8 07/17/2024 05:30 PM    BUNCREATRAT 16 10/14/2020 08:05 AM     Lab Results   Component Value Date/Time    ALKPHOSPHAT 79 06/25/2024 01:29 PM    ASTSGOT 28 06/25/2024 01:29 PM    ALTSGPT 20 06/25/2024 01:29 PM    TBILIRUBIN 0.3 06/25/2024 01:29 PM      Echocardiogram 5/10/2023  CONCLUSIONS  No prior study is available for comparison.   Normal left ventricular size and systolic function.  The left ventricular ejection fraction is visually estimated to be 70%.  No significant valvular disease.    Procedure: ZIO for 2 weeks beginning 10/7/2024   DOS: 10/29/2024     Findings:   Underlying rhythm: Sinus   The average heart rate is 68 BPM, and ranges from  BPM     Atrial events: Rare PACs. No atrial fibrillation     Ventricular events: Rare PVCs. No ventricular tachycardia     Pauses, bradyarrhythmia or heart block: None     Patient events: No symptoms     Impressions:   Asymptomatic monitor   No significant arrhythmia     Angiogram 5/10/2023  INDICATION/PREOPERATIVE DIAGNOSIS:  Unstable angina  Hypertension     POSTOPERATIVE DIAGNOSIS:  99% proximal LAD stenosis, single-vessel disease  LVEDP 0 mmHg  Successful IVUS guided PCI of proximal LAD (3.5 x 18 mm Nathaniel BRITT), excellent result     RECOMMENDATIONS:  Guideline directed medical therapy   Cardiovascular Risk factor modification  Dual antiplatelet therapy for minimum 1 year        PROCEDURES PERFORMED:  Coronary arteriograms  Left heart catheterization and Left ventriculogram   Supervision moderate sedation  Percutaneous coronary intervention  Intravascular ultrasound LAD        FINDINGS:  I.  HEMODYNAMICS              Ao: 108/46 mmHg              LEDP: 0 mmHg              Gradient on LV pullback: No      II. CORONARY ANGIOGRAPHY:  Left main coronary artery: Large caliber bifurcating no CAD  Left anterior descending artery: Large caliber thrombotic 99% proximal stenosis with TRA II flow.  No branch disease.  Post PCI 0% residual stenosis and TRA-3 flow.  Left circumflex coronary artery: Moderate caliber no significant CAD.  Right coronary artery: Large-caliber trivial CAD.       Assessment & Plan     1. S/P coronary artery stent placement  nitroglycerin (NITROSTAT) 0.4 MG SL Tab      2. Dyslipidemia  rosuvastatin (CRESTOR) 20 MG Tab      3. Essential hypertension            Medical Decision Making: Today's Assessment/Status/Plan:        Coronary Artery Disease  Status post PCI with BRITT to LAD on 5/10/2023  HTN, HLD  -Overall she is doing well with well-controlled risk factors, we had a long discussion about needing to be on a high intensity statin following stent placement, she is willing to try rosuvastatin  -Regarding her infrequent chest pains I have given her a as needed prescription for nitroglycerin  - continue aspirin, losartan 100 mg daily, metoprolol 50 mg twice daily, rosuvastatin 20 mg daily, and ezetimibe  -LDL goal of less than 70, most recent 62  -BP goal of less than 130/80  We addressed the management of atherosclerotic artery disease.  She is on proper antiplatelet, cholesterol management and beta-blockers as appropriate.  We addressed the potential side effects and laboratory follow-up for these medications.    Follow-up with SOFY Peterson in 1 year    This note was dictated using Dragon speech recognition software.

## 2024-11-18 ENCOUNTER — OFFICE VISIT (OUTPATIENT)
Dept: MEDICAL GROUP | Facility: LAB | Age: 84
End: 2024-11-18
Payer: MEDICARE

## 2024-11-18 VITALS
BODY MASS INDEX: 27.97 KG/M2 | WEIGHT: 174 LBS | HEIGHT: 66 IN | TEMPERATURE: 97.2 F | OXYGEN SATURATION: 97 % | SYSTOLIC BLOOD PRESSURE: 112 MMHG | RESPIRATION RATE: 16 BRPM | HEART RATE: 62 BPM | DIASTOLIC BLOOD PRESSURE: 60 MMHG

## 2024-11-18 DIAGNOSIS — M15.0 PRIMARY OSTEOARTHRITIS INVOLVING MULTIPLE JOINTS: ICD-10-CM

## 2024-11-18 DIAGNOSIS — Z00.00 ENCOUNTER FOR MEDICARE ANNUAL WELLNESS EXAM: ICD-10-CM

## 2024-11-18 DIAGNOSIS — Z95.5 S/P CORONARY ARTERY STENT PLACEMENT: ICD-10-CM

## 2024-11-18 PROCEDURE — G0439 PPPS, SUBSEQ VISIT: HCPCS | Performed by: FAMILY MEDICINE

## 2024-11-18 RX ORDER — GABAPENTIN 100 MG/1
100-200 CAPSULE ORAL
COMMUNITY
Start: 2024-10-22 | End: 2024-11-18

## 2024-11-18 RX ORDER — LORAZEPAM 0.5 MG/1
0.5 TABLET ORAL
COMMUNITY
Start: 2024-09-30

## 2024-11-18 ASSESSMENT — ACTIVITIES OF DAILY LIVING (ADL): BATHING_REQUIRES_ASSISTANCE: 0

## 2024-11-18 ASSESSMENT — PATIENT HEALTH QUESTIONNAIRE - PHQ9: CLINICAL INTERPRETATION OF PHQ2 SCORE: 0

## 2024-11-18 ASSESSMENT — FIBROSIS 4 INDEX: FIB4 SCORE: 1.63

## 2024-11-18 ASSESSMENT — ENCOUNTER SYMPTOMS: GENERAL WELL-BEING: GOOD

## 2024-11-18 NOTE — PROGRESS NOTES
Chief Complaint   Patient presents with    Medicare Annual Wellness       HPI:  Karli Berrios is a 84 y.o. here for Medicare Annual Wellness Visit.     Busy with holidays upcoming.   Holiday cards.     When waking in the AM lots of pain, hands, back, neck. Tylenol in the AM, heating pad helps. Scared of other meds due to side effects.   Saw rheum. They ordered PT. She wants to postpone due to weather, and conditions, darkness.     Follows with cardio as well, CAD, with stenting. Cardio wants her to have her LDL 55.     Recently started on arnuity for chronic cough, which has been helpful. Lying down and talking a lot triggers this.     Sees eye providers.   Dusty Mendoza.   Sees derm. Visit tomorrow.   Sees dentist.     Patient Active Problem List    Diagnosis Date Noted    S/P coronary artery stent placement 11/07/2024    Lung nodule seen on imaging study 10/10/2023    Chest pain 07/25/2023    Abnormal cardiovascular stress test 05/09/2023    Atrophic vaginitis 11/07/2022    LUIS FELIPE (generalized anxiety disorder) 08/30/2021    Degeneration of lumbar intervertebral disc 07/01/2021    GERD (gastroesophageal reflux disease) 07/01/2021    Osteoarthrosis 07/01/2021    Osteoporosis 07/01/2021    Acquired hypothyroidism 04/21/2021    Dyslipidemia 04/21/2021    Backache 04/21/2021    Essential hypertension 04/21/2021    Vitamin D deficiency 04/21/2021    Asthma, mild intermittent 05/02/2016    Neck pain 01/26/2010    Macular puckering of retina 04/21/2008       Current Outpatient Medications   Medication Sig Dispense Refill    LORazepam (ATIVAN) 0.5 MG Tab Take 0.5 mg by mouth 1 time a day as needed for Anxiety.      nitroglycerin (NITROSTAT) 0.4 MG SL Tab Place 1 Tablet under the tongue as needed for Chest Pain (may repeat after 5 minutes may repeat x 3, if chest apin does not resolve see emergency care). 25 Tablet 3    rosuvastatin (CRESTOR) 20 MG Tab Take 1 Tablet by mouth every evening. 100 Tablet 3    losartan (COZAAR)  100 MG Tab Take 1 Tablet by mouth every day. 100 Tablet 3    omeprazole (PRILOSEC) 20 MG delayed-release capsule Take 1 Capsule by mouth every day. 30 Capsule 1    metoprolol tartrate (LOPRESSOR) 50 MG Tab Take 1 Tablet by mouth 2 times a day. 200 Tablet 3    ezetimibe (ZETIA) 10 MG Tab Take 1 Tablet by mouth every day. 100 Tablet 3    ARMOUR THYROID 60 MG Tab TAKE 1 TABLET EVERY DAY ON 5 DAYS A WEEK 65 Tablet 3    fluticasone furoate (ARNUITY ELLIPTA) 100 MCG/ACT AEROSOL POWDER, BREATH ACTIVATED inhaler Inhale 1 Puff every day. Rinse mouth after each use. 90 Each 3    thyroid (ARMOUR THYROID) 90 MG Tab TAKE 1 TABLET DAILY ON 2 DAYS PER WEEK 26 Tablet 3    aspirin EC (ECOTRIN) 81 MG Tablet Delayed Response Take 1 Tablet by mouth every day. 100 Tablet 0    Glucosamine-Chondroitin (MOVE FREE PO) Take 1 Tablet by mouth every day.      Faricimab-svoa (VABYSMO) 6 MG/0.05ML Solution Administer 6 mg into both eyes see administration instructions. Every 5 to 6 weeks  Due May 26th 2023      Lutein-Zeaxanthin 25-5 MG Cap Take  by mouth every day.      acetaminophen (TYLENOL) 500 MG Tab Take 1,000 mg by mouth every morning.      Calcium Carbonate-Vit D-Min (CALCIUM 1200 PO) Take 1 tablet  by mouth 2 times a day.      Cyanocobalamin (VITAMIN B12 PO) Take 1,000 mcg by mouth every day.      Turmeric 500 MG Tab Take 500 mg by mouth 2 times a day.      vitamin D (CHOLECALCIFEROL) 1000 UNIT TABS Take 1,000 Units by mouth 2 times a day.      MAGNESIUM PO Take 1 Tablet by mouth every day.      Bioflavonoid Products (LISSY-C) TABS Take 1,000 Tablets by mouth 2 times a day.      Multiple Vitamin (MULTIVITAMIN PO) Take 1 tablet by mouth every day.       No current facility-administered medications for this visit.          Current supplements as per medication list.     Allergies: Bactrim ds, Bactrim [sulfamethoxazole w-trimethoprim], Codeine, Denosumab, and Atenolol    Current social contact/activities: Lunch/Dinner with friends     She   reports that she quit smoking about 47 years ago. Her smoking use included cigarettes. She started smoking about 67 years ago. She has a 20 pack-year smoking history. She has been exposed to tobacco smoke. She has never used smokeless tobacco. She reports that she does not currently use alcohol. She reports that she does not use drugs.  Counseling given: Not Answered      ROS:    Gait: Uses no assistive device  Ostomy: No  Other tubes: No  Amputations: No  Chronic oxygen use: No  Last eye exam: 10/2024  Wears hearing aids: No   : Denies any urinary leakage during the last 6 months    Screening:    Depression Screening  Little interest or pleasure in doing things?  0 - not at all  Feeling down, depressed , or hopeless? 0 - not at all  Patient Health Questionnaire Score: 0     If depressive symptoms identified deferred to follow up visit unless specifically addressed in assessment and plan.    Interpretation of PHQ-9 Total Score   Score Severity   1-4 No Depression   5-9 Mild Depression   10-14 Moderate Depression   15-19 Moderately Severe Depression   20-27 Severe Depression    Screening for Cognitive Impairment  Do you or any of your friends or family members have any concern about your memory?    Three Minute Recall (Leader, Season, Table) 2/3    Alvarado clock face with all 12 numbers and set the hands to show 10 minutes after 11.  Yes    Cognitive concerns identified deferred for follow up unless specifically addressed in assessment and plan.    Fall Risk Assessment  Has the patient had two or more falls in the last year or any fall with injury in the last year?  No    Safety Assessment  Do you always wear your seatbelt?  Yes  Any changes to home needed to function safely? No  Difficulty hearing.  No  Patient counseled about all safety risks that were identified.    Functional Assessment ADLs  Are there any barriers preventing you from cooking for yourself or meeting nutritional needs?  No.    Are there any  barriers preventing you from driving safely or obtaining transportation?  No.    Are there any barriers preventing you from using a telephone or calling for help?  No    Are there any barriers preventing you from shopping?  No.    Are there any barriers preventing you from taking care of your own finances?  No    Are there any barriers preventing you from managing your medications?  No    Are there any barriers preventing you from showering, bathing or dressing yourself? No    Are there any barriers preventing you from doing housework or laundry? No  Are there any barriers preventing you from using the toilet?No  Are you currently engaging in any exercise or physical activity?  Yes.      Self-Assessment of Health  What is your perception of your health? Good    Do you sleep more than six hours a night? Yes    In the past 7 days, how much did pain keep you from doing your normal work? Some    Do you spend quality time with family or friends (virtually or in person)? Yes    Do you usually eat a heart healthy diet that constists of a variety of fruits, vegetables, whole grains and fiber? Yes    Do you eat foods high in fat and/or Fast Food more than three times per week? No    How concerned are you that your medical conditions are not being well managed? Not at all    Are you worried that in the next 2 months, you may not have stable housing that you own, rent, or stay in as part of a household? No      Advance Care Planning  Do you have an Advance Directive, Living Will, Durable Power of , or POLST? Yes  Advance Directive       is on file      Health Maintenance Summary            Overdue - COVID-19 Vaccine (5 - 2024-25 season) Overdue since 9/1/2024      10/10/2022  Imm Admin: PFIZER BIVALENT SARS-COV-2 VACCINE (12+)    10/06/2021  Imm Admin: PFIZER PURPLE CAP SARS-COV-2 VACCINATION (12+)    03/28/2021  Imm Admin: PFIZER PURPLE CAP SARS-COV-2 VACCINATION (12+)    03/07/2021  Imm Admin: PFIZER PURPLE CAP  SARS-COV-2 VACCINATION (12+)              Annual Wellness Visit (Yearly) Next due on 11/18/2025 11/18/2024  Visit Dx: Encounter for Medicare annual wellness exam    10/19/2023  Level of Service: ANNUAL WELLNESS VISIT-INCLUDES PPPS SUBSEQUE*    10/19/2023  Visit Dx: Medicare annual wellness visit, subsequent    11/14/2022  Visit Dx: Encounter for Medicare annual wellness exam              Ordered - Bone Density Scan (Every 5 Years) Ordered on 10/23/2024      04/24/2023  DS-BONE DENSITY STUDY (DEXA)    04/21/2021  DS-BONE DENSITY STUDY (DEXA)    03/10/2005  DS-BONE DENSITY STUDY (DEXA)              IMM DTaP/Tdap/Td Vaccine (2 - Td or Tdap) Next due on 9/15/2030      09/15/2020  Imm Admin: Tdap Vaccine              Hepatitis A Vaccine (Hep A) (Series Information) Aged Out      02/07/2002  Imm Admin: Hepatitis A Vaccine, Adult    07/31/2001  Imm Admin: Hepatitis A Vaccine, Adult              Hepatitis B Vaccine (Hep B) (Series Information) Completed      02/07/2002  Imm Admin: Hepatitis B Vaccine (Adol/Adult)    09/12/2001  Imm Admin: Hepatitis B Vaccine (Adol/Adult)    07/31/2001  Imm Admin: Hepatitis B Vaccine (Adol/Adult)              Pneumococcal Vaccine: 65+ Years (Series Information) Completed      11/10/2015  Imm Admin: Pneumococcal Conjugate Vaccine (Prevnar/PCV-13)    10/01/2015  Imm Admin: Pneumococcal Conjugate Vaccine (Prevnar/PCV-13)    01/01/2008  Imm Admin: Pneumococcal polysaccharide vaccine (PPSV-23)    01/01/2008  Imm Admin: Pneumococcal Vaccine (UF) - HISTORICAL DATA              Zoster (Shingles) Vaccines (Series Information) Completed      03/09/2020  Imm Admin: Zoster Vaccine Recombinant (RZV) (SHINGRIX)    12/09/2019  Imm Admin: Zoster Vaccine Recombinant (RZV) (SHINGRIX)              Influenza Vaccine (Series Information) Completed      09/30/2024  Imm Admin: Influenza high-dose trivalent (PF)    09/08/2023  Imm Admin: Influenza Vaccine Adult HD    09/30/2022  Imm Admin: Influenza Vaccine  Adult HD    10/06/2021  Imm Admin: Influenza Vaccine Adult HD    09/15/2020  Imm Admin: INFLUENZA TIV (IM)    Only the first 5 history entries have been loaded, but more history exists.              HPV Vaccines (Series Information) Aged Out      No completion history exists for this topic.              Polio Vaccine (Inactivated Polio) (Series Information) Aged Out      No completion history exists for this topic.              Meningococcal Immunization (Series Information) Aged Out      No completion history exists for this topic.              Discontinued - Mammogram  Discontinued        Frequency changed to Never automatically (Topic No Longer Applies)    2024  MA-SCREENING MAMMO BILAT W/TOMOSYNTHESIS W/CAD    2023  MA-SCREENING MAMMO BILAT W/TOMOSYNTHESIS W/CAD    2022  MA-SCREENING MAMMO BILAT W/TOMOSYNTHESIS W/CAD    2021  MA-SCREENING MAMMO BILAT W/TOMOSYNTHESIS W/CAD    Only the first 5 history entries have been loaded, but more history exists.              Discontinued - Colorectal Cancer Screening  Discontinued        Frequency changed to Never automatically (Topic No Longer Applies)    08/15/2019  Colonoscopy (Done - [facility])                    Patient Care Team:  Deepa Joe M.D. as PCP - General (Family Medicine)        Social History     Tobacco Use    Smoking status: Former     Current packs/day: 0.00     Average packs/day: 1 pack/day for 20.0 years (20.0 ttl pk-yrs)     Types: Cigarettes     Start date: 1957     Quit date: 1977     Years since quittin.4     Passive exposure: Past    Smokeless tobacco: Never   Vaping Use    Vaping status: Never Used   Substance Use Topics    Alcohol use: Not Currently     Comment: occ    Drug use: No     Family History   Problem Relation Age of Onset    Arthritis Mother     Hyperlipidemia Mother     Glaucoma Maternal Grandfather      She  has a past medical history of Abnormal cardiovascular stress test  (05/09/2023), 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), wet (HCC) (12/09/2022), Anxiety, Arthritis, Asthma, Bronchitis, Cataract, Detached retina (12/09/2022), Dry age-related macular degeneration of right eye (12/09/2022), GERD (gastroesophageal reflux disease), High cholesterol, HTN (hypertension), Hyperlipidemia, Hypertensive urgency, 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    She has no past medical history of Addisons disease (HCC), Adrenal disorder (HCC), Allergy, Anemia, Arrhythmia, Blood transfusion without reported diagnosis, Breast cancer (HCC), Cancer (HCC), Cancer of peritoneum (HCC), CHF (congestive heart failure) (HCC), Clotting disorder (HCC), COPD (chronic obstructive pulmonary disease) (HCC), Cushings syndrome (HCC), Depression, Diabetes (HCC), Diabetic neuropathy (HCC), Glaucoma, Goiter, Head ache, Heart attack (HCC), Heart murmur, HIV (human immunodeficiency virus infection) (HCC), IBD (inflammatory bowel disease), Malignant neoplasm of fallopian tube (HCC), Meningitis, Migraine, Ovarian cancer (HCC), Parathyroid disorder (HCC), Pituitary disease (HCC), Pulmonary emphysema (HCC), Seizure (HCC), Sickle cell disease (HCC), Stroke (HCC), Substance abuse (HCC), or Tuberculosis.   Past Surgical History:   Procedure Laterality Date    CATARACT EXTRACTION WITH IOL Bilateral 2007    CARPAL TUNNEL RELEASE  2006    ABDOMINAL EXPLORATION      peritonitis    ABDOMINAL HYSTERECTOMY TOTAL      APPENDECTOMY      EYE SURGERY Bilateral     Vitrectomy-OD 2008 & OS 2011    HEMORRHOIDECTOMY      OTHER      D & C    OTHER      FISTULECTOMY    OTHER      HYSTERECTOMY    OTHER      LEFTY BREAST LIPOMA RESECTION    OTHER      H/O Oral Surgery-Dental Extractions & Implants.       Exam:   /60   Pulse 62   Temp 36.2 °C (97.2 °F)   Resp 16   Ht 1.676 m (5'  "6\")   Wt 78.9 kg (174 lb)   SpO2 97%  Body mass index is 28.08 kg/m².    Hearing good.    Dentition good  Alert, oriented in no acute distress.  Eye contact is good, speech goal directed, affect calm  RRR, CTAB. No edema.     Assessment and Plan. The following treatment and monitoring plan is recommended:    1. Encounter for Medicare annual wellness exam    2. S/P coronary artery stent placement    3. Primary osteoarthritis involving multiple joints    Other orders  - LORazepam (ATIVAN) 0.5 MG Tab; Take 0.5 mg by mouth 1 time a day as needed for Anxiety.      Services suggested: No services needed at this time  Health Care Screening: Age-appropriate preventive services recommended by USPTF and ACIP covered by Medicare were discussed today. Services ordered if indicated and agreed upon by the patient.  Referrals offered: Community-based lifestyle interventions to reduce health risks and promote self-management and wellness, fall prevention, nutrition, physical activity, tobacco-use cessation, weight loss, and mental health services as per orders if indicated.    Discussion today about general wellness and lifestyle habits:    Prevent falls and reduce trip hazards; Cautioned about securing or removing rugs.  Have a working fire alarm and carbon monoxide detector;   Engage in regular physical activity and social activities     Follow-up: No follow-ups on file.  "

## 2024-11-19 ENCOUNTER — APPOINTMENT (RX ONLY)
Dept: URBAN - METROPOLITAN AREA CLINIC 15 | Facility: CLINIC | Age: 84
Setting detail: DERMATOLOGY
End: 2024-11-19

## 2024-11-19 DIAGNOSIS — Z71.89 OTHER SPECIFIED COUNSELING: ICD-10-CM

## 2024-11-19 DIAGNOSIS — Z85.828 PERSONAL HISTORY OF OTHER MALIGNANT NEOPLASM OF SKIN: ICD-10-CM

## 2024-11-19 DIAGNOSIS — D18.0 HEMANGIOMA: ICD-10-CM

## 2024-11-19 DIAGNOSIS — Z86.006 PERSONAL HISTORY OF MELANOMA IN-SITU: ICD-10-CM

## 2024-11-19 DIAGNOSIS — L82.1 OTHER SEBORRHEIC KERATOSIS: ICD-10-CM

## 2024-11-19 DIAGNOSIS — L73.8 OTHER SPECIFIED FOLLICULAR DISORDERS: ICD-10-CM

## 2024-11-19 DIAGNOSIS — L82.0 INFLAMED SEBORRHEIC KERATOSIS: ICD-10-CM

## 2024-11-19 DIAGNOSIS — L81.4 OTHER MELANIN HYPERPIGMENTATION: ICD-10-CM

## 2024-11-19 DIAGNOSIS — D22 MELANOCYTIC NEVI: ICD-10-CM

## 2024-11-19 PROBLEM — D22.5 MELANOCYTIC NEVI OF TRUNK: Status: ACTIVE | Noted: 2024-11-19

## 2024-11-19 PROBLEM — D18.01 HEMANGIOMA OF SKIN AND SUBCUTANEOUS TISSUE: Status: ACTIVE | Noted: 2024-11-19

## 2024-11-19 PROCEDURE — 17110 DESTRUCTION B9 LES UP TO 14: CPT

## 2024-11-19 PROCEDURE — ? LIQUID NITROGEN

## 2024-11-19 PROCEDURE — ? ADDITIONAL NOTES

## 2024-11-19 PROCEDURE — 99213 OFFICE O/P EST LOW 20 MIN: CPT | Mod: 25

## 2024-11-19 PROCEDURE — ? COUNSELING

## 2024-11-19 ASSESSMENT — LOCATION SIMPLE DESCRIPTION DERM
LOCATION SIMPLE: CHEST
LOCATION SIMPLE: LEFT THIGH
LOCATION SIMPLE: LEFT POSTERIOR UPPER ARM
LOCATION SIMPLE: LEFT ANKLE
LOCATION SIMPLE: RIGHT CHEEK
LOCATION SIMPLE: LEFT CHEEK
LOCATION SIMPLE: RIGHT LOWER BACK
LOCATION SIMPLE: RIGHT THIGH
LOCATION SIMPLE: LEFT HAND
LOCATION SIMPLE: LEFT UPPER BACK
LOCATION SIMPLE: UPPER BACK
LOCATION SIMPLE: LEFT PRETIBIAL REGION
LOCATION SIMPLE: LEFT LOWER LEG

## 2024-11-19 ASSESSMENT — LOCATION DETAILED DESCRIPTION DERM
LOCATION DETAILED: RIGHT MEDIAL INFERIOR CHEST
LOCATION DETAILED: LEFT PROXIMAL POSTERIOR UPPER ARM
LOCATION DETAILED: LEFT RADIAL DORSAL HAND
LOCATION DETAILED: LEFT POSTERIOR LATERAL DISTAL THIGH
LOCATION DETAILED: RIGHT SUPERIOR LATERAL MIDBACK
LOCATION DETAILED: RIGHT ANTERIOR LATERAL PROXIMAL THIGH
LOCATION DETAILED: LEFT LATERAL PROXIMAL CALF
LOCATION DETAILED: LEFT ANTERIOR PROXIMAL THIGH
LOCATION DETAILED: LEFT CENTRAL MALAR CHEEK
LOCATION DETAILED: RIGHT CENTRAL MALAR CHEEK
LOCATION DETAILED: LEFT LATERAL POSTERIOR ANKLE
LOCATION DETAILED: LEFT DISTAL PRETIBIAL REGION
LOCATION DETAILED: LEFT INFERIOR UPPER BACK
LOCATION DETAILED: SUPERIOR THORACIC SPINE

## 2024-11-19 ASSESSMENT — LOCATION ZONE DERM
LOCATION ZONE: FACE
LOCATION ZONE: LEG
LOCATION ZONE: TRUNK
LOCATION ZONE: HAND
LOCATION ZONE: ARM

## 2024-12-06 ENCOUNTER — APPOINTMENT (OUTPATIENT)
Dept: MEDICAL GROUP | Facility: LAB | Age: 84
End: 2024-12-06
Payer: MEDICARE

## 2024-12-06 ENCOUNTER — OFFICE VISIT (OUTPATIENT)
Dept: MEDICAL GROUP | Facility: LAB | Age: 84
End: 2024-12-06
Payer: MEDICARE

## 2024-12-06 VITALS
BODY MASS INDEX: 27.64 KG/M2 | SYSTOLIC BLOOD PRESSURE: 122 MMHG | HEIGHT: 66 IN | TEMPERATURE: 97 F | DIASTOLIC BLOOD PRESSURE: 70 MMHG | RESPIRATION RATE: 16 BRPM | WEIGHT: 172 LBS | OXYGEN SATURATION: 99 % | HEART RATE: 72 BPM

## 2024-12-06 DIAGNOSIS — J40 BRONCHITIS: ICD-10-CM

## 2024-12-06 PROCEDURE — 3078F DIAST BP <80 MM HG: CPT | Performed by: FAMILY MEDICINE

## 2024-12-06 PROCEDURE — 99213 OFFICE O/P EST LOW 20 MIN: CPT | Performed by: FAMILY MEDICINE

## 2024-12-06 PROCEDURE — 3074F SYST BP LT 130 MM HG: CPT | Performed by: FAMILY MEDICINE

## 2024-12-06 RX ORDER — PREDNISONE 20 MG/1
40 TABLET ORAL DAILY
Qty: 10 TABLET | Refills: 0 | Status: SHIPPED | OUTPATIENT
Start: 2024-12-06 | End: 2024-12-10 | Stop reason: SDUPTHER

## 2024-12-06 ASSESSMENT — FIBROSIS 4 INDEX: FIB4 SCORE: 1.63

## 2024-12-06 NOTE — PROGRESS NOTES
Subjective:     Chief Complaint   Patient presents with    Cough     X1 week     Sore Throat         HPI:   Karli presents today for cough and sore throat.  Cough started first.  She feels like sore throat may be related to irritation from the cough itself.  Worse with lying down.  Not really short of breath today.  Used albuterol last night for coughing and it did seem to help open things up a little bit.  No recent COVID vaccine.  This has been present for about a week or so.  Has in the past had a chronic cough which been difficult to treat.  Does have a Arnuity Ellipta inhaler.    Felt more tired yesterday, but previously doing fine. No fevers.   Not doing any saline spray or rinse.     Some heaviness or tightness in upper chest.     Current Outpatient Medications Ordered in Epic   Medication Sig Dispense Refill    LORazepam (ATIVAN) 0.5 MG Tab Take 0.5 mg by mouth 1 time a day as needed for Anxiety.      nitroglycerin (NITROSTAT) 0.4 MG SL Tab Place 1 Tablet under the tongue as needed for Chest Pain (may repeat after 5 minutes may repeat x 3, if chest apin does not resolve see emergency care). 25 Tablet 3    rosuvastatin (CRESTOR) 20 MG Tab Take 1 Tablet by mouth every evening. 100 Tablet 3    losartan (COZAAR) 100 MG Tab Take 1 Tablet by mouth every day. 100 Tablet 3    omeprazole (PRILOSEC) 20 MG delayed-release capsule Take 1 Capsule by mouth every day. 30 Capsule 1    metoprolol tartrate (LOPRESSOR) 50 MG Tab Take 1 Tablet by mouth 2 times a day. 200 Tablet 3    ezetimibe (ZETIA) 10 MG Tab Take 1 Tablet by mouth every day. 100 Tablet 3    ARMOUR THYROID 60 MG Tab TAKE 1 TABLET EVERY DAY ON 5 DAYS A WEEK 65 Tablet 3    fluticasone furoate (ARNUITY ELLIPTA) 100 MCG/ACT AEROSOL POWDER, BREATH ACTIVATED inhaler Inhale 1 Puff every day. Rinse mouth after each use. 90 Each 3    thyroid (ARMOUR THYROID) 90 MG Tab TAKE 1 TABLET DAILY ON 2 DAYS PER WEEK 26 Tablet 3    aspirin EC (ECOTRIN) 81 MG Tablet Delayed  "Response Take 1 Tablet by mouth every day. 100 Tablet 0    Glucosamine-Chondroitin (MOVE FREE PO) Take 1 Tablet by mouth every day.      Faricimab-svoa (VABYSMO) 6 MG/0.05ML Solution Administer 6 mg into both eyes see administration instructions. Every 5 to 6 weeks  Due May 26th 2023      Lutein-Zeaxanthin 25-5 MG Cap Take  by mouth every day.      acetaminophen (TYLENOL) 500 MG Tab Take 1,000 mg by mouth every morning.      Calcium Carbonate-Vit D-Min (CALCIUM 1200 PO) Take 1 tablet  by mouth 2 times a day.      Cyanocobalamin (VITAMIN B12 PO) Take 1,000 mcg by mouth every day.      Turmeric 500 MG Tab Take 500 mg by mouth 2 times a day.      vitamin D (CHOLECALCIFEROL) 1000 UNIT TABS Take 1,000 Units by mouth 2 times a day.      MAGNESIUM PO Take 1 Tablet by mouth every day.      Bioflavonoid Products (LISSY-C) TABS Take 1,000 Tablets by mouth 2 times a day.      Multiple Vitamin (MULTIVITAMIN PO) Take 1 tablet by mouth every day.       No current [facility]-ordered facility-administered medications on file.         ROS:  Gen: no fevers/chills, no changes in weight  Eyes: no changes in vision  ENT: no sore throat, no hearing loss, no bloody nose  Pulm: no sob, no cough  CV: no chest pain, no palpitations  GI: no nausea/vomiting, no diarrhea  : no dysuria  MSk: no myalgias  Skin: no rash  Neuro: no headaches, no numbness/tingling  Heme/Lymph: no easy bruising      Objective:     Exam:  /70   Pulse 72   Temp 36.1 °C (97 °F)   Resp 16   Ht 1.676 m (5' 6\")   Wt 78 kg (172 lb)   SpO2 99%   BMI 27.76 kg/m²  Body mass index is 27.76 kg/m².    Gen: Alert and oriented, No apparent distress.  Neck: Neck is supple without lymphadenopathy.  Lungs: Normal effort, CTA bilaterally, no wheezes, rhonchi, or rales  CV: Regular rate and rhythm. No murmurs, rubs, or gallops.  Ext: No clubbing, cyanosis, edema.      Assessment & Plan:     84 y.o. female with the following -     1. Bronchitis  Reassured that her lungs actually " sound fairly good.  I think we will go ahead and just do a prednisone burst because this may have been a little bit of an asthma exacerbation.  She should also get started on some regular saline rinses for sinus issues which could be contributing as well.  She will go and start him prednisone burst to see how she does with that.  She will let me know how she is doing Monday or Tuesday and we can adjust as needed.  - predniSONE (DELTASONE) 20 MG Tab; Take 2 Tablets by mouth every day for 5 days.  Dispense: 10 Tablet; Refill: 0            No follow-ups on file.    Please note that this dictation was created using voice recognition software. I have made every reasonable attempt to correct obvious errors, but I expect that there are errors of grammar and possibly content that I did not discover before finalizing the note.

## 2024-12-10 ENCOUNTER — PATIENT MESSAGE (OUTPATIENT)
Dept: MEDICAL GROUP | Facility: LAB | Age: 84
End: 2024-12-10
Payer: MEDICARE

## 2024-12-10 DIAGNOSIS — J01.00 SUBACUTE MAXILLARY SINUSITIS: ICD-10-CM

## 2024-12-10 DIAGNOSIS — J40 BRONCHITIS: ICD-10-CM

## 2024-12-10 RX ORDER — FLUTICASONE PROPIONATE 50 MCG
1 SPRAY, SUSPENSION (ML) NASAL DAILY
Qty: 16 G | Refills: 1 | Status: SHIPPED | OUTPATIENT
Start: 2024-12-10

## 2024-12-10 RX ORDER — PREDNISONE 20 MG/1
40 TABLET ORAL DAILY
Qty: 10 TABLET | Refills: 0 | Status: SHIPPED | OUTPATIENT
Start: 2024-12-10 | End: 2024-12-15

## 2024-12-13 ENCOUNTER — TELEPHONE (OUTPATIENT)
Dept: CARDIOLOGY | Facility: MEDICAL CENTER | Age: 84
End: 2024-12-13
Payer: MEDICARE

## 2024-12-13 DIAGNOSIS — E78.5 DYSLIPIDEMIA: ICD-10-CM

## 2024-12-13 RX ORDER — THYROID 90 MG/1
TABLET ORAL
Qty: 26 TABLET | Refills: 3 | Status: SHIPPED | OUTPATIENT
Start: 2024-12-13

## 2024-12-13 RX ORDER — ROSUVASTATIN CALCIUM 20 MG/1
20 TABLET, COATED ORAL EVERY EVENING
Qty: 90 TABLET | Refills: 3 | Status: SHIPPED | OUTPATIENT
Start: 2024-12-13

## 2024-12-13 NOTE — TELEPHONE ENCOUNTER
Caller: Karli Berrios     Topic/issue: Patient is calling to change the     rosuvastatin (CRESTOR) 20 MG Tab to (90 days)    [facility] - [place], [place] - [address] [[id number]]    Request number: [id number]    PHONE: [phone]  FAX: [phone]     Callback Number: [phone]     Thank you,  Beth DIXON

## 2024-12-13 NOTE — TELEPHONE ENCOUNTER
Phone Number Called: [phone]    Call outcome: Spoke to patient regarding message below.    Message: Called to inform patient RX updated for 90 day supply. Pt appreciative of call

## 2024-12-13 NOTE — TELEPHONE ENCOUNTER
Received request via: Pharmacy    Was the patient seen in the last year in this department? Yes    Does the patient have an active prescription (recently filled or refills available) for medication(s) requested? No    Pharmacy Name: MAIL    Does the patient have [facility] and need 100-day supply? (This applies to ALL medications) Patient does not have SCP

## 2024-12-19 DIAGNOSIS — J45.20 MILD INTERMITTENT ASTHMA WITHOUT COMPLICATION: ICD-10-CM

## 2024-12-20 NOTE — TELEPHONE ENCOUNTER
Have we ever prescribed this med? Yes.  If yes, what date? 3/4/2024    Last OV: 6/4/2024- Alfreda GOETZ    Next OV: none     DX:  Mild intermittent asthma without complication [J45.20]     Medications: fluticasone furoate (ARNUITY ELLIPTA) 100 MCG/ACT AEROSOL POWDER, BREATH ACTIVATED inhaler

## 2024-12-22 DIAGNOSIS — J45.20 MILD INTERMITTENT ASTHMA WITHOUT COMPLICATION: ICD-10-CM

## 2024-12-24 NOTE — TELEPHONE ENCOUNTER
Have we ever prescribed this med? Yes.  If yes, what date? 3/4/2024     Last OV: 6/4/2024- Alfreda GOETZ     Next OV: none      DX:  Mild intermittent asthma without complication [J45.20]      Medications: fluticasone furoate (ARNUITY ELLIPTA) 100 MCG/ACT AEROSOL POWDER, BREATH ACTIVATED inhaler           Pt LVM asking for  a refill request to be done ASAP because she has met her deductible for insurance and is  hoping to get this medication before the end of the year

## 2025-01-03 RX ORDER — FLUTICASONE FUROATE 100 UG/1
1 POWDER RESPIRATORY (INHALATION) DAILY
Qty: 90 EACH | Refills: 0 | Status: SHIPPED | OUTPATIENT
Start: 2025-01-03

## 2025-01-06 RX ORDER — FLUTICASONE FUROATE 100 UG/1
1 POWDER RESPIRATORY (INHALATION) DAILY
Qty: 3 EACH | Refills: 3 | Status: SHIPPED | OUTPATIENT
Start: 2025-01-06

## 2025-01-06 RX ORDER — FLUTICASONE FUROATE 100 UG/1
1 POWDER RESPIRATORY (INHALATION) DAILY
Qty: 90 EACH | Refills: 3 | OUTPATIENT
Start: 2025-01-06

## 2025-01-06 NOTE — TELEPHONE ENCOUNTER
Refused by: Johana Gonzales A.P.R.NDara     Refusal reason: Patient has requested refill too soon.    Non-formulary

## 2025-01-15 ENCOUNTER — PATIENT MESSAGE (OUTPATIENT)
Dept: MEDICAL GROUP | Facility: LAB | Age: 85
End: 2025-01-15
Payer: MEDICARE

## 2025-01-15 DIAGNOSIS — F41.1 GAD (GENERALIZED ANXIETY DISORDER): ICD-10-CM

## 2025-01-16 RX ORDER — LORAZEPAM 0.5 MG/1
0.5 TABLET ORAL
Qty: 30 TABLET | Refills: 3 | Status: SHIPPED | OUTPATIENT
Start: 2025-01-16 | End: 2025-02-15

## 2025-02-24 RX ORDER — THYROID,PORK 60 MG
TABLET ORAL
Qty: 65 TABLET | Refills: 3 | Status: SHIPPED | OUTPATIENT
Start: 2025-02-24

## 2025-02-25 ENCOUNTER — APPOINTMENT (OUTPATIENT)
Dept: URBAN - METROPOLITAN AREA CLINIC 15 | Facility: CLINIC | Age: 85
Setting detail: DERMATOLOGY
End: 2025-02-25

## 2025-02-25 DIAGNOSIS — Z86.006 PERSONAL HISTORY OF MELANOMA IN-SITU: ICD-10-CM

## 2025-02-25 DIAGNOSIS — D18.0 HEMANGIOMA: ICD-10-CM

## 2025-02-25 DIAGNOSIS — D22 MELANOCYTIC NEVI: ICD-10-CM

## 2025-02-25 DIAGNOSIS — L82.0 INFLAMED SEBORRHEIC KERATOSIS: ICD-10-CM

## 2025-02-25 DIAGNOSIS — L82.1 OTHER SEBORRHEIC KERATOSIS: ICD-10-CM

## 2025-02-25 DIAGNOSIS — Z85.828 PERSONAL HISTORY OF OTHER MALIGNANT NEOPLASM OF SKIN: ICD-10-CM

## 2025-02-25 DIAGNOSIS — L73.8 OTHER SPECIFIED FOLLICULAR DISORDERS: ICD-10-CM

## 2025-02-25 DIAGNOSIS — Z71.89 OTHER SPECIFIED COUNSELING: ICD-10-CM

## 2025-02-25 DIAGNOSIS — L81.4 OTHER MELANIN HYPERPIGMENTATION: ICD-10-CM

## 2025-02-25 DIAGNOSIS — L57.0 ACTINIC KERATOSIS: ICD-10-CM

## 2025-02-25 PROBLEM — D22.5 MELANOCYTIC NEVI OF TRUNK: Status: ACTIVE | Noted: 2025-02-25

## 2025-02-25 PROBLEM — D18.01 HEMANGIOMA OF SKIN AND SUBCUTANEOUS TISSUE: Status: ACTIVE | Noted: 2025-02-25

## 2025-02-25 PROCEDURE — ? LIQUID NITROGEN

## 2025-02-25 PROCEDURE — 99213 OFFICE O/P EST LOW 20 MIN: CPT | Mod: 25

## 2025-02-25 PROCEDURE — ? ADDITIONAL NOTES

## 2025-02-25 PROCEDURE — ? COUNSELING

## 2025-02-25 PROCEDURE — 17000 DESTRUCT PREMALG LESION: CPT | Mod: 59

## 2025-02-25 PROCEDURE — 17110 DESTRUCTION B9 LES UP TO 14: CPT

## 2025-02-25 ASSESSMENT — LOCATION DETAILED DESCRIPTION DERM
LOCATION DETAILED: RIGHT ANTERIOR LATERAL PROXIMAL THIGH
LOCATION DETAILED: RIGHT MEDIAL INFERIOR CHEST
LOCATION DETAILED: LEFT LATERAL POSTERIOR ANKLE
LOCATION DETAILED: LEFT RADIAL DORSAL HAND
LOCATION DETAILED: LEFT PROXIMAL POSTERIOR UPPER ARM
LOCATION DETAILED: LEFT ANTERIOR PROXIMAL THIGH
LOCATION DETAILED: LEFT INFERIOR UPPER BACK
LOCATION DETAILED: LEFT CENTRAL MALAR CHEEK
LOCATION DETAILED: LEFT POSTERIOR LATERAL DISTAL THIGH
LOCATION DETAILED: RIGHT SUPERIOR LATERAL MIDBACK
LOCATION DETAILED: LEFT LATERAL PROXIMAL CALF
LOCATION DETAILED: LEFT SUPERIOR LATERAL FOREHEAD
LOCATION DETAILED: RIGHT MEDIAL MALAR CHEEK

## 2025-02-25 ASSESSMENT — LOCATION SIMPLE DESCRIPTION DERM
LOCATION SIMPLE: LEFT HAND
LOCATION SIMPLE: LEFT THIGH
LOCATION SIMPLE: CHEST
LOCATION SIMPLE: LEFT POSTERIOR UPPER ARM
LOCATION SIMPLE: LEFT ANKLE
LOCATION SIMPLE: LEFT FOREHEAD
LOCATION SIMPLE: RIGHT LOWER BACK
LOCATION SIMPLE: LEFT CHEEK
LOCATION SIMPLE: RIGHT CHEEK
LOCATION SIMPLE: RIGHT THIGH
LOCATION SIMPLE: LEFT LOWER LEG
LOCATION SIMPLE: LEFT UPPER BACK

## 2025-02-25 ASSESSMENT — LOCATION ZONE DERM
LOCATION ZONE: HAND
LOCATION ZONE: ARM
LOCATION ZONE: TRUNK
LOCATION ZONE: LEG
LOCATION ZONE: FACE

## 2025-02-25 NOTE — PROCEDURE: LIQUID NITROGEN
Detail Level: Simple
Render Note In Bullet Format When Appropriate: No
Post-Care Instructions: I reviewed with the patient in detail post-care instructions. Patient is to wear sunprotection, and avoid picking at any of the treated lesions. Pt may apply Vaseline to crusted or scabbing areas.
Consent: The patient's consent was obtained including but not limited to risks of crusting, scabbing, blistering, scarring, darker or lighter pigmentary change, recurrence, incomplete removal and infection.
Show Aperture Variable?: Yes
Duration Of Freeze Thaw-Cycle (Seconds): 0
Detail Level: Detailed
Spray Paint Text: The liquid nitrogen was applied to the skin utilizing a spray paint frosting technique.
Pared With?: curette
Medical Necessity Information: It is in your best interest to select a reason for this procedure from the list below. All of these items fulfill various CMS LCD requirements except the new and changing color options.
Medical Necessity Clause: This procedure was medically necessary because the lesions that were treated were:

## 2025-03-14 ENCOUNTER — TELEPHONE (OUTPATIENT)
Dept: SLEEP MEDICINE | Facility: MEDICAL CENTER | Age: 85
End: 2025-03-14
Payer: MEDICARE

## 2025-03-14 DIAGNOSIS — J45.20 MILD INTERMITTENT ASTHMA WITHOUT COMPLICATION: ICD-10-CM

## 2025-03-14 NOTE — TELEPHONE ENCOUNTER
MANJIT   Caller: Karli Berrios    Topic/issue: Patient is completely out of her inhaler. Please call patient back to update.     Callback Number: [phone]    Thank you,  Johana SAMANIEGO

## 2025-03-17 RX ORDER — FLUTICASONE FUROATE 100 UG/1
1 POWDER RESPIRATORY (INHALATION) DAILY
Qty: 3 EACH | Refills: 0 | Status: SHIPPED | OUTPATIENT
Start: 2025-03-17

## 2025-03-17 NOTE — TELEPHONE ENCOUNTER
Patient is needing refill for     ARNUITY ELLIPTA 100 MCG/ACT AEROSOL POWDER, BREATH ACTIVATED     Send to   [facility] - [place], [place] - [address]  [address]  [facility] [address]  Phone: [phone] Fax: [phone]

## 2025-03-20 DIAGNOSIS — I25.112 CORONARY ARTERY DISEASE INVOLVING NATIVE CORONARY ARTERY OF NATIVE HEART WITH REFRACTORY ANGINA PECTORIS (HCC): ICD-10-CM

## 2025-04-07 ENCOUNTER — HOSPITAL ENCOUNTER (OUTPATIENT)
Dept: LAB | Facility: MEDICAL CENTER | Age: 85
End: 2025-04-07
Payer: MEDICARE

## 2025-04-07 ENCOUNTER — HOSPITAL ENCOUNTER (OUTPATIENT)
Dept: LAB | Facility: MEDICAL CENTER | Age: 85
End: 2025-04-07
Attending: INTERNAL MEDICINE
Payer: MEDICARE

## 2025-04-07 DIAGNOSIS — E78.5 DYSLIPIDEMIA: ICD-10-CM

## 2025-04-07 LAB — ERYTHROCYTE [SEDIMENTATION RATE] IN BLOOD BY WESTERGREN METHOD: 8 MM/HOUR (ref 0–25)

## 2025-04-07 PROCEDURE — 80053 COMPREHEN METABOLIC PANEL: CPT

## 2025-04-07 PROCEDURE — 36415 COLL VENOUS BLD VENIPUNCTURE: CPT

## 2025-04-07 PROCEDURE — 80061 LIPID PANEL: CPT

## 2025-04-07 PROCEDURE — 82306 VITAMIN D 25 HYDROXY: CPT

## 2025-04-07 PROCEDURE — 85652 RBC SED RATE AUTOMATED: CPT

## 2025-04-08 ENCOUNTER — RESULTS FOLLOW-UP (OUTPATIENT)
Dept: CARDIOLOGY | Facility: MEDICAL CENTER | Age: 85
End: 2025-04-08
Payer: MEDICARE

## 2025-04-08 LAB
25(OH)D3 SERPL-MCNC: 63 NG/ML (ref 30–100)
ALBUMIN SERPL BCP-MCNC: 4.2 G/DL (ref 3.2–4.9)
ALBUMIN/GLOB SERPL: 1.6 G/DL
ALP SERPL-CCNC: 77 U/L (ref 30–99)
ALT SERPL-CCNC: 19 U/L (ref 2–50)
ANION GAP SERPL CALC-SCNC: 11 MMOL/L (ref 7–16)
AST SERPL-CCNC: 28 U/L (ref 12–45)
BILIRUB SERPL-MCNC: 0.3 MG/DL (ref 0.1–1.5)
BUN SERPL-MCNC: 10 MG/DL (ref 8–22)
CALCIUM ALBUM COR SERPL-MCNC: 8.7 MG/DL (ref 8.5–10.5)
CALCIUM SERPL-MCNC: 8.9 MG/DL (ref 8.5–10.5)
CHLORIDE SERPL-SCNC: 96 MMOL/L (ref 96–112)
CHOLEST SERPL-MCNC: 116 MG/DL (ref 100–199)
CO2 SERPL-SCNC: 24 MMOL/L (ref 20–33)
CREAT SERPL-MCNC: 0.79 MG/DL (ref 0.5–1.4)
FASTING STATUS PATIENT QL REPORTED: NORMAL
GFR SERPLBLD CREATININE-BSD FMLA CKD-EPI: 74 ML/MIN/1.73 M 2
GLOBULIN SER CALC-MCNC: 2.7 G/DL (ref 1.9–3.5)
GLUCOSE SERPL-MCNC: 83 MG/DL (ref 65–99)
HDLC SERPL-MCNC: 63 MG/DL
LDLC SERPL CALC-MCNC: 43 MG/DL
POTASSIUM SERPL-SCNC: 4.4 MMOL/L (ref 3.6–5.5)
PROT SERPL-MCNC: 6.9 G/DL (ref 6–8.2)
SODIUM SERPL-SCNC: 131 MMOL/L (ref 135–145)
TRIGL SERPL-MCNC: 49 MG/DL (ref 0–149)

## 2025-04-08 NOTE — TELEPHONE ENCOUNTER
----- Message from Nurse Practitioner LUTHER Addison sent at 4/8/2025  8:54 AM PDT -----  Cholesterol remains well-controlled

## 2025-04-28 ENCOUNTER — HOSPITAL ENCOUNTER (OUTPATIENT)
Dept: RADIOLOGY | Facility: MEDICAL CENTER | Age: 85
End: 2025-04-28
Attending: INTERNAL MEDICINE
Payer: MEDICARE

## 2025-04-28 DIAGNOSIS — M81.0 AGE-RELATED OSTEOPOROSIS WITHOUT CURRENT PATHOLOGICAL FRACTURE: ICD-10-CM

## 2025-04-28 PROCEDURE — 77080 DXA BONE DENSITY AXIAL: CPT

## 2025-05-01 ENCOUNTER — PATIENT MESSAGE (OUTPATIENT)
Dept: MEDICAL GROUP | Facility: LAB | Age: 85
End: 2025-05-01
Payer: MEDICARE

## 2025-05-01 DIAGNOSIS — I16.0 HYPERTENSIVE URGENCY: ICD-10-CM

## 2025-05-01 DIAGNOSIS — E78.5 DYSLIPIDEMIA: ICD-10-CM

## 2025-05-01 DIAGNOSIS — E03.9 ACQUIRED HYPOTHYROIDISM: ICD-10-CM

## 2025-05-02 ENCOUNTER — PATIENT MESSAGE (OUTPATIENT)
Dept: MEDICAL GROUP | Facility: LAB | Age: 85
End: 2025-05-02
Payer: MEDICARE

## 2025-05-02 DIAGNOSIS — F41.1 GAD (GENERALIZED ANXIETY DISORDER): ICD-10-CM

## 2025-05-02 RX ORDER — METOPROLOL TARTRATE 50 MG
50 TABLET ORAL 2 TIMES DAILY
Qty: 180 TABLET | Refills: 2 | Status: SHIPPED | OUTPATIENT
Start: 2025-05-02

## 2025-05-02 RX ORDER — LOSARTAN POTASSIUM 100 MG/1
100 TABLET ORAL DAILY
Qty: 90 TABLET | Refills: 2 | Status: SHIPPED | OUTPATIENT
Start: 2025-05-02

## 2025-05-02 RX ORDER — EZETIMIBE 10 MG/1
10 TABLET ORAL DAILY
Qty: 90 TABLET | Refills: 2 | Status: SHIPPED | OUTPATIENT
Start: 2025-05-02

## 2025-05-02 RX ORDER — LORAZEPAM 0.5 MG/1
0.5 TABLET ORAL NIGHTLY
Qty: 30 TABLET | Refills: 3 | Status: SHIPPED | OUTPATIENT
Start: 2025-05-02 | End: 2025-06-01

## 2025-05-02 NOTE — TELEPHONE ENCOUNTER
Is the patient due for a refill? Yes    Was the patient seen the last 15 months? Yes    Date of last office visit: 11/7/24    Does the patient have an upcoming appointment?  Yes   If yes, When? 10/14/25    Provider to refill:LH    Does the patient have [facility] Plus and need 100-day supply? (This applies to ALL medications) Patient does not have SCP

## 2025-05-02 NOTE — TELEPHONE ENCOUNTER
Advise switching to Flovent 110mcg 2 puffs BID.  
Pt advised, Flovent was sent to the pharmacy.  
The pt called and stated that when she went to  her [facility] it was 500.00 for three months worth.  She cannot afford that.  She called the insurance and they stated that covered meds are:  Advair, Flovent and Symbicort.  Can she get one of these instead?  Please advise, thank you.  
details…

## 2025-05-06 ENCOUNTER — HOSPITAL ENCOUNTER (OUTPATIENT)
Dept: LAB | Facility: MEDICAL CENTER | Age: 85
End: 2025-05-06
Attending: FAMILY MEDICINE
Payer: MEDICARE

## 2025-05-06 DIAGNOSIS — E03.9 ACQUIRED HYPOTHYROIDISM: ICD-10-CM

## 2025-05-06 LAB — TSH SERPL DL<=0.005 MIU/L-ACNC: 1.56 UIU/ML (ref 0.38–5.33)

## 2025-05-06 PROCEDURE — 84443 ASSAY THYROID STIM HORMONE: CPT

## 2025-05-06 PROCEDURE — 36415 COLL VENOUS BLD VENIPUNCTURE: CPT

## 2025-05-08 ENCOUNTER — RESULTS FOLLOW-UP (OUTPATIENT)
Dept: MEDICAL GROUP | Facility: LAB | Age: 85
End: 2025-05-08
Payer: MEDICARE

## 2025-05-15 ENCOUNTER — APPOINTMENT (OUTPATIENT)
Dept: RADIOLOGY | Facility: MEDICAL CENTER | Age: 85
End: 2025-05-15
Attending: FAMILY MEDICINE
Payer: MEDICARE

## 2025-05-15 DIAGNOSIS — Z12.31 VISIT FOR SCREENING MAMMOGRAM: ICD-10-CM

## 2025-05-15 PROCEDURE — 77067 SCR MAMMO BI INCL CAD: CPT

## 2025-05-16 ENCOUNTER — APPOINTMENT (OUTPATIENT)
Dept: URBAN - METROPOLITAN AREA CLINIC 15 | Facility: CLINIC | Age: 85
Setting detail: DERMATOLOGY
End: 2025-05-16

## 2025-05-16 DIAGNOSIS — D18.0 HEMANGIOMA: ICD-10-CM

## 2025-05-16 DIAGNOSIS — D485 NEOPLASM OF UNCERTAIN BEHAVIOR OF SKIN: ICD-10-CM

## 2025-05-16 DIAGNOSIS — Z86.006 PERSONAL HISTORY OF MELANOMA IN-SITU: ICD-10-CM

## 2025-05-16 DIAGNOSIS — L81.4 OTHER MELANIN HYPERPIGMENTATION: ICD-10-CM

## 2025-05-16 DIAGNOSIS — L82.1 OTHER SEBORRHEIC KERATOSIS: ICD-10-CM

## 2025-05-16 DIAGNOSIS — D22 MELANOCYTIC NEVI: ICD-10-CM

## 2025-05-16 PROBLEM — D22.62 MELANOCYTIC NEVI OF LEFT UPPER LIMB, INCLUDING SHOULDER: Status: ACTIVE | Noted: 2025-05-16

## 2025-05-16 PROBLEM — D22.72 MELANOCYTIC NEVI OF LEFT LOWER LIMB, INCLUDING HIP: Status: ACTIVE | Noted: 2025-05-16

## 2025-05-16 PROBLEM — D22.71 MELANOCYTIC NEVI OF RIGHT LOWER LIMB, INCLUDING HIP: Status: ACTIVE | Noted: 2025-05-16

## 2025-05-16 PROBLEM — D22.61 MELANOCYTIC NEVI OF RIGHT UPPER LIMB, INCLUDING SHOULDER: Status: ACTIVE | Noted: 2025-05-16

## 2025-05-16 PROBLEM — D18.01 HEMANGIOMA OF SKIN AND SUBCUTANEOUS TISSUE: Status: ACTIVE | Noted: 2025-05-16

## 2025-05-16 PROBLEM — D48.5 NEOPLASM OF UNCERTAIN BEHAVIOR OF SKIN: Status: ACTIVE | Noted: 2025-05-16

## 2025-05-16 PROBLEM — D22.5 MELANOCYTIC NEVI OF TRUNK: Status: ACTIVE | Noted: 2025-05-16

## 2025-05-16 PROCEDURE — ? LIQUID NITROGEN

## 2025-05-16 PROCEDURE — ? ADDITIONAL NOTES

## 2025-05-16 PROCEDURE — ? COUNSELING

## 2025-05-16 PROCEDURE — ? BIOPSY BY SHAVE METHOD

## 2025-05-16 PROCEDURE — 11102 TANGNTL BX SKIN SINGLE LES: CPT

## 2025-05-16 PROCEDURE — 99213 OFFICE O/P EST LOW 20 MIN: CPT | Mod: 25

## 2025-05-16 ASSESSMENT — LOCATION SIMPLE DESCRIPTION DERM
LOCATION SIMPLE: RIGHT PRETIBIAL REGION
LOCATION SIMPLE: RIGHT FOREHEAD
LOCATION SIMPLE: RIGHT HAND
LOCATION SIMPLE: RIGHT EYEBROW
LOCATION SIMPLE: LEFT ELBOW
LOCATION SIMPLE: LEFT POPLITEAL SKIN
LOCATION SIMPLE: LEFT ANKLE
LOCATION SIMPLE: RIGHT FOREARM
LOCATION SIMPLE: ABDOMEN
LOCATION SIMPLE: RIGHT POSTERIOR UPPER ARM
LOCATION SIMPLE: RIGHT POPLITEAL SKIN
LOCATION SIMPLE: CHEST
LOCATION SIMPLE: UPPER BACK
LOCATION SIMPLE: RIGHT THIGH
LOCATION SIMPLE: RIGHT UPPER BACK
LOCATION SIMPLE: LEFT THIGH
LOCATION SIMPLE: LEFT PRETIBIAL REGION
LOCATION SIMPLE: LEFT FOREARM

## 2025-05-16 ASSESSMENT — LOCATION DETAILED DESCRIPTION DERM
LOCATION DETAILED: RIGHT PROXIMAL PRETIBIAL REGION
LOCATION DETAILED: MIDDLE STERNUM
LOCATION DETAILED: LEFT ANTERIOR LATERAL PROXIMAL THIGH
LOCATION DETAILED: LEFT VENTRAL LATERAL PROXIMAL FOREARM
LOCATION DETAILED: LEFT DISTAL DORSAL FOREARM
LOCATION DETAILED: RIGHT SUPERIOR UPPER BACK
LOCATION DETAILED: LEFT POPLITEAL SKIN
LOCATION DETAILED: SUBXIPHOID
LOCATION DETAILED: LEFT ANTERIOR PROXIMAL THIGH
LOCATION DETAILED: RIGHT INFERIOR MEDIAL FOREHEAD
LOCATION DETAILED: RIGHT PROXIMAL DORSAL FOREARM
LOCATION DETAILED: RIGHT INFERIOR MEDIAL UPPER BACK
LOCATION DETAILED: RIGHT CENTRAL EYEBROW
LOCATION DETAILED: LEFT LATERAL POSTERIOR ANKLE
LOCATION DETAILED: RIGHT POPLITEAL SKIN
LOCATION DETAILED: RIGHT DISTAL POSTERIOR UPPER ARM
LOCATION DETAILED: RIGHT ANTERIOR PROXIMAL THIGH
LOCATION DETAILED: EPIGASTRIC SKIN
LOCATION DETAILED: RIGHT LATERAL SUPERIOR CHEST
LOCATION DETAILED: INFERIOR THORACIC SPINE
LOCATION DETAILED: LEFT VENTRAL PROXIMAL FOREARM
LOCATION DETAILED: LEFT LATERAL ELBOW
LOCATION DETAILED: RIGHT VENTRAL PROXIMAL FOREARM
LOCATION DETAILED: RIGHT VENTRAL DISTAL FOREARM
LOCATION DETAILED: LEFT PROXIMAL PRETIBIAL REGION
LOCATION DETAILED: RIGHT ULNAR DORSAL HAND
LOCATION DETAILED: RIGHT ANTERIOR DISTAL THIGH

## 2025-05-16 ASSESSMENT — LOCATION ZONE DERM
LOCATION ZONE: HAND
LOCATION ZONE: FACE
LOCATION ZONE: TRUNK
LOCATION ZONE: ARM
LOCATION ZONE: LEG

## 2025-05-16 NOTE — PROCEDURE: BIOPSY BY SHAVE METHOD
Detail Level: Detailed
Depth Of Biopsy: dermis
Was A Bandage Applied: Yes
Size Of Lesion In Cm: 1.1
X Size Of Lesion In Cm: 0
Biopsy Type: H and E
Biopsy Method: Personna blade
Anesthesia Type: 1% lidocaine with epinephrine
Anesthesia Volume In Cc: 1
Hemostasis: Electrocautery
Wound Care: Vaseline
Dressing: Band-Aid
Destruction After The Procedure: No
Type Of Destruction Used: Electrodesiccation
Curettage Text: The wound bed was treated with curettage after the biopsy was performed.
Cryotherapy Text: The wound bed was treated with cryotherapy after the biopsy was performed.
Electrodesiccation Text: The wound bed was treated with electrodesiccation after the biopsy was performed.
Electrodesiccation And Curettage Text: The wound bed was treated with electrodesiccation and curettage after the biopsy was performed.
Silver Nitrate Text: The wound bed was treated with silver nitrate after the biopsy was performed.
Lab: [id number]
Lab Facility: 
Medical Necessity Information: It is in your best interest to select a reason for this procedure from the list below. All of these items fulfill various CMS LCD requirements except the new and changing color options.
Consent: Written consent was obtained and risks were reviewed including but not limited to scarring, infection, bleeding, scabbing, incomplete removal, nerve damage and allergy to anesthesia.
Post-Care Instructions: I reviewed with the patient in detail post-care instructions. Patient is to keep the biopsy site dry overnight, and then apply bacitracin/petroleum  twice daily until healed. Patient may apply hydrogen peroxide soaks to remove any crusting.
Notification Instructions: Patient will be notified of biopsy results. However, patient instructed to call the office if not contacted within 2 weeks.
Billing Type: Third-Party Bill
Information: Selecting Yes will display possible errors in your note based on the variables you have selected. This validation is only offered as a suggestion for you. PLEASE NOTE THAT THE VALIDATION TEXT WILL BE REMOVED WHEN YOU FINALIZE YOUR NOTE. IF YOU WANT TO FAX A PRELIMINARY NOTE YOU WILL NEED TO TOGGLE THIS TO 'NO' IF YOU DO NOT WANT IT IN YOUR FAXED NOTE.

## 2025-05-16 NOTE — PROCEDURE: LIQUID NITROGEN
Show Spray Paint Technique Variable?: Yes
Bill Insurance (You Assume Risk Of Denial Or Audit By Selecting Yes): No
Duration Of Freeze Thaw-Cycle (Seconds): 0
Post-Care Instructions: I reviewed with the patient in detail post-care instructions. Patient is to wear sunprotection, and avoid picking at any of the treated lesions. Pt may apply Vaseline to crusted or scabbing areas.
Detail Level: Detailed
Medical Necessity Clause: This procedure was medically necessary because the lesions that were treated were:  If lesion does not resolve, bx is needed.
Consent: The patient's consent was obtained including but not limited to risks of crusting, scabbing, blistering, scarring, darker or lighter pigmentary change, recurrence, incomplete removal and infection.
Spray Paint Text: The liquid nitrogen was applied to the skin utilizing a spray paint frosting technique.
Medical Necessity Information: It is in your best interest to select a reason for this procedure from the list below. All of these items fulfill various CMS LCD requirements except the new and changing color options.

## 2025-05-21 ENCOUNTER — RESULTS FOLLOW-UP (OUTPATIENT)
Dept: MEDICAL GROUP | Facility: LAB | Age: 85
End: 2025-05-21
Payer: MEDICARE

## 2025-05-29 DIAGNOSIS — J45.20 MILD INTERMITTENT ASTHMA WITHOUT COMPLICATION: ICD-10-CM

## 2025-06-03 RX ORDER — FLUTICASONE FUROATE 100 UG/1
POWDER RESPIRATORY (INHALATION)
Qty: 90 EACH | Refills: 0 | Status: SHIPPED | OUTPATIENT
Start: 2025-06-03

## 2025-06-03 NOTE — TELEPHONE ENCOUNTER
Have we ever prescribed this med? Yes.  If yes, what date? 01/03/25     Last OV: 06/04/24 with Alfreda GOETZ     Next OV: 07/09/25 with Alfreda GOETZ     DX: asthma    Medications:   Requested Prescriptions     Pending Prescriptions Disp Refills    ARNUITY ELLIPTA 100 MCG/ACT AEROSOL POWDER, BREATH ACTIVATED inhaler [Pharmacy Med Name: Arnuity Ellipta Inhalation Aerosol Powder Breath Activated 100 MCG/ACT]  3     Sig: INHALE 1 PUFF EVERY DAY. RINSE MOUTH AFTER EACH USE. (NEED MD APPOINTMENT FOR REFILLS)

## 2025-06-11 ENCOUNTER — TELEPHONE (OUTPATIENT)
Dept: CARDIOLOGY | Facility: MEDICAL CENTER | Age: 85
End: 2025-06-11
Payer: MEDICARE

## 2025-06-11 DIAGNOSIS — E78.5 DYSLIPIDEMIA: ICD-10-CM

## 2025-06-11 NOTE — TELEPHONE ENCOUNTER
Caller:   Karli Berrios    Reported Symptoms:   Reporting leg pain, would like to discuss her medications.     Recent Blood Pressure/Heart Rate Reading:   N/A    Callback Number:   [phone]     Thank you,   Audrey MCWILLIAMS

## 2025-06-12 NOTE — TELEPHONE ENCOUNTER
Phone Number Called: [phone]      Call outcome: Spoke to patient regarding message below.    Message: Called to ask the patient about her call. Pt reports having osteoarthritis and arthritis. Pt saw rheumatologist and they recommended talking to us about the statins.  Pt asked about completing a statin vacation. I will route to  to advise.

## 2025-06-18 NOTE — TELEPHONE ENCOUNTER
Okay to try statin medication for 1 week.  If leg pain completely resolves could consider PSK 9 inhibitor, ultimately she will need cholesterol-lowering medication whether it be a statin or other.

## 2025-06-18 NOTE — TELEPHONE ENCOUNTER
Phone Number Called: [phone]      Call outcome: Spoke to patient regarding message below.    Message: Called to inform patient of LH recommendations. Pt verbalized understanding. Pt said she will reach back out to us and update us on how she is feeling.

## 2025-06-23 ENCOUNTER — APPOINTMENT (OUTPATIENT)
Dept: URBAN - METROPOLITAN AREA CLINIC 36 | Facility: CLINIC | Age: 85
Setting detail: DERMATOLOGY
End: 2025-06-23

## 2025-06-23 PROBLEM — C44.622 SQUAMOUS CELL CARCINOMA OF SKIN OF RIGHT UPPER LIMB, INCLUDING SHOULDER: Status: ACTIVE | Noted: 2025-06-23

## 2025-06-23 PROCEDURE — ? CURETTAGE AND DESTRUCTION WITH PATHOLOGY

## 2025-06-23 NOTE — PROCEDURE: CURETTAGE AND DESTRUCTION WITH PATHOLOGY
Detail Level: Detailed
Size Of Lesion In Cm: 0.6
Size Of Lesion After Curettage: 1.2
Anesthesia Type: 1% lidocaine with epinephrine and a 1:10 solution of 8.4% sodium bicarbonate
Anesthesia Volume In Cc: 4
Cautery Type: electrodesiccation
Number Of Curettages: 3
What Was Performed First?: Curettage
Additional Information: (Optional): The wound was cleaned, and a pressure dressing was applied.  The patient received detailed post-op instructions.
Lab: [id number]
Lab Facility: 
Histology Text: Following the procedure a portion of the curetted material was sent for histologic evaluation.
Biopsy Type: H and E
Path Notes (To The Dermatopathologist): SCC
Render Path Notes In Note?: No
Consent was obtained from the patient. The risks, benefits and alternatives to therapy were discussed in detail. Specifically, the risks of infection, scarring, bleeding, prolonged wound healing, nerve injury, incomplete removal, allergy to anesthesia and recurrence were addressed. Alternatives to ED&C, such as: surgical removal and XRT were also discussed.  Prior to the procedure, the treatment site was clearly identified and confirmed by the patient. All components of Universal Protocol/PAUSE Rule completed.
Post-Care Instructions: I reviewed with the patient in detail post-care instructions. Patient is to keep the area dry for 48 hours, and not to engage in any swimming until the area is healed. Should the patient develop any fevers, chills, bleeding, severe pain patient will contact the office immediately.
Billing Type: Third-Party Bill
Bill As A Line Item Or As Units: Line Item

## 2025-06-23 NOTE — PROCEDURE: MIPS QUALITY
Detail Level: Detailed
Quality 137: Melanoma: Continuity Of Care - Recall System: Patient information entered into a recall system that includes: target date for the next exam specified AND a process to follow up with patients regarding missed or unscheduled appointments
Quality 397: Melanoma: Reporting: Pathology report includes the pT Category, thickness, ulceration and mitotic rate, peripheral and deep margin status and presence or absence of microsatellitosis for invasive tumors.
Quality 226: Preventive Care And Screening: Tobacco Use: Screening And Cessation Intervention: Patient screened for tobacco use and is an ex/non-smoker
Quality 130: Documentation Of Current Medications In The Medical Record: Current Medications Documented

## 2025-07-09 ENCOUNTER — OFFICE VISIT (OUTPATIENT)
Dept: SLEEP MEDICINE | Facility: MEDICAL CENTER | Age: 85
End: 2025-07-09
Attending: NURSE PRACTITIONER
Payer: MEDICARE

## 2025-07-09 VITALS
DIASTOLIC BLOOD PRESSURE: 68 MMHG | HEIGHT: 66 IN | WEIGHT: 170 LBS | SYSTOLIC BLOOD PRESSURE: 118 MMHG | HEART RATE: 64 BPM | OXYGEN SATURATION: 95 % | BODY MASS INDEX: 27.32 KG/M2

## 2025-07-09 DIAGNOSIS — J45.20 MILD INTERMITTENT ASTHMA WITHOUT COMPLICATION: Primary | Chronic | ICD-10-CM

## 2025-07-09 DIAGNOSIS — Z87.891 FORMER SMOKER: ICD-10-CM

## 2025-07-09 PROCEDURE — 3074F SYST BP LT 130 MM HG: CPT | Performed by: NURSE PRACTITIONER

## 2025-07-09 PROCEDURE — 3078F DIAST BP <80 MM HG: CPT | Performed by: NURSE PRACTITIONER

## 2025-07-09 PROCEDURE — 99213 OFFICE O/P EST LOW 20 MIN: CPT | Performed by: NURSE PRACTITIONER

## 2025-07-09 RX ORDER — FLUTICASONE FUROATE 100 UG/1
1 POWDER RESPIRATORY (INHALATION) DAILY
Qty: 90 EACH | Refills: 3 | Status: SHIPPED | OUTPATIENT
Start: 2025-07-09

## 2025-07-09 ASSESSMENT — FIBROSIS 4 INDEX: FIB4 SCORE: 1.67

## 2025-07-09 NOTE — PROGRESS NOTES
Chief Complaint   Patient presents with    Follow-Up     Dx/Last seen: Mild intermittent asthma without complication 6/4/24 Alfreda Joseph     Medication Refill     fluticasone furoate (ARNUITY ELLIPTA) 100 MCG/ACT AEROSOL POWDER, BREATH ACTIVATED inhaler send it to [facility] pharmacy        HPI:  Karli Berrios is a 84 y.o. year old female here today for follow-up on asthma.  Last OV 6/4/24     MMRC stGstrstastdstest:st st1st Exacerbations this year: 0    She remains on Arnuity 100mcg 1 puff once daily, and PRATIMA as needed.      Interval Events:  7/9/25: Breathing to be stable and doing well with Arnuity inhaler.  She is requesting refills.  She has had no exacerbations.  She has been dealing with eye surgery and a detached retina.  She has blurry vision and macular degeneration.  She is also having new leg pain which she is going to speak to her primary care about.-Seasonal allergies or other respiratory symptoms.  6/4/24: She notes moving up to [facility] for the summer being exposed to significant massive tree pollen but trying to stay inside.  She has had some increased cough with sinus drainage and wheezing.  She notes phlegm to be clear but can be thick and difficult to get out at times.  She was experiencing some shortness of breath and used albuterol 1 time and has felt fine since.  She is back down in [place] for multiple doctors appointments.  She has a history of back pain managed by a spine physician and also arthritis and eye injections for glaucoma.  She would like to have an antibiotic and steroid on hand in the event of getting bronchitis although she has not suffered from this in quite some time.  She would like to avoid nasal sprays that could impair her eyes.  She does have a sinus rinse and saline spray on hand but has not been using these.     PULM HX:  Former smoker, quit 1977 with 15PYH.  Spirometry August 2018 indicated FEV1 of 1.77 L 77% predicted, FEV1 FVC ratio of 77, no significant bronchodilator response.   She uses Flovent 110 mcg 2 puffs twice a day, rare use of albuterol.  She rarely uses PRATIMA.  PFT 4/10/24 normal airflows and no significant bronchodilator response.  Lung volumes normal and DLCO normal.      She does have a history of tiny bilateral pulmonary nodules stable on chest imaging from 2009 through 2011.  Chest x-ray in April 2019 was unremarkable.   CT Chest 8/5/2021 indicated no rib fractures, no acute cardiopulmonary process, small tree-in-bud nodules in the anterior inferior right upper lobe and a 3 mm nodule in the inferior right upper lobe that is stable.   History of lung nodules with abnormal findings on CT of chest July 2023.  CT chest 7/11/23:  1.  Multifocal patchy groundglass opacities within both lungs predominantly in the upper lobes consistent with multifocal pneumonitis possible atypical infection.  2.  Trace left pleural effusion.  3.  4.1 mm nodule right lower lobe and smaller nodules within the lungs unchanged.  4.  Tree-in-bud opacities anterior inferior right upper lobe unchanged.  5.  Coronary artery calcifications.  History by PCP for pneumonia and repeat imaging obtained  CT chest w/contrast 8/6/23:  1.  No acute abnormality in the chest. Previously described pulmonary opacities have resolved.  2.  Stable tiny pulmonary nodules are stable since the prior studies and are considered benign. They do not require further follow-up.  Reviewed with patient.  No further follow-up necessary at this time.    ROS: As per HPI and otherwise negative if not stated.    Past Medical History[1]    Past Surgical History[2]    Family History   Problem Relation Age of Onset    Arthritis Mother     Hyperlipidemia Mother     Glaucoma Maternal Grandfather        Social History     Socioeconomic History    Marital status:      Spouse name: Not on file    Number of children: Not on file    Years of education: Not on file    Highest education level: 12th grade   Occupational History    Not on file    Tobacco Use    Smoking status: Former     Current packs/day: 0.00     Average packs/day: 1 pack/day for 20.0 years (20.0 ttl pk-yrs)     Types: Cigarettes     Start date: 1957     Quit date: 1977     Years since quittin.1     Passive exposure: Past    Smokeless tobacco: Never   Vaping Use    Vaping status: Never Used   Substance and Sexual Activity    Alcohol use: Not Currently     Comment: occ    Drug use: No    Sexual activity: Not Currently     Partners: Male   Other Topics Concern    Not on file   Social History Narrative    Not on file     Social Drivers of Health     Financial Resource Strain: Low Risk  (10/16/2023)    Overall Financial Resource Strain (CARDIA)     Difficulty of Paying Living Expenses: Not hard at all   Food Insecurity: No Food Insecurity (10/16/2023)    Hunger Vital Sign     Worried About Running Out of Food in the Last Year: Never true     Ran Out of Food in the Last Year: Never true   Transportation Needs: No Transportation Needs (10/16/2023)    PRAPARE - Transportation     Lack of Transportation (Medical): No     Lack of Transportation (Non-Medical): No   Physical Activity: Insufficiently Active (10/16/2023)    Exercise Vital Sign     Days of Exercise per Week: 2 days     Minutes of Exercise per Session: 30 min   Stress: Stress Concern Present (10/16/2023)    [employer] - Occupational Stress Questionnaire     Feeling of Stress : To some extent   Social Connections: Socially Isolated (10/16/2023)    Social Connection and Isolation Panel [NHANES]     Frequency of Communication with Friends and Family: More than three times a week     Frequency of Social Gatherings with Friends and Family: Once a week     Attends [employer] Services: Never     Active Member of Clubs or Organizations: No     Attends Club or Organization Meetings: Never     Marital Status:    Intimate Partner Violence: Not on file   Housing Stability: Low Risk  (2024)     "Housing Stability Vital Sign     Unable to Pay for Housing in the Last Year: No     Number of Places Lived in the Last Year: 1     Unstable Housing in the Last Year: No       Allergies as of 07/09/2025 - Reviewed 07/09/2025   Allergen Reaction Noted    Bactrim ds Unspecified 11/14/2022    Bactrim [sulfamethoxazole w-trimethoprim]  09/27/2018    Codeine Diarrhea 06/26/2022    Denosumab Unspecified 10/05/2020    Atenolol Unspecified 10/15/2014        Vitals:  /68   Pulse 64   Ht 1.676 m (5' 6\")   Wt 77.1 kg (170 lb)   SpO2 95%     Current medications as of today Current Medications[3]      Physical Exam:   Gen:           Alert and oriented, No apparent distress. Mood and affect appropriate, normal interaction with examiner.  Eyes:          PERRL, EOM intact, sclere white, conjunctive moist. Glasses.  Ears:          Not examined.   Hearing:     Grossly intact.  Nose:          Normal, no lesions or deformities.  Dentition:    Not examined.   Oropharynx:   Not examined.   Mallampati Classification: Not examined.   Neck:        Supple, trachea midline, no masses.  Respiratory Effort: No intercostal retractions or use of accessory muscles.   Lung Auscultation:      Clear to auscultation bilaterally; no rales, rhonchi or wheezing.  CV:            Regular rate and rhythm. No murmurs, rubs or gallops.  Abd:           Not examined.  Lymphadenopathy: Not examined.   Gait and Station: Normal.  Digits and Nails: No clubbing, cyanosis, petechiae, or nodes.   Cranial Nerves: II-XII grossly intact.  Skin:        No rashes, lesions or ulcers noted.               Ext:           No cyanosis or edema.      Assessment:  1. Mild intermittent asthma without complication        2. BMI 27.0-27.9,adult        3. Former smoker                 Immunizations:    Flu:9/2024  Pneumovax 23:2008  Prevnar 13:2015  PCV 20: not due  COVID-19: 2022    Plan:  Scarlet is clinically stable.  She will continue with current bronchodilators.  Encourage " healthy diet and activity for conditioning.  Follow-up in 1 year with compliance report, sooner if needed.    Please note that this dictation was created using voice recognition software. I have made every reasonable attempt to correct obvious errors, but it is possible there are errors of grammar and possibly content that I did not discover before finalizing the note.           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    Hypothyroidism     Lung nodules 07/2009    stable 2010    Macular pucker of both eyes     Osteoporosis     Pain in the chest 05/09/2023    Strain of left trapezius muscle     Urinary tract infection     H/O   [2]   Past Surgical History:  Procedure Laterality Date    CATARACT EXTRACTION WITH IOL Bilateral 2007    CARPAL TUNNEL RELEASE  2006    ABDOMINAL EXPLORATION      peritonitis    ABDOMINAL HYSTERECTOMY TOTAL      APPENDECTOMY      EYE SURGERY Bilateral     Vitrectomy-OD 2008 & OS 2011    HEMORRHOIDECTOMY      OTHER      D & C    OTHER      FISTULECTOMY    OTHER      HYSTERECTOMY    OTHER      LEFTY BREAST LIPOMA RESECTION    OTHER      H/O Oral Surgery-Dental Extractions & Implants.   [3]   Current Outpatient Medications   Medication Sig Dispense Refill    fluticasone furoate (ARNUITY ELLIPTA) 100 MCG/ACT AEROSOL POWDER, BREATH ACTIVATED inhaler INHALE 1 PUFF EVERY DAY. RINSE MOUTH AFTER EACH USE. (NEED MD APPOINTMENT FOR REFILLS) 90 Each 0    losartan (COZAAR) 100 MG Tab Take 1 Tablet by mouth every day. 90 Tablet 2    metoprolol  tartrate (LOPRESSOR) 50 MG Tab Take 1 Tablet by mouth 2 times a day. 180 Tablet 2    ezetimibe (ZETIA) 10 MG Tab Take 1 Tablet by mouth every day. 90 Tablet 2    ARMOUR THYROID 60 MG Tab TAKE 1 TABLET EVERY DAY ON 5 DAYS A WEEK 65 Tablet 3    thyroid (ARMOUR THYROID) 90 MG Tab TAKE 1 TABLET DAILY ON 2 DAYS PER WEEK 26 Tablet 3    rosuvastatin (CRESTOR) 20 MG Tab Take 1 Tablet by mouth every evening. 90 Tablet 3    fluticasone (FLONASE) 50 MCG/ACT nasal spray Administer 1 Spray into affected nostril(S) every day. 16 g 1    nitroglycerin (NITROSTAT) 0.4 MG SL Tab Place 1 Tablet under the tongue as needed for Chest Pain (may repeat after 5 minutes may repeat x 3, if chest apin does not resolve see emergency care). 25 Tablet 3    omeprazole (PRILOSEC) 20 MG delayed-release capsule Take 1 Capsule by mouth every day. 30 Capsule 1    aspirin EC (ECOTRIN) 81 MG Tablet Delayed Response Take 1 Tablet by mouth every day. 100 Tablet 0    Faricimab-svoa (VABYSMO) 6 MG/0.05ML Solution Administer 6 mg into both eyes see administration instructions. Every 5 to 6 weeks  Due May 26th 2023      Lutein-Zeaxanthin 25-5 MG Cap Take  by mouth every day.      acetaminophen (TYLENOL) 500 MG Tab Take 1,000 mg by mouth every morning.      Calcium Carbonate-Vit D-Min (CALCIUM 1200 PO) Take 1 tablet  by mouth 2 times a day.      Cyanocobalamin (VITAMIN B12 PO) Take 1,000 mcg by mouth every day.      Turmeric 500 MG Tab Take 500 mg by mouth 2 times a day.      vitamin D (CHOLECALCIFEROL) 1000 UNIT TABS Take 1,000 Units by mouth 2 times a day.      MAGNESIUM PO Take 1 Tablet by mouth every day.      Bioflavonoid Products (LISSY-C) TABS Take 1,000 Tablets by mouth 2 times a day.      Multiple Vitamin (MULTIVITAMIN PO) Take 1 tablet by mouth every day.      fluticasone furoate (ARNUITY ELLIPTA) 100 MCG/ACT AEROSOL POWDER, BREATH ACTIVATED inhaler Inhale 1 Puff every day. Rinse mouth after each use. 90 Each 0    Glucosamine-Chondroitin (MOVE FREE  PO) Take 1 Tablet by mouth every day.       No current facility-administered medications for this visit.

## 2025-07-15 ENCOUNTER — OFFICE VISIT (OUTPATIENT)
Dept: MEDICAL GROUP | Facility: LAB | Age: 85
End: 2025-07-15
Payer: MEDICARE

## 2025-07-15 VITALS
SYSTOLIC BLOOD PRESSURE: 110 MMHG | RESPIRATION RATE: 16 BRPM | WEIGHT: 174 LBS | OXYGEN SATURATION: 98 % | HEIGHT: 66 IN | DIASTOLIC BLOOD PRESSURE: 60 MMHG | BODY MASS INDEX: 27.97 KG/M2 | TEMPERATURE: 97.2 F | HEART RATE: 66 BPM

## 2025-07-15 DIAGNOSIS — M79.10 MYALGIA: Primary | ICD-10-CM

## 2025-07-15 DIAGNOSIS — M48.061 SPINAL STENOSIS OF LUMBAR REGION, UNSPECIFIED WHETHER NEUROGENIC CLAUDICATION PRESENT: ICD-10-CM

## 2025-07-15 PROCEDURE — 3078F DIAST BP <80 MM HG: CPT | Performed by: FAMILY MEDICINE

## 2025-07-15 PROCEDURE — 99213 OFFICE O/P EST LOW 20 MIN: CPT | Performed by: FAMILY MEDICINE

## 2025-07-15 PROCEDURE — 3074F SYST BP LT 130 MM HG: CPT | Performed by: FAMILY MEDICINE

## 2025-07-15 ASSESSMENT — FIBROSIS 4 INDEX: FIB4 SCORE: 1.67

## 2025-07-15 NOTE — PROGRESS NOTES
"Subjective:     Chief Complaint   Patient presents with    Medication Management     Go over medications    Joint Pain         HPI:   Karli presents today for back and leg pain. Back pain on and off throughout the day ,needs to lie down on heating pad for relief.   Leg pain mostly at night in bed trying to fall asleep.     Total cholesterol 116, Trig 49, HDL 63, LDL 43. April 2025.     Had MRI lumbar spine Last year with [facility]. Sowed lots of arthritis, mild to moderate foraminal and central stenosis . Worse with activities. Did have ANOOP with Dr Collado. This didn't help at all. Didn't go back after. May 2024.     She is worried about her statin. Was started on rosuvastatin in November 2024.       Current Medications and Prescriptions Ordered in Epic[1]      ROS:  Gen: no fevers/chills, no changes in weight  Eyes: no changes in vision  ENT: no sore throat, no hearing loss, no bloody nose  Pulm: no sob, no cough  CV: no chest pain, no palpitations  GI: no nausea/vomiting, no diarrhea  : no dysuria  MSk: no myalgias  Skin: no rash  Neuro: no headaches, no numbness/tingling  Heme/Lymph: no easy bruising      Objective:     Exam:  /60   Pulse 66   Temp 36.2 °C (97.2 °F)   Resp 16   Ht 1.676 m (5' 6\")   Wt 78.9 kg (174 lb)   SpO2 98%   BMI 28.08 kg/m²  Body mass index is 28.08 kg/m².    Gen: AAOx3, NAD, well appearing  HEENT: NCAT, EOMI, Nares patent, Mucosa moist  Resp: Normal chest wall rise and fall, not SOB, no tachypnea  Skin: no rash or abnormality of visible skin.   Psych: normal speech, not slurred, good insight, affect full  MSK: Moves all four limbs equally and normally, gait normal        Assessment & Plan:     84 y.o. female with the following -     1. Myalgia (Primary)  Discussed potential for statins exacerbating myalgia.  Her cholesterol has really been quite low for some time now.  She is also on Zetia.  She will go and stop the rosuvastatin for the next month and see how she feels.  If she " does feel a lot better she will stay off of this and then we can recheck her cholesterol in 3 months.  I am quite sure that cardiology will be upset with this but if she is feeling better and has better quality of life this does tend to mean more long-term.    2. Spinal stenosis of lumbar region, unspecified whether neurogenic claudication present  She does have a history of a spinal stenosis and also foraminal stenosis.  Epidural injection in the past has not been very helpful.  May need to have her reevaluated from that standpoint.  MRI and procedure note from her ANOOP reviewed today in the clinic.  These are scanned into her chart as well.  If stopping her statin does not remedy a lot of her pain we may need to have her seen by physiatry otherwise to decipher new source.  Neurogenic claudication is certainly in the differential.            Return in about 4 weeks (around 8/12/2025) for follow up meds.    Please note that this dictation was created using voice recognition software. I have made every reasonable attempt to correct obvious errors, but I expect that there are errors of grammar and possibly content that I did not discover before finalizing the note.             [1]   Current Outpatient Medications Ordered in Epic   Medication Sig Dispense Refill    fluticasone furoate (ARNUITY ELLIPTA) 100 MCG/ACT AEROSOL POWDER, BREATH ACTIVATED inhaler INHALE 1 PUFF EVERY DAY. RINSE MOUTH AFTER EACH USE. (NEED MD APPOINTMENT FOR REFILLS) 90 Each 0    losartan (COZAAR) 100 MG Tab Take 1 Tablet by mouth every day. 90 Tablet 2    metoprolol tartrate (LOPRESSOR) 50 MG Tab Take 1 Tablet by mouth 2 times a day. 180 Tablet 2    ezetimibe (ZETIA) 10 MG Tab Take 1 Tablet by mouth every day. 90 Tablet 2    ARMOUR THYROID 60 MG Tab TAKE 1 TABLET EVERY DAY ON 5 DAYS A WEEK 65 Tablet 3    rosuvastatin (CRESTOR) 20 MG Tab Take 1 Tablet by mouth every evening. 90 Tablet 3    nitroglycerin (NITROSTAT) 0.4 MG SL Tab Place 1 Tablet  under the tongue as needed for Chest Pain (may repeat after 5 minutes may repeat x 3, if chest apin does not resolve see emergency care). 25 Tablet 3    omeprazole (PRILOSEC) 20 MG delayed-release capsule Take 1 Capsule by mouth every day. 30 Capsule 1    aspirin EC (ECOTRIN) 81 MG Tablet Delayed Response Take 1 Tablet by mouth every day. 100 Tablet 0    Glucosamine-Chondroitin (MOVE FREE PO) Take 1 Tablet by mouth every day.      Faricimab-svoa (VABYSMO) 6 MG/0.05ML Solution Administer 6 mg into both eyes see administration instructions. Every 5 to 6 weeks  Due May 26th 2023      Lutein-Zeaxanthin 25-5 MG Cap Take  by mouth every day.      Calcium Carbonate-Vit D-Min (CALCIUM 1200 PO) Take 1 tablet  by mouth 2 times a day.      Cyanocobalamin (VITAMIN B12 PO) Take 1,000 mcg by mouth every day.      Turmeric 500 MG Tab Take 500 mg by mouth 2 times a day.      vitamin D (CHOLECALCIFEROL) 1000 UNIT TABS Take 1,000 Units by mouth 2 times a day.      MAGNESIUM PO Take 1 Tablet by mouth every day.      Bioflavonoid Products (LISSY-C) TABS Take 1,000 Tablets by mouth 2 times a day.      Multiple Vitamin (MULTIVITAMIN PO) Take 1 tablet by mouth every day.      fluticasone furoate (ARNUITY ELLIPTA) 100 MCG/ACT AEROSOL POWDER, BREATH ACTIVATED inhaler Inhale 1 Puff every day. Rinse mouth after each use. 90 Each 3    thyroid (ARMOUR THYROID) 90 MG Tab TAKE 1 TABLET DAILY ON 2 DAYS PER WEEK 26 Tablet 3    fluticasone (FLONASE) 50 MCG/ACT nasal spray Administer 1 Spray into affected nostril(S) every day. 16 g 1    acetaminophen (TYLENOL) 500 MG Tab Take 1,000 mg by mouth every morning.       No current [facility]-ordered facility-administered medications on file.

## 2025-08-15 ENCOUNTER — OFFICE VISIT (OUTPATIENT)
Dept: MEDICAL GROUP | Facility: LAB | Age: 85
End: 2025-08-15
Payer: MEDICARE

## 2025-08-15 VITALS
TEMPERATURE: 97.3 F | HEIGHT: 66 IN | RESPIRATION RATE: 16 BRPM | SYSTOLIC BLOOD PRESSURE: 112 MMHG | HEART RATE: 60 BPM | WEIGHT: 174 LBS | BODY MASS INDEX: 27.97 KG/M2 | DIASTOLIC BLOOD PRESSURE: 60 MMHG | OXYGEN SATURATION: 95 %

## 2025-08-15 DIAGNOSIS — Z95.5 S/P CORONARY ARTERY STENT PLACEMENT: ICD-10-CM

## 2025-08-15 DIAGNOSIS — M81.0 AGE-RELATED OSTEOPOROSIS WITHOUT CURRENT PATHOLOGICAL FRACTURE: Primary | ICD-10-CM

## 2025-08-15 DIAGNOSIS — E03.9 ACQUIRED HYPOTHYROIDISM: ICD-10-CM

## 2025-08-15 PROCEDURE — 99214 OFFICE O/P EST MOD 30 MIN: CPT | Performed by: FAMILY MEDICINE

## 2025-08-15 PROCEDURE — 3078F DIAST BP <80 MM HG: CPT | Performed by: FAMILY MEDICINE

## 2025-08-15 PROCEDURE — 3074F SYST BP LT 130 MM HG: CPT | Performed by: FAMILY MEDICINE

## 2025-08-15 RX ORDER — THYROID 90 MG/1
TABLET ORAL
Qty: 26 TABLET | Refills: 3 | Status: SHIPPED | OUTPATIENT
Start: 2025-08-15

## 2025-08-15 RX ORDER — THYROID,PORK 60 MG
60 TABLET ORAL DAILY
Qty: 65 TABLET | Refills: 3 | Status: SHIPPED | OUTPATIENT
Start: 2025-08-15

## 2025-08-15 ASSESSMENT — FIBROSIS 4 INDEX: FIB4 SCORE: 1.67

## 2025-08-18 ENCOUNTER — PATIENT MESSAGE (OUTPATIENT)
Dept: CARDIOLOGY | Facility: MEDICAL CENTER | Age: 85
End: 2025-08-18
Payer: MEDICARE

## 2025-08-19 ENCOUNTER — PATIENT MESSAGE (OUTPATIENT)
Dept: CARDIOLOGY | Facility: MEDICAL CENTER | Age: 85
End: 2025-08-19
Payer: MEDICARE

## 2025-08-20 ENCOUNTER — PATIENT MESSAGE (OUTPATIENT)
Dept: CARDIOLOGY | Facility: MEDICAL CENTER | Age: 85
End: 2025-08-20
Payer: MEDICARE

## 2025-08-21 ENCOUNTER — PATIENT MESSAGE (OUTPATIENT)
Dept: CARDIOLOGY | Facility: MEDICAL CENTER | Age: 85
End: 2025-08-21
Payer: MEDICARE

## 2025-08-21 DIAGNOSIS — E78.5 DYSLIPIDEMIA: Primary | ICD-10-CM

## 2025-08-21 RX ORDER — ROSUVASTATIN CALCIUM 10 MG/1
10 TABLET, COATED ORAL EVERY EVENING
Qty: 90 TABLET | Refills: 0 | Status: SHIPPED | OUTPATIENT
Start: 2025-08-21 | End: 2025-11-19